# Patient Record
Sex: FEMALE | URBAN - METROPOLITAN AREA
[De-identification: names, ages, dates, MRNs, and addresses within clinical notes are randomized per-mention and may not be internally consistent; named-entity substitution may affect disease eponyms.]

---

## 2018-11-01 ENCOUNTER — HOSPITAL ENCOUNTER (OUTPATIENT)
Age: 58
Setting detail: SPECIMEN
Discharge: HOME | End: 2018-11-01
Payer: COMMERCIAL

## 2018-11-06 LAB — SURGICAL PATHOLOGY REPORT: NORMAL

## 2020-01-16 ENCOUNTER — HOSPITAL ENCOUNTER (OUTPATIENT)
Age: 60
Setting detail: SPECIMEN
Discharge: HOME | End: 2020-01-16
Payer: COMMERCIAL

## 2020-01-22 LAB — SURGICAL PATHOLOGY REPORT: NORMAL

## 2023-10-16 ENCOUNTER — APPOINTMENT (OUTPATIENT)
Dept: GENERAL RADIOLOGY | Age: 63
End: 2023-10-16

## 2023-10-16 ENCOUNTER — HOSPITAL ENCOUNTER (INPATIENT)
Age: 63
LOS: 2 days | Discharge: HOME | End: 2023-10-18
Attending: EMERGENCY MEDICINE | Admitting: PSYCHIATRY & NEUROLOGY

## 2023-10-16 DIAGNOSIS — R26.2 INABILITY TO WALK: Primary | ICD-10-CM

## 2023-10-16 DIAGNOSIS — R26.81 GAIT INSTABILITY: ICD-10-CM

## 2023-10-16 LAB
ALBUMIN SERPL-MCNC: 3.1 G/DL (ref 3.5–5.2)
ALBUMIN/GLOB SERPL: 0.8 {RATIO} (ref 1–2.5)
ALP SERPL-CCNC: 330 U/L (ref 35–104)
ALT SERPL-CCNC: 32 U/L (ref 5–33)
ANION GAP SERPL CALCULATED.3IONS-SCNC: 19 MMOL/L (ref 9–17)
AST SERPL-CCNC: 52 U/L
BASOPHILS # BLD: 0.07 K/UL (ref 0–0.2)
BASOPHILS NFR BLD: 1 % (ref 0–2)
BILIRUB SERPL-MCNC: 0.6 MG/DL (ref 0.3–1.2)
BUN SERPL-MCNC: 9 MG/DL (ref 8–23)
CALCIUM SERPL-MCNC: 9 MG/DL (ref 8.6–10.4)
CHLORIDE SERPL-SCNC: 95 MMOL/L (ref 98–107)
CO2 SERPL-SCNC: 17 MMOL/L (ref 20–31)
CREAT SERPL-MCNC: 0.5 MG/DL (ref 0.5–0.9)
EOSINOPHIL # BLD: 0.06 K/UL (ref 0–0.44)
EOSINOPHILS RELATIVE PERCENT: 1 % (ref 1–4)
ERYTHROCYTE [DISTWIDTH] IN BLOOD BY AUTOMATED COUNT: 12.3 % (ref 11.8–14.4)
GFR SERPL CREATININE-BSD FRML MDRD: >60 ML/MIN/1.73M2
GLUCOSE SERPL-MCNC: 79 MG/DL (ref 70–99)
HCT VFR BLD AUTO: 38.8 % (ref 36.3–47.1)
HGB BLD-MCNC: 13.1 G/DL (ref 11.9–15.1)
IMM GRANULOCYTES # BLD AUTO: 0.03 K/UL (ref 0–0.3)
IMM GRANULOCYTES NFR BLD: 0 %
LYMPHOCYTES NFR BLD: 1.28 K/UL (ref 1.1–3.7)
LYMPHOCYTES RELATIVE PERCENT: 13 % (ref 24–43)
MAGNESIUM SERPL-MCNC: 2.2 MG/DL (ref 1.6–2.6)
MCH RBC QN AUTO: 32.4 PG (ref 25.2–33.5)
MCHC RBC AUTO-ENTMCNC: 33.8 G/DL (ref 28.4–34.8)
MCV RBC AUTO: 96 FL (ref 82.6–102.9)
MONOCYTES NFR BLD: 0.65 K/UL (ref 0.1–1.2)
MONOCYTES NFR BLD: 7 % (ref 3–12)
NEUTROPHILS NFR BLD: 78 % (ref 36–65)
NEUTS SEG NFR BLD: 7.56 K/UL (ref 1.5–8.1)
NRBC BLD-RTO: 0 PER 100 WBC
PLATELET # BLD AUTO: 368 K/UL (ref 138–453)
PMV BLD AUTO: 9.7 FL (ref 8.1–13.5)
POTASSIUM SERPL-SCNC: 3.6 MMOL/L (ref 3.7–5.3)
PROT SERPL-MCNC: 6.8 G/DL (ref 6.4–8.3)
RBC # BLD AUTO: 4.04 M/UL (ref 3.95–5.11)
SODIUM SERPL-SCNC: 131 MMOL/L (ref 135–144)
TROPONIN I SERPL HS-MCNC: 11 NG/L (ref 0–14)
TROPONIN I SERPL HS-MCNC: 11 NG/L (ref 0–14)
TSH SERPL DL<=0.05 MIU/L-ACNC: 4.8 UIU/ML (ref 0.3–5)
WBC OTHER # BLD: 9.7 K/UL (ref 3.5–11.3)

## 2023-10-16 PROCEDURE — 83735 ASSAY OF MAGNESIUM: CPT

## 2023-10-16 PROCEDURE — 2060000000 HC ICU INTERMEDIATE R&B

## 2023-10-16 PROCEDURE — 84443 ASSAY THYROID STIM HORMONE: CPT

## 2023-10-16 PROCEDURE — 84484 ASSAY OF TROPONIN QUANT: CPT

## 2023-10-16 PROCEDURE — 93005 ELECTROCARDIOGRAM TRACING: CPT | Performed by: STUDENT IN AN ORGANIZED HEALTH CARE EDUCATION/TRAINING PROGRAM

## 2023-10-16 PROCEDURE — 83036 HEMOGLOBIN GLYCOSYLATED A1C: CPT

## 2023-10-16 PROCEDURE — 71046 X-RAY EXAM CHEST 2 VIEWS: CPT

## 2023-10-16 PROCEDURE — 80053 COMPREHEN METABOLIC PANEL: CPT

## 2023-10-16 PROCEDURE — 99285 EMERGENCY DEPT VISIT HI MDM: CPT

## 2023-10-16 PROCEDURE — 85025 COMPLETE CBC W/AUTO DIFF WBC: CPT

## 2023-10-16 PROCEDURE — 6370000000 HC RX 637 (ALT 250 FOR IP)

## 2023-10-16 PROCEDURE — 80061 LIPID PANEL: CPT

## 2023-10-16 RX ORDER — LORAZEPAM 2 MG/ML
1 INJECTION INTRAMUSCULAR EVERY 5 MIN PRN
Status: DISCONTINUED | OUTPATIENT
Start: 2023-10-16 | End: 2023-10-18 | Stop reason: HOSPADM

## 2023-10-16 RX ORDER — LEVOTHYROXINE SODIUM 137 UG/1
TABLET ORAL DAILY
COMMUNITY
Start: 2023-01-13

## 2023-10-16 RX ORDER — ONDANSETRON 2 MG/ML
4 INJECTION INTRAMUSCULAR; INTRAVENOUS EVERY 6 HOURS PRN
Status: DISCONTINUED | OUTPATIENT
Start: 2023-10-16 | End: 2023-10-18 | Stop reason: HOSPADM

## 2023-10-16 RX ORDER — ONDANSETRON 4 MG/1
4 TABLET, ORALLY DISINTEGRATING ORAL EVERY 8 HOURS PRN
Status: DISCONTINUED | OUTPATIENT
Start: 2023-10-16 | End: 2023-10-18 | Stop reason: HOSPADM

## 2023-10-16 RX ORDER — ENOXAPARIN SODIUM 100 MG/ML
40 INJECTION SUBCUTANEOUS DAILY
Status: DISCONTINUED | OUTPATIENT
Start: 2023-10-17 | End: 2023-10-18 | Stop reason: HOSPADM

## 2023-10-16 RX ORDER — SODIUM CHLORIDE 0.9 % (FLUSH) 0.9 %
5-40 SYRINGE (ML) INJECTION PRN
Status: DISCONTINUED | OUTPATIENT
Start: 2023-10-16 | End: 2023-10-18 | Stop reason: HOSPADM

## 2023-10-16 RX ORDER — POLYETHYLENE GLYCOL 3350 17 G/17G
17 POWDER, FOR SOLUTION ORAL DAILY PRN
Status: DISCONTINUED | OUTPATIENT
Start: 2023-10-16 | End: 2023-10-18 | Stop reason: HOSPADM

## 2023-10-16 RX ORDER — ACETAMINOPHEN 325 MG/1
650 TABLET ORAL EVERY 6 HOURS PRN
Status: DISCONTINUED | OUTPATIENT
Start: 2023-10-16 | End: 2023-10-18 | Stop reason: HOSPADM

## 2023-10-16 RX ORDER — SODIUM CHLORIDE 9 MG/ML
INJECTION, SOLUTION INTRAVENOUS PRN
Status: DISCONTINUED | OUTPATIENT
Start: 2023-10-16 | End: 2023-10-18 | Stop reason: HOSPADM

## 2023-10-16 RX ORDER — ACETAMINOPHEN 650 MG/1
650 SUPPOSITORY RECTAL EVERY 6 HOURS PRN
Status: DISCONTINUED | OUTPATIENT
Start: 2023-10-16 | End: 2023-10-18 | Stop reason: HOSPADM

## 2023-10-16 RX ORDER — LEVOTHYROXINE SODIUM 137 UG/1
137 TABLET ORAL DAILY
Status: DISCONTINUED | OUTPATIENT
Start: 2023-10-17 | End: 2023-10-18 | Stop reason: HOSPADM

## 2023-10-16 RX ORDER — SODIUM CHLORIDE 0.9 % (FLUSH) 0.9 %
5-40 SYRINGE (ML) INJECTION EVERY 12 HOURS SCHEDULED
Status: DISCONTINUED | OUTPATIENT
Start: 2023-10-16 | End: 2023-10-18 | Stop reason: HOSPADM

## 2023-10-16 RX ADMIN — ACETAMINOPHEN 650 MG: 325 TABLET ORAL at 23:22

## 2023-10-16 ASSESSMENT — ENCOUNTER SYMPTOMS
RHINORRHEA: 0
SHORTNESS OF BREATH: 0
BACK PAIN: 0
NAUSEA: 0
SORE THROAT: 0
ABDOMINAL PAIN: 0
COUGH: 0
VOMITING: 0

## 2023-10-16 ASSESSMENT — PAIN SCALES - GENERAL
PAINLEVEL_OUTOF10: 8
PAINLEVEL_OUTOF10: 6

## 2023-10-16 ASSESSMENT — PAIN - FUNCTIONAL ASSESSMENT: PAIN_FUNCTIONAL_ASSESSMENT: 0-10

## 2023-10-16 NOTE — ED PROVIDER NOTES
[address] ED  Emergency Department Encounter  Emergency Medicine Resident     Pt Travon Mock  MRN: [id number]  [facility] 1960  Date of evaluation: 10/16/23  PCP:  Gudelia Myers MD  Note Started: 4:03 PM EDT      CHIEF COMPLAINT       Chief Complaint   Patient presents with    Leg Pain    Arm Pain    Numbness     Pt states neuropathy     Extremity Weakness       HISTORY OF PRESENT ILLNESS  (Location/Symptom, Timing/Onset, Context/Setting, Quality, Duration, Modifying Factors, Severity.)      Aiden Colvin is a 59 y.o. female who presents with inability to ambulate. Patient states beginning in July she began having issues with neuropathy in her hands and feet. Patient states that she had MRIs of her spine done, showed some mild stenosis, followed up with neurosurgery and they determined that problem was not operative and recommended outpatient follow-up with neurology. Patient states she has an appointment scheduled with neurology however still is a month away, has not yet been able to see them. Patient states starting a few days ago she had significant progression in her neuropathy in her bilateral lower extremities and has having difficulty feeling her legs. States she feels off balance, feels weak. Having significant difficulty walking due to this. Denies any dizziness. No preceding viral-like symptoms. No fevers or chills. Patient states she is never an issue like this in the past.    PAST MEDICAL / SURGICAL / SOCIAL / FAMILY HISTORY      has no past medical history on file. has no past surgical history on file.       Social History     Socioeconomic History    Marital status:      Spouse name: Not on file    Number of children: Not on file    Years of education: Not on file    Highest education level: Not on file   Occupational History    Not on file   Tobacco Use    Smoking status: Not on file    Smokeless tobacco: Not on file   Substance and Sexual Activity

## 2023-10-16 NOTE — ED NOTES
Pt came to ed via triage w complaint of bilateral arm/leg numbness + tingling. Pt states that she has neuropathy, however has been struggling w unsteady gait since Thursday and wanted to be seen here in the ED. States she tried to make an appt w neuro however their earliest appt is in November. Denies previous stroke hx, cardiac hx, or resp issues. Denies pertinent medical conditions besides neuropathy. - blood thinner use. Not a stroke per Dr Adrien Bob. NIH 0 VSS, NAD, AOx4. Patient resting in bed, respirations even and unlabored. Call light in reach.        Milton Marvin RN  10/16/23 6558

## 2023-10-16 NOTE — ED TRIAGE NOTES
Pt to ED for arm/leg numbness tingling and pain and weakness. Pt states shes had it since July. Pt states she is suppose to see neuro end of November but cannot wait.

## 2023-10-17 ENCOUNTER — APPOINTMENT (OUTPATIENT)
Dept: CT IMAGING | Age: 63
End: 2023-10-17

## 2023-10-17 PROBLEM — R26.2 INABILITY TO WALK: Status: ACTIVE | Noted: 2023-10-17

## 2023-10-17 LAB
ANION GAP SERPL CALCULATED.3IONS-SCNC: 9 MMOL/L (ref 9–17)
BASOPHILS # BLD: 0.05 K/UL (ref 0–0.2)
BASOPHILS NFR BLD: 1 % (ref 0–2)
BUN SERPL-MCNC: 11 MG/DL (ref 8–23)
CALCIUM SERPL-MCNC: 8.8 MG/DL (ref 8.6–10.4)
CHLORIDE SERPL-SCNC: 98 MMOL/L (ref 98–107)
CHOLEST SERPL-MCNC: 188 MG/DL
CHOLESTEROL/HDL RATIO: 2
CO2 SERPL-SCNC: 25 MMOL/L (ref 20–31)
CREAT SERPL-MCNC: 0.6 MG/DL (ref 0.5–0.9)
EKG ATRIAL RATE: 83 BPM
EKG P AXIS: 59 DEGREES
EKG P-R INTERVAL: 144 MS
EKG Q-T INTERVAL: 428 MS
EKG QRS DURATION: 74 MS
EKG QTC CALCULATION (BAZETT): 502 MS
EKG R AXIS: -8 DEGREES
EKG T AXIS: -4 DEGREES
EKG VENTRICULAR RATE: 83 BPM
EOSINOPHIL # BLD: 0.06 K/UL (ref 0–0.44)
EOSINOPHILS RELATIVE PERCENT: 1 % (ref 1–4)
ERYTHROCYTE [DISTWIDTH] IN BLOOD BY AUTOMATED COUNT: 12.4 % (ref 11.8–14.4)
EST. AVERAGE GLUCOSE BLD GHB EST-MCNC: 88 MG/DL
FOLATE SERPL-MCNC: 8.5 NG/ML
GFR SERPL CREATININE-BSD FRML MDRD: >60 ML/MIN/1.73M2
GLUCOSE SERPL-MCNC: 88 MG/DL (ref 70–99)
HBA1C MFR BLD: 4.7 % (ref 4–6)
HCT VFR BLD AUTO: 35.7 % (ref 36.3–47.1)
HDLC SERPL-MCNC: 96 MG/DL
HGB BLD-MCNC: 12.1 G/DL (ref 11.9–15.1)
IMM GRANULOCYTES # BLD AUTO: 0.03 K/UL (ref 0–0.3)
IMM GRANULOCYTES NFR BLD: 0 %
LDLC SERPL CALC-MCNC: 72 MG/DL (ref 0–130)
LYMPHOCYTES NFR BLD: 1.55 K/UL (ref 1.1–3.7)
LYMPHOCYTES RELATIVE PERCENT: 21 % (ref 24–43)
MCH RBC QN AUTO: 32.2 PG (ref 25.2–33.5)
MCHC RBC AUTO-ENTMCNC: 33.9 G/DL (ref 28.4–34.8)
MCV RBC AUTO: 94.9 FL (ref 82.6–102.9)
MONOCYTES NFR BLD: 0.54 K/UL (ref 0.1–1.2)
MONOCYTES NFR BLD: 8 % (ref 3–12)
NEUTROPHILS NFR BLD: 69 % (ref 36–65)
NEUTS SEG NFR BLD: 5.01 K/UL (ref 1.5–8.1)
NRBC BLD-RTO: 0 PER 100 WBC
PLATELET # BLD AUTO: 302 K/UL (ref 138–453)
PMV BLD AUTO: 9.9 FL (ref 8.1–13.5)
POTASSIUM SERPL-SCNC: 4.1 MMOL/L (ref 3.7–5.3)
RBC # BLD AUTO: 3.76 M/UL (ref 3.95–5.11)
SODIUM SERPL-SCNC: 132 MMOL/L (ref 135–144)
TRIGL SERPL-MCNC: 102 MG/DL
VIT B12 SERPL-MCNC: 799 PG/ML (ref 232–1245)
WBC OTHER # BLD: 7.2 K/UL (ref 3.5–11.3)

## 2023-10-17 PROCEDURE — 82607 VITAMIN B-12: CPT

## 2023-10-17 PROCEDURE — 93010 ELECTROCARDIOGRAM REPORT: CPT | Performed by: INTERNAL MEDICINE

## 2023-10-17 PROCEDURE — 6370000000 HC RX 637 (ALT 250 FOR IP)

## 2023-10-17 PROCEDURE — 82746 ASSAY OF FOLIC ACID SERUM: CPT

## 2023-10-17 PROCEDURE — 6360000002 HC RX W HCPCS

## 2023-10-17 PROCEDURE — 99222 1ST HOSP IP/OBS MODERATE 55: CPT | Performed by: PSYCHIATRY & NEUROLOGY

## 2023-10-17 PROCEDURE — 97162 PT EVAL MOD COMPLEX 30 MIN: CPT

## 2023-10-17 PROCEDURE — 2580000003 HC RX 258

## 2023-10-17 PROCEDURE — 2060000000 HC ICU INTERMEDIATE R&B

## 2023-10-17 PROCEDURE — 80048 BASIC METABOLIC PNL TOTAL CA: CPT

## 2023-10-17 PROCEDURE — 97530 THERAPEUTIC ACTIVITIES: CPT

## 2023-10-17 PROCEDURE — 72131 CT LUMBAR SPINE W/O DYE: CPT

## 2023-10-17 PROCEDURE — 6370000000 HC RX 637 (ALT 250 FOR IP): Performed by: STUDENT IN AN ORGANIZED HEALTH CARE EDUCATION/TRAINING PROGRAM

## 2023-10-17 PROCEDURE — 85025 COMPLETE CBC W/AUTO DIFF WBC: CPT

## 2023-10-17 PROCEDURE — 36415 COLL VENOUS BLD VENIPUNCTURE: CPT

## 2023-10-17 RX ORDER — KETOROLAC TROMETHAMINE 15 MG/ML
15 INJECTION, SOLUTION INTRAMUSCULAR; INTRAVENOUS EVERY 6 HOURS PRN
Status: DISCONTINUED | OUTPATIENT
Start: 2023-10-17 | End: 2023-10-18 | Stop reason: HOSPADM

## 2023-10-17 RX ORDER — VENLAFAXINE 75 MG/1
75 TABLET ORAL 2 TIMES DAILY
COMMUNITY

## 2023-10-17 RX ORDER — VENLAFAXINE 75 MG/1
75 TABLET ORAL 2 TIMES DAILY
Status: DISCONTINUED | OUTPATIENT
Start: 2023-10-17 | End: 2023-10-18 | Stop reason: HOSPADM

## 2023-10-17 RX ORDER — PANTOPRAZOLE SODIUM 40 MG/1
40 TABLET, DELAYED RELEASE ORAL
Status: DISCONTINUED | OUTPATIENT
Start: 2023-10-18 | End: 2023-10-18 | Stop reason: HOSPADM

## 2023-10-17 RX ADMIN — SODIUM CHLORIDE, PRESERVATIVE FREE 10 ML: 5 INJECTION INTRAVENOUS at 22:37

## 2023-10-17 RX ADMIN — ACETAMINOPHEN 650 MG: 325 TABLET ORAL at 06:55

## 2023-10-17 RX ADMIN — VENLAFAXINE 75 MG: 75 TABLET ORAL at 21:27

## 2023-10-17 RX ADMIN — KETOROLAC TROMETHAMINE 15 MG: 15 INJECTION, SOLUTION INTRAMUSCULAR; INTRAVENOUS at 13:15

## 2023-10-17 RX ADMIN — KETOROLAC TROMETHAMINE 15 MG: 15 INJECTION, SOLUTION INTRAMUSCULAR; INTRAVENOUS at 21:27

## 2023-10-17 RX ADMIN — SODIUM CHLORIDE, PRESERVATIVE FREE 10 ML: 5 INJECTION INTRAVENOUS at 10:53

## 2023-10-17 RX ADMIN — VENLAFAXINE 75 MG: 75 TABLET ORAL at 12:39

## 2023-10-17 RX ADMIN — LEVOTHYROXINE SODIUM 137 MCG: 137 TABLET ORAL at 10:53

## 2023-10-17 ASSESSMENT — PAIN DESCRIPTION - ORIENTATION
ORIENTATION: LOWER

## 2023-10-17 ASSESSMENT — PAIN DESCRIPTION - LOCATION
LOCATION: BACK

## 2023-10-17 ASSESSMENT — PAIN DESCRIPTION - DESCRIPTORS
DESCRIPTORS: THROBBING;STABBING;ACHING
DESCRIPTORS: DISCOMFORT

## 2023-10-17 ASSESSMENT — PAIN SCALES - GENERAL
PAINLEVEL_OUTOF10: 5
PAINLEVEL_OUTOF10: 10
PAINLEVEL_OUTOF10: 2
PAINLEVEL_OUTOF10: 0
PAINLEVEL_OUTOF10: 8
PAINLEVEL_OUTOF10: 5
PAINLEVEL_OUTOF10: 10

## 2023-10-17 ASSESSMENT — PAIN - FUNCTIONAL ASSESSMENT: PAIN_FUNCTIONAL_ASSESSMENT: PREVENTS OR INTERFERES SOME ACTIVE ACTIVITIES AND ADLS

## 2023-10-17 NOTE — PROGRESS NOTES
Physical Therapy  Facility/Department: [facility] OBSERVATION UNIT  Physical Therapy Initial Assessment    Name: Cayetano Gallegos  : 1960  MRN: [id number]  Date of Service: 10/17/2023  Chief Complaint   Patient presents with    Leg Pain    Arm Pain    Numbness     Pt states neuropathy     Extremity Weakness       Discharge Recommendations:    Further therapy recommended at discharge. PT Equipment Recommendations  Equipment Needed: Yes  Mobility Devices: Kirt Zoe; ADL Assistive Devices  Walker: Rolling  ADL Assistive Devices: Shower Chair with back; Toileting - 3-in-1 Commode      Patient Diagnosis(es): The encounter diagnosis was Inability to walk. Past Medical History:  has no past medical history on file. Past Surgical History:  has a past surgical history that includes Hysterectomy. Assessment   Body Structures, Functions, Activity Limitations Requiring Skilled Therapeutic Intervention: Decreased functional mobility ; Decreased endurance;Decreased ROM; Decreased balance; Increased pain;Decreased posture;Decreased strength;Decreased sensation  Assessment: Pt ambulated 6ft x 2 with RW Eric, pt demonstrates generalized strength and sensory deficits and significant endurance deficits limiting tolerance to functional mobility. Pt is a high fall risk. Recommending continued skilled physical therapy to address continued strength deficits, decrease fall risk and return pt to prior level of independence.   Therapy Prognosis: Fair  Decision Making: Medium Complexity  Requires PT Follow-Up: Yes  Activity Tolerance  Activity Tolerance: Patient limited by fatigue;Patient limited by endurance     Plan   Physcial Therapy Plan  General Plan:  (5-6x/week)  Current Treatment Recommendations: Strengthening, ROM, Balance training, Endurance training, Equipment evaluation, education, & procurement, Functional mobility training, Home exercise program, Safety education & training, Patient/Caregiver education & training, Gait training, Stair

## 2023-10-17 NOTE — ED NOTES
Pt refusing BP cuff at this time. Writer to reattempt to place BP cuff at next BP check.       Nahomy Francis RN  10/17/23 5787

## 2023-10-17 NOTE — PLAN OF CARE
Problem: Safety - Adult  Goal: Free from fall injury  Flowsheets (Taken 10/17/2023 8102)  Free From Fall Injury:   Instruct family/caregiver on patient safety   Based on caregiver fall risk screen, instruct family/caregiver to ask for assistance with transferring infant if caregiver noted to have fall risk factors

## 2023-10-17 NOTE — ED NOTES
Dr. Sanjay Hope notifed that patient is experiencing pain that is unrelieved by Jacqueline Nation RN  10/17/23 0739

## 2023-10-17 NOTE — PLAN OF CARE
Problem: Discharge Planning  Goal: Discharge to home or other facility with appropriate resources  Flowsheets (Taken 10/17/2023 2204)  Discharge to home or other facility with appropriate resources:   Identify barriers to discharge with patient and caregiver   Arrange for needed discharge resources and transportation as appropriate   Identify discharge learning needs (meds, wound care, etc)     Problem: Pain  Goal: Verbalizes/displays adequate comfort level or baseline comfort level  Flowsheets (Taken 10/17/2023 0886)  Verbalizes/displays adequate comfort level or baseline comfort level:   Encourage patient to monitor pain and request assistance   Assess pain using appropriate pain scale   Administer analgesics based on type and severity of pain and evaluate response   Implement non-pharmacological measures as appropriate and evaluate response   Consider cultural and social influences on pain and pain management   Notify Licensed Independent Practitioner if interventions unsuccessful or patient reports new pain

## 2023-10-17 NOTE — H&P
[facility]   [address] [facility]    HISTORY AND PHYSICAL EXAMINATION            Date:   10/16/2023  Patient name:  Yeni Lugo  Date of admission:  10/16/2023  4:03 PM  MRN:   [id number]  Account:  [de-identified]  YOB: 1960  PCP:    Chuck Wood MD  Room:   21/21  Code Status:    No Order    Chief Complaint:     Chief Complaint   Patient presents with    Leg Pain    Arm Pain    Numbness     Pt states neuropathy     Extremity Weakness       History Obtained From:     patient    History of Present Illness:     71-year-old female history of bilateral lower limb neuropathy, anxiety depression, hypothyroidism on Synthroid presented after experiencing worsening gait instability. Patient does not appear to have any focal weakness in her lower limbs but does get very shaky upon initiation of gait movement. Patient mentions she cannot feel the location of her limbs and space, which supports a diagnosis of severe lower limb neuropathy causing dysautonomia. Patient will be admitted for work-up regarding her gait issues, and will get a CT lumbar without contrast to rule out any acute pathologies as it has been worsening over the last couple of days. Past Medical History:     History reviewed. No pertinent past medical history. Past Surgical History:     History reviewed. No pertinent surgical history. Medications Prior to Admission:     Prior to Admission medications    Medication Sig Start Date End Date Taking? Authorizing Provider   levothyroxine (SYNTHROID) 137 MCG tablet Take by mouth daily 1/13/23  Yes Provider, MD Stef        Allergies:     Patient has no known allergies. Social History:     Tobacco:    has no history on file for tobacco use. Alcohol:      has no history on file for alcohol use. Drug Use:  has no history on file for drug use. Family History:     History reviewed. No pertinent family history.     Review of Systems: FUNCTION:  Intact fine motor control over upper limbs and lower limbs   REFLEX FUNCTION:  Symmetric in upper and lower extremities, no Babinski sign   STATION and GAIT Unstable gait, patient is unable to ambulate due to tremulousness in the lower limbs         Investigations:      Laboratory Testing:  Recent Results (from the past 24 hour(s))   CBC with Auto Differential    Collection Time: 10/16/23  4:24 PM   Result Value Ref Range    WBC 9.7 3.5 - 11.3 k/uL    RBC 4.04 3.95 - 5.11 m/uL    Hemoglobin 13.1 11.9 - 15.1 g/dL    Hematocrit 38.8 36.3 - 47.1 %    MCV 96.0 82.6 - 102.9 fL    MCH 32.4 25.2 - 33.5 pg    MCHC 33.8 28.4 - 34.8 g/dL    RDW 12.3 11.8 - 14.4 %    Platelets 723 405 - 637 k/uL    MPV 9.7 8.1 - 13.5 fL    NRBC Automated 0.0 0.0 per 100 WBC    Neutrophils % 78 (H) 36 - 65 %    Lymphocytes % 13 (L) 24 - 43 %    Monocytes % 7 3 - 12 %    Eosinophils % 1 1 - 4 %    Basophils % 1 0 - 2 %    Immature Granulocytes 0 0 %    Neutrophils Absolute 7.56 1.50 - 8.10 k/uL    Lymphocytes Absolute 1.28 1.10 - 3.70 k/uL    Monocytes Absolute 0.65 0.10 - 1.20 k/uL    Eosinophils Absolute 0.06 0.00 - 0.44 k/uL    Basophils Absolute 0.07 0.00 - 0.20 k/uL    Absolute Immature Granulocyte 0.03 0.00 - 0.30 k/uL   CMP    Collection Time: 10/16/23  4:24 PM   Result Value Ref Range    Sodium 131 (L) 135 - 144 mmol/L    Potassium 3.6 (L) 3.7 - 5.3 mmol/L    Chloride 95 (L) 98 - 107 mmol/L    CO2 17 (L) 20 - 31 mmol/L    Anion Gap 19 (H) 9 - 17 mmol/L    Glucose 79 70 - 99 mg/dL    BUN 9 8 - 23 mg/dL    Creatinine 0.5 0.5 - 0.9 mg/dL    Est, Glom Filt Rate >60 >60 mL/min/1.73m2    Calcium 9.0 8.6 - 10.4 mg/dL    Total Protein 6.8 6.4 - 8.3 g/dL    Albumin 3.1 (L) 3.5 - 5.2 g/dL    Albumin/Globulin Ratio 0.8 (L) 1.0 - 2.5    Total Bilirubin 0.6 0.3 - 1.2 mg/dL    Alkaline Phosphatase 330 (H) 35 - 104 U/L    ALT 32 5 - 33 U/L    AST 52 (H) <32 U/L   Troponin    Collection Time: 10/16/23  4:24 PM   Result Value Ref Range

## 2023-10-17 NOTE — CARE COORDINATION
Case Management Assessment  Initial Evaluation    Date/Time of Evaluation: 10/17/2023 11:41 AM  Assessment Completed by: Montez Cockayne, RN    If patient is discharged prior to next notation, then this note serves as note for discharge by case management. Patient Name: Luigi Loera                   YOB: 1960  Diagnosis: Gait instability [R26.81]  Inability to walk [R26.2]                   Date / Time: 10/16/2023  4:03 PM    Patient Admission Status: Inpatient   Readmission Risk (Low < 19, Mod (19-27), High > 27): Readmission Risk Score: 6    Current PCP: Catherine Mike MD  PCP verified by CM? Yes (levar downs md)    Chart Reviewed: Yes      History Provided by: Patient  Patient Orientation: Alert and Oriented    Patient Cognition: Alert    Hospitalization in the last 30 days (Readmission):  No    If yes, Readmission Assessment in  Navigator will be completed. Advance Directives:      Code Status: Full Code   Patient's Primary Decision Maker is: Legal Next of Kin      Discharge Planning:    Patient lives with: Spouse/Significant Other Type of Home: House  Primary Care Giver: Self  Patient Support Systems include: Spouse/Significant Other   Current Financial resources: HELP  Current community resources: None  Current services prior to admission: Durable Medical Equipment            Current DME: Walker            Type of Home Care services:  OT, PT    ADLS  Prior functional level: Independent in ADLs/IADLs  Current functional level: Independent in ADLs/IADLs    PT AM-PAC:   /24  OT AM-PAC:   /24    Family can provide assistance at DC: Yes  Would you like Case Management to discuss the discharge plan with any other family members/significant others, and if so, who?     Plans to Return to Present Housing: Yes  Other Identified Issues/Barriers to RETURNING to current housing: none  Potential Assistance needed at discharge: N/A            Patient expects to discharge to: [address] [place] [address] for transportation at

## 2023-10-17 NOTE — ED NOTES
ED to inpatient nurses report    Chief Complaint   Patient presents with    Leg Pain    Arm Pain    Numbness     Pt states neuropathy     Extremity Weakness      Present to ED from home for bilateral leg weakness with neuropathy, no hx of DM, new onset. Pt walks with a walker but is having weakness. LOC: alert and orientated to name, place, date  Vital signs   Vitals:    10/17/23 0434 10/17/23 0500 10/17/23 0542 10/17/23 0608   BP:  (!) 145/98     Pulse:       Resp:       Temp:       TempSrc:       SpO2: 95% 92% 93% 96%   Weight:          Oxygen Baseline RA    Current needs required RA   LDAs:   Peripheral IV 10/16/23 Right Antecubital (Active)     Mobility: Requires assistance * 1  Pending ED orders: NA  Present condition: A&Ox4,   Code Status: [unfilled] FULL  Consults:  []  Hospitalist  Completed  [] yes [] no  []  Medicine  Completed  [] yes [] No  []  Cardiology  Completed  [] yes [] No  []  GI   Completed  [] yes [] No  []  Neurology  Completed  [] yes [] No  []  Nephrology Completed  [] yes [] No  []  Vascular  Completed  [] yes [] No   []  Surgery  Completed  [] yes [] No   []  Urology  Completed  [] yes [] No   []  Plastics  Completed  [] yes [] No   []  ENT  Completed  [] yes [] No   []  Other   Completed  [] yes [] No  Pertinent event(s)  Pt is unsteady when getting up even with walker. Using bedside commode for her.      Electronically signed by Jersey Lacey RN on 10/17/2023 at 6:22 AM      Jersey Lacey RN  10/17/23 036

## 2023-10-17 NOTE — ED NOTES
Pt attempted to ambulate to RR w walker as she used walker at home. Pt lightly slipped and caught herself on the ground w RN at bedside while writer was positioning herself to help place her back in the bed. Pt caught herself , no apparent injuries as seen by RN or verbalized by pt. Pt did not hit head. Pt assisted back to bed by writer and another RN. Pt given pillow and repositioned in bed, NAD, no verbalized needs at this time. Orthostatics completed however pt unable to tolerate standing d/t bilateral leg weakness so standing portion not completed.       Ioana , RN  10/17/23 2239

## 2023-10-18 VITALS
BODY MASS INDEX: 27.11 KG/M2 | SYSTOLIC BLOOD PRESSURE: 148 MMHG | OXYGEN SATURATION: 97 % | WEIGHT: 153 LBS | HEART RATE: 72 BPM | RESPIRATION RATE: 16 BRPM | TEMPERATURE: 98.5 F | DIASTOLIC BLOOD PRESSURE: 79 MMHG | HEIGHT: 63 IN

## 2023-10-18 LAB
ANION GAP SERPL CALCULATED.3IONS-SCNC: 11 MMOL/L (ref 9–17)
BASOPHILS # BLD: 0.03 K/UL (ref 0–0.2)
BASOPHILS NFR BLD: 1 % (ref 0–2)
BUN SERPL-MCNC: 11 MG/DL (ref 8–23)
CALCIUM SERPL-MCNC: 8.5 MG/DL (ref 8.6–10.4)
CHLORIDE SERPL-SCNC: 98 MMOL/L (ref 98–107)
CO2 SERPL-SCNC: 23 MMOL/L (ref 20–31)
CREAT SERPL-MCNC: 0.5 MG/DL (ref 0.5–0.9)
EOSINOPHIL # BLD: 0.06 K/UL (ref 0–0.44)
EOSINOPHILS RELATIVE PERCENT: 1 % (ref 1–4)
ERYTHROCYTE [DISTWIDTH] IN BLOOD BY AUTOMATED COUNT: 12.3 % (ref 11.8–14.4)
GFR SERPL CREATININE-BSD FRML MDRD: >60 ML/MIN/1.73M2
GLUCOSE SERPL-MCNC: 81 MG/DL (ref 70–99)
HCT VFR BLD AUTO: 33 % (ref 36.3–47.1)
HGB BLD-MCNC: 11.1 G/DL (ref 11.9–15.1)
IMM GRANULOCYTES # BLD AUTO: <0.03 K/UL (ref 0–0.3)
IMM GRANULOCYTES NFR BLD: 0 %
LYMPHOCYTES NFR BLD: 1.1 K/UL (ref 1.1–3.7)
LYMPHOCYTES RELATIVE PERCENT: 18 % (ref 24–43)
MAGNESIUM SERPL-MCNC: 2.2 MG/DL (ref 1.6–2.6)
MCH RBC QN AUTO: 32.8 PG (ref 25.2–33.5)
MCHC RBC AUTO-ENTMCNC: 33.6 G/DL (ref 28.4–34.8)
MCV RBC AUTO: 97.6 FL (ref 82.6–102.9)
MONOCYTES NFR BLD: 0.49 K/UL (ref 0.1–1.2)
MONOCYTES NFR BLD: 8 % (ref 3–12)
NEUTROPHILS NFR BLD: 72 % (ref 36–65)
NEUTS SEG NFR BLD: 4.33 K/UL (ref 1.5–8.1)
NRBC BLD-RTO: 0 PER 100 WBC
PLATELET # BLD AUTO: 262 K/UL (ref 138–453)
PMV BLD AUTO: 9.7 FL (ref 8.1–13.5)
POTASSIUM SERPL-SCNC: 3.2 MMOL/L (ref 3.7–5.3)
RBC # BLD AUTO: 3.38 M/UL (ref 3.95–5.11)
SODIUM SERPL-SCNC: 132 MMOL/L (ref 135–144)
WBC OTHER # BLD: 6 K/UL (ref 3.5–11.3)

## 2023-10-18 PROCEDURE — 6370000000 HC RX 637 (ALT 250 FOR IP): Performed by: STUDENT IN AN ORGANIZED HEALTH CARE EDUCATION/TRAINING PROGRAM

## 2023-10-18 PROCEDURE — 6370000000 HC RX 637 (ALT 250 FOR IP)

## 2023-10-18 PROCEDURE — 36415 COLL VENOUS BLD VENIPUNCTURE: CPT

## 2023-10-18 PROCEDURE — 85025 COMPLETE CBC W/AUTO DIFF WBC: CPT

## 2023-10-18 PROCEDURE — 80048 BASIC METABOLIC PNL TOTAL CA: CPT

## 2023-10-18 PROCEDURE — 99232 SBSQ HOSP IP/OBS MODERATE 35: CPT | Performed by: PSYCHIATRY & NEUROLOGY

## 2023-10-18 PROCEDURE — 83735 ASSAY OF MAGNESIUM: CPT

## 2023-10-18 PROCEDURE — 2580000003 HC RX 258

## 2023-10-18 RX ADMIN — LEVOTHYROXINE SODIUM 137 MCG: 137 TABLET ORAL at 07:58

## 2023-10-18 RX ADMIN — PANTOPRAZOLE SODIUM 40 MG: 40 TABLET, DELAYED RELEASE ORAL at 06:18

## 2023-10-18 RX ADMIN — SODIUM CHLORIDE, PRESERVATIVE FREE 10 ML: 5 INJECTION INTRAVENOUS at 07:57

## 2023-10-18 RX ADMIN — VENLAFAXINE 75 MG: 75 TABLET ORAL at 07:58

## 2023-10-18 NOTE — PROGRESS NOTES
[facility]   [address] [facility]     Progress note      Date:                            10/16/2023  Patient name:              Magalis Treadwell  Date of admission:      10/16/2023  4:03 PM  MRN:                           [id number]  Account:                      [id number]  YOB: 1960  PCP:                            Tiffany Anderson MD  Room:                          21/21  Code Status:               No Order     Chief Complaint:           Chief Complaint   Patient presents with    Leg Pain    Arm Pain    Numbness       Pt states neuropathy     Extremity Weakness         History Obtained From:      Patient      Interval hx:     CT lumbar spine without contrast showed spondylolysis of the lumbar spine most prominent at the level of L4-L5 and mild to moderate central stenosis without fractures    vitamin B12 799, folate 8.5-both within normal limits    Physical therapy evaluated patient, made a strengthening physical therapy plan for next 5 to 6 home exercise program     History of Present Illness:      59-year-old female history of bilateral lower limb neuropathy, anxiety depression, hypothyroidism on Synthroid presented after experiencing worsening gait instability. Patient does not appear to have any focal weakness in her lower limbs but does get very shaky upon initiation of gait movement. Patient mentions she cannot feel the location of her limbs and space, which supports a diagnosis of severe lower limb neuropathy causing dysautonomia. Patient will be admitted for work-up regarding her gait issues, and will get a CT lumbar without contrast to rule out any acute pathologies as it has been worsening over the last couple of days. Past Medical History:      Past Medical History   History reviewed. No pertinent past medical history. Past Surgical History:      Past Surgical History   History reviewed. No pertinent surgical history.

## 2023-10-18 NOTE — PLAN OF CARE
Macie Loza was evaluated today and a DME order was entered for a wheeled walker because she requires this to successfully complete daily living tasks of eating, bathing, toileting, personal cares, ambulating, grooming, hygiene, dressing upper body, dressing lower body, meal preparation, and taking own medications. A wheeled walker is necessary due to the patient's unsteady gait, upper body weakness, and inability to  an ambulation device; and she can ambulate only by pushing a walker instead of a lesser assistive device such as a cane, crutch, or standard walker. The need for this equipment was discussed with the patient and she understands and is in agreement.      Electronically signed by Ramana Wu MD on 10/18/2023 at 11:18 AM

## 2023-10-26 ENCOUNTER — TELEPHONE (OUTPATIENT)
Dept: NEUROLOGY | Age: 63
End: 2023-10-26

## 2023-10-26 NOTE — TELEPHONE ENCOUNTER
Bernice Mills called in to schedule a  appt. Discussed the option of being seen at [facility] since she had seen residents. She asked to schedule here. Pt. offered appt for 12/14/23 and to be put on wait list. She refused that appt for now.

## 2023-11-21 ENCOUNTER — TELEPHONE (OUTPATIENT)
Dept: NEUROLOGY | Age: 63
End: 2023-11-21

## 2023-11-21 NOTE — TELEPHONE ENCOUNTER
11 21 2023 I called the patient times 2 (11 14 2023 and 11 21 2023 at  [id number]) to schedule new patient appointment with one of our providers, EVER both times, no response. I mailed the patient a letter asking them to call the office back to schedule this appointment.   KS

## 2024-02-12 ENCOUNTER — HOSPITAL ENCOUNTER (INPATIENT)
Age: 64
LOS: 11 days | Discharge: OTHER INSTITUTION | DRG: 052 | End: 2024-02-23
Attending: STUDENT IN AN ORGANIZED HEALTH CARE EDUCATION/TRAINING PROGRAM | Admitting: FAMILY MEDICINE
Payer: MEDICAID

## 2024-02-12 ENCOUNTER — APPOINTMENT (OUTPATIENT)
Dept: ULTRASOUND IMAGING | Age: 64
DRG: 052 | End: 2024-02-12
Attending: STUDENT IN AN ORGANIZED HEALTH CARE EDUCATION/TRAINING PROGRAM
Payer: MEDICAID

## 2024-02-12 DIAGNOSIS — R56.9 SEIZURE-LIKE ACTIVITY (HCC): ICD-10-CM

## 2024-02-12 DIAGNOSIS — G81.94 LEFT HEMIPARESIS (HCC): Primary | ICD-10-CM

## 2024-02-12 DIAGNOSIS — D64.9 ANEMIA, UNSPECIFIED TYPE: ICD-10-CM

## 2024-02-12 DIAGNOSIS — R41.89 COGNITIVE IMPAIRMENT: ICD-10-CM

## 2024-02-12 PROBLEM — G93.40 ACUTE ENCEPHALOPATHY: Status: ACTIVE | Noted: 2024-02-12

## 2024-02-12 PROBLEM — K74.60 CIRRHOSIS OF LIVER WITH ASCITES (HCC): Status: ACTIVE | Noted: 2024-02-12

## 2024-02-12 PROBLEM — R79.89 ELEVATED LFTS: Status: ACTIVE | Noted: 2024-02-12

## 2024-02-12 PROBLEM — F10.10 ALCOHOL ABUSE: Status: ACTIVE | Noted: 2024-02-12

## 2024-02-12 PROBLEM — R18.8 CIRRHOSIS OF LIVER WITH ASCITES (HCC): Status: ACTIVE | Noted: 2024-02-12

## 2024-02-12 LAB
A1AT SERPL-MCNC: 188 MG/DL (ref 90–200)
AMMONIA PLAS-SCNC: 25 UMOL/L (ref 11–51)
ANION GAP SERPL CALCULATED.3IONS-SCNC: 7 MMOL/L (ref 9–17)
BUN SERPL-MCNC: 26 MG/DL (ref 8–23)
CALCIUM SERPL-MCNC: 8 MG/DL (ref 8.6–10.4)
CERULOPLASMIN SERPL-MCNC: 11 MG/DL (ref 16–45)
CHLORIDE SERPL-SCNC: 111 MMOL/L (ref 98–107)
CO2 SERPL-SCNC: 33 MMOL/L (ref 20–31)
CREAT SERPL-MCNC: 0.8 MG/DL (ref 0.5–0.9)
FERRITIN SERPL-MCNC: 1205 NG/ML (ref 13–150)
FOLATE SERPL-MCNC: 7.8 NG/ML
GFR SERPL CREATININE-BSD FRML MDRD: >60 ML/MIN/1.73M2
GLUCOSE SERPL-MCNC: 80 MG/DL (ref 70–99)
HCT VFR BLD AUTO: 23.7 % (ref 36–46)
HCT VFR BLD AUTO: 24.2 % (ref 36–46)
HCT VFR BLD AUTO: 24.3 % (ref 36–46)
HGB BLD-MCNC: 8 G/DL (ref 12–16)
HGB BLD-MCNC: 8 G/DL (ref 12–16)
HGB BLD-MCNC: 8.1 G/DL (ref 12–16)
IRON SATN MFR SERPL: 30 % (ref 20–55)
IRON SERPL-MCNC: 28 UG/DL (ref 37–145)
POTASSIUM SERPL-SCNC: 3.7 MMOL/L (ref 3.7–5.3)
SODIUM SERPL-SCNC: 151 MMOL/L (ref 135–144)
TIBC SERPL-MCNC: 94 UG/DL (ref 250–450)
UNSATURATED IRON BINDING CAPACITY: 66 UG/DL (ref 112–347)
VIT B12 SERPL-MCNC: 689 PG/ML (ref 232–1245)

## 2024-02-12 PROCEDURE — 86645 CMV ANTIBODY IGM: CPT

## 2024-02-12 PROCEDURE — 6360000002 HC RX W HCPCS: Performed by: NURSE PRACTITIONER

## 2024-02-12 PROCEDURE — 82607 VITAMIN B-12: CPT

## 2024-02-12 PROCEDURE — 6370000000 HC RX 637 (ALT 250 FOR IP): Performed by: NURSE PRACTITIONER

## 2024-02-12 PROCEDURE — 36415 COLL VENOUS BLD VENIPUNCTURE: CPT

## 2024-02-12 PROCEDURE — 82746 ASSAY OF FOLIC ACID SERUM: CPT

## 2024-02-12 PROCEDURE — 83540 ASSAY OF IRON: CPT

## 2024-02-12 PROCEDURE — 83516 IMMUNOASSAY NONANTIBODY: CPT

## 2024-02-12 PROCEDURE — 76705 ECHO EXAM OF ABDOMEN: CPT

## 2024-02-12 PROCEDURE — 2580000003 HC RX 258: Performed by: STUDENT IN AN ORGANIZED HEALTH CARE EDUCATION/TRAINING PROGRAM

## 2024-02-12 PROCEDURE — 1210000000 HC MED SURG R&B

## 2024-02-12 PROCEDURE — 2580000003 HC RX 258: Performed by: NURSE PRACTITIONER

## 2024-02-12 PROCEDURE — 80048 BASIC METABOLIC PNL TOTAL CA: CPT

## 2024-02-12 PROCEDURE — C9113 INJ PANTOPRAZOLE SODIUM, VIA: HCPCS | Performed by: NURSE PRACTITIONER

## 2024-02-12 PROCEDURE — 86663 EPSTEIN-BARR ANTIBODY: CPT

## 2024-02-12 PROCEDURE — A4216 STERILE WATER/SALINE, 10 ML: HCPCS | Performed by: NURSE PRACTITIONER

## 2024-02-12 PROCEDURE — 85014 HEMATOCRIT: CPT

## 2024-02-12 PROCEDURE — 82390 ASSAY OF CERULOPLASMIN: CPT

## 2024-02-12 PROCEDURE — 85018 HEMOGLOBIN: CPT

## 2024-02-12 PROCEDURE — 82728 ASSAY OF FERRITIN: CPT

## 2024-02-12 PROCEDURE — 6370000000 HC RX 637 (ALT 250 FOR IP): Performed by: STUDENT IN AN ORGANIZED HEALTH CARE EDUCATION/TRAINING PROGRAM

## 2024-02-12 PROCEDURE — 82103 ALPHA-1-ANTITRYPSIN TOTAL: CPT

## 2024-02-12 PROCEDURE — 82140 ASSAY OF AMMONIA: CPT

## 2024-02-12 PROCEDURE — 87040 BLOOD CULTURE FOR BACTERIA: CPT

## 2024-02-12 PROCEDURE — 80074 ACUTE HEPATITIS PANEL: CPT

## 2024-02-12 PROCEDURE — 89051 BODY FLUID CELL COUNT: CPT

## 2024-02-12 PROCEDURE — 86376 MICROSOMAL ANTIBODY EACH: CPT

## 2024-02-12 PROCEDURE — 82105 ALPHA-FETOPROTEIN SERUM: CPT

## 2024-02-12 PROCEDURE — 99222 1ST HOSP IP/OBS MODERATE 55: CPT | Performed by: STUDENT IN AN ORGANIZED HEALTH CARE EDUCATION/TRAINING PROGRAM

## 2024-02-12 PROCEDURE — 87070 CULTURE OTHR SPECIMN AEROBIC: CPT

## 2024-02-12 PROCEDURE — 82042 OTHER SOURCE ALBUMIN QUAN EA: CPT

## 2024-02-12 PROCEDURE — 6360000002 HC RX W HCPCS: Performed by: STUDENT IN AN ORGANIZED HEALTH CARE EDUCATION/TRAINING PROGRAM

## 2024-02-12 PROCEDURE — 87205 SMEAR GRAM STAIN: CPT

## 2024-02-12 PROCEDURE — 86664 EPSTEIN-BARR NUCLEAR ANTIGEN: CPT

## 2024-02-12 PROCEDURE — 83550 IRON BINDING TEST: CPT

## 2024-02-12 PROCEDURE — 99255 IP/OBS CONSLTJ NEW/EST HI 80: CPT | Performed by: NURSE PRACTITIONER

## 2024-02-12 PROCEDURE — 86644 CMV ANTIBODY: CPT

## 2024-02-12 PROCEDURE — 86038 ANTINUCLEAR ANTIBODIES: CPT

## 2024-02-12 PROCEDURE — 87075 CULTR BACTERIA EXCEPT BLOOD: CPT

## 2024-02-12 PROCEDURE — 84157 ASSAY OF PROTEIN OTHER: CPT

## 2024-02-12 PROCEDURE — 86225 DNA ANTIBODY NATIVE: CPT

## 2024-02-12 PROCEDURE — 86665 EPSTEIN-BARR CAPSID VCA: CPT

## 2024-02-12 RX ORDER — DEXTROSE AND SODIUM CHLORIDE 5; .45 G/100ML; G/100ML
INJECTION, SOLUTION INTRAVENOUS CONTINUOUS
Status: DISCONTINUED | OUTPATIENT
Start: 2024-02-12 | End: 2024-02-13

## 2024-02-12 RX ORDER — LACTULOSE 10 G/15ML
30 SOLUTION ORAL 2 TIMES DAILY
Status: ON HOLD | COMMUNITY
End: 2024-02-23 | Stop reason: HOSPADM

## 2024-02-12 RX ORDER — LEVOTHYROXINE SODIUM 0.12 MG/1
125 TABLET ORAL DAILY
Status: ON HOLD | COMMUNITY
End: 2024-02-23 | Stop reason: HOSPADM

## 2024-02-12 RX ORDER — MULTIVIT-MIN/FERROUS GLUCONATE 9 MG/15 ML
15 LIQUID (ML) ORAL DAILY
COMMUNITY

## 2024-02-12 RX ORDER — DULOXETIN HYDROCHLORIDE 60 MG/1
60 CAPSULE, DELAYED RELEASE ORAL DAILY
Status: ON HOLD | COMMUNITY
End: 2024-02-23 | Stop reason: HOSPADM

## 2024-02-12 RX ORDER — ONDANSETRON 2 MG/ML
4 INJECTION INTRAMUSCULAR; INTRAVENOUS EVERY 6 HOURS PRN
Status: DISCONTINUED | OUTPATIENT
Start: 2024-02-12 | End: 2024-02-23 | Stop reason: HOSPADM

## 2024-02-12 RX ORDER — GABAPENTIN 100 MG/1
100 CAPSULE ORAL NIGHTLY
Status: ON HOLD | COMMUNITY
End: 2024-02-23 | Stop reason: HOSPADM

## 2024-02-12 RX ORDER — LACTULOSE 10 G/15ML
20 SOLUTION ORAL 3 TIMES DAILY
Status: DISCONTINUED | OUTPATIENT
Start: 2024-02-12 | End: 2024-02-23 | Stop reason: HOSPADM

## 2024-02-12 RX ORDER — ONDANSETRON 4 MG/1
4 TABLET, ORALLY DISINTEGRATING ORAL EVERY 8 HOURS PRN
Status: DISCONTINUED | OUTPATIENT
Start: 2024-02-12 | End: 2024-02-23 | Stop reason: HOSPADM

## 2024-02-12 RX ORDER — BUMETANIDE 1 MG/1
2 TABLET ORAL DAILY
Status: ON HOLD | COMMUNITY
End: 2024-02-23

## 2024-02-12 RX ORDER — FENTANYL 12.5 UG/1
1 PATCH TRANSDERMAL
COMMUNITY

## 2024-02-12 RX ORDER — SODIUM CHLORIDE 0.9 % (FLUSH) 0.9 %
5-40 SYRINGE (ML) INJECTION PRN
Status: DISCONTINUED | OUTPATIENT
Start: 2024-02-12 | End: 2024-02-23 | Stop reason: HOSPADM

## 2024-02-12 RX ORDER — ACETAMINOPHEN 325 MG/1
650 TABLET ORAL EVERY 6 HOURS PRN
Status: DISCONTINUED | OUTPATIENT
Start: 2024-02-12 | End: 2024-02-23

## 2024-02-12 RX ORDER — BISACODYL 10 MG
10 SUPPOSITORY, RECTAL RECTAL DAILY PRN
COMMUNITY

## 2024-02-12 RX ORDER — ACETAMINOPHEN 325 MG/1
650 TABLET ORAL EVERY 6 HOURS PRN
COMMUNITY

## 2024-02-12 RX ORDER — NORTRIPTYLINE HYDROCHLORIDE 10 MG/1
10 CAPSULE ORAL NIGHTLY
COMMUNITY

## 2024-02-12 RX ORDER — ACETAMINOPHEN 650 MG/1
650 SUPPOSITORY RECTAL EVERY 6 HOURS PRN
Status: DISCONTINUED | OUTPATIENT
Start: 2024-02-12 | End: 2024-02-23

## 2024-02-12 RX ORDER — SODIUM CHLORIDE 9 MG/ML
INJECTION, SOLUTION INTRAVENOUS PRN
Status: DISCONTINUED | OUTPATIENT
Start: 2024-02-12 | End: 2024-02-23 | Stop reason: HOSPADM

## 2024-02-12 RX ORDER — FENTANYL 12.5 UG/1
1 PATCH TRANSDERMAL
Status: DISCONTINUED | OUTPATIENT
Start: 2024-02-12 | End: 2024-02-23 | Stop reason: HOSPADM

## 2024-02-12 RX ORDER — MORPHINE SULFATE 10 MG/5ML
10 SOLUTION ORAL EVERY 4 HOURS PRN
COMMUNITY

## 2024-02-12 RX ORDER — BISACODYL 5 MG/1
10 TABLET, DELAYED RELEASE ORAL DAILY PRN
COMMUNITY

## 2024-02-12 RX ORDER — SODIUM CHLORIDE 0.9 % (FLUSH) 0.9 %
5-40 SYRINGE (ML) INJECTION EVERY 12 HOURS SCHEDULED
Status: DISCONTINUED | OUTPATIENT
Start: 2024-02-12 | End: 2024-02-23 | Stop reason: HOSPADM

## 2024-02-12 RX ADMIN — LACTULOSE 20 G: 10 SOLUTION ORAL at 20:54

## 2024-02-12 RX ADMIN — SODIUM CHLORIDE, PRESERVATIVE FREE 10 ML: 5 INJECTION INTRAVENOUS at 20:54

## 2024-02-12 RX ADMIN — DEXTROSE AND SODIUM CHLORIDE: 5; 450 INJECTION, SOLUTION INTRAVENOUS at 08:40

## 2024-02-12 RX ADMIN — RIFAXIMIN 550 MG: 550 TABLET ORAL at 20:54

## 2024-02-12 RX ADMIN — CEFTRIAXONE SODIUM 1000 MG: 1 INJECTION, POWDER, FOR SOLUTION INTRAMUSCULAR; INTRAVENOUS at 21:01

## 2024-02-12 RX ADMIN — SODIUM CHLORIDE, PRESERVATIVE FREE 10 ML: 5 INJECTION INTRAVENOUS at 08:48

## 2024-02-12 RX ADMIN — VANCOMYCIN HYDROCHLORIDE 1000 MG: 1 INJECTION, POWDER, LYOPHILIZED, FOR SOLUTION INTRAVENOUS at 15:11

## 2024-02-12 RX ADMIN — SODIUM CHLORIDE, PRESERVATIVE FREE 40 MG: 5 INJECTION INTRAVENOUS at 08:43

## 2024-02-12 RX ADMIN — SODIUM CHLORIDE, PRESERVATIVE FREE 10 ML: 5 INJECTION INTRAVENOUS at 08:49

## 2024-02-12 ASSESSMENT — PAIN SCALES - WONG BAKER
WONGBAKER_NUMERICALRESPONSE: 2
WONGBAKER_NUMERICALRESPONSE: HURTS A LITTLE BIT
WONGBAKER_NUMERICALRESPONSE: 2
WONGBAKER_NUMERICALRESPONSE: 2
WONGBAKER_NUMERICALRESPONSE: HURTS A LITTLE BIT
WONGBAKER_NUMERICALRESPONSE: HURTS A LITTLE BIT
WONGBAKER_NUMERICALRESPONSE: 2
WONGBAKER_NUMERICALRESPONSE: HURTS A LITTLE BIT
WONGBAKER_NUMERICALRESPONSE: 2
WONGBAKER_NUMERICALRESPONSE: HURTS A LITTLE BIT
WONGBAKER_NUMERICALRESPONSE: HURTS A LITTLE BIT
WONGBAKER_NUMERICALRESPONSE: 2
WONGBAKER_NUMERICALRESPONSE: HURTS A LITTLE BIT
WONGBAKER_NUMERICALRESPONSE: 2
WONGBAKER_NUMERICALRESPONSE: HURTS A LITTLE BIT
WONGBAKER_NUMERICALRESPONSE: HURTS A LITTLE BIT
WONGBAKER_NUMERICALRESPONSE: 2
WONGBAKER_NUMERICALRESPONSE: HURTS A LITTLE BIT
WONGBAKER_NUMERICALRESPONSE: 2
WONGBAKER_NUMERICALRESPONSE: HURTS A LITTLE BIT
WONGBAKER_NUMERICALRESPONSE: HURTS A LITTLE BIT
WONGBAKER_NUMERICALRESPONSE: 2
WONGBAKER_NUMERICALRESPONSE: HURTS A LITTLE BIT
WONGBAKER_NUMERICALRESPONSE: 2
WONGBAKER_NUMERICALRESPONSE: HURTS A LITTLE BIT
WONGBAKER_NUMERICALRESPONSE: HURTS A LITTLE BIT

## 2024-02-12 NOTE — H&P
[facility]  Office: [phone]  Fer Villegas DO, Francis Braswell DO, Prabhakar Gillette DO, Jose Veliz DO, Jessica Petty MD, Karli Lu MD, Loida Velazquez MD, Nicole Resendiz MD,  Norberto Mondragon MD, Anat Farias MD, Charles Nieves MD,  Enrike Beth DO, Regis Shell MD, Kenny Munson MD, Misael Villegas DO, Cari Liang MD,  Eliud Jara DO, Ayana Watson MD, Mikayla Durham MD, Jenna Lucero MD, Austin Kelly MD,  Joe Brannon MD, Karthik Hernández MD, Jeffrey Pineda MD, Gisella Dickerson MD, Dnay Guaman MD, Ruddy Martins MD, Warner Lkue DO, Raymundo Penn DO, Donna Guajardo MD,  Zaheer Gray MD, Shirley Waterhouse, CNP,  Lina Morrow CNP, Maged Baptiste, CNP,  Zainab Blackwood, DNP, Merna Martinez, CNP, Radha Johnson, CNP, Ginger Obando CNP, Taty Antonio, CNP, Darlene Weiner, CNP, Gaby Garcia, PA-C, Ebony Murray PA-C, Catherine Benito, CNP, Chaparrita Amaya, CNP, Sandhya Cerna, CNS, Maranda Ambrosio, CNP, Tram Almonte CNP, Tracy Schwab, CNP         [facility]   IN-PATIENT SERVICE   [facility]    HISTORY AND PHYSICAL EXAMINATION            Date:   2/12/2024  Patient name:  Clemente Garcia  Date of admission:  2/12/2024  1:23 AM  MRN:   [id number]  Account:  [id number]  YOB: 1960  PCP:    Milana Whalen MD  Room:   [address] [place] [address]  Code Status:    Full Code    Chief Complaint:     No chief complaint on file.      History Obtained From:     patient, electronic medical record    History of Present Illness:     Clemente Garcia is a 63 y.o. Non- / non  female who presents with No chief complaint on file.   and is admitted to the hospital for the management of Anemia.    63-year-old female with past medical history of depression, fibromyalgia, hypertension, dyslipidemia, THURSTON nonalcoholic steatohepatitis, nephrolithiasis, neuropathy and osteoarthritis, history of anemia, degenerative disc disease initially was brought in to [place]  catheter we will continue with IV antibiotic and IV fluid  Acute on chronic anemia likely multifactorial due to chronic disease, hemoglobin improved after 1 unit of blood transfusion, will check B12, folic acid, iron, will monitor H&H every 8 hour, will keep n.p.o. will continue with IV pantoprazole, avoid NSAID,  hyper natremia could be due to dehydration and poor oral intake, will continue with IV fluid now and will monitor will repeat BMP  Liver cirrhosis with mild ascites, patient with history of alcohol use disorder, continue lactulose and rifaximin mean, GI team on board patient may need paracentesis and further workup for liver cirrhosis  Depression will continue home medication  Hypertension will resume home medication  Hypothyroidism we will continue levothyroxine  Neuropathic pain will continue gabapentin  DVT prophylaxis SCD S      Consultations:   IP CONSULT TO GI    Patient is admitted as inpatient status because of co-morbidities listed above, severity of signs and symptoms as outlined, requirement for current medical therapies and most importantly because of direct risk to patient if care not provided in a hospital setting.  Expected length of stay > 48 hours.    Gisella Dickerson MD  2/12/2024  9:18 AM    Copy sent to Milana Gonzalez MD

## 2024-02-12 NOTE — PROGRESS NOTES
[facility]   Pharmacy Pharmacokinetic Monitoring Service - Vancomycin     Clemente Garcia is a 63 y.o. female starting on vancomycin therapy for bloodstream infection. Pharmacy consulted by Gisella Dickerson for monitoring and adjustment.    Target Concentration: Goal AUC/-600 mg*hr/L    Additional Antimicrobials: ceftriaxone    Pertinent Laboratory Values:   Wt Readings from Last 1 Encounters:   02/12/24 96.6 kg (212 lb 15.4 oz)     Temp Readings from Last 1 Encounters:   02/12/24 98.6 °F (37 °C)     Estimated Creatinine Clearance: 81 mL/min (based on SCr of 0.8 mg/dL).  Recent Labs     02/12/24  0537   CREATININE 0.8   BUN 26*     Procalcitonin:     Pertinent Cultures:  Culture Date Source Results        MRSA Nasal Swab: N/A. Non-respiratory infection.    Plan:  Dosing recommendations based on Bayesian software  Start vancomycin 1000 mg every 12 hours  Anticipated AUC of 490 and trough concentration of 14.7 at steady state  Renal labs as indicated   Vancomycin concentration not yet ordered   Pharmacy will continue to monitor patient and adjust therapy as indicated    Thank you for the consult,  JADEN POOLE RPH  2/12/2024 2:29 PM

## 2024-02-12 NOTE — CONSULTS
[facility]    GASTROENTEROLOGY CONSULT    Patient:   Clemente Garcia   :    1960   Facility:   [facility]   Date:    2024  Admission Dx:  Anemia [D64.9]  Symptomatic anemia [D64.9]  Requesting physician: Gisella Dickerson*  Reason for consult:  Anemia and cirrhosis    SUBJECTIVE:  History of Present Illness:  This is a 63 y.o.   female who was admitted 2024 with Anemia [D64.9]  Symptomatic anemia [D64.9]. We have been asked to see the patient in consultation by Gisella Dickerson* for  anemia, cirrhosis. This is a 63 year old  pt with a pmh of alcohol abuse (last drink one month ago)  dysphagia neuropathy OA pernicious anemia nephrolithiasis fibromyalgia gastric band surgery in , peg placement one week ago depression htn hld who was transferred to this facility from [facility] for AMS. She is being treated for a UTI. The pt is oriented to person and family members only, HPI per electronic record family and records from [facility]. Per the family the pt is normally alert and oriented x3. Her confusion began over the weekend and she was sent to [facility]. UA revealed UTI and she was noted to have anemia. Hgb at [facility] was 6.7 now improved to 8 after transfusion. Per the family she has not had prior gi bleeding but was taking \"lots of motrin for a while\" for body pain. She is not on anticoagulation medications. Her last BM was at [facility] ED but the family does not know what color it was. She has not had any melena hematemesis or hematochezia since arriving here. She has not had abdominal pain fevers or chills.     She had a peg tube placed last week at [facility] due to dysphagia (the records of this are not available for review), she has not had any bleeding from this site and has not had any drainage around the tube. She was tolerating tube feeding prior to admission.. Per the family she has newly diagnosed liver  hh  Will discuss possible egd with md    2 UTI-abx per primary service    3.newly diagnosed cirrhosis etiology possibly from etoh abuse, pt  has ascites and AMS; some of the AMS could be due to UTI  Liver eval  and US ordered  Ammonia level  Lactulose and xifaxin   Paracentesis with labs to r/o infection and determine SAAG ordered  Recheck lft and inr in am  Avoid sedatives and narcotics    4.hypernatremia mgt per primary service    5.dysphagia s/p peg tube insertion last week at [facility]    MELD 3.0 is 10    This plan was formulated in collaboration with Dr. contreras .  Thank you for allowing us to participate in the care of your patient.    Electronically signed by: NAZIA Tubbs - CNP on 2/12/2024 at 11:25 AM

## 2024-02-12 NOTE — PLAN OF CARE
Problem: Confusion  Goal: Confusion, delirium, dementia, or psychosis is improved or at baseline  Description: INTERVENTIONS:  1. Assess for possible contributors to thought disturbance, including medications, impaired vision or hearing, underlying metabolic abnormalities, dehydration, psychiatric diagnoses, and notify attending LIP  2. [place] high risk fall precautions, as indicated  3. Provide frequent short contacts to provide reality reorientation, refocusing and direction  4. Decrease environmental stimuli, including noise as appropriate  5. Monitor and intervene to maintain adequate nutrition, hydration, elimination, sleep and activity  6. If unable to ensure safety without constant attention obtain sitter and review sitter guidelines with assigned personnel  7. Initiate Psychosocial CNS and Spiritual Care consult, as indicated  2/12/2024 1125 by Cher Christianson, RN  Outcome: Progressing  2/12/2024 0437 by Radha Penny, RN  Outcome: Not Progressing

## 2024-02-12 NOTE — PROGRESS NOTES
Received a call from [facility] that patient has a positive blood culture which showed gram-positive cocci, will check blood culture stat then after blood draw will start patient on IV vancomycin, if the blood culture came back negative will discontinue antibiotic but if the culture remain positive we will continue vancomycin pending final result and sensitivity

## 2024-02-12 NOTE — CARE COORDINATION
Patient planned for paracentesis tomorrow at [facility]. Transport scheduled for 0700. Nurse notified and will notify IR at [place].

## 2024-02-12 NOTE — PROGRESS NOTES
[facility] called the writer at [phone] about the result of Blood culture which is gram positive cocci in chain. Preliminary result. Faxed number was given and the faxed result was placed in patient's chart. Urine Culture prelim pending. Notified Dr Niru Salgado for the result. See new orders.

## 2024-02-12 NOTE — PLAN OF CARE
Problem: Confusion  Goal: Confusion, delirium, dementia, or psychosis is improved or at baseline  Description: INTERVENTIONS:  1. Assess for possible contributors to thought disturbance, including medications, impaired vision or hearing, underlying metabolic abnormalities, dehydration, psychiatric diagnoses, and notify attending LIP  2. [place] high risk fall precautions, as indicated  3. Provide frequent short contacts to provide reality reorientation, refocusing and direction  4. Decrease environmental stimuli, including noise as appropriate  5. Monitor and intervene to maintain adequate nutrition, hydration, elimination, sleep and activity  6. If unable to ensure safety without constant attention obtain sitter and review sitter guidelines with assigned personnel  7. Initiate Psychosocial CNS and Spiritual Care consult, as indicated  Outcome: Not Progressing     Problem: Discharge Planning  Goal: Discharge to home or other facility with appropriate resources  Outcome: Progressing     Problem: Pain  Goal: Verbalizes/displays adequate comfort level or baseline comfort level  Outcome: Progressing     Problem: Skin/Tissue Integrity  Goal: Absence of new skin breakdown  Description: 1.  Monitor for areas of redness and/or skin breakdown  2.  Assess vascular access sites hourly  3.  Every 4-6 hours minimum:  Change oxygen saturation probe site  4.  Every 4-6 hours:  If on nasal continuous positive airway pressure, respiratory therapy assess nares and determine need for appliance change or resting period.  Outcome: Progressing     Problem: Safety - Adult  Goal: Free from fall injury  Outcome: Progressing     Problem: ABCDS Injury Assessment  Goal: Absence of physical injury  Outcome: Progressing

## 2024-02-12 NOTE — PLAN OF CARE
Nutrition Problem #1: Increased nutrient needs (protein)  Intervention: Food and/or Nutrient Delivery: Continue NPO (recommend restart TF once medically indicated)  Nutritional Goal: Initiate TF by next RD assessment (2/15)

## 2024-02-12 NOTE — PROGRESS NOTES
Comprehensive Nutrition Assessment    Type and Reason for Visit:  Positive Nutrition Screen    Nutrition Recommendations/Plan:   Recommend restart TF once clinically indicated  Monitor weight, labs, skin, TF     Malnutrition Assessment:  Malnutrition Status:  At risk for malnutrition (Comment) (TF is currently held) (02/12/24 1206)    Context:  Acute Illness     Findings of the 6 clinical characteristics of malnutrition:  Energy Intake:  Mild decrease in energy intake (Comment) (TF currently held)  Weight Loss:  No significant weight loss     Body Fat Loss:  No significant body fat loss     Muscle Mass Loss:  No significant muscle mass loss    Fluid Accumulation:  Mild     Strength:  Not Performed    Nutrition Assessment:    Pt admitted with anemia. PNS received d/t TF, PEG in place. Writer spoke with Dr. Dickerson who states to hold off on TF at this time d/t possible GI bleed. No weight loss per EMR. Multiple wounds are noted. Writer recommends to initiate TF as soon as medically indicated to avoid malnutrition and promote wound healing. Noted Na 151. D5NaCl running @ 50 ml/hr, providing 1200 ml fluid and 204 kcal. Pt regular TF rx: Jevity 1.2 @ 60 ml/hr with 125 ml free water flush q 6 hours.    Nutrition Related Findings:    Na 151, Chl 111, Co2 33, BUN 26. All other labs/meds reviewed. Wound Type: Multiple       Current Nutrition Intake & Therapies:    Average Meal Intake: NPO  Average Supplements Intake: NPO  Diet NPO Exceptions are: Ice Chips    Anthropometric Measures:  Height: 160 cm (5' 2.99\")  Ideal Body Weight (IBW): 115 lbs (52 kg)    Current Body Weight: 96.6 kg (212 lb 15.4 oz), 185.2 % IBW.    Current BMI (kg/m2): 37.7  BMI Categories: Obese Class 2 (BMI 35.0 -39.9)    Estimated Daily Nutrient Needs:  Energy Requirements Based On: Kcal/kg  Weight Used for Energy Requirements: Current  Energy (kcal/day): 4973-2755 kcal/day (15-17 kcal/kg)  Weight Used for Protein Requirements: Ideal  Protein  (g/day):  g/day (1.5-2.0 g/kg IBW)  Method Used for Fluid Requirements: 1 ml/kcal  Fluid (ml/day): 6633-9629 ml    Nutrition Diagnosis:   Increased nutrient needs (protein) related to increase demand for energy/nutrients as evidenced by wounds  Inadequate oral intake related to swallowing difficulty as evidenced by nutrition support - enteral nutrition    Nutrition Interventions:   Food and/or Nutrient Delivery: Continue NPO (recommend restart TF once medically indicated)  Nutrition Education/Counseling: No recommendation at this time  Coordination of Nutrition Care: Continue to monitor while inpatient, Interdisciplinary Rounds    Goals:  Goals: Initiate nutrition support, by next RD assessment    Nutrition Monitoring and Evaluation:   Behavioral-Environmental Outcomes: None Identified  Food/Nutrient Intake Outcomes: Enteral Nutrition Intake/Tolerance  Physical Signs/Symptoms Outcomes: Biochemical Data, Nutrition Focused Physical Findings, Skin, Weight, Fluid Status or Edema, GI Status    Discharge Planning:    Too soon to determine     NGA Gregory, RDN, LD  Registered Dietitian  [facility]  [phone]

## 2024-02-13 ENCOUNTER — ANESTHESIA (OUTPATIENT)
Dept: OPERATING ROOM | Age: 64
End: 2024-02-13

## 2024-02-13 ENCOUNTER — ANESTHESIA EVENT (OUTPATIENT)
Dept: OPERATING ROOM | Age: 64
End: 2024-02-13

## 2024-02-13 ENCOUNTER — HOSPITAL ENCOUNTER (OUTPATIENT)
Dept: ULTRASOUND IMAGING | Age: 64
Discharge: HOME | End: 2024-02-15

## 2024-02-13 ENCOUNTER — APPOINTMENT (OUTPATIENT)
Dept: CT IMAGING | Age: 64
DRG: 052 | End: 2024-02-13
Attending: STUDENT IN AN ORGANIZED HEALTH CARE EDUCATION/TRAINING PROGRAM
Payer: MEDICAID

## 2024-02-13 VITALS
HEART RATE: 83 BPM | DIASTOLIC BLOOD PRESSURE: 66 MMHG | RESPIRATION RATE: 15 BRPM | SYSTOLIC BLOOD PRESSURE: 96 MMHG | OXYGEN SATURATION: 97 %

## 2024-02-13 PROBLEM — E43 SEVERE PROTEIN-CALORIE MALNUTRITION (HCC): Status: ACTIVE | Noted: 2024-02-13

## 2024-02-13 PROBLEM — E86.0 DEHYDRATION: Status: ACTIVE | Noted: 2024-02-13

## 2024-02-13 PROBLEM — K31.84 GASTROPARESIS: Status: ACTIVE | Noted: 2024-02-13

## 2024-02-13 PROBLEM — G62.1 ALCOHOLIC POLYNEUROPATHY (HCC): Status: ACTIVE | Noted: 2024-02-13

## 2024-02-13 PROBLEM — G93.41 ACUTE METABOLIC ENCEPHALOPATHY: Status: ACTIVE | Noted: 2024-02-13

## 2024-02-13 PROBLEM — D64.89 OTHER SPECIFIED ANEMIAS: Status: ACTIVE | Noted: 2024-02-12

## 2024-02-13 PROBLEM — N31.9 NEUROGENIC BLADDER: Status: ACTIVE | Noted: 2024-02-13

## 2024-02-13 PROBLEM — Z93.1 S/P PERCUTANEOUS ENDOSCOPIC GASTROSTOMY (PEG) TUBE PLACEMENT (HCC): Status: ACTIVE | Noted: 2024-02-13

## 2024-02-13 LAB
AFP SERPL-MCNC: 5.9 UG/L
ALBUMIN FLD-MCNC: 0.8 G/DL
ALBUMIN SERPL-MCNC: 2.2 G/DL (ref 3.5–5.2)
ALBUMIN/GLOB SERPL: 0.5 {RATIO} (ref 1–2.5)
ALP SERPL-CCNC: 174 U/L (ref 35–104)
ALT SERPL-CCNC: 12 U/L (ref 5–33)
ANION GAP SERPL CALCULATED.3IONS-SCNC: 7 MMOL/L (ref 9–17)
AST SERPL-CCNC: 28 U/L
BASOPHILS # BLD: 0 K/UL (ref 0–0.2)
BASOPHILS NFR BLD: 1 % (ref 0–2)
BILIRUB DIRECT SERPL-MCNC: 0.3 MG/DL
BILIRUB INDIRECT SERPL-MCNC: 0.4 MG/DL (ref 0–1)
BILIRUB SERPL-MCNC: 0.7 MG/DL (ref 0.3–1.2)
BUN SERPL-MCNC: 22 MG/DL (ref 8–23)
CALCIUM SERPL-MCNC: 8 MG/DL (ref 8.6–10.4)
CHLORIDE SERPL-SCNC: 111 MMOL/L (ref 98–107)
CMV IGG SERPL QL IA: 0.5
CMV IGM SERPL QL IA: 0.2
CO2 SERPL-SCNC: 32 MMOL/L (ref 20–31)
CREAT SERPL-MCNC: 0.8 MG/DL (ref 0.5–0.9)
EOSINOPHIL # BLD: 0.1 K/UL (ref 0–0.4)
EOSINOPHILS RELATIVE PERCENT: 2 % (ref 1–4)
ERYTHROCYTE [DISTWIDTH] IN BLOOD BY AUTOMATED COUNT: 16.2 % (ref 12.5–15.4)
GFR SERPL CREATININE-BSD FRML MDRD: >60 ML/MIN/1.73M2
GLUCOSE SERPL-MCNC: 82 MG/DL (ref 70–99)
HAV IGM SERPL QL IA: NONREACTIVE
HBV CORE IGM SERPL QL IA: NONREACTIVE
HBV SURFACE AG SERPL QL IA: NONREACTIVE
HCT VFR BLD AUTO: 24.1 % (ref 36–46)
HCT VFR BLD AUTO: 24.4 % (ref 36–46)
HCT VFR BLD AUTO: 24.5 % (ref 36–46)
HCV AB SERPL QL IA: NONREACTIVE
HGB BLD-MCNC: 7.9 G/DL (ref 12–16)
HGB BLD-MCNC: 8 G/DL (ref 12–16)
HGB BLD-MCNC: 8 G/DL (ref 12–16)
INR PPP: 1.1
LYMPHOCYTES NFR BLD: 0.7 K/UL (ref 1–4.8)
LYMPHOCYTES RELATIVE PERCENT: 15 % (ref 24–44)
MAGNESIUM SERPL-MCNC: 2.3 MG/DL (ref 1.6–2.6)
MCH RBC QN AUTO: 31.3 PG (ref 26–34)
MCHC RBC AUTO-ENTMCNC: 32.7 G/DL (ref 31–37)
MCV RBC AUTO: 95.8 FL (ref 80–100)
MONOCYTES NFR BLD: 0.6 K/UL (ref 0.1–1.2)
MONOCYTES NFR BLD: 13 % (ref 2–11)
NEUTROPHILS NFR BLD: 69 % (ref 36–66)
NEUTS SEG NFR BLD: 3.3 K/UL (ref 1.8–7.7)
PARTIAL THROMBOPLASTIN TIME: 29.5 SEC (ref 21.3–31.3)
PLATELET # BLD AUTO: 220 K/UL (ref 140–450)
PMV BLD AUTO: 8.7 FL (ref 6–12)
POTASSIUM SERPL-SCNC: 3.5 MMOL/L (ref 3.7–5.3)
PROT FLD-MCNC: 1.6 G/DL
PROT SERPL-MCNC: 6.3 G/DL (ref 6.4–8.3)
PROTHROMBIN TIME: 11.6 SEC (ref 9.4–12.6)
RBC # BLD AUTO: 2.54 M/UL (ref 4–5.2)
SODIUM SERPL-SCNC: 150 MMOL/L (ref 135–144)
SPECIMEN TYPE: NORMAL
SPECIMEN TYPE: NORMAL
WBC OTHER # BLD: 4.6 K/UL (ref 3.5–11)

## 2024-02-13 PROCEDURE — 88112 CYTOPATH CELL ENHANCE TECH: CPT

## 2024-02-13 PROCEDURE — 2580000003 HC RX 258: Performed by: NURSE PRACTITIONER

## 2024-02-13 PROCEDURE — 85730 THROMBOPLASTIN TIME PARTIAL: CPT

## 2024-02-13 PROCEDURE — 3700000000 HC ANESTHESIA ATTENDED CARE: Performed by: INTERNAL MEDICINE

## 2024-02-13 PROCEDURE — 3700000001 HC ADD 15 MINUTES (ANESTHESIA): Performed by: INTERNAL MEDICINE

## 2024-02-13 PROCEDURE — 88305 TISSUE EXAM BY PATHOLOGIST: CPT

## 2024-02-13 PROCEDURE — 85018 HEMOGLOBIN: CPT

## 2024-02-13 PROCEDURE — 6370000000 HC RX 637 (ALT 250 FOR IP): Performed by: INTERNAL MEDICINE

## 2024-02-13 PROCEDURE — 3609012400 HC EGD TRANSORAL BIOPSY SINGLE/MULTIPLE: Performed by: INTERNAL MEDICINE

## 2024-02-13 PROCEDURE — 6360000002 HC RX W HCPCS: Performed by: FAMILY MEDICINE

## 2024-02-13 PROCEDURE — 85014 HEMATOCRIT: CPT

## 2024-02-13 PROCEDURE — 84425 ASSAY OF VITAMIN B-1: CPT

## 2024-02-13 PROCEDURE — A4216 STERILE WATER/SALINE, 10 ML: HCPCS | Performed by: NURSE PRACTITIONER

## 2024-02-13 PROCEDURE — 0DB68ZX EXCISION OF STOMACH, VIA NATURAL OR ARTIFICIAL OPENING ENDOSCOPIC, DIAGNOSTIC: ICD-10-PCS | Performed by: INTERNAL MEDICINE

## 2024-02-13 PROCEDURE — 99233 SBSQ HOSP IP/OBS HIGH 50: CPT | Performed by: FAMILY MEDICINE

## 2024-02-13 PROCEDURE — 2580000003 HC RX 258: Performed by: FAMILY MEDICINE

## 2024-02-13 PROCEDURE — 6360000002 HC RX W HCPCS: Performed by: NURSE ANESTHETIST, CERTIFIED REGISTERED

## 2024-02-13 PROCEDURE — C9113 INJ PANTOPRAZOLE SODIUM, VIA: HCPCS | Performed by: NURSE PRACTITIONER

## 2024-02-13 PROCEDURE — 6360000002 HC RX W HCPCS: Performed by: INTERNAL MEDICINE

## 2024-02-13 PROCEDURE — 7100000000 HC PACU RECOVERY - FIRST 15 MIN: Performed by: INTERNAL MEDICINE

## 2024-02-13 PROCEDURE — 43239 EGD BIOPSY SINGLE/MULTIPLE: CPT | Performed by: INTERNAL MEDICINE

## 2024-02-13 PROCEDURE — 2500000003 HC RX 250 WO HCPCS: Performed by: FAMILY MEDICINE

## 2024-02-13 PROCEDURE — 83735 ASSAY OF MAGNESIUM: CPT

## 2024-02-13 PROCEDURE — 6360000002 HC RX W HCPCS: Performed by: NURSE PRACTITIONER

## 2024-02-13 PROCEDURE — 2709999900 HC NON-CHARGEABLE SUPPLY: Performed by: INTERNAL MEDICINE

## 2024-02-13 PROCEDURE — 49083 ABD PARACENTESIS W/IMAGING: CPT

## 2024-02-13 PROCEDURE — 0W9G3ZZ DRAINAGE OF PERITONEAL CAVITY, PERCUTANEOUS APPROACH: ICD-10-PCS | Performed by: RADIOLOGY

## 2024-02-13 PROCEDURE — 2580000003 HC RX 258: Performed by: INTERNAL MEDICINE

## 2024-02-13 PROCEDURE — 80076 HEPATIC FUNCTION PANEL: CPT

## 2024-02-13 PROCEDURE — 70450 CT HEAD/BRAIN W/O DYE: CPT

## 2024-02-13 PROCEDURE — 2060000000 HC ICU INTERMEDIATE R&B

## 2024-02-13 PROCEDURE — 80048 BASIC METABOLIC PNL TOTAL CA: CPT

## 2024-02-13 PROCEDURE — 7100000001 HC PACU RECOVERY - ADDTL 15 MIN: Performed by: INTERNAL MEDICINE

## 2024-02-13 PROCEDURE — 36415 COLL VENOUS BLD VENIPUNCTURE: CPT

## 2024-02-13 PROCEDURE — 85025 COMPLETE CBC W/AUTO DIFF WBC: CPT

## 2024-02-13 PROCEDURE — 2580000003 HC RX 258: Performed by: STUDENT IN AN ORGANIZED HEALTH CARE EDUCATION/TRAINING PROGRAM

## 2024-02-13 PROCEDURE — 6360000002 HC RX W HCPCS: Performed by: STUDENT IN AN ORGANIZED HEALTH CARE EDUCATION/TRAINING PROGRAM

## 2024-02-13 PROCEDURE — 85610 PROTHROMBIN TIME: CPT

## 2024-02-13 PROCEDURE — 2500000003 HC RX 250 WO HCPCS: Performed by: NURSE ANESTHETIST, CERTIFIED REGISTERED

## 2024-02-13 PROCEDURE — 30233N1 TRANSFUSION OF NONAUTOLOGOUS RED BLOOD CELLS INTO PERIPHERAL VEIN, PERCUTANEOUS APPROACH: ICD-10-PCS | Performed by: FAMILY MEDICINE

## 2024-02-13 RX ORDER — SODIUM CHLORIDE 9 MG/ML
INJECTION, SOLUTION INTRAVENOUS PRN
Status: DISCONTINUED | OUTPATIENT
Start: 2024-02-13 | End: 2024-02-13

## 2024-02-13 RX ORDER — DULOXETIN HYDROCHLORIDE 30 MG/1
30 CAPSULE, DELAYED RELEASE ORAL DAILY
Status: DISCONTINUED | OUTPATIENT
Start: 2024-02-14 | End: 2024-02-23 | Stop reason: HOSPADM

## 2024-02-13 RX ORDER — OXYCODONE HYDROCHLORIDE AND ACETAMINOPHEN 5; 325 MG/1; MG/1
2 TABLET ORAL
Status: DISCONTINUED | OUTPATIENT
Start: 2024-02-13 | End: 2024-02-13

## 2024-02-13 RX ORDER — PROMETHAZINE HYDROCHLORIDE 25 MG/ML
6.25 INJECTION, SOLUTION INTRAMUSCULAR; INTRAVENOUS EVERY 5 MIN PRN
Status: DISCONTINUED | OUTPATIENT
Start: 2024-02-13 | End: 2024-02-13

## 2024-02-13 RX ORDER — M-VIT,TX,IRON,MINS/CALC/FOLIC 27MG-0.4MG
1 TABLET ORAL DAILY
Status: DISCONTINUED | OUTPATIENT
Start: 2024-02-14 | End: 2024-02-13

## 2024-02-13 RX ORDER — DEXMEDETOMIDINE HYDROCHLORIDE 100 UG/ML
INJECTION, SOLUTION INTRAVENOUS PRN
Status: DISCONTINUED | OUTPATIENT
Start: 2024-02-13 | End: 2024-02-13 | Stop reason: SDUPTHER

## 2024-02-13 RX ORDER — MIDAZOLAM HYDROCHLORIDE 2 MG/2ML
2 INJECTION, SOLUTION INTRAMUSCULAR; INTRAVENOUS
Status: DISCONTINUED | OUTPATIENT
Start: 2024-02-13 | End: 2024-02-13

## 2024-02-13 RX ORDER — POTASSIUM CHLORIDE 20 MEQ/1
40 TABLET, EXTENDED RELEASE ORAL PRN
Status: DISCONTINUED | OUTPATIENT
Start: 2024-02-13 | End: 2024-02-23 | Stop reason: HOSPADM

## 2024-02-13 RX ORDER — LABETALOL HYDROCHLORIDE 5 MG/ML
10 INJECTION, SOLUTION INTRAVENOUS
Status: DISCONTINUED | OUTPATIENT
Start: 2024-02-13 | End: 2024-02-13

## 2024-02-13 RX ORDER — LIDOCAINE HYDROCHLORIDE 10 MG/ML
INJECTION, SOLUTION INFILTRATION; PERINEURAL PRN
Status: DISCONTINUED | OUTPATIENT
Start: 2024-02-13 | End: 2024-02-13 | Stop reason: SDUPTHER

## 2024-02-13 RX ORDER — SODIUM CHLORIDE 0.9 % (FLUSH) 0.9 %
5-40 SYRINGE (ML) INJECTION EVERY 12 HOURS SCHEDULED
Status: DISCONTINUED | OUTPATIENT
Start: 2024-02-13 | End: 2024-02-13

## 2024-02-13 RX ORDER — MEPERIDINE HYDROCHLORIDE 50 MG/ML
12.5 INJECTION INTRAMUSCULAR; INTRAVENOUS; SUBCUTANEOUS EVERY 5 MIN PRN
Status: DISCONTINUED | OUTPATIENT
Start: 2024-02-13 | End: 2024-02-13

## 2024-02-13 RX ORDER — MAGNESIUM SULFATE IN WATER 40 MG/ML
2000 INJECTION, SOLUTION INTRAVENOUS PRN
Status: DISCONTINUED | OUTPATIENT
Start: 2024-02-13 | End: 2024-02-23 | Stop reason: HOSPADM

## 2024-02-13 RX ORDER — MORPHINE SULFATE 2 MG/ML
2 INJECTION, SOLUTION INTRAMUSCULAR; INTRAVENOUS EVERY 5 MIN PRN
Status: DISCONTINUED | OUTPATIENT
Start: 2024-02-13 | End: 2024-02-13

## 2024-02-13 RX ORDER — GLYCOPYRROLATE 0.2 MG/ML
0.4 INJECTION INTRAMUSCULAR; INTRAVENOUS ONCE
Status: DISCONTINUED | OUTPATIENT
Start: 2024-02-13 | End: 2024-02-23 | Stop reason: HOSPADM

## 2024-02-13 RX ORDER — SODIUM CHLORIDE 0.9 % (FLUSH) 0.9 %
5-40 SYRINGE (ML) INJECTION PRN
Status: DISCONTINUED | OUTPATIENT
Start: 2024-02-13 | End: 2024-02-13

## 2024-02-13 RX ORDER — DULOXETIN HYDROCHLORIDE 30 MG/1
60 CAPSULE, DELAYED RELEASE ORAL DAILY
Status: DISCONTINUED | OUTPATIENT
Start: 2024-02-14 | End: 2024-02-13

## 2024-02-13 RX ORDER — GLYCOPYRROLATE 0.2 MG/ML
INJECTION INTRAMUSCULAR; INTRAVENOUS PRN
Status: DISCONTINUED | OUTPATIENT
Start: 2024-02-13 | End: 2024-02-13 | Stop reason: SDUPTHER

## 2024-02-13 RX ORDER — IPRATROPIUM BROMIDE AND ALBUTEROL SULFATE 2.5; .5 MG/3ML; MG/3ML
1 SOLUTION RESPIRATORY (INHALATION)
Status: DISCONTINUED | OUTPATIENT
Start: 2024-02-13 | End: 2024-02-13

## 2024-02-13 RX ORDER — SODIUM CHLORIDE, SODIUM LACTATE, POTASSIUM CHLORIDE, CALCIUM CHLORIDE 600; 310; 30; 20 MG/100ML; MG/100ML; MG/100ML; MG/100ML
INJECTION, SOLUTION INTRAVENOUS CONTINUOUS
Status: DISCONTINUED | OUTPATIENT
Start: 2024-02-13 | End: 2024-02-13

## 2024-02-13 RX ORDER — DIPHENHYDRAMINE HYDROCHLORIDE 50 MG/ML
12.5 INJECTION INTRAMUSCULAR; INTRAVENOUS
Status: DISCONTINUED | OUTPATIENT
Start: 2024-02-13 | End: 2024-02-13

## 2024-02-13 RX ORDER — POTASSIUM CHLORIDE 7.45 MG/ML
10 INJECTION INTRAVENOUS PRN
Status: DISCONTINUED | OUTPATIENT
Start: 2024-02-13 | End: 2024-02-23 | Stop reason: HOSPADM

## 2024-02-13 RX ORDER — LEVOTHYROXINE SODIUM 0.12 MG/1
125 TABLET ORAL DAILY
Status: DISCONTINUED | OUTPATIENT
Start: 2024-02-14 | End: 2024-02-19

## 2024-02-13 RX ORDER — PROPOFOL 10 MG/ML
INJECTION, EMULSION INTRAVENOUS PRN
Status: DISCONTINUED | OUTPATIENT
Start: 2024-02-13 | End: 2024-02-13 | Stop reason: SDUPTHER

## 2024-02-13 RX ORDER — NORTRIPTYLINE HYDROCHLORIDE 10 MG/1
10 CAPSULE ORAL NIGHTLY
Status: DISCONTINUED | OUTPATIENT
Start: 2024-02-13 | End: 2024-02-23 | Stop reason: HOSPADM

## 2024-02-13 RX ORDER — OXYCODONE HYDROCHLORIDE AND ACETAMINOPHEN 5; 325 MG/1; MG/1
1 TABLET ORAL
Status: DISCONTINUED | OUTPATIENT
Start: 2024-02-13 | End: 2024-02-13

## 2024-02-13 RX ORDER — HYDRALAZINE HYDROCHLORIDE 20 MG/ML
10 INJECTION INTRAMUSCULAR; INTRAVENOUS
Status: DISCONTINUED | OUTPATIENT
Start: 2024-02-13 | End: 2024-02-13

## 2024-02-13 RX ORDER — BUMETANIDE 1 MG/1
2 TABLET ORAL 2 TIMES DAILY
Status: DISCONTINUED | OUTPATIENT
Start: 2024-02-13 | End: 2024-02-14

## 2024-02-13 RX ORDER — ACETAMINOPHEN 325 MG/1
650 TABLET ORAL EVERY 6 HOURS PRN
Status: DISCONTINUED | OUTPATIENT
Start: 2024-02-13 | End: 2024-02-13 | Stop reason: SDUPTHER

## 2024-02-13 RX ORDER — ONDANSETRON 2 MG/ML
4 INJECTION INTRAMUSCULAR; INTRAVENOUS
Status: DISCONTINUED | OUTPATIENT
Start: 2024-02-13 | End: 2024-02-13

## 2024-02-13 RX ORDER — METOCLOPRAMIDE HYDROCHLORIDE 5 MG/ML
10 INJECTION INTRAMUSCULAR; INTRAVENOUS
Status: DISCONTINUED | OUTPATIENT
Start: 2024-02-13 | End: 2024-02-13

## 2024-02-13 RX ORDER — LIDOCAINE HYDROCHLORIDE 10 MG/ML
1 INJECTION, SOLUTION EPIDURAL; INFILTRATION; INTRACAUDAL; PERINEURAL
Status: DISCONTINUED | OUTPATIENT
Start: 2024-02-13 | End: 2024-02-13

## 2024-02-13 RX ADMIN — SODIUM CHLORIDE: 9 INJECTION, SOLUTION INTRAVENOUS at 14:39

## 2024-02-13 RX ADMIN — LIDOCAINE HYDROCHLORIDE 90 MG: 10 INJECTION, SOLUTION INFILTRATION; PERINEURAL at 14:39

## 2024-02-13 RX ADMIN — NORTRIPTYLINE HYDROCHLORIDE 10 MG: 10 CAPSULE ORAL at 21:22

## 2024-02-13 RX ADMIN — THIAMINE HYDROCHLORIDE: 100 INJECTION, SOLUTION INTRAMUSCULAR; INTRAVENOUS at 22:27

## 2024-02-13 RX ADMIN — VANCOMYCIN HYDROCHLORIDE 1000 MG: 1 INJECTION, POWDER, LYOPHILIZED, FOR SOLUTION INTRAVENOUS at 04:31

## 2024-02-13 RX ADMIN — POTASSIUM BICARBONATE 40 MEQ: 782 TABLET, EFFERVESCENT ORAL at 17:33

## 2024-02-13 RX ADMIN — DEXMEDETOMIDINE HCL 8 MCG: 100 INJECTION INTRAVENOUS at 14:44

## 2024-02-13 RX ADMIN — RIFAXIMIN 550 MG: 550 TABLET ORAL at 21:22

## 2024-02-13 RX ADMIN — CEFTRIAXONE SODIUM 1000 MG: 1 INJECTION, POWDER, FOR SOLUTION INTRAMUSCULAR; INTRAVENOUS at 21:20

## 2024-02-13 RX ADMIN — PROPOFOL 50 MG: 10 INJECTION, EMULSION INTRAVENOUS at 14:43

## 2024-02-13 RX ADMIN — SODIUM CHLORIDE, PRESERVATIVE FREE 10 ML: 5 INJECTION INTRAVENOUS at 21:23

## 2024-02-13 RX ADMIN — VANCOMYCIN HYDROCHLORIDE 1000 MG: 1 INJECTION, POWDER, LYOPHILIZED, FOR SOLUTION INTRAVENOUS at 16:36

## 2024-02-13 RX ADMIN — DEXMEDETOMIDINE HCL 8 MCG: 100 INJECTION INTRAVENOUS at 14:41

## 2024-02-13 RX ADMIN — LACTULOSE 20 G: 10 SOLUTION ORAL at 17:33

## 2024-02-13 RX ADMIN — DEXMEDETOMIDINE HCL 8 MCG: 100 INJECTION INTRAVENOUS at 14:37

## 2024-02-13 RX ADMIN — PROPOFOL 50 MG: 10 INJECTION, EMULSION INTRAVENOUS at 14:39

## 2024-02-13 RX ADMIN — SODIUM CHLORIDE, PRESERVATIVE FREE 40 MG: 5 INJECTION INTRAVENOUS at 11:34

## 2024-02-13 RX ADMIN — GLYCOPYRROLATE 0.2 MG: 0.2 INJECTION INTRAMUSCULAR; INTRAVENOUS at 14:37

## 2024-02-13 RX ADMIN — BUMETANIDE 2 MG: 1 TABLET ORAL at 21:22

## 2024-02-13 RX ADMIN — PROPOFOL 50 MG: 10 INJECTION, EMULSION INTRAVENOUS at 14:47

## 2024-02-13 RX ADMIN — LACTULOSE 20 G: 10 SOLUTION ORAL at 21:22

## 2024-02-13 ASSESSMENT — PAIN SCALES - PAIN ASSESSMENT IN ADVANCED DEMENTIA (PAINAD)
BODYLANGUAGE: TENSE, DISTRESSED PACING, FIDGETING
BREATHING: 0
BREATHING: NORMAL
CONSOLABILITY: NO NEED TO CONSOLE
NEGVOCALIZATION: 0
FACIALEXPRESSION: SMILING OR INEXPRESSIVE
FACIALEXPRESSION: 0
TOTALSCORE: 1
CONSOLABILITY: 0
BODYLANGUAGE: 1

## 2024-02-13 ASSESSMENT — PAIN SCALES - WONG BAKER
WONGBAKER_NUMERICALRESPONSE: 4
WONGBAKER_NUMERICALRESPONSE: HURTS LITTLE MORE

## 2024-02-13 ASSESSMENT — PAIN - FUNCTIONAL ASSESSMENT: PAIN_FUNCTIONAL_ASSESSMENT: NONE - DENIES PAIN

## 2024-02-13 NOTE — PLAN OF CARE
Problem: Discharge Planning  Goal: Discharge to home or other facility with appropriate resources  2/13/2024 1452 by Cher Christianson RN  Outcome: Progressing  2/13/2024 0411 by Radha Penny RN  Outcome: Progressing  2/13/2024 0411 by Radha Penny RN  Outcome: Progressing     Problem: Pain  Goal: Verbalizes/displays adequate comfort level or baseline comfort level  2/13/2024 1452 by Cher Christianson RN  Outcome: Progressing  2/13/2024 0411 by Radha Penny RN  Outcome: Progressing  2/13/2024 0411 by Radha Penny RN  Outcome: Progressing     Problem: Confusion  Goal: Confusion, delirium, dementia, or psychosis is improved or at baseline  Description: INTERVENTIONS:  1. Assess for possible contributors to thought disturbance, including medications, impaired vision or hearing, underlying metabolic abnormalities, dehydration, psychiatric diagnoses, and notify attending LIP  2. [place] high risk fall precautions, as indicated  3. Provide frequent short contacts to provide reality reorientation, refocusing and direction  4. Decrease environmental stimuli, including noise as appropriate  5. Monitor and intervene to maintain adequate nutrition, hydration, elimination, sleep and activity  6. If unable to ensure safety without constant attention obtain sitter and review sitter guidelines with assigned personnel  7. Initiate Psychosocial CNS and Spiritual Care consult, as indicated  2/13/2024 1452 by Cher Christianson RN  Outcome: Progressing  2/13/2024 0411 by Radha Penny RN  Outcome: Progressing  2/13/2024 0411 by Radha Penny RN  Outcome: Not Progressing     Problem: Skin/Tissue Integrity  Goal: Absence of new skin breakdown  Description: 1.  Monitor for areas of redness and/or skin breakdown  2.  Assess vascular access sites hourly  3.  Every 4-6 hours minimum:  Change oxygen saturation probe site  4.  Every 4-6 hours:  If on nasal continuous positive airway pressure, respiratory therapy assess nares and  determine need for appliance change or resting period.  2/13/2024 1452 by Cher Christianson RN  Outcome: Progressing  2/13/2024 0411 by Radha Penny RN  Outcome: Progressing  2/13/2024 0411 by Radha Penny RN  Outcome: Progressing     Problem: Safety - Adult  Goal: Free from fall injury  2/13/2024 1452 by Cher Christianson RN  Outcome: Progressing  2/13/2024 0411 by Radha Penny RN  Outcome: Progressing  2/13/2024 0411 by Radha Penny RN  Outcome: Progressing     Problem: ABCDS Injury Assessment  Goal: Absence of physical injury  2/13/2024 1452 by Cher Christianson RN  Outcome: Progressing  2/13/2024 0411 by Radha Penny RN  Outcome: Progressing  2/13/2024 0411 by Radha Penny RN  Outcome: Progressing     Problem: Nutrition Deficit:  Goal: Optimize nutritional status  2/13/2024 1452 by Cher Christianson RN  Outcome: Progressing  Flowsheets (Taken 2/13/2024 1020 by Kaela Porter, DB)  Nutrient intake appropriate for improving, restoring, or maintaining nutritional needs:   Monitor oral intake, labs, and treatment plans   Recommend appropriate diets, oral nutritional supplements, and vitamin/mineral supplements   Recommend, monitor, and adjust tube feedings and TPN/PPN based on assessed needs   Assess nutritional status and recommend course of action  2/13/2024 0411 by Radha Penny RN  Outcome: Progressing  2/13/2024 0411 by Radha Penny RN  Outcome: Progressing     Problem: Confusion  Goal: Confusion, delirium, dementia, or psychosis is improved or at baseline  Description: INTERVENTIONS:  1. Assess for possible contributors to thought disturbance, including medications, impaired vision or hearing, underlying metabolic abnormalities, dehydration, psychiatric diagnoses, and notify attending LIP  2. [place] high risk fall precautions, as indicated  3. Provide frequent short contacts to provide reality reorientation, refocusing and direction  4. Decrease environmental stimuli, including noise as  appropriate  5. Monitor and intervene to maintain adequate nutrition, hydration, elimination, sleep and activity  6. If unable to ensure safety without constant attention obtain sitter and review sitter guidelines with assigned personnel  7. Initiate Psychosocial CNS and Spiritual Care consult, as indicated  2/13/2024 1452 by Cher Christianson, RN  Outcome: Progressing  2/13/2024 0411 by Radha Penny, RN  Outcome: Progressing  2/13/2024 0411 by Radha Penny, RN  Outcome: Not Progressing

## 2024-02-13 NOTE — ANESTHESIA POSTPROCEDURE EVALUATION
Department of Anesthesiology  Postprocedure Note    Patient: Clemente Garcia  MRN: [id number]  YOB: 1960  Date of evaluation: 2/13/2024    Procedure Summary       Date: 02/13/24 Room / Location: [address]    Anesthesia Start: 1437 Anesthesia Stop: 1501    Procedure: EGD BIOPSY Diagnosis:       Anemia, unspecified type      (Anemia, unspecified type [D64.9])    Surgeons: Taqueria Mckoy MD Responsible Provider: Blade Edwards MD    Anesthesia Type: MAC ASA Status: 4            Anesthesia Type: No value filed.    Jolly Phase I: Jolly Score: 8    Jolly Phase II:      Anesthesia Post Evaluation    Patient location during evaluation: PACU  Patient participation: complete - patient participated  Level of consciousness: awake and alert  Airway patency: patent  Nausea & Vomiting: no nausea and no vomiting  Cardiovascular status: hemodynamically stable  Respiratory status: spontaneous ventilation and nasal cannula  Hydration status: euvolemic  Multimodal analgesia pain management approach  Pain management: adequate    No notable events documented.

## 2024-02-13 NOTE — PLAN OF CARE
Problem: Discharge Planning  Goal: Discharge to home or other facility with appropriate resources  2/13/2024 0411 by Radha Penny RN  Outcome: Progressing     Problem: Pain  Goal: Verbalizes/displays adequate comfort level or baseline comfort level  2/13/2024 0411 by Radha Penny RN  Outcome: Progressing     Problem: Confusion  Goal: Confusion, delirium, dementia, or psychosis is improved or at baseline  Description: INTERVENTIONS:  1. Assess for possible contributors to thought disturbance, including medications, impaired vision or hearing, underlying metabolic abnormalities, dehydration, psychiatric diagnoses, and notify attending LIP  2. [place] high risk fall precautions, as indicated  3. Provide frequent short contacts to provide reality reorientation, refocusing and direction  4. Decrease environmental stimuli, including noise as appropriate  5. Monitor and intervene to maintain adequate nutrition, hydration, elimination, sleep and activity  6. If unable to ensure safety without constant attention obtain sitter and review sitter guidelines with assigned personnel  7. Initiate Psychosocial CNS and Spiritual Care consult, as indicated  2/13/2024 0411 by Radha Penny RN  Outcome: Progressing     Problem: Skin/Tissue Integrity  Goal: Absence of new skin breakdown  Description: 1.  Monitor for areas of redness and/or skin breakdown  2.  Assess vascular access sites hourly  3.  Every 4-6 hours minimum:  Change oxygen saturation probe site  4.  Every 4-6 hours:  If on nasal continuous positive airway pressure, respiratory therapy assess nares and determine need for appliance change or resting period.  2/13/2024 0411 by Radha Penny RN  Outcome: Progressing     Problem: Safety - Adult  Goal: Free from fall injury  2/13/2024 0411 by Radha Penny RN  Outcome: Progressing     Problem: ABCDS Injury Assessment  Goal: Absence of physical injury  2/13/2024 0411 by Radha Penny RN  Outcome:  Progressing     Problem: Nutrition Deficit:  Goal: Optimize nutritional status  2/13/2024 0411 by Radha Penny, RN  Outcome: Progressing     Problem: Confusion  Goal: Confusion, delirium, dementia, or psychosis is improved or at baseline  Description: INTERVENTIONS:  1. Assess for possible contributors to thought disturbance, including medications, impaired vision or hearing, underlying metabolic abnormalities, dehydration, psychiatric diagnoses, and notify attending LIP  2. [place] high risk fall precautions, as indicated  3. Provide frequent short contacts to provide reality reorientation, refocusing and direction  4. Decrease environmental stimuli, including noise as appropriate  5. Monitor and intervene to maintain adequate nutrition, hydration, elimination, sleep and activity  6. If unable to ensure safety without constant attention obtain sitter and review sitter guidelines with assigned personnel  7. Initiate Psychosocial CNS and Spiritual Care consult, as indicated  2/13/2024 0411 by Radha Penny, RN  Outcome: Progressing  2/13/2024 0411 by Radha Penny, RN  Outcome: Not Progressing

## 2024-02-13 NOTE — PROGRESS NOTES
Physician Progress Note      PATIENT:               BILL DAVIDSON  [facility] #:                  [id number]  :                       1960  ADMIT DATE:       2024 1:23 AM  DISCH DATE:  RESPONDING  PROVIDER #:        Roscoe Marie MD          QUERY TEXT:    Pt admitted with UTI.  Pt noted to have chronic indwelling urinary catheter.   If possible, please document if you are evaluating and/or treating any of the   following:    The medical record reflects the following:  Risk Factors: Chronic indwelling urinary catheter    Clinical Indicators: Per H&P:  From [facility] for AMS was   found to have abnormal urinalysis. She has a chronic Loaiza catheter, will   change Loaiza catheter    Treatment: Change urinary catheter. IV Rocephin and Vanco.    Thank-you,  Maranda Haq RN, CDS  [email or web address]  Options provided:  -- UTI due to chronic indwelling urinary catheter  -- UTI not due to indwelling urinary catheter  -- Other - I will add my own diagnosis  -- Disagree - Not applicable / Not valid  -- Disagree - Clinically unable to determine / Unknown  -- Refer to Clinical Documentation Reviewer    PROVIDER RESPONSE TEXT:    Provider is clinically unable to determine a response to this query.  Dont know the sequence of events if she had th e UTI prior toplacement she has   a hisotry of neurogenic bladder since     Query created by: Maranda Haq on 2024 8:43 AM      QUERY TEXT:    Pt admitted with AMS and UTI, and has encephalopathy documented. If possible,   please document further specificity regarding the type of encephalopathy:    The medical record reflects the following:  Risk Factors: UTI, hypernatremia    Clinical Indicators: Initially was brought in to [facility]   due to concern about altered mental status and confusion, Urinalysis at [facility]   indicative of UTI. Serum Na 151 and 150, initial Hgb 6.7. Per H&P: Acute   encephalopathy likely due to UTI.    Treatment:

## 2024-02-13 NOTE — CARE COORDINATION
spoke to Betty from [facility] regarding patient discharge. Patient from [facility], [place] but son, Marek, is POA and wants another facility at discharge. Referral sent to [facility] after speaking to Marek. Waiting to hear from [facility] to determine if patient is accepted.   [facility] - Card[facility] Stepdown  Cardiology  Progress Note    Patient Name: Lonnie Taylor  MRN: [id number]  Admission Date: 3/28/2022  Hospital Length of Stay: 1 days  Code Status: Full Code   Attending Physician: Tremayne Stahl MD   Primary Care Physician: Isiah You MD  Expected Discharge Date: 3/31/2022  Principal Problem:NSTEMI (non-ST elevated myocardial infarction)    Subjective:     Hospital Course:   No notes on file    Interval History: NAEON. Continues on heparin drip. Chest pressure has resolved. Is coughing and wheezing some.     ROS  Objective:     Vital Signs (Most Recent):  Temp: 97.1 °F (36.2 °C) (03/30/22 0756)  Pulse: 69 (03/30/22 0756)  Resp: 18 (03/30/22 0756)  BP: (!) 146/67 (03/30/22 0756)  SpO2: 97 % (03/30/22 0756)   Vital Signs (24h Range):  Temp:  [96.1 °F (35.6 °C)-98 °F (36.7 °C)] 97.1 °F (36.2 °C)  Pulse:  [54-77] 69  Resp:  [16-20] 18  SpO2:  [91 %-100 %] 97 %  BP: (114-157)/(58-70) 146/67     Weight: 100 kg (220 lb 7.4 oz)  Body mass index is 32.56 kg/m².     SpO2: 97 %  O2 Device (Oxygen Therapy): room air      Intake/Output Summary (Last 24 hours) at 3/30/2022 0838  Last data filed at 3/30/2022 0500  Gross per 24 hour   Intake 622 ml   Output --   Net 622 ml       Lines/Drains/Airways       Peripheral Intravenous Line  Duration                  Peripheral IV - Single Lumen 03/28/22 2247 20 G Right Antecubital 1 day                    Physical Exam  Vitals and nursing note reviewed.   Constitutional:       General: He is not in acute distress.     Appearance: He is not ill-appearing, toxic-appearing or diaphoretic.   HENT:      Head: Normocephalic.   Cardiovascular:      Rate and Rhythm: Normal rate and regular rhythm.   Pulmonary:      Effort: Pulmonary effort is normal. No respiratory distress.      Breath sounds: Wheezing present.   Abdominal:      Palpations: Abdomen is soft.      Tenderness: There is no guarding.   Musculoskeletal:      Right lower leg: No edema.      Left  lower leg: No edema.   Skin:     General: Skin is warm and dry.   Neurological:      Mental Status: He is alert. Mental status is at baseline.      Cranial Nerves: No dysarthria.   Psychiatric:         Mood and Affect: Mood normal.         Behavior: Behavior normal.       Significant Labs: All pertinent lab results from the last 24 hours have been reviewed.    Significant Imaging:  na    Assessment and Plan:     * NSTEMI (non-ST elevated myocardial infarction)  Briefly this is Mr. Taylor who is a 75-year-old male with a past medical history of CAD with previous CABG and PCI (most recent 3/2020) and multiple cardiac risk factors presenting with chest pain, found to have NSTEMI on admission with trop of 31.  Most recent LHC 2/14/22 with Ost LM to mid % stenosed, Ost RCA to prox % stenosed, mild diffuse disease in grafts to 1st ck and distal LAD, yet remain patent. LVEDP 35mmHg. No interventions were done at the time. He continues to smoke daily and has poor dietary compliance. I would treat for ACS given significant elevation in troponin and chest pain with known significant CAD as well for ADHF. Currently hemodynamically stable and chest pain free.    Recommendations:   Continue DAPT (ASA, prasugrel)   recommend medical management (ACS protocol) with heparin gtt x 48 hours    EKG + NTG 0.4mg SL PRN q5m for chest pain (max 3 doses)   Continue high intensity statin + fenofibrate    Discontinued lisinopril + will start washout period before transitioning to Entresto 3/30 PM   Continue Coreg 6.25mg bid and uptitrate to target HR 60; recommend uptitration after initiation of Entresto with likely concurrent discontinuation of amlodipine   Consider increasing Imdur to 120 mg daily in future once transitioned to Entresto   Continue ranolazine 500mg BID   F/u official TTE this morning   continue tele monitoring         Acute decompensated heart failure  EF 40%. BNP 1500 on admission with CXR  suggestive of pulmonary edema.  Continue current GDMT: Coreg 6.25mg BID, spironolactone 25mg, changed to Entresto  · Continue diureses with Lasix 40IV BID; likely can transition to PO tomorrow  · Starting Entresto this evening  · Repeat troponin prior to discharge to ensure remains downtrending  · Strict I/Os  · Low Na diet    PAD (peripheral artery disease)  · cilostazol discontinued prior admission due to HF   · DAPT with aspirin + prasugrel + AC with low dose Xarelto 2.5mg daily once off heparin drip        VTE Risk Mitigation (From admission, onward)         Ordered     IP VTE HIGH RISK PATIENT  Once         03/29/22 0143     Place sequential compression device  Until discontinued         03/29/22 0143     heparin 25,000 units in dextrose 5% (100 units/ml) IV bolus from bag - ADDITIONAL PRN BOLUS - 60 units/kg (max bolus 4000 units)  As needed (PRN)        Question:  Heparin Infusion Adjustment (DO NOT MODIFY ANSWER)  Answer:  \\[email or web address]\epic\Images\Pharmacy\HeparinInfusions\heparin LOW INTENSITY nomogram for [facility] [id number].pdf    03/29/22 0039     heparin 25,000 units in dextrose 5% (100 units/ml) IV bolus from bag - ADDITIONAL PRN BOLUS - 30 units/kg (max bolus 4000 units)  As needed (PRN)        Question:  Heparin Infusion Adjustment (DO NOT MODIFY ANSWER)  Answer:  \\[email or web address]\epic\Images\Pharmacy\HeparinInfusions\heparin LOW INTENSITY nomogram for [facility] [id number].pdf    03/29/22 0039     Place sequential compression device  Until discontinued         03/29/22 0135     Place sequential compression device  Until discontinued         03/29/22 0134     heparin 25,000 units in dextrose 5% 250 mL (100 units/mL) infusion LOW INTENSITY nomogram - [facility]  Continuous        Question Answer Comment   Heparin Infusion Adjustment (DO NOT MODIFY ANSWER) \\[email or web address]\epic\Images\Pharmacy\HeparinInfusions\heparin LOW INTENSITY nomogram for [facility] [id number].pdf    Begin at (in units/kg/hr) 12        03/29/22 0039                Rosalba  JOSSE Zamora MD  Cardiology  [facility] - Cardiology Stepdown

## 2024-02-13 NOTE — ANESTHESIA PRE PROCEDURE
ALKPHOS 174 02/13/2024 05:08 AM    AST 28 02/13/2024 05:08 AM    ALT 12 02/13/2024 05:08 AM       POC Tests: No results for input(s): \"POCGLU\", \"POCNA\", \"POCK\", \"POCCL\", \"POCBUN\", \"POCHEMO\", \"POCHCT\" in the last 72 hours.    Coags:   Lab Results   Component Value Date/Time    PROTIME 11.6 02/13/2024 05:08 AM    INR 1.1 02/13/2024 05:08 AM    APTT 29.5 02/13/2024 05:08 AM       HCG (If Applicable): No results found for: \"PREGTESTUR\", \"PREGSERUM\", \"HCG\", \"HCGQUANT\"     ABGs: No results found for: \"PHART\", \"PO2ART\", \"GPB0VRM\", \"KDW7TVW\", \"BEART\", \"F5CWTNYT\"     Type & Screen (If Applicable):  No results found for: \"LABABO\", \"LABRH\"    Drug/Infectious Status (If Applicable):  Lab Results   Component Value Date/Time    HEPCAB NONREACTIVE 02/12/2024 01:27 PM       COVID-19 Screening (If Applicable): No results found for: \"COVID19\"        Anesthesia Evaluation  Patient summary reviewed and Nursing notes reviewed  Airway: Mallampati: III  TM distance: >3 FB   Neck ROM: full  Mouth opening: > = 3 FB   Dental: normal exam         Pulmonary:Negative Pulmonary ROS and normal exam                               Cardiovascular:Negative CV ROS                      Neuro/Psych:                ROS comment: -ENCEPHALOPATHY GI/Hepatic/Renal:   (+) liver disease:, morbid obesity         ROS comment: -CIRRHOSIS OF LIVER.   Endo/Other:    (+) hypothyroidism::..                  ROS comment: -ALCOHOL ABUSE - HAS NOT HAD A DRINK IN SEVERAL WEEKS  -NPO AFTER MIDNIGHT  -NKDA Abdominal: normal exam            Vascular:           ROS comment: -ANEMIA. Other Findings:       Anesthesia Plan      MAC     ASA 4       Induction: intravenous.    MIPS: Postoperative opioids intended and Prophylactic antiemetics administered.  Anesthetic plan and risks discussed with patient.      Plan discussed with CRNA.    Attending anesthesiologist reviewed and agrees with Preprocedure content            KATALINA HUSTON MD   2/13/2024

## 2024-02-13 NOTE — PROGRESS NOTES
Transport arrived now going to [facility] for IR US paracenteses.

## 2024-02-13 NOTE — CARE COORDINATION
Case Management Assessment  Initial Evaluation    Date/Time of Evaluation: 2/13/2024 8:56 AM  Assessment Completed by: ABRAHAM ROLDAN CARO    If patient is discharged prior to next notation, then this note serves as note for discharge by case management.    Patient Name: Clemente Garcia                   YOB: 1960  Diagnosis: Anemia [D64.9]  Symptomatic anemia [D64.9]                   Date / Time: 2/12/2024  1:23 AM    Patient Admission Status: Inpatient   Readmission Risk (Low < 19, Mod (19-27), High > 27): Readmission Risk Score: 12.8    Current PCP: Milana Whalen MD  PCP verified by CM? Yes    Chart Reviewed: Yes      History Provided by:    Patient Orientation: Unable to Assess    Patient Cognition: Severely Impaired    Hospitalization in the last 30 days (Readmission):  No    If yes, Readmission Assessment in  Navigator will be completed.    Advance Directives:      Code Status: Full Code   Patient's Primary Decision Maker is: Legal Next of Kin      Discharge Planning:    Patient lives with: Other (Comment) ([facility]) Type of Home: Long-Term Care  Primary Care Giver: Other (Comment) ([facility])  Patient Support Systems include: Spouse/Significant Other, Children   Current Financial resources: None  Current community resources: None  Current services prior to admission: Extended Care Facility            Current DME:              Type of Home Care services:       ADLS  Prior functional level: Assistance with the following:, Bathing, Dressing, Mobility  Current functional level: Assistance with the following:, Mobility    PT AM-PAC:   /24  OT AM-PAC:   /24    Family can provide assistance at DC: No  Would you like Case Management to discuss the discharge plan with any other family members/significant others, and if so, who?    Plans to Return to Present Housing: Yes  Other Identified Issues/Barriers to RETURNING to current housing: None  Potential Assistance

## 2024-02-13 NOTE — CONSULTS
Comprehensive Nutrition Assessment    Type and Reason for Visit:  Reassess    Nutrition Recommendations/Plan:   Recommend initiate TF after EGD, once clinically indicated: Osmolite 1.2 @ 30 ml/hr   1 protein modular daily  150 ml free water flush q 4 hours (advance to 215 ml once IVF has been discontinued)  Consider d/c D5   Continue NPO  Monitor weight, labs, skin, IVF delivery, TF tolerance     Malnutrition Assessment:  Malnutrition Status:  At risk for malnutrition (Comment) (TF to begin today) (02/13/24 1040)    Context:  Acute Illness     Findings of the 6 clinical characteristics of malnutrition:  Energy Intake:  Mild decrease in energy intake (Comment) (TF to begin today)  Weight Loss:  No significant weight loss     Body Fat Loss:  No significant body fat loss     Muscle Mass Loss:  No significant muscle mass loss    Fluid Accumulation:  Mild     Strength:  Not Performed    Nutrition Assessment:    Pt admitted with anemia. Per rounds, pt is at [facility] this morning for paracentesis. Pt to also have EGD today. Per Dr. Marie-begin TF following EGD. D5 continues to run @ 50 ml/hr providing 204 kcal. Noted Na 150. Recommend increase goal free water flush from 125 ml q 6 hours (per facility paper work) to 215 ml q 4 hours. Once TF is initiated, will increase rate and flushes to goal as tolerated. Goal to have stabilized elytes with TF and flushes, no IVF. Anticipate pt will tolerate TF well, plan to advance to goal 2/14/2024.    Nutrition Related Findings:    +1 BUE, +4 BLE. Na 150, K+ 3.5, Chl 111, Co2 32, Ca 8.0, Alb 2.2. All other labs/meds reviewed. Wound Type: Multiple       Current Nutrition Intake & Therapies:    Average Meal Intake: NPO  Average Supplements Intake: NPO  Diet NPO  Current Tube Feeding (TF) Orders:  Feeding Route: PEG  Formula: Standard without Fiber  Schedule: Continuous  Feeding Regimen: Osmolite 1.2 @ 30 ml/hr  Additives/Modulars: Protein (1 daily)  Water Flushes: 150 ml q 4

## 2024-02-13 NOTE — BRIEF OP NOTE
Brief Postoperative Note for Paracentesis    Clemente Garcia  YOB: 1960  [id number]    Pre-operative Diagnosis: Ascites      Post-operative Diagnosis: Same    Procedure: Ultrasound guided Paracentesis     Anesthesia: 1% Lidocaine     Surgeons/Assistants: ZACKERY KRAUS MD    Complications: none    Specimens: were obtained    Ultrasound guided paracentesis performed. 1000 ml clear yellow fluid obtained from RUQ.  Dressing applied.  Vital signs were reviewed and were stable after the procedure.        Electronically signed by ZACKERY KRAUS MD on 2/13/2024 at 9:58 AM

## 2024-02-13 NOTE — PROGRESS NOTES
PT HAD PARACENTESIS TODAY. 1 LITERS OF YELLOW ASCITIC FLUID COLLECTED. 2X2 GAUZE AND TEGADERM TO CATHETER SITE. DRY AND INTACT. SPECIMEN SENT. PT TOLERATED WELL. BP STABLE THROUGHOUT PROCEDURE. REPORT CALLED. DISCHARGED STABLE WITH TRANSPORT

## 2024-02-13 NOTE — PROGRESS NOTES
[facility]  Inpatient Service   Attending Physician Statement  I have discussed the care of Clemente Garcia  including pertinent history and exam findings,  with the resident. I have reviewed the key elements of all parts of the encounter with the resident.  I have had the opportunity at today's visit to personally discuss and repeat any exam deemed necessary.  I agree with the assessment, plan and orders as documented by the resident.    Patient examined and family interviewed in the room extensively.  Multiple family members with 1 primary spokesperson.  Multiple questions regarding course to date.  Difficult to determine exactly without extensive review of records.  Family member produced a document that showed that they were considering CIDP back in November.  Apparently patient was reluctant to disclose that she was a fairly heavy drinker continued drinking to about a month ago.    Patient currently is clearly confused and hallucinating talking to people who is not there.  She has chloroform motion of her hands above her head waving at something.  Trying to brush away something on her nose that again is not there.  Her chest is relatively clear to auscultation but poor tidal volume heart seems regular though distant abdomen soft and nontender.  Bilateral heels are wrapped with a heel guard protectors in place.    Will continue to adjust feedings and electrolytes.  Patient will need consultation to address the bilateral heel eschars.  Will pay attention for signs of  secondary infection.  Patient did have 1 positive culture of cocci in chains.  Uncertain etiology with a normal white count.  Long conversation with regards to CODE STATUS with family.  They will discuss after our discussion further.  It sounds like that they may determine her wishes to be DNR CCA with out intubation or CPR.    Roscoe Marie MD   2/13/2024    Progress Note  2/13/2024    11:49   Resp 20   Ht 1.6 m (5' 2.99\")   Wt 93.8 kg (206 lb 12.7 oz)   SpO2 98%   BMI 36.64 kg/m²   Temp (24hrs), Av.3 °F (36.8 °C), Min:97.8 °F (36.6 °C), Max:98.6 °F (37 °C)    No results for input(s): \"POCGLU\" in the last 72 hours.    I/O (24Hr):    Intake/Output Summary (Last 24 hours) at 2024 1150  Last data filed at 2024 0031  Gross per 24 hour   Intake 50 ml   Output 200 ml   Net -150 ml       Labs:  Hematology:  Recent Labs     24  1327 24  2114 24  0508   HGB 8.0* 8.1* 7.9*   HCT 23.7* 24.3* 24.1*   INR  --   --  1.1     Chemistry:  Recent Labs     24  0537 24  0508   * 150*   K 3.7 3.5*   * 111*   CO2 33* 32*   GLUCOSE 80 82   BUN 26* 22   CREATININE 0.8 0.8   MG  --  2.3   ANIONGAP 7* 7*   LABGLOM >60 >60   CALCIUM 8.0* 8.0*     Recent Labs     24  1327 24  0508   PROT  --  6.3*   LABALBU  --  2.2*   AST  --  28   ALT  --  12   ALKPHOS  --  174*   BILITOT  --  0.7   BILIDIR  --  0.3*   AMMONIA 25  --      ABG:No results found for: \"POCPH\", \"PHART\", \"PH\", \"POCPCO2\", \"ZIL8SMW\", \"PCO2\", \"POCPO2\", \"PO2ART\", \"PO2\", \"POCHCO3\", \"UGT1SJY\", \"HCO3\", \"NBEA\", \"PBEA\", \"BEART\", \"BE\", \"THGBART\", \"THB\", \"CQE8RHO\", \"VJXQ9SGU\", \"L0ZFAPXE\", \"O2SAT\", \"FIO2\"  Lab Results   Component Value Date/Time    SPECIAL 10ml left hand 2024 04:13 PM     Lab Results   Component Value Date/Time    CULTURE NO GROWTH 12 HOURS 2024 04:13 PM       Radiology:  US LIVER    Result Date: 2024  Morphologic findings consistent with cirrhosis. The major hepatic veins, main hepatic artery and imaged IVC are patent.       Physical Examination:   Physical Exam  Constitutional:       General: She is not in acute distress.     Appearance: She is obese.   HENT:      Head: Normocephalic.   Eyes:      Extraocular Movements: Extraocular movements intact.      Pupils: Pupils are equal, round, and reactive to light.   Cardiovascular:      Rate and Rhythm: Normal rate and regular  rhythm.      Pulses: Normal pulses.      Heart sounds: Normal heart sounds. No murmur heard.  Pulmonary:      Effort: Pulmonary effort is normal. No respiratory distress.      Breath sounds: No wheezing or rales.   Chest:      Chest wall: No tenderness.   Abdominal:      General: There is distension.      Tenderness: There is abdominal tenderness. There is no guarding.   Musculoskeletal:      Right lower leg: Edema (pitting) present.      Left lower leg: Edema (pitting) present.   Neurological:      Mental Status: She is alert.      Comments: Oriented to self only          Assessment:     Hospital Problems             Last Modified POA    * (Principal) Anemia 2/12/2024 Yes    Symptomatic anemia 2/12/2024 Yes    Acute encephalopathy 2/12/2024 Yes    Cirrhosis of liver with ascites (HCC) 2/12/2024 Yes    Elevated LFTs 2/12/2024 Yes    Alcohol abuse 2/12/2024 Yes    Acute metabolic encephalopathy 2/13/2024 Yes    Severe protein-calorie malnutrition (HCC) 2/13/2024 Yes    S/P percutaneous endoscopic gastrostomy (PEG) tube placement (HCC) 2/13/2024 Yes     61-year-old female with PMH of depression, fibromyalgia, hypertension, dyslipidemia, THURSTON, nephrolithiasis, neuropathy, osteoarthritis, anemia, degenerative disc disease who presented from [facility] due to concern about altered mental status and confusion.  History additionally notable for alcohol abuse, last drink within the past couple of months per chart review.  CT abdomen showed mild to moderate ascites with liver cirrhosis low.  And patient was given 1 unit of blood and started on IV antibiotics for UTI and transferred to our facility for further evaluation. GI was consulted and getting EGD and US guided paracentesis today. [facility] stated blood culture grew gram positive cocci in chains. New blood cultures were taken and NGTD.    Plan:     Acute metabolic encephalopathy  Altered mental status  Cirrhosis of liver w/ mild ascites  Elevated

## 2024-02-13 NOTE — OP NOTE
PROCEDURE NOTE    DATE OF PROCEDURE: 2/13/2024     SURGEON: Taqueria Mckoy MD  Facility: [facility]   ASSISTANT: None  Anesthesia: mac   PREOPERATIVE DIAGNOSIS:     Anemia  Liver disease    Diagnosis:  No esophageal varices  Retroflex of the GE junction showed swelling and edema with a donut shaped GE junction, rectal could be related to postsurgical but this patient did not have any history of surgery for which I went ahead and to biopsies      Significant gastritis with edema biopsies were taken      The oropharyngeal area was filled with food particles and mucus and crusted tissue I spent 5 to 10 minutes cleaning it until its all clean    PEG tube is in place and there is no abnormality with it  And no PEG related ulcer      POSTOPERATIVE DIAGNOSIS: As described below    OPERATION: Upper GI endoscopy with Biopsy    ANESTHESIA: Moderate Sedation     ESTIMATED BLOOD LOSS: Less than 50 ml    COMPLICATIONS: None.     SPECIMENS:  Was Obtained: As below    HISTORY: The patient is a 63 y.o. year old female with history of above preop diagnosis.  I recommended esophagogastroduodenoscopy with possible biopsy and I explained the risk, benefits, expected outcome, and alternatives to the procedure.  Risks included but are not limited to bleeding, infection, respiratory distress, hypotension, and perforation of the esophagus, stomach, or duodenum.  Patient understands and is in agreement.      The patient was counseled at length about the risks of anabell Covid-19 during their perioperative period and any recovery window from their procedure.  The patient was made aware that anabell Covid-19  may worsen their prognosis for recovering from their procedure  and lend to a higher morbidity and/or mortality risk.  All material risks, benefits, and reasonable alternatives including postponing the procedure were discussed. The patient does wish to proceed with the procedure at this time.         PROCEDURE: The patient

## 2024-02-14 LAB
ANA SER QL IA: NEGATIVE
ANION GAP SERPL CALCULATED.3IONS-SCNC: 6 MMOL/L (ref 9–17)
ANION GAP SERPL CALCULATED.3IONS-SCNC: 7 MMOL/L (ref 9–17)
BASOPHILS # BLD: 0 K/UL (ref 0–0.2)
BASOPHILS NFR BLD: 0 % (ref 0–2)
BODY FLD TYPE: NORMAL
BUN SERPL-MCNC: 18 MG/DL (ref 8–23)
BUN SERPL-MCNC: 20 MG/DL (ref 8–23)
CALCIUM SERPL-MCNC: 7.6 MG/DL (ref 8.6–10.4)
CALCIUM SERPL-MCNC: 7.8 MG/DL (ref 8.6–10.4)
CASE NUMBER:: NORMAL
CHLORIDE SERPL-SCNC: 113 MMOL/L (ref 98–107)
CHLORIDE SERPL-SCNC: 115 MMOL/L (ref 98–107)
CO2 SERPL-SCNC: 30 MMOL/L (ref 20–31)
CO2 SERPL-SCNC: 32 MMOL/L (ref 20–31)
CREAT SERPL-MCNC: 0.9 MG/DL (ref 0.5–0.9)
CREAT SERPL-MCNC: 0.9 MG/DL (ref 0.5–0.9)
DSDNA IGG SER QL IA: 2.9 IU/ML
EOSINOPHIL # BLD: 0.1 K/UL (ref 0–0.4)
EOSINOPHILS RELATIVE PERCENT: 2 % (ref 1–4)
ERYTHROCYTE [DISTWIDTH] IN BLOOD BY AUTOMATED COUNT: 16.3 % (ref 12.5–15.4)
GFR SERPL CREATININE-BSD FRML MDRD: >60 ML/MIN/1.73M2
GFR SERPL CREATININE-BSD FRML MDRD: >60 ML/MIN/1.73M2
GLUCOSE SERPL-MCNC: 118 MG/DL (ref 70–99)
GLUCOSE SERPL-MCNC: 125 MG/DL (ref 70–99)
HCT VFR BLD AUTO: 24 % (ref 36–46)
HCT VFR BLD AUTO: 24.6 % (ref 36–46)
HCT VFR BLD AUTO: 24.9 % (ref 36–46)
HGB BLD-MCNC: 7.8 G/DL (ref 12–16)
HGB BLD-MCNC: 8 G/DL (ref 12–16)
HGB BLD-MCNC: 8.1 G/DL (ref 12–16)
LKM AB TITR SER IF: NORMAL {TITER}
LYMPHOCYTES NFR BLD: 0.6 K/UL (ref 1–4.8)
LYMPHOCYTES NFR FLD: 65 %
LYMPHOCYTES RELATIVE PERCENT: 14 % (ref 24–44)
MAGNESIUM SERPL-MCNC: 2.2 MG/DL (ref 1.6–2.6)
MCH RBC QN AUTO: 31.3 PG (ref 26–34)
MCHC RBC AUTO-ENTMCNC: 32.5 G/DL (ref 31–37)
MCV RBC AUTO: 96.4 FL (ref 80–100)
MITOCHONDRIA M2 IGG SER-ACNC: 1.4 U/ML (ref 0–4)
MONOCYTES NFR BLD: 0.5 K/UL (ref 0.1–1.2)
MONOCYTES NFR BLD: 12 % (ref 2–11)
NEUTROPHILS NFR BLD: 72 % (ref 36–66)
NEUTROPHILS NFR FLD: 11 %
NEUTS SEG NFR BLD: 3.3 K/UL (ref 1.8–7.7)
NUCLEAR IGG SER IA-RTO: 0.3 U/ML
PHOSPHATE SERPL-MCNC: 3.9 MG/DL (ref 2.6–4.5)
PLATELET # BLD AUTO: 208 K/UL (ref 140–450)
PMV BLD AUTO: 9 FL (ref 6–12)
POTASSIUM SERPL-SCNC: 3.5 MMOL/L (ref 3.7–5.3)
POTASSIUM SERPL-SCNC: 3.7 MMOL/L (ref 3.7–5.3)
RBC # BLD AUTO: 2.55 M/UL (ref 4–5.2)
RBC # FLD: <3000 CELLS/UL
SMOOTH MUSCLE ANTIBODY: 6 UNITS (ref 0–19)
SODIUM SERPL-SCNC: 150 MMOL/L (ref 135–144)
SODIUM SERPL-SCNC: 153 MMOL/L (ref 135–144)
SODIUM UR-SCNC: 96 MMOL/L
TRIGL SERPL-MCNC: 86 MG/DL
UNIDENT CELLS NFR FLD: NORMAL %
VANCOMYCIN SERPL-MCNC: 26.3 UG/ML
WBC # FLD: 385 CELLS/UL
WBC OTHER # BLD: 4.6 K/UL (ref 3.5–11)

## 2024-02-14 PROCEDURE — 84300 ASSAY OF URINE SODIUM: CPT

## 2024-02-14 PROCEDURE — 6370000000 HC RX 637 (ALT 250 FOR IP): Performed by: INTERNAL MEDICINE

## 2024-02-14 PROCEDURE — 36415 COLL VENOUS BLD VENIPUNCTURE: CPT

## 2024-02-14 PROCEDURE — 80202 ASSAY OF VANCOMYCIN: CPT

## 2024-02-14 PROCEDURE — 85018 HEMOGLOBIN: CPT

## 2024-02-14 PROCEDURE — 2580000003 HC RX 258: Performed by: INTERNAL MEDICINE

## 2024-02-14 PROCEDURE — 2500000003 HC RX 250 WO HCPCS: Performed by: FAMILY MEDICINE

## 2024-02-14 PROCEDURE — 85014 HEMATOCRIT: CPT

## 2024-02-14 PROCEDURE — 99233 SBSQ HOSP IP/OBS HIGH 50: CPT | Performed by: FAMILY MEDICINE

## 2024-02-14 PROCEDURE — 84478 ASSAY OF TRIGLYCERIDES: CPT

## 2024-02-14 PROCEDURE — 6370000000 HC RX 637 (ALT 250 FOR IP)

## 2024-02-14 PROCEDURE — A4216 STERILE WATER/SALINE, 10 ML: HCPCS | Performed by: INTERNAL MEDICINE

## 2024-02-14 PROCEDURE — 83735 ASSAY OF MAGNESIUM: CPT

## 2024-02-14 PROCEDURE — 99231 SBSQ HOSP IP/OBS SF/LOW 25: CPT | Performed by: NURSE PRACTITIONER

## 2024-02-14 PROCEDURE — 6360000002 HC RX W HCPCS: Performed by: INTERNAL MEDICINE

## 2024-02-14 PROCEDURE — 83935 ASSAY OF URINE OSMOLALITY: CPT

## 2024-02-14 PROCEDURE — 84100 ASSAY OF PHOSPHORUS: CPT

## 2024-02-14 PROCEDURE — 2060000000 HC ICU INTERMEDIATE R&B

## 2024-02-14 PROCEDURE — 2580000003 HC RX 258: Performed by: FAMILY MEDICINE

## 2024-02-14 PROCEDURE — 6360000002 HC RX W HCPCS: Performed by: FAMILY MEDICINE

## 2024-02-14 PROCEDURE — 85025 COMPLETE CBC W/AUTO DIFF WBC: CPT

## 2024-02-14 PROCEDURE — 80048 BASIC METABOLIC PNL TOTAL CA: CPT

## 2024-02-14 PROCEDURE — C9113 INJ PANTOPRAZOLE SODIUM, VIA: HCPCS | Performed by: INTERNAL MEDICINE

## 2024-02-14 PROCEDURE — 99212 OFFICE O/P EST SF 10 MIN: CPT

## 2024-02-14 PROCEDURE — 99222 1ST HOSP IP/OBS MODERATE 55: CPT | Performed by: INTERNAL MEDICINE

## 2024-02-14 RX ORDER — MIDODRINE HYDROCHLORIDE 5 MG/1
5 TABLET ORAL 3 TIMES DAILY
Status: COMPLETED | OUTPATIENT
Start: 2024-02-14 | End: 2024-02-14

## 2024-02-14 RX ORDER — GABAPENTIN 100 MG/1
100 CAPSULE ORAL NIGHTLY
Status: DISCONTINUED | OUTPATIENT
Start: 2024-02-14 | End: 2024-02-14

## 2024-02-14 RX ORDER — GABAPENTIN 100 MG/1
100 CAPSULE ORAL EVERY 8 HOURS PRN
Status: DISCONTINUED | OUTPATIENT
Start: 2024-02-14 | End: 2024-02-23 | Stop reason: HOSPADM

## 2024-02-14 RX ORDER — SPIRONOLACTONE 25 MG/1
25 TABLET ORAL DAILY
Status: DISCONTINUED | OUTPATIENT
Start: 2024-02-14 | End: 2024-02-17

## 2024-02-14 RX ORDER — BUMETANIDE 1 MG/1
1 TABLET ORAL 2 TIMES DAILY
Status: DISCONTINUED | OUTPATIENT
Start: 2024-02-14 | End: 2024-02-14

## 2024-02-14 RX ORDER — BUMETANIDE 1 MG/1
1 TABLET ORAL 2 TIMES DAILY
Status: DISCONTINUED | OUTPATIENT
Start: 2024-02-14 | End: 2024-02-18

## 2024-02-14 RX ADMIN — RIFAXIMIN 550 MG: 550 TABLET ORAL at 10:00

## 2024-02-14 RX ADMIN — LEVOTHYROXINE SODIUM 125 MCG: 125 TABLET ORAL at 05:33

## 2024-02-14 RX ADMIN — SODIUM CHLORIDE, PRESERVATIVE FREE 40 MG: 5 INJECTION INTRAVENOUS at 10:01

## 2024-02-14 RX ADMIN — MICONAZOLE NITRATE: 20 CREAM TOPICAL at 15:46

## 2024-02-14 RX ADMIN — NORTRIPTYLINE HYDROCHLORIDE 10 MG: 10 CAPSULE ORAL at 22:59

## 2024-02-14 RX ADMIN — MIDODRINE HYDROCHLORIDE 5 MG: 5 TABLET ORAL at 15:01

## 2024-02-14 RX ADMIN — VANCOMYCIN HYDROCHLORIDE 1000 MG: 1 INJECTION, POWDER, LYOPHILIZED, FOR SOLUTION INTRAVENOUS at 05:32

## 2024-02-14 RX ADMIN — ACETAMINOPHEN 650 MG: 325 TABLET ORAL at 17:05

## 2024-02-14 RX ADMIN — LACTULOSE 20 G: 10 SOLUTION ORAL at 10:00

## 2024-02-14 RX ADMIN — SPIRONOLACTONE 25 MG: 25 TABLET, FILM COATED ORAL at 10:00

## 2024-02-14 RX ADMIN — BUMETANIDE 1 MG: 1 TABLET ORAL at 10:00

## 2024-02-14 RX ADMIN — CEFTRIAXONE SODIUM 1000 MG: 1 INJECTION, POWDER, FOR SOLUTION INTRAMUSCULAR; INTRAVENOUS at 22:58

## 2024-02-14 RX ADMIN — RIFAXIMIN 550 MG: 550 TABLET ORAL at 22:59

## 2024-02-14 RX ADMIN — BUMETANIDE 1 MG: 1 TABLET ORAL at 22:59

## 2024-02-14 RX ADMIN — THIAMINE HYDROCHLORIDE: 100 INJECTION, SOLUTION INTRAMUSCULAR; INTRAVENOUS at 10:19

## 2024-02-14 RX ADMIN — MICONAZOLE NITRATE: 20 CREAM TOPICAL at 23:01

## 2024-02-14 RX ADMIN — GABAPENTIN 100 MG: 100 CAPSULE ORAL at 17:05

## 2024-02-14 RX ADMIN — DULOXETINE HYDROCHLORIDE 30 MG: 30 CAPSULE, DELAYED RELEASE ORAL at 10:01

## 2024-02-14 RX ADMIN — MIDODRINE HYDROCHLORIDE 5 MG: 5 TABLET ORAL at 10:00

## 2024-02-14 RX ADMIN — VANCOMYCIN HYDROCHLORIDE 1000 MG: 1 INJECTION, POWDER, LYOPHILIZED, FOR SOLUTION INTRAVENOUS at 15:00

## 2024-02-14 RX ADMIN — MIDODRINE HYDROCHLORIDE 5 MG: 5 TABLET ORAL at 22:59

## 2024-02-14 ASSESSMENT — PAIN SCALES - PAIN ASSESSMENT IN ADVANCED DEMENTIA (PAINAD)
TOTALSCORE: 5
FACIALEXPRESSION: SMILING OR INEXPRESSIVE
FACIALEXPRESSION: 2
BODYLANGUAGE: 1
CONSOLABILITY: 0
FACIALEXPRESSION: 0
BREATHING: 0
FACIALEXPRESSION: FACIAL GRIMACING
BODYLANGUAGE: TENSE, DISTRESSED PACING, FIDGETING
BREATHING: NORMAL
BODYLANGUAGE: TENSE, DISTRESSED PACING, FIDGETING
CONSOLABILITY: NO NEED TO CONSOLE
BREATHING: 0
CONSOLABILITY: 1
NEGVOCALIZATION: OCCASIONAL MOAN/GROAN, LOW SPEECH, NEGATIVE/DISAPPROVING QUALITY
TOTALSCORE: 1
NEGVOCALIZATION: 1
NEGVOCALIZATION: 0
BREATHING: NORMAL
BODYLANGUAGE: 1
CONSOLABILITY: DISTRACTED OR REASSURED BY VOICE/TOUCH

## 2024-02-14 NOTE — PROGRESS NOTES
Nutrition Note    Writer is off campus today. Pt TF running at 40 ml/hr per flowsheet. Na 153, flushes at 215 ml q 4 hours. Recommend increase flush to 250 ml q 4 hours. Con't to advance TF as tolerated. Reassess 2/15/2024    NGA Gregory, RDN, LD  Registered Dietitian  [facility]  [phone]

## 2024-02-14 NOTE — PROGRESS NOTES
[facility] Residency  Inpatient Service       Attending Physician Statement  I have discussed the care of Clemente Garcia  including pertinent history and exam findings,  with the resident. I have reviewed the key elements of all parts of the encounter with the resident.  I have had the opportunity at today's visit to personally discuss and repeat any exam deemed necessary.  I agree with the assessment, plan and orders as documented by the resident.    Patient much more alert today.  Was able to recognize me as I walked in the room and old some semblance of a conversation.  Patient was examined and interviewed in the presence of her .  Chest was relatively clear to auscultation with only scattered rhonchi but poor tidal volume.  Abdomen was soft and nontender subtle fluid wave.  Not distended.  Positive heel protectors in place.    Discussed the case with gastroenterology.  Shared the additional information in regards to history of workup that was leading towards CIDP in November 2023.  Apparently patient went back to drinking for until most recently about a month ago.  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  Will continue to follow closely appreciate consultant input.  Further changes made in tube feeds as the patient needs to adjust to the concentrated formula and will add probiotic and increase free water.    Roscoe Marie MD   2/14/2024      Progress Note  2/14/2024    8:59 AM    Name:   Clemente Garcia  MRN:     [id number]     Acct:      [id number]   Room:   329/329-01  IP Day:  2  Admit Date:  2/12/2024  1:23 AM    PCP:   Milana Whalen MD  Code Status:  Full Code    Subjective:     Overnight events: none    Patient has worsening altered mental status this morning. Continues to have choreiform movements of her hands and waving them above her head. Was unable to  24.4* 24.5* 24.6*   MCV  --  95.8  --  96.4   MCH  --  31.3  --  31.3   MCHC  --  32.7  --  32.5   RDW  --  16.2*  --  16.3*   PLT  --  220  --  208   MPV  --  8.7  --  9.0   INR 1.1  --   --   --        Chemistry:  Recent Labs     02/12/24  0537 02/13/24  0508 02/14/24  0537   * 150* 153*   K 3.7 3.5* 3.7   * 111* 115*   CO2 33* 32* 32*   GLUCOSE 80 82 118*   BUN 26* 22 20   CREATININE 0.8 0.8 0.9   MG  --  2.3 2.2   ANIONGAP 7* 7* 6*   LABGLOM >60 >60 >60   CALCIUM 8.0* 8.0* 7.8*   PHOS  --   --  3.9       Recent Labs     02/12/24  1327 02/13/24  0508   PROT  --  6.3*   LABALBU  --  2.2*   AST  --  28   ALT  --  12   ALKPHOS  --  174*   BILITOT  --  0.7   BILIDIR  --  0.3*   AMMONIA 25  --        ABG:No results found for: \"POCPH\", \"PHART\", \"PH\", \"POCPCO2\", \"AWY6OLC\", \"PCO2\", \"POCPO2\", \"PO2ART\", \"PO2\", \"POCHCO3\", \"QVX4UKC\", \"HCO3\", \"NBEA\", \"PBEA\", \"BEART\", \"BE\", \"THGBART\", \"THB\", \"TGG6IUU\", \"YWBS0CGV\", \"E1VLZIJW\", \"O2SAT\", \"FIO2\"  Lab Results   Component Value Date/Time    SPECIAL 10ml left hand 02/12/2024 04:13 PM     Lab Results   Component Value Date/Time    CULTURE NO GROWTH 13 HOURS 02/12/2024 05:01 PM       Radiology:  US LIVER    Result Date: 2/12/2024  Morphologic findings consistent with cirrhosis. The major hepatic veins, main hepatic artery and imaged IVC are patent.       Physical Examination:   Physical Exam  Vitals reviewed.   Constitutional:       General: She is not in acute distress.     Appearance: She is obese.   HENT:      Head: Normocephalic.   Eyes:      Extraocular Movements: Extraocular movements intact.      Pupils: Pupils are equal, round, and reactive to light.   Cardiovascular:      Rate and Rhythm: Normal rate and regular rhythm.      Pulses: Normal pulses.      Heart sounds: Normal heart sounds. No murmur heard.  Pulmonary:      Effort: Pulmonary effort is normal. No respiratory distress.      Breath sounds: No wheezing or rales.   Chest:      Chest wall: No tenderness.

## 2024-02-14 NOTE — DISCHARGE INSTR - COC
Continuity of Care Form    Patient Name: Clemente Garcia   :  1960  MRN:  [id number]    Admit date:  2024  Discharge date:  2024    Code Status Order: Full Code   Advance Directives:     Admitting Physician:  Roscoe Marie MD  PCP: Milana Whalen MD    Discharging Nurse: Polly  [facility] Unit/Room#: 329/329-01  Discharging Unit Phone Number: [phone]    Emergency Contact:   Extended Emergency Contact Information  Primary Emergency Contact: Joni Garcia  Home Phone: [phone]  Relation: Spouse  Preferred language: English   needed? No  Secondary Emergency Contact: Marek Garcia  Mobile Phone: [phone]  Relation: Child    Past Surgical History:  Past Surgical History:   Procedure Laterality Date    HYSTERECTOMY (CERVIX STATUS UNKNOWN)      UPPER GASTROINTESTINAL ENDOSCOPY N/A 2024    EGD BIOPSY performed by Taqueria Mckoy MD at [facility] OR       Immunization History:   Immunization History   Administered Date(s) Administered    COVID-19, PFIZER PURPLE top, DILUTE for use, (age 12 y+), 30mcg/0.3mL 2021, 2021       Active Problems:  Patient Active Problem List   Diagnosis Code    Gait instability R26.81    Inability to walk R26.2    Other specified anemias D64.89    Anemia D64.9    Acute encephalopathy G93.40    Cirrhosis of liver with ascites (HCC) K74.60, R18.8    Elevated LFTs R79.89    Alcohol abuse F10.10    Acute metabolic encephalopathy G93.41    Severe protein-calorie malnutrition (HCC) E43    S/P percutaneous endoscopic gastrostomy (PEG) tube placement (HCC) Z93.1    Dehydration E86.0    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    Gastroparesis K31.84    Neurogenic bladder N31.9       Isolation/Infection:   Isolation            No Isolation          Patient Infection Status       None to display            Nurse Assessment:  Last Vital Signs: /64   Pulse 87   Temp 99.5 °F (37.5 °C) (Oral)   Resp 15   Ht 1.6 m (5' 2.99\")   Wt 93.4 kg (205 lb  02/14/24 0818   Dressing Status New dressing applied 02/14/24 0818   Wound Cleansed Betadine/povidone iodine 02/14/24 0818   Dressing/Treatment Betadine swabs/povidone iodine;ABD 02/14/24 0818   Offloading for Diabetic Foot Ulcers Offloading boot 02/14/24 0818   Wound Assessment Eschar dry 02/14/24 0818   Drainage Amount None (dry) 02/14/24 0818   Odor None 02/13/24 1610   Estelita-wound Assessment Dry/flaky;Edematous 02/14/24 0818   Number of days: 2     BILATERAL HEELS:  Ensure heels are offloaded using heel boots & ensure strap is not tight across the anterior ankle.  Keep stable eschar dry with daily application of Betadine; cover with ADB and loose roll gauze.     RIGHT MEDIAL LOWER LEG:  Silicone bordered foam.  Change every 72 hours     [x] Turn and reposition every 2 hours while in bed.     [x] Elevate legs with  pillows under calves.     [x] Heel protective boots (heel medix boots) at all times while in bed.      [x] Apply zinc oxide cream twice daily and as needed after incontinent episodes.     [x] Perform routine incontinence care with use of foam cleanser.     [x] Use single layer moisture wicking underpad.  Avoid use of briefs in bed     [x] Use comfort glide system and wedges to reposition patient.     Elimination:  Continence:   Bowel: No  Bladder: Loaiza catheter  Urinary Catheter: Insertion Date: 2/12/2024    Colostomy/Ileostomy/Ileal Conduit: No       Date of Last BM: 2/23/2024    Intake/Output Summary (Last 24 hours) at 2/14/2024 0847  Last data filed at 2/14/2024 0532  Gross per 24 hour   Intake 4243.5 ml   Output 975 ml   Net 3268.5 ml     I/O last 3 completed shifts:  In: 4293.5 [I.V.:2243.5; NG/GT:1205; IV Piggyback:845]  Out: 1175 [Urine:1175]    Safety Concerns:     Risk for Falls    Impairments/Disabilities:      Vision    Nutrition Therapy:  Current Nutrition Therapy:   - Tube Feedings:  Standard without fiber    Routes of Feeding:  PEG  Liquids: NPO  Daily Fluid Restriction: no  Last Modified

## 2024-02-14 NOTE — PROGRESS NOTES
[facility]    Gastroenterology Daily Progress Note      Patient:   Clemente Garcia   :    1960   [facility]  Date:     2024  Consultant:   NAZIA Tubbs - CNP, CNP      SUBJECTIVE  63 y.o. female admitted 2024 with Anemia [D64.9]  Symptomatic anemia [D64.9] and seen for anemia with cirrhosis, AMS. The pt was seen and examined. She is s/p egd yesterday that is discussed below. Underwent paracentesis that was negative for SBP, culture and cytology are pending. Remains alert and oriented to person and family members only. Tube feeding has been started, having non bloody loose stools. Ammonia level was normal. Blood culture so far here show no growth.        OBJECTIVE  Scheduled Meds:   midodrine  5 mg Oral TID    spironolactone  25 mg Oral Daily    bumetanide  1 mg PEG Tube BID    miconazole   Topical BID    levothyroxine  125 mcg PEG Tube Daily    nortriptyline  10 mg PEG Tube Nightly    glycopyrrolate  0.4 mg IntraVENous Once    sodium chloride 0.9 % 1,000 mL with folic acid 1 mg, adult multi-vitamin with vitamin k 10 mL, thiamine 100 mg   IntraVENous Daily    DULoxetine  30 mg Per G Tube Daily    sodium chloride flush  5-40 mL IntraVENous 2 times per day    cefTRIAXone (ROCEPHIN) IV  1,000 mg IntraVENous Q24H    pantoprazole (PROTONIX) 40 mg in sodium chloride (PF) 0.9 % 10 mL injection  40 mg IntraVENous Daily    [Held by provider] lactulose  20 g PEG Tube TID    rifAXIMin  550 mg PEG Tube BID    fentaNYL  1 patch TransDERmal Q72H    vancomycin  1,000 mg IntraVENous Q12H    vancomycin (VANCOCIN) intermittent dosing (placeholder)   Other RX Placeholder       Vital Signs:  /68   Pulse 88   Temp 98.6 °F (37 °C) (Temporal)   Resp 16   Ht 1.6 m (5' 2.99\")   Wt 93.4 kg (205 lb 14.6 oz)   SpO2 95%   BMI 36.48 kg/m²    ROS not completed due to AMS  Physical Exam:       General Appearance: ill appearing alert and oriented to person, and family members   well-developed and well-nourished, in no acute distress  Skin: warm and dry, no rash or erythema  Head: normocephalic and atraumatic  Eyes: pupils equal, round, and reactive to light, extraocular eye movements intact, conjunctivae normal  ENT: hearing grossly normal bilaterally  Neck: neck supple and non tender without mass, no thyromegaly or thyroid nodules, no cervical lymphadenopathy   Pulmonary/Chest: clear to auscultation bilaterally- no wheezes, rales or rhonchi, normal air movement, no respiratory distress  Cardiovascular: normal rate, regular rhythm, normal S1 and S2, no murmurs, rubs, clicks or gallops, distal pulses intact, no carotid bruits  Abdomen: soft, peg site with no bleeding or sign of infection non-tender, non-distended, normal bowel sounds, no masses or organomegaly  Extremities: no cyanosis, clubbing positive for extremity edema  Musculoskeletal: normal range of motion, no joint swelling, deformity or tenderness  Neurologic: alert to self and family members only, bilateral choreiform movements of her hands    Lab and Imaging Review     CBC  Recent Labs     02/13/24  1336 02/13/24  2117 02/14/24  0537   WBC 4.6  --  4.6   HGB 8.0* 8.0* 8.0*   HCT 24.4* 24.5* 24.6*   MCV 95.8  --  96.4     --  208       BMP  Recent Labs     02/12/24  0537 02/13/24  0508 02/14/24  0537   * 150* 153*   K 3.7 3.5* 3.7   * 111* 115*   CO2 33* 32* 32*   BUN 26* 22 20   CREATININE 0.8 0.8 0.9   GLUCOSE 80 82 118*   CALCIUM 8.0* 8.0* 7.8*   MG  --  2.3 2.2       LFTS  Recent Labs     02/13/24  0508   ALKPHOS 174*   ALT 12   AST 28   PROT 6.3*   BILITOT 0.7   BILIDIR 0.3*   LABALBU 2.2*       AMYLASE/LIPASE/AMMONIA  Recent Labs     02/12/24  1327   AMMONIA 25       PT/INR  Recent Labs     02/13/24  0508   PROTIME 11.6   INR 1.1      Latest Reference Range & Units 02/12/24 13:27   Ceruloplasmin 16 - 45 mg/dL 11 (L)   (L): Data is abnormally low   Latest Reference Range & Units 02/12/24 13:27   A-1  Antitrypsin 90 - 200 mg/dL 188      Latest Reference Range & Units 02/12/24 13:27   AFP (Alpha Fetoprotein) <8.4 ug/L 5.9      Latest Reference Range & Units 02/12/24 13:27   MANUEL NEGATIVE  NEGATIVE   Anti ds DNA <10.0 IU/mL 2.9   Anti-Mitochondrial Antibody 0.0 - 4.0 U/mL 1.4   SMOOTH MUSCLE AB 0 - 19 Units 6      Latest Reference Range & Units 02/12/24 13:27   Hep A IgM NONREACTIVE  NONREACTIVE   Hepatitis B Surface Ag NONREACTIVE  NONREACTIVE   Hepatitis C Ab NONREACTIVE  NONREACTIVE   Hep B Core Ab, IgM NONREACTIVE  NONREACTIVE      Latest Reference Range & Units 02/12/24 13:27   CMV IgG <0.5  0.5 (H)   CMV IgM <0.7  0.2   (H): Data is abnormally high      ANEMIA STUDIES  Recent Labs     02/12/24  0537 02/12/24  1327   TIBC  --  94*   FERRITIN 1,205*  --    [id number] 689  --    FOLATE 7.8  --      Paracentesis 2/12/24   Latest Reference Range & Units 02/12/24 17:01   RBC, Fluid cells/uL <3,000   Lymphocytes, Body Fluid % 65   Neutrophil Count, Fluid % 11   Total Protein, Body Fluid g/dL 1.6   WBC, Fluid cells/uL 385   Albumin, Fluid g/dL 0.8   Other Cells, Fluid % PENDING     FINDINGS:liver us 2/12/24  LIVER:  The liver demonstrates normal echogenicity and mildly nodular contour  without evidence of intrahepatic biliary ductal dilatation.  It measures 15  cm.     HEPATIC VASCULATURE:     Main portal vein is patent with hepatopetal flow.  The left and right portal  veins are patent with normal direction of flow. No appreciable echogenic  filling defect within the main portal vein to suggest thrombosis.     The right hepatic vein, middle hepatic vein and left hepatic vein are patent.     The imaged IVC is patent.     The splenic vein is patent.     The main hepatic artery is patent.     IMPRESSION:  Morphologic findings consistent with cirrhosis.     The major hepatic veins, main hepatic artery and imaged IVC are patent.      Findings:egd dr contreras 2/13/24      The oropharyngeal area was filled with food particles

## 2024-02-14 NOTE — CONSULTS
NEPHROLOGY     REASON FOR CONSULT:     HypERnatremia sodium 153      ASSESSMENT     Hyper natremia secondary to free water deficit from impaired thirst because of encephalopathy along with use of loop diuretics.  Evolving.  No evidence of primary sodium gain and I doubt - D or  - R  Status post PEG placement a week back for dysphagia and on tube feedings  Decompensated cirrhosis of liver likely THURSTON status post paracentesis with SAAG gradient more than 1.1  Acute metabolic encephalopathy multifactorial secondary to UTI/bacteremia/cirrhosis of liver  UTI/bacteremia with blood culture at [facility] positive for gram-positive cocci in chains exclude SBP  Anemia status post EGD  Bilateral pressure ulcers on the heel     PLAN     Agree to free water to 250 cc q. 4.  Recheck electrolytes in the evening along with urine osmolality   Reduce the dose of Bumex and added Aldactone.  Recommend to gradually increase Aldactone  AdD mIDODRINE      HISTORY OF PRESENTING ILLNESS                 This is a 63 y.o. female who with background history of THURSTON liver cirrhosis/hypertension/fibromyalgia/anemia suspected pernicious along with osteoarthropathy has been transferred [facility] where she presented with altered mental sensorium.  No reported history of fever/head trauma/any change in medications recently.  A week prior she was in the hospital at [facility] details unavailable for dysphagia for which a PEG placement was carried out.  She is getting tube feedings with PEG placement.    Initial assessment showed hemoglobin of 6.7 with sodium of 152.  CT head was negative for any acute pathology.  CT abdomen disclosed a liver cirrhosis with ascites.  She received 1 unit of blood and started on IV antibiotics for suspected UTI and transferred to [facility].  Here she underwent paracentesis of 1 L and SAG gradient is more than 1.1 suggestive of liver etiology.  She has been getting free water but     Not on file   Social History Narrative    Not on file     Social Determinants of Health     Financial Resource Strain: Not on file   Food Insecurity: Not on file   Transportation Needs: Not on file   Physical Activity: Not on file   Stress: Not on file   Social Connections: Not on file   Intimate Partner Violence: Not on file   Housing Stability: Not on file       FAMILY HISTORY     History reviewed. No pertinent family history.       REVIEW OF SYSTEM     Unable to obtain    PHYSICAL EXAM     Vitals:    02/13/24 2010 02/14/24 0000 02/14/24 0322 02/14/24 0832   BP: 102/60 101/71 102/68 107/64   Pulse: 85 83 80 87   Resp: 15 16 18 15   Temp: 97.7 °F (36.5 °C) 98.5 °F (36.9 °C) 97.4 °F (36.3 °C) 99.5 °F (37.5 °C)   TempSrc: Oral Oral Temporal Oral   SpO2: 94% 100% 98% 95%   Weight:   93.4 kg (205 lb 14.6 oz)    Height:         24HR INTAKE/OUTPUT:    Intake/Output Summary (Last 24 hours) at 2/14/2024 0949  Last data filed at 2/14/2024 0532  Gross per 24 hour   Intake 4243.5 ml   Output 975 ml   Net 3268.5 ml       General appearance:Awake, alert, in no acute distress  Skin: warm and dry, no rash or erythema  Eyes: conjunctivae pale and sclera anicteric  ENT: no thrush no pharyngeal congestion  orodental hygiene   Neck: No JVD, Lymphadenopathty or thyromegaly  Respiratory: vesicular breath sounds,no wheeze/crackles  Cardiovascular: S1 S2 normal,no gallop or organic murmur.No carotid bruit  Abdomen:Non tender/Bowel sounds present  Extremities: No Cyanosis or Clubbing, present lower extremity edema  Neurological: Confused    INVESTIGATIONS      CBC:   Recent Labs     02/13/24  1336 02/13/24  2117 02/14/24  0537   WBC 4.6  --  4.6   RBC 2.54*  --  2.55*   HGB 8.0* 8.0* 8.0*   HCT 24.4* 24.5* 24.6*   MCV 95.8  --  96.4   MCH 31.3  --  31.3   MCHC 32.7  --  32.5   RDW 16.2*  --  16.3*     --  208   MPV 8.7  --  9.0      BMP:   Recent Labs     02/12/24  0537 02/13/24  0508 02/14/24  0537   * 150* 153*   K 3.7  3.5* 3.7   * 111* 115*   CO2 33* 32* 32*   BUN 26* 22 20   CREATININE 0.8 0.8 0.9   GLUCOSE 80 82 118*   CALCIUM 8.0* 8.0* 7.8*        Phosphorus:    Recent Labs     02/14/24  0537   PHOS 3.9     Magnesium:   Recent Labs     02/13/24  0508 02/14/24  0537   MG 2.3 2.2     Albumin:   Recent Labs     02/13/24  0508   LABALBU 2.2*       Radiology:  Reviewed as available.  Thank you for the consultation.  Please do not hesitate to call with questions.    This note is created with the assistance of a speech-recognition program. While intending to generate a document that actually reflects the content of the visit, no guarantees can be provided that every mistake has been identified and corrected by editing.    Vincent Carter MD MD, MRCP ([place]), FACP   2/14/2024 9:49 AM    [facility]

## 2024-02-14 NOTE — PROGRESS NOTES
[facility] Wound Ostomy  Nurse  Consult Note       NAME:  Clemente Garcia  MEDICAL RECORD NUMBER:  [id number]  AGE: 63 y.o.   GENDER: female  : 1960  TODAY'S DATE:  2024    Subjective   Reason for [facility] Nurse Evaluation and Assessment: \"bilateral ulcer on heels\"      Clemente Garcia is a 63 y.o. female referred by:   [x] Physician  [] Nursing  [] Other:     Wound Identification:  Wound Type: pressure  Contributing Factors: edema, chronic pressure, decreased mobility, shear force, obesity, and incontinence of stool    Wound History:   From H&P:  63-year-old female with past medical history of depression, fibromyalgia, hypertension, dyslipidemia, THURSTON nonalcoholic steatohepatitis, nephrolithiasis, neuropathy and osteoarthritis, history of anemia, degenerative disc disease initially was brought in to [facility] due to concern about altered mental status and confusion   Following at [facility] with Dr. Eliud Salazar  for workup for rapid onset sensory impairment.  Current Wound Care Treatment:  Offloading heel boots in use.          Objective    /68   Pulse 80   Temp 97.4 °F (36.3 °C) (Temporal)   Resp 18   Ht 1.6 m (5' 2.99\")   Wt 93.4 kg (205 lb 14.6 oz)   SpO2 98%   BMI 36.48 kg/m²     LABS:  WBC:    Lab Results   Component Value Date/Time    WBC 4.6 2024 05:37 AM     H/H:    Lab Results   Component Value Date/Time    HGB 8.0 2024 05:37 AM    HCT 24.6 2024 05:37 AM     PTT:    Lab Results   Component Value Date/Time    APTT 29.5 2024 05:08 AM   [APTT}  PT/INR:    Lab Results   Component Value Date/Time    PROTIME 11.6 2024 05:08 AM    INR 1.1 2024 05:08 AM     HgBA1c:    Lab Results   Component Value Date/Time    LABA1C 4.7 10/16/2023 04:24 PM       Assessment   Babak Risk Score: Babak Scale Score: (P) 11    Patient Active Problem List   Diagnosis Code    Gait instability R26.81    Inability to walk R26.2    Other specified anemias D64.89    Anemia

## 2024-02-14 NOTE — CONSULTS
Consultation Note  Podiatric Medicine and Surgery     Subjective     Chief Complaint: Bilateral pressure ulcers to heel    HPI:  Clemente Garcia is a 63 y.o. female seen at [facility] for bilateral ischial ulcers to the heel.  Patient was admitted approximately a month ago due to anemia and since then she has undergone EGD, paracentesis was started on tube feedings.  Podiatry was consulted due to bilateral pressure ulcers.  Patient does not see a podiatrist at this time.  Per patient's , the patient was ambulatory and walking approximately a month ago and felt sick which made her bedbound.  Since then she does not communicate properly and is in pain.  No other pedal complaints at this time    PCP is Milana Whalen MD    ROS:   Review of Systems   Constitutional:  Positive for activity change. Negative for chills, fatigue and fever.   Respiratory:  Positive for shortness of breath. Negative for cough.    Cardiovascular:  Positive for leg swelling.   Musculoskeletal:  Positive for joint swelling.   Skin:  Positive for color change and wound.   Neurological:  Positive for weakness. Negative for numbness.     Past Medical History   has no past medical history on file.    Past Surgical History   has a past surgical history that includes Hysterectomy and Upper gastrointestinal endoscopy (N/A, 2/13/2024).    Medications  Prior to Admission medications    Medication Sig Start Date End Date Taking? Authorizing Provider   bumetanide (BUMEX) 1 MG tablet Take 2 tablets by mouth daily   Yes Stef Antunez MD   bisacodyl (DULCOLAX) 10 MG suppository Place 1 suppository rectally daily as needed for Constipation   Yes Stef Antunez MD   DULoxetine (CYMBALTA) 60 MG extended release capsule 1 capsule by Per G Tube route daily   Yes Stef Antunez MD   fentaNYL (DURAGESIC) 12 MCG/HR Place 1 patch onto the skin every 72 hours. Max Daily Amount: 1 patch   Yes Stef Antunez MD   gabapentin  (NEURONTIN) 100 MG capsule 1 capsule by PEG Tube route at bedtime.   Yes Stef Antunez MD   bisacodyl (DULCOLAX) 5 MG EC tablet Take 2 tablets by mouth daily as needed for Constipation Per peg TUbe   Yes Stef Antunez MD   lactulose (CHRONULAC) 10 GM/15ML solution 45 mLs by PEG Tube route 2 times daily   Yes Stef Antunez MD   levothyroxine (SYNTHROID) 125 MCG tablet 1 tablet by PEG Tube route Daily   Yes Stef Antunez MD   magnesium hydroxide (MILK OF MAGNESIA) 400 MG/5ML suspension 30 mLs by Per G Tube route daily as needed for Constipation   Yes Stef Antunez MD   morphine 10 MG/5ML solution 5 mLs by PEG Tube route every 4 hours as needed for Pain. Max Daily Amount: 60 mg   Yes Stef Antunez MD   Multiple Vitamins-Minerals (CENTRUM/CERTA-NIEVES WITH MINERALS ORAL) solution Take 15 mLs by mouth daily   Yes Stef Antunez MD   nortriptyline (PAMELOR) 10 MG capsule 1 capsule by PEG Tube route nightly   Yes Stef Antunez MD   acetaminophen (TYLENOL) 325 MG tablet 2 tablets by PEG Tube route every 6 hours as needed for Pain For pain/fever   Yes Stef Antunez MD   venlafaxine (EFFEXOR) 75 MG tablet Take 1 tablet by mouth 2 times daily    Stef Antunez MD    Scheduled Meds:   midodrine  5 mg Oral TID    spironolactone  25 mg Oral Daily    bumetanide  1 mg PEG Tube BID    miconazole   Topical BID    levothyroxine  125 mcg PEG Tube Daily    nortriptyline  10 mg PEG Tube Nightly    glycopyrrolate  0.4 mg IntraVENous Once    sodium chloride 0.9 % 1,000 mL with folic acid 1 mg, adult multi-vitamin with vitamin k 10 mL, thiamine 100 mg   IntraVENous Daily    DULoxetine  30 mg Per G Tube Daily    sodium chloride flush  5-40 mL IntraVENous 2 times per day    cefTRIAXone (ROCEPHIN) IV  1,000 mg IntraVENous Q24H    pantoprazole (PROTONIX) 40 mg in sodium chloride (PF) 0.9 % 10 mL injection  40 mg IntraVENous Daily    [Held by provider] lactulose  20 g  a document that actually reflects the content of the visit, the document can still have some errors including those of syntax and sound a like substitutions which may escape proof reading.  In such instances, actual meaning can be extrapolated by contextual diversion.      I performed a history and physical examination of the patient and discussed management with the resident. I reviewed the resident’s note and agree with the documented findings and plan of care. Any areas of disagreement are noted on the chart. I was personally present for the key portions of any procedures. I have documented in the chart those procedures where I was not present during the key portions. I have reviewed the Podiatry Resident progress note. I agree with the chief complaint, past medical history, past surgical history, allergies, medications, social and family history as documented unless otherwise noted below. Documentation of the HPI, Physical Exam and Medical Decision Making performed by medical students or scribes is based on my personal performance of the HPI, PE and MDM. I have personally evaluated this patient and have completed at least one if not all key elements of the E/M (history, physical exam, and MDM). Additional findings are as noted.     Nancy Bonilla DPM on 2/14/2024 at 3:34 PM  Board Certified, American Board of Podiatric Surgery  Fellow, [employer]

## 2024-02-15 PROBLEM — R56.9 SEIZURE-LIKE ACTIVITY (HCC): Status: ACTIVE | Noted: 2024-02-15

## 2024-02-15 PROBLEM — G40.209: Status: ACTIVE | Noted: 2024-02-15

## 2024-02-15 LAB
ALBUMIN SERPL-MCNC: 2 G/DL (ref 3.5–5.2)
ALBUMIN SERPL-MCNC: 2.2 G/DL (ref 3.5–5.2)
ALBUMIN/GLOB SERPL: 0.5 {RATIO} (ref 1–2.5)
ALP SERPL-CCNC: 172 U/L (ref 35–104)
ALT SERPL-CCNC: 12 U/L (ref 5–33)
ANION GAP SERPL CALCULATED.3IONS-SCNC: 7 MMOL/L (ref 9–17)
ANION GAP SERPL CALCULATED.3IONS-SCNC: 8 MMOL/L (ref 9–17)
AST SERPL-CCNC: 28 U/L
BASOPHILS # BLD: 0.01 K/UL (ref 0–0.2)
BASOPHILS NFR BLD: 0 % (ref 0–2)
BILIRUB DIRECT SERPL-MCNC: 0.2 MG/DL
BILIRUB INDIRECT SERPL-MCNC: 0.2 MG/DL (ref 0–1)
BILIRUB SERPL-MCNC: 0.4 MG/DL (ref 0.3–1.2)
BUN SERPL-MCNC: 17 MG/DL (ref 8–23)
BUN SERPL-MCNC: 17 MG/DL (ref 8–23)
CALCIUM SERPL-MCNC: 7.4 MG/DL (ref 8.6–10.4)
CALCIUM SERPL-MCNC: 7.7 MG/DL (ref 8.6–10.4)
CHLORIDE SERPL-SCNC: 112 MMOL/L (ref 98–107)
CHLORIDE SERPL-SCNC: 112 MMOL/L (ref 98–107)
CO2 SERPL-SCNC: 28 MMOL/L (ref 20–31)
CO2 SERPL-SCNC: 29 MMOL/L (ref 20–31)
CREAT SERPL-MCNC: 1 MG/DL (ref 0.5–0.9)
CREAT SERPL-MCNC: 1 MG/DL (ref 0.5–0.9)
EBV EA-D IGG SER-ACNC: 161 U/ML
EBV INTERPRETATION: ABNORMAL
EBV NA IGG SER IA-ACNC: 210 U/ML
EBV VCA IGG SER-ACNC: 251 U/ML
EBV VCA IGM SER-ACNC: 15 U/ML
EOSINOPHIL # BLD: 0.12 K/UL (ref 0–0.4)
EOSINOPHILS RELATIVE PERCENT: 2 % (ref 1–4)
ERYTHROCYTE [DISTWIDTH] IN BLOOD BY AUTOMATED COUNT: 15.8 % (ref 12.5–15.4)
GFR SERPL CREATININE-BSD FRML MDRD: >60 ML/MIN/1.73M2
GFR SERPL CREATININE-BSD FRML MDRD: >60 ML/MIN/1.73M2
GLUCOSE SERPL-MCNC: 120 MG/DL (ref 70–99)
GLUCOSE SERPL-MCNC: 124 MG/DL (ref 70–99)
HCT VFR BLD AUTO: 30.8 % (ref 36–46)
HGB BLD-MCNC: 9.4 G/DL (ref 12–16)
LYMPHOCYTES NFR BLD: 0.67 K/UL (ref 1–4.8)
LYMPHOCYTES RELATIVE PERCENT: 12 % (ref 24–44)
MAGNESIUM SERPL-MCNC: 2 MG/DL (ref 1.6–2.6)
MCH RBC QN AUTO: 32 PG (ref 26–34)
MCHC RBC AUTO-ENTMCNC: 30.5 G/DL (ref 31–37)
MCV RBC AUTO: 104.8 FL (ref 80–100)
MONOCYTES NFR BLD: 0.56 K/UL (ref 0.1–1.2)
MONOCYTES NFR BLD: 10 % (ref 2–11)
NEUTROPHILS NFR BLD: 76 % (ref 36–66)
NEUTS SEG NFR BLD: 4.49 K/UL (ref 1.8–7.7)
OSMOLALITY UR: 400 MOSM/KG (ref 80–1300)
PHOSPHATE SERPL-MCNC: 3.6 MG/DL (ref 2.6–4.5)
PLATELET # BLD AUTO: 173 K/UL (ref 140–450)
PMV BLD AUTO: 11.6 FL (ref 6–12)
POTASSIUM SERPL-SCNC: 3.8 MMOL/L (ref 3.7–5.3)
POTASSIUM SERPL-SCNC: 3.9 MMOL/L (ref 3.7–5.3)
PROT SERPL-MCNC: 6.4 G/DL (ref 6.4–8.3)
RBC # BLD AUTO: 2.94 M/UL (ref 4–5.2)
SODIUM SERPL-SCNC: 147 MMOL/L (ref 135–144)
SODIUM SERPL-SCNC: 149 MMOL/L (ref 135–144)
SURGICAL PATHOLOGY REPORT: NORMAL
WBC OTHER # BLD: 5.9 K/UL (ref 3.5–11)

## 2024-02-15 PROCEDURE — 83735 ASSAY OF MAGNESIUM: CPT

## 2024-02-15 PROCEDURE — 2580000003 HC RX 258: Performed by: INTERNAL MEDICINE

## 2024-02-15 PROCEDURE — 6360000002 HC RX W HCPCS: Performed by: INTERNAL MEDICINE

## 2024-02-15 PROCEDURE — 99232 SBSQ HOSP IP/OBS MODERATE 35: CPT | Performed by: INTERNAL MEDICINE

## 2024-02-15 PROCEDURE — 99232 SBSQ HOSP IP/OBS MODERATE 35: CPT | Performed by: FAMILY MEDICINE

## 2024-02-15 PROCEDURE — 99223 1ST HOSP IP/OBS HIGH 75: CPT | Performed by: PSYCHIATRY & NEUROLOGY

## 2024-02-15 PROCEDURE — 36415 COLL VENOUS BLD VENIPUNCTURE: CPT

## 2024-02-15 PROCEDURE — 6370000000 HC RX 637 (ALT 250 FOR IP): Performed by: INTERNAL MEDICINE

## 2024-02-15 PROCEDURE — 85025 COMPLETE CBC W/AUTO DIFF WBC: CPT

## 2024-02-15 PROCEDURE — 80076 HEPATIC FUNCTION PANEL: CPT

## 2024-02-15 PROCEDURE — C9254 INJECTION, LACOSAMIDE: HCPCS | Performed by: PSYCHIATRY & NEUROLOGY

## 2024-02-15 PROCEDURE — 2580000003 HC RX 258: Performed by: FAMILY MEDICINE

## 2024-02-15 PROCEDURE — 2580000003 HC RX 258: Performed by: PSYCHIATRY & NEUROLOGY

## 2024-02-15 PROCEDURE — 6360000002 HC RX W HCPCS: Performed by: FAMILY MEDICINE

## 2024-02-15 PROCEDURE — 6360000002 HC RX W HCPCS: Performed by: PSYCHIATRY & NEUROLOGY

## 2024-02-15 PROCEDURE — 80048 BASIC METABOLIC PNL TOTAL CA: CPT

## 2024-02-15 PROCEDURE — 93005 ELECTROCARDIOGRAM TRACING: CPT | Performed by: PSYCHIATRY & NEUROLOGY

## 2024-02-15 PROCEDURE — C9113 INJ PANTOPRAZOLE SODIUM, VIA: HCPCS | Performed by: INTERNAL MEDICINE

## 2024-02-15 PROCEDURE — 6370000000 HC RX 637 (ALT 250 FOR IP)

## 2024-02-15 PROCEDURE — 82040 ASSAY OF SERUM ALBUMIN: CPT

## 2024-02-15 PROCEDURE — 99231 SBSQ HOSP IP/OBS SF/LOW 25: CPT | Performed by: NURSE PRACTITIONER

## 2024-02-15 PROCEDURE — 2060000000 HC ICU INTERMEDIATE R&B

## 2024-02-15 PROCEDURE — 2500000003 HC RX 250 WO HCPCS: Performed by: FAMILY MEDICINE

## 2024-02-15 PROCEDURE — 84100 ASSAY OF PHOSPHORUS: CPT

## 2024-02-15 RX ORDER — CHLOROTHIAZIDE SODIUM 500 MG/1
500 INJECTION INTRAVENOUS ONCE
Status: COMPLETED | OUTPATIENT
Start: 2024-02-15 | End: 2024-02-15

## 2024-02-15 RX ADMIN — BUMETANIDE 1 MG: 1 TABLET ORAL at 21:54

## 2024-02-15 RX ADMIN — VANCOMYCIN HYDROCHLORIDE 1000 MG: 1 INJECTION, POWDER, LYOPHILIZED, FOR SOLUTION INTRAVENOUS at 03:15

## 2024-02-15 RX ADMIN — SODIUM CHLORIDE, PRESERVATIVE FREE 40 MG: 5 INJECTION INTRAVENOUS at 09:07

## 2024-02-15 RX ADMIN — SPIRONOLACTONE 25 MG: 25 TABLET, FILM COATED ORAL at 09:08

## 2024-02-15 RX ADMIN — SODIUM CHLORIDE, PRESERVATIVE FREE 10 ML: 5 INJECTION INTRAVENOUS at 21:55

## 2024-02-15 RX ADMIN — CHLOROTHIAZIDE SODIUM 500 MG: 500 INJECTION, POWDER, LYOPHILIZED, FOR SOLUTION INTRAVENOUS at 13:38

## 2024-02-15 RX ADMIN — GABAPENTIN 100 MG: 100 CAPSULE ORAL at 17:05

## 2024-02-15 RX ADMIN — THIAMINE HYDROCHLORIDE: 100 INJECTION, SOLUTION INTRAMUSCULAR; INTRAVENOUS at 09:07

## 2024-02-15 RX ADMIN — LACTULOSE 20 G: 10 SOLUTION ORAL at 09:08

## 2024-02-15 RX ADMIN — SODIUM CHLORIDE, PRESERVATIVE FREE 10 ML: 5 INJECTION INTRAVENOUS at 09:08

## 2024-02-15 RX ADMIN — LEVOTHYROXINE SODIUM 125 MCG: 125 TABLET ORAL at 05:57

## 2024-02-15 RX ADMIN — MICONAZOLE NITRATE: 20 CREAM TOPICAL at 09:08

## 2024-02-15 RX ADMIN — BUMETANIDE 1 MG: 1 TABLET ORAL at 09:08

## 2024-02-15 RX ADMIN — MICONAZOLE NITRATE: 20 CREAM TOPICAL at 21:57

## 2024-02-15 RX ADMIN — LACOSAMIDE 100 MG: 10 INJECTION INTRAVENOUS at 22:28

## 2024-02-15 RX ADMIN — DULOXETINE HYDROCHLORIDE 30 MG: 30 CAPSULE, DELAYED RELEASE ORAL at 09:08

## 2024-02-15 RX ADMIN — LACTULOSE 20 G: 10 SOLUTION ORAL at 21:54

## 2024-02-15 RX ADMIN — RIFAXIMIN 550 MG: 550 TABLET ORAL at 09:08

## 2024-02-15 RX ADMIN — NORTRIPTYLINE HYDROCHLORIDE 10 MG: 10 CAPSULE ORAL at 22:02

## 2024-02-15 RX ADMIN — CEFTRIAXONE SODIUM 1000 MG: 1 INJECTION, POWDER, FOR SOLUTION INTRAMUSCULAR; INTRAVENOUS at 21:56

## 2024-02-15 RX ADMIN — RIFAXIMIN 550 MG: 550 TABLET ORAL at 21:54

## 2024-02-15 RX ADMIN — LACTULOSE 20 G: 10 SOLUTION ORAL at 13:59

## 2024-02-15 ASSESSMENT — PAIN SCALES - PAIN ASSESSMENT IN ADVANCED DEMENTIA (PAINAD)
FACIALEXPRESSION: 0
BODYLANGUAGE: RELAXED
FACIALEXPRESSION: SMILING OR INEXPRESSIVE
BODYLANGUAGE: 0
BREATHING: NORMAL
NEGVOCALIZATION: 0
TOTALSCORE: 0
BREATHING: 0
CONSOLABILITY: 0
CONSOLABILITY: NO NEED TO CONSOLE

## 2024-02-15 NOTE — PROGRESS NOTES
[facility]    Gastroenterology Daily Progress Note      Patient:   Clemente Garcia   :    1960   Facility:   [facility]   Date:     2/15/2024  Consultant:   NAZIA Tubbs - CNP, CNP      SUBJECTIVE  63 y.o. female admitted 2024 with Anemia [D64.9]  Symptomatic anemia [D64.9] and seen for cirrhosis with anemia. The pt was seen and examined. The pt is alert and oriented to self and family members. She is slightly more conversant today. She states that she is in [facility]. Per the rn she had multiple  non bloody stools overnight. She is tolerating tube feeding with no emesis. Hgb 9.4 no growth so far on the blood and ascitic fluid cultures. Ascitic cytology results negative for malignancy        OBJECTIVE  Scheduled Meds:   [START ON 2024] dextrose 5 % 1,000 mL with folic acid 1 mg, adult multi-vitamin with vitamin k 10 mL, thiamine 100 mg   IntraVENous Daily    chlorothiazide  500 mg IntraVENous Once    spironolactone  25 mg Oral Daily    bumetanide  1 mg PEG Tube BID    miconazole   Topical BID    levothyroxine  125 mcg PEG Tube Daily    nortriptyline  10 mg PEG Tube Nightly    glycopyrrolate  0.4 mg IntraVENous Once    DULoxetine  30 mg Per G Tube Daily    sodium chloride flush  5-40 mL IntraVENous 2 times per day    cefTRIAXone (ROCEPHIN) IV  1,000 mg IntraVENous Q24H    pantoprazole (PROTONIX) 40 mg in sodium chloride (PF) 0.9 % 10 mL injection  40 mg IntraVENous Daily    lactulose  20 g PEG Tube TID    rifAXIMin  550 mg PEG Tube BID    fentaNYL  1 patch TransDERmal Q72H    vancomycin (VANCOCIN) intermittent dosing (placeholder)   Other RX Placeholder       Vital Signs:  /69   Pulse 90   Temp 99.1 °F (37.3 °C) (Oral)   Resp 18   Ht 1.6 m (5' 2.99\")   Wt 93.4 kg (205 lb 14.6 oz)   SpO2 95%   BMI 36.48 kg/m²    ROS not completed due to AMS  Physical Exam:     General Appearance: ill appearing alert and oriented to person, and family members   cirrhosis.Her liver autoimmune testing is negative except for her  low ceruloplasmin; will need outpt follow up once discharged, would consider outpt liver bx ,  Perfect serve message sent to dr benavidez (primary service) to consider neurology consult for AMS, pt does have hx of neuropathy per family   -Continue xifaxin and titrate lactulose for 2-3 BM per day  Avoid sedatives and narcotics  May need prn outpt paracentesis     4.hypernatremia, mgt per nephrology     5.dysphagia s/p peg tube insertion, tolerating tube feeding    6.AMS/acute metabolic encephalopathy     Gi signing off information given to family about gi clinic, multiple questions answered     This plan was formulated in collaboration with Dr. Mckoy  .    Electronically signed by: NAZIA Tubbs - CNP on 2/15/2024 at 1:14 PM

## 2024-02-15 NOTE — PLAN OF CARE
Problem: Discharge Planning  Goal: Discharge to home or other facility with appropriate resources  2/15/2024 1240 by Gabriela Grace RN  Outcome: Progressing  Flowsheets (Taken 2/15/2024 0800)  Discharge to home or other facility with appropriate resources: Identify barriers to discharge with patient and caregiver  2/15/2024 0415 by Raina Noel RN  Outcome: Progressing  Flowsheets (Taken 2/14/2024 0800 by CAMILLE HAMILTON)  Discharge to home or other facility with appropriate resources:   Identify barriers to discharge with patient and caregiver   Arrange for needed discharge resources and transportation as appropriate   Identify discharge learning needs (meds, wound care, etc)   Refer to discharge planning if patient needs post-hospital services based on physician order or complex needs related to functional status, cognitive ability or social support system     Problem: Pain  Goal: Verbalizes/displays adequate comfort level or baseline comfort level  2/15/2024 1240 by Gabriela Grace RN  Outcome: Progressing  2/15/2024 0415 by Raina Noel RN  Outcome: Progressing  Flowsheets (Taken 2/14/2024 1134 by CAMILLE HAMILTON)  Verbalizes/displays adequate comfort level or baseline comfort level:   Encourage patient to monitor pain and request assistance   Assess pain using appropriate pain scale   Administer analgesics based on type and severity of pain and evaluate response   Implement non-pharmacological measures as appropriate and evaluate response   Notify Licensed Independent Practitioner if interventions unsuccessful or patient reports new pain     Problem: Confusion  Goal: Confusion, delirium, dementia, or psychosis is improved or at baseline  Description: INTERVENTIONS:  1. Assess for possible contributors to thought disturbance, including medications, impaired vision or hearing, underlying metabolic abnormalities, dehydration, psychiatric diagnoses, and notify attending LIP  2. [place] high risk fall

## 2024-02-15 NOTE — CARE COORDINATION
Placed call to Betty at [facility] to f/u on referral, left message, call back requested.    1236 - Received message from Betty at [facility], they are able to accept. Writer placed call back to Betty, [phone], discussed that pt is not ready today, Betty will continue to follow.

## 2024-02-15 NOTE — PLAN OF CARE
Problem: Nutrition Deficit:  Goal: Optimize nutritional status  2/15/2024 1118 by Kaela Porter RD  Outcome: Progressing  Flowsheets (Taken 2/15/2024 1105)  Nutrient intake appropriate for improving, restoring, or maintaining nutritional needs:   Assess nutritional status and recommend course of action   Recommend, monitor, and adjust tube feedings and TPN/PPN based on assessed needs  2/15/2024 0415 by Raina Noel RN  Outcome: Progressing  Flowsheets (Taken 2/13/2024 1020 by Kaela Porter RD)  Nutrient intake appropriate for improving, restoring, or maintaining nutritional needs:   Monitor oral intake, labs, and treatment plans   Recommend appropriate diets, oral nutritional supplements, and vitamin/mineral supplements   Recommend, monitor, and adjust tube feedings and TPN/PPN based on assessed needs   Assess nutritional status and recommend course of action

## 2024-02-15 NOTE — PROGRESS NOTES
[facility]  Inpatient Service       Attending Physician Statement  I have discussed the care of Clemente Garcia  including pertinent history and exam findings,  with the resident. I have reviewed the key elements of all parts of the encounter with the resident.  I have had the opportunity at today's visit to personally discuss and repeat any exam deemed necessary.  I agree with the assessment, plan and orders as documented by the resident.    Exam of patient separate from resident staff.  Marked improvement in mental status able to answer questions today.  Still with chloroform type movements and picking.  Poor tidal volume, with scattered rhonchi.  Regular rate no obvious ectopy.  Abdomen soft and nontender with mild ascitic wave.  Nondistended.  Area around G-tube without inflammation.    Daily improvement with correction of hyponatremia as well as replacement of vitamin deficiency.  Believe this to be alcoholic encephalopathy.  Personally discussed with GI that they do not believe the ascitic fluid is not consistent with SBP, and believe outside hospital culture is a contaminant.  Repeat blood culture pending.  Patient is drinking stopped approximately 1 month ago with placement of feeding tube for gastroparesis during that hospitalization.  It was at that time that there was a change in mental status.  It worsened most recently with UTI.  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  The working diagnosis of CIDP and workup ended with discussion of obtaining biopsies for definitive diagnosis.  Whereupon series of hospitalizations led to progression of worsening of gastroparesis/feeding and neurogenic bladder leading to placement of G-tube for feeding as well as Loaiza for neurogenic bladder.  Discussion of patient's wishes occurred with  DO  Family Medicine Resident, PGY-1   2/15/2024  9:40 AM    Senior Resident Attestation:    I have independently seen Clemente Garcia and discussed the assessment and plan with the intern listed above and the attending Roscoe Marie MD. I have reviewed the note and have included any edits or additional information above.     60 yo F w/PMH of liver cirrhosis w/ascites s/p paracentesis 2/13, Hx of ETOH use, chronic anemia, HTN, dyslipidemia, fibromyalgia, neuropathy, and severe protein calorie malnutrition w/PEG tube in place who was admitted for acute metabolic encephalopathy with electrolyte derangements and UTI with one positive blood culture (repeat Bcx NGTD). Hypernatremia improving (Na 149 today) with increase in free water, reduced dosing of Bumex and Aldactone. Patient remains altered but slowly improving. Continuing rifaximin, lactulose and PPI for alcoholic cirrhosis. Per GI, suspect \"encephalopathy is acute and is likely not relates to her cirrhosis.\" GI recommending neurology consult for encephalopathy. Outpatient follow up w/GI for EGD biopsy results, ascitic fluid cultures and possible liver biopsy. Per nursing, patient does continue to be confused, have abnl arm movements and auditory hallucinations of a baby in the room. Will consult Neurology.    Adriana Mondragon MD  Family Medicine Resident, PGY-2   2/15/2024  3:42 PM

## 2024-02-15 NOTE — PROGRESS NOTES
SUBJECTIVE    Patient was seen and examined.  Patient was lying flat.  She was alert and awake.  She is receiving free water 250 mL every 4 hours at the same time she is receiving normal saline with multivitamin at 100 mL/h.  Her sodium which was up to 153 yesterday is down to 149.  Her intake and output charting shows significant positive fluid balance.  She put out 750 mL while the intake was 3.9 L.  Denies any shortness of breath.  OBJECTIVE      CURRENT TEMPERATURE:  Temp: 98 °F (36.7 °C)  MAXIMUM TEMPERATURE OVER 24HRS:  Temp (24hrs), Av.2 °F (36.8 °C), Min:98 °F (36.7 °C), Max:98.6 °F (37 °C)    CURRENT RESPIRATORY RATE:  Respirations: 18  CURRENT PULSE:  Pulse: 94  CURRENT BLOOD PRESSURE:  BP: 121/69  24HR BLOOD PRESSURE RANGE:  Systolic (24hrs), Av , Min:100 , Max:126   ; Diastolic (24hrs), Av, Min:58, Max:69    24HR INTAKE/OUTPUT:    Intake/Output Summary (Last 24 hours) at 2/15/2024 1131  Last data filed at 2/15/2024 0601  Gross per 24 hour   Intake 3922.56 ml   Output 750 ml   Net 3172.56 ml     WEIGHT :Patient Vitals for the past 96 hrs (Last 3 readings):   Weight   24 0322 93.4 kg (205 lb 14.6 oz)   24 0031 93.8 kg (206 lb 12.7 oz)   24 0115 96.6 kg (212 lb 15.4 oz)     PHYSICAL EXAM      GENERAL APPEARANCE: Awake and alert x 3   SKIN: Warm to touch  EYES: conjunctivae normal and sclera anicteric  NECK:   No JVD or lymphadenopathy.  PULMONARY: Bilateral air entry and clear  CADRDIOVASCULAR: S1 and S2 audible no S3   ABDOMEN: Soft and nontender bowel sounds are positive   EXTREMITIES: 2+ edema and anterior abdominal wall edema  CURRENT MEDICATIONS      spironolactone (ALDACTONE) tablet 25 mg, Daily  bumetanide (BUMEX) tablet 1 mg, BID  miconazole (MICOTIN) 2 % cream, BID  gabapentin (NEURONTIN) capsule 100 mg, Q8H PRN  levothyroxine (SYNTHROID) tablet 125 mcg, Daily  nortriptyline (PAMELOR) capsule 10 mg, Nightly  potassium chloride (KLOR-CON M) extended release  tablet 40 mEq, PRN   Or  potassium bicarb-citric acid (EFFER-K) effervescent tablet 40 mEq, PRN   Or  potassium chloride 10 mEq/100 mL IVPB (Peripheral Line), PRN  magnesium sulfate 2000 mg in 50 mL IVPB premix, PRN  Glycopyrrolate injection 0.4 mg, Once  sodium chloride 0.9 % 1,000 mL with folic acid 1 mg, adult multi-vitamin with vitamin k 10 mL, thiamine 100 mg, Daily  DULoxetine (CYMBALTA) extended release capsule 30 mg, Daily  sodium chloride flush 0.9 % injection 5-40 mL, 2 times per day  sodium chloride flush 0.9 % injection 5-40 mL, PRN  0.9 % sodium chloride infusion, PRN  ondansetron (ZOFRAN-ODT) disintegrating tablet 4 mg, Q8H PRN   Or  ondansetron (ZOFRAN) injection 4 mg, Q6H PRN  acetaminophen (TYLENOL) tablet 650 mg, Q6H PRN   Or  acetaminophen (TYLENOL) suppository 650 mg, Q6H PRN  cefTRIAXone (ROCEPHIN) 1,000 mg in sodium chloride 0.9 % 50 mL IVPB (mini-bag), Q24H  pantoprazole (PROTONIX) 40 mg in sodium chloride (PF) 0.9 % 10 mL injection, Daily  lactulose (CHRONULAC) 10 GM/15ML solution 20 g, TID  rifAXIMin (XIFAXAN) tablet 550 mg, BID  fentaNYL (DURAGESIC) 12 MCG/HR 1 patch, Q72H  vancomycin (VANCOCIN) intermittent dosing (placeholder), RX Placeholder          LABS      CBC:   Recent Labs     02/13/24  1336 02/13/24  2117 02/14/24  0537 02/14/24  1253 02/14/24 2113 02/15/24  0558   WBC 4.6  --  4.6  --   --  5.9   RBC 2.54*  --  2.55*  --   --  2.94*   HGB 8.0*   < > 8.0* 8.1* 7.8* 9.4*   HCT 24.4*   < > 24.6* 24.9* 24.0* 30.8*   MCV 95.8  --  96.4  --   --  104.8*   MCH 31.3  --  31.3  --   --  32.0   MCHC 32.7  --  32.5  --   --  30.5*   RDW 16.2*  --  16.3*  --   --  15.8*     --  208  --   --  173   MPV 8.7  --  9.0  --   --  11.6    < > = values in this interval not displayed.      BMP:   Recent Labs     02/14/24  0537 02/14/24  1829 02/15/24  0558   * 150* 149*   K 3.7 3.5* 3.9   * 113* 112*   CO2 32* 30 29   BUN 20 18 17   CREATININE 0.9 0.9 1.0*   GLUCOSE 118* 125* 120*    CALCIUM 7.8* 7.6* 7.7*    PHOSPHORUS:    Recent Labs     02/14/24  0537 02/15/24  0558   PHOS 3.9 3.6     MAGNESIUM:   Recent Labs     02/13/24  0508 02/14/24  0537 02/15/24  0558   MG 2.3 2.2 2.0     ALBUMIN:   Recent Labs     02/13/24  0508 02/15/24  0558   LABALBU 2.2* 2.2*     IRON:    Lab Results   Component Value Date/Time    IRON 28 02/12/2024 01:27 PM   TIBC:    Lab Results   Component Value Date/Time    TIBC 94 02/12/2024 01:27 PM     FERRITIN:    Lab Results   Component Value Date/Time    FERRITIN 1,205 02/12/2024 05:37 AM     MANUEL:   Lab Results   Component Value Date    MANUEL NEGATIVE 02/12/2024       SPEP:   Lab Results   Component Value Date/Time    PROT 6.4 02/15/2024 05:58 AM     UPEP: No results found for: \"TPU\"   HEPBSAG:  Lab Results   Component Value Date/Time    HEPBSAG NONREACTIVE 02/12/2024 01:27 PM     HEPCAB:  Lab Results   Component Value Date/Time    HEPCAB NONREACTIVE 02/12/2024 01:27 PM   URINE SODIUM:    Lab Results   Component Value Date/Time    FENG 96 02/14/2024 03:47 PM    URINE OSMOLARITY:    Lab Results   Component Value Date/Time    OSMOU 400 02/14/2024 03:47 PM     RADIOLOGY      Reviewed as available.      ASSESSMENT      ASSESSMENT      Hypernatremia due to free water deficit.  Improving with free water through NG tube.  Status post PEG placement a week back for dysphagia and on tube feedings.  Patient is tolerating tube feed fairly well  3. Decompensated cirrhosis of liver likely THURSTON status post paracentesis with SAAG gradient more than 1.1  4. Acute metabolic encephalopathy multifactorial secondary to UTI/bacteremia/cirrhosis of liver.  Clinically patient is much better and encephalopathy is resolving  5. UTI/bacteremia with blood culture at [facility] positive for gram-positive cocci in chains exclude SBP  6. Anemia status post EGD  7. Bilateral pressure ulcers on the heel  8.  Volume overload  PLAN      Continue to free water to 250 cc q. 4.  Recheck electrolytes in the  evening along with urine osmolality   Reduce the dose of Bumex and added Aldactone.  Recommend to gradually increase Aldactone  Multivitamin drip to multivitamin in D5  Will give a dose of Tylenol today  Please do not hesitate to call with questions.    Electronically signed by Caden Hernandez MD on 2/15/2024 at 11:31 AM

## 2024-02-15 NOTE — PLAN OF CARE
Problem: Discharge Planning  Goal: Discharge to home or other facility with appropriate resources  2/15/2024 0415 by Raina Noel RN  Outcome: Progressing  Flowsheets (Taken 2/14/2024 0800 by CAMILLE HAMILTON)  Discharge to home or other facility with appropriate resources:   Identify barriers to discharge with patient and caregiver   Arrange for needed discharge resources and transportation as appropriate   Identify discharge learning needs (meds, wound care, etc)   Refer to discharge planning if patient needs post-hospital services based on physician order or complex needs related to functional status, cognitive ability or social support system  2/14/2024 1434 by CAMILLE HAMILTON  Outcome: Progressing  Flowsheets (Taken 2/14/2024 0800)  Discharge to home or other facility with appropriate resources:   Identify barriers to discharge with patient and caregiver   Arrange for needed discharge resources and transportation as appropriate   Identify discharge learning needs (meds, wound care, etc)   Refer to discharge planning if patient needs post-hospital services based on physician order or complex needs related to functional status, cognitive ability or social support system     Problem: Pain  Goal: Verbalizes/displays adequate comfort level or baseline comfort level  2/15/2024 0415 by Raina Noel, RN  Outcome: Progressing  Flowsheets (Taken 2/14/2024 1134 by CAMILLE HAMILTON)  Verbalizes/displays adequate comfort level or baseline comfort level:   Encourage patient to monitor pain and request assistance   Assess pain using appropriate pain scale   Administer analgesics based on type and severity of pain and evaluate response   Implement non-pharmacological measures as appropriate and evaluate response   Notify Licensed Independent Practitioner if interventions unsuccessful or patient reports new pain  2/14/2024 1434 by CAMILLE HAMILTON  Outcome: Progressing  Flowsheets  Taken 2/14/2024 1134  Verbalizes/displays adequate  Nutrition Deficit:  Goal: Optimize nutritional status  2/15/2024 0415 by Raina Noel, RN  Outcome: Progressing  Flowsheets (Taken 2/13/2024 1020 by Kaela Porter RD)  Nutrient intake appropriate for improving, restoring, or maintaining nutritional needs:   Monitor oral intake, labs, and treatment plans   Recommend appropriate diets, oral nutritional supplements, and vitamin/mineral supplements   Recommend, monitor, and adjust tube feedings and TPN/PPN based on assessed needs   Assess nutritional status and recommend course of action  2/14/2024 1434 by CAMILLE HAMILTON  Outcome: Progressing

## 2024-02-15 NOTE — PROGRESS NOTES
Case d/w dr langford notified md of low ceruloplasm level, will get 24 hour copper level to help r/o Danny’s disease. Primary rn notified and message sent to dr benavidez via [employer]. Talked to pts son Marek via phone about plan of care and need to help r/o Danny’s disease even though rare condition it could possibly cause some of the neurological issues she is having in addition to the uti and nutritional issues she has He verbalized und erstanding Mellissa Wang CNP APRN

## 2024-02-15 NOTE — PROGRESS NOTES
Comprehensive Nutrition Assessment    Type and Reason for Visit:  Reassess    Nutrition Recommendations/Plan:   Recommend con't current TF: Osmolite 1.2 @ 60 ml/hr  250 ml free water flush q 4 hours  1 protein modular daily  Continue NPO  Monitor weight, labs, skin, TF tolerance      Malnutrition Assessment:  Malnutrition Status:  At risk for malnutrition (Comment) (TF at goal) (02/15/24 1113)    Context:  Acute Illness     Findings of the 6 clinical characteristics of malnutrition:  Energy Intake:  Mild decrease in energy intake (Comment) (TF at goal)  Weight Loss:  No significant weight loss     Body Fat Loss:  No significant body fat loss     Muscle Mass Loss:  No significant muscle mass loss    Fluid Accumulation:  Moderate to Severe (Not nutrition related)     Strength:  Not Performed    Nutrition Assessment:    Pt admitted with anemia, acute metabolic encephalopathy. Pt had PEG in place, TF is running at goal fo 60 ml/hr. Pt's Na has improved today, 148. Free water flush currently 250 ml q 4 hours providing 2680 ml fluid/day, will slightly increase flushes to 280 ml q 4 hours providing 2860 ml fluid. Noted pt's extremeties are still edematous.    Addendum- will keep free water flush to 250 ml/hr per nephro.    Nutrition Related Findings:    +2 BUE, +4 RLE, +3 LLE edema noted. Na 149, Alb 2.2, Chl 112, Cr 1.0, Glu 120. All other labs/meds reviewed. Wound Type: Multiple       Current Nutrition Intake & Therapies:    Average Meal Intake: NPO  Average Supplements Intake: NPO  Diet NPO  ADULT TUBE FEEDING; PEG; Standard without Fiber; Continuous; 30; Yes; 10; Q 8 hours; 60; 250; Q 4 hours; Protein; 1 Dose; Daily  Current Tube Feeding (TF) Orders:  Feeding Route: PEG  Formula: Standard with Fiber  Schedule: Continuous  Feeding Regimen: Osmolite 1.2 @ 60 ml/hr  Additives/Modulars: Protein (1 modular daily)  Water Flushes: 280 ml q 4 hours  Current TF & Flush Orders Provides: 1832 kcal, 65 g/protein, 2860 ml  Goal  TF & Flush Orders Provides: 1832 kcal, 105 g pro, 2470 ml fluid (Osmolite 1.2 @ 60 ml/hr with 1 protein modular daily, 215 ml free water flush Q 4 hours)    Anthropometric Measures:  Height: 160 cm (5' 2.99\")  Ideal Body Weight (IBW): 115 lbs (52 kg)    Current Body Weight: 96.6 kg (212 lb 15.4 oz), 185.2 % IBW.    Current BMI (kg/m2): 37.7  BMI Categories: Obese Class 2 (BMI 35.0 -39.9)    Estimated Daily Nutrient Needs:  Energy Requirements Based On: Kcal/kg  Weight Used for Energy Requirements: Current  Energy (kcal/day): 5647-5358 kcal/day (15-17 kcal/kg)  Weight Used for Protein Requirements: Ideal  Protein (g/day):  g/day (1.5-2.0 g/kg IBW)  Method Used for Fluid Requirements: 1 ml/kcal  Fluid (ml/day): 6772-1028 ml    Nutrition Diagnosis:   Increased nutrient needs (protein) related to increase demand for energy/nutrients as evidenced by wounds    Nutrition Interventions:   Food and/or Nutrient Delivery: Continue Current Tube Feeding  Nutrition Education/Counseling: No recommendation at this time  Coordination of Nutrition Care: Continue to monitor while inpatient, Interdisciplinary Rounds    Goals:  Previous Goal Met: Goal(s) Achieved  Goals: Tolerate nutrition support at goal rate, prior to discharge    Nutrition Monitoring and Evaluation:   Behavioral-Environmental Outcomes: None Identified  Food/Nutrient Intake Outcomes: Enteral Nutrition Intake/Tolerance  Physical Signs/Symptoms Outcomes: Biochemical Data, Nutrition Focused Physical Findings, Skin, Weight, Fluid Status or Edema    Discharge Planning:    Too soon to determine     NGA Gregory, RDN, LD  Registered Dietitian  [facility]  [phone]

## 2024-02-15 NOTE — PROGRESS NOTES
Vancomycin Day: 4  Current Regimen: 1000 mg IV every 12 hours    Patient's labs, cultures, vitals, and vancomycin regimen reviewed.   [facility] updated    Plan:   HOLDing vancomycin for ~24 hours. Will draw level with 2/16 AM labs to determine clearance with elevated vancomycin level    Ray Rendon,   PharmD  2/15/2024 9:13 AM

## 2024-02-15 NOTE — SIGNIFICANT EVENT
[facility]  Inpatient Service     Second Visit Note  For more detailed information please refer to the progress note of the day      2/15/2024    5:58 PM    Name:   Clemente Garcia  MRN:     [id number]     Acct:      [id number]   Room:   329/329-01   Day:  3  Admit Date:  2/12/2024  1:23 AM    PCP:   Milana Whalen MD  Code Status:  Full Code      Received perfect serve that patient became very sleepy all of a sudden and not responding to commands besides opening her eyes. Patient had a slight temperature of 99.1 degrees farenheit, HR 89, saturating 95% on 2L via NC, and bp 108/72.     Upon arrival patient responded to name, was answering questions appropriately, and able to follow simple commands. Family at bedside and interacting with them appropriately.     Physical Exam  Constitutional:       General: She is not in acute distress.     Appearance: She is diaphoretic (mild).   Eyes:      Extraocular Movements: Extraocular movements intact.      Pupils: Pupils are equal, round, and reactive to light.   Cardiovascular:      Rate and Rhythm: Normal rate and regular rhythm.      Pulses: Normal pulses.      Heart sounds: Normal heart sounds. No murmur heard.     No gallop.   Pulmonary:      Effort: No respiratory distress.      Breath sounds: Normal breath sounds. No wheezing or rales.   Chest:      Chest wall: No tenderness.   Abdominal:      General: There is distension (increased from AM).      Tenderness: There is no abdominal tenderness. There is no guarding.   Musculoskeletal:      Right lower leg: Edema (pitting +4) present.      Left lower leg: Edema (pitting +4) present.      Comments: Whole body edema similar to this morning   Neurological:      Mental Status: She is alert. She is disoriented.      Comments: Oriented to self, family, and situation. Disoriented to time and place          Updated Plan:     Neurology consulted  Continue monitoring

## 2024-02-15 NOTE — PROGRESS NOTES
SPIRITUAL CARE DEPARTMENT - [facility]  PROGRESS NOTE    Room # 329/329-01   Name: Clemente Garcia               Reason for visit: Routine    I visited the  patient and family .    Admit Date & Time: 2/12/2024  1:23 AM    Assessment:  Clemente Garcia is a 63 y.o. female in the hospital because \"encephalopathy\". Upon entering the room patient was lying in bed with eyes open. Pt said \"hello\" to writer. Pt also shared that she is \"doing better\" Pt's spouse, friend, and sister-in-law were also seated in room. Pt's family and friend shared that they have seen improvements in patient's health since yesterday. Pt and family welcomed prayer.       Intervention:  I introduced myself and my title as  I offered space for patient  to express feelings, needs, and concerns and provided a ministry presence. Writer actively listened to patient and family and offered words of affirmation. Writer also offered a prayer at bedside for pt.     Outcome:  Patient and family expressed gratitude for visit     Plan:  Chaplains will remain available to offer spiritual and emotional support as needed.    Electronically signed by Chaplain Hannah, on 2/15/2024 at 1:07 PM.  Spiritual Care Department  [facility] -        02/15/24 1303   Encounter Summary   Encounter Overview/Reason  Initial Encounter   Service Provided For: Patient and family together   Referral/Consult From: [facility] [place]   Support System Spouse;Friends/neighbors;Family members   Last Encounter  02/15/24   Complexity of Encounter Moderate   Begin Time 1040   End Time  1055   Total Time Calculated 15 min   Spiritual/Emotional needs   Type Spiritual Support   Assessment/Intervention/Outcome   Assessment Impaired resilience;Impaired social interaction;Calm;Coping   Intervention Active listening;Sustaining Presence/Ministry of presence;Prayer (assurance of)/[place];Explored/Affirmed feelings, thoughts, concerns   Outcome Engaged in conversation;Expressed  Gratitude;Coping;Receptive   Plan and Referrals   Plan/Referrals Continue Support (comment)

## 2024-02-16 ENCOUNTER — APPOINTMENT (OUTPATIENT)
Dept: GENERAL RADIOLOGY | Age: 64
DRG: 052 | End: 2024-02-16
Attending: STUDENT IN AN ORGANIZED HEALTH CARE EDUCATION/TRAINING PROGRAM
Payer: MEDICAID

## 2024-02-16 PROBLEM — K74.69 OTHER CIRRHOSIS OF LIVER (HCC): Status: ACTIVE | Noted: 2024-02-12

## 2024-02-16 PROBLEM — E87.0 HYPERNATREMIA: Status: ACTIVE | Noted: 2024-02-16

## 2024-02-16 PROBLEM — E88.09 HYPOALBUMINEMIA: Status: ACTIVE | Noted: 2024-02-16

## 2024-02-16 PROBLEM — E51.2 WERNICKE ENCEPHALOPATHY SYNDROME: Status: ACTIVE | Noted: 2024-02-12

## 2024-02-16 PROBLEM — R60.1 ANASARCA: Status: ACTIVE | Noted: 2024-02-16

## 2024-02-16 PROBLEM — E86.0 DEHYDRATION: Status: RESOLVED | Noted: 2024-02-13 | Resolved: 2024-02-16

## 2024-02-16 PROBLEM — Z78.9 NON-SMOKER: Status: ACTIVE | Noted: 2024-02-16

## 2024-02-16 LAB
ALBUMIN SERPL-MCNC: 2.1 G/DL (ref 3.5–5.2)
ALBUMIN/GLOB SERPL: 0.5 {RATIO} (ref 1–2.5)
ALP SERPL-CCNC: 168 U/L (ref 35–104)
ALT SERPL-CCNC: 10 U/L (ref 5–33)
AMMONIA PLAS-SCNC: 32 UMOL/L (ref 11–51)
ANION GAP SERPL CALCULATED.3IONS-SCNC: 6 MMOL/L (ref 9–17)
ANION GAP SERPL CALCULATED.3IONS-SCNC: 7 MMOL/L (ref 9–17)
AST SERPL-CCNC: 23 U/L
BASOPHILS # BLD: 0 K/UL (ref 0–0.2)
BASOPHILS NFR BLD: 0 % (ref 0–2)
BILIRUB DIRECT SERPL-MCNC: 0.1 MG/DL
BILIRUB INDIRECT SERPL-MCNC: 0.2 MG/DL (ref 0–1)
BILIRUB SERPL-MCNC: 0.3 MG/DL (ref 0.3–1.2)
BUN SERPL-MCNC: 17 MG/DL (ref 8–23)
BUN SERPL-MCNC: 18 MG/DL (ref 8–23)
CALCIUM SERPL-MCNC: 7.7 MG/DL (ref 8.6–10.4)
CALCIUM SERPL-MCNC: 8.1 MG/DL (ref 8.6–10.4)
CHLORIDE SERPL-SCNC: 111 MMOL/L (ref 98–107)
CHLORIDE SERPL-SCNC: 111 MMOL/L (ref 98–107)
CO2 SERPL-SCNC: 29 MMOL/L (ref 20–31)
CO2 SERPL-SCNC: 30 MMOL/L (ref 20–31)
CREAT SERPL-MCNC: 1.1 MG/DL (ref 0.5–0.9)
CREAT SERPL-MCNC: 1.1 MG/DL (ref 0.5–0.9)
EKG ATRIAL RATE: 87 BPM
EKG P AXIS: 33 DEGREES
EKG P-R INTERVAL: 118 MS
EKG Q-T INTERVAL: 416 MS
EKG QRS DURATION: 76 MS
EKG QTC CALCULATION (BAZETT): 500 MS
EKG R AXIS: 17 DEGREES
EKG T AXIS: 32 DEGREES
EKG VENTRICULAR RATE: 87 BPM
EOSINOPHIL # BLD: 0.1 K/UL (ref 0–0.4)
EOSINOPHILS RELATIVE PERCENT: 2 % (ref 1–4)
ERYTHROCYTE [DISTWIDTH] IN BLOOD BY AUTOMATED COUNT: 16.4 % (ref 12.5–15.4)
GFR SERPL CREATININE-BSD FRML MDRD: 57 ML/MIN/1.73M2
GFR SERPL CREATININE-BSD FRML MDRD: 57 ML/MIN/1.73M2
GLUCOSE SERPL-MCNC: 113 MG/DL (ref 70–99)
GLUCOSE SERPL-MCNC: 119 MG/DL (ref 70–99)
HCT VFR BLD AUTO: 26.6 % (ref 36–46)
HGB BLD-MCNC: 8.6 G/DL (ref 12–16)
LYMPHOCYTES NFR BLD: 0.5 K/UL (ref 1–4.8)
LYMPHOCYTES RELATIVE PERCENT: 8 % (ref 24–44)
MAGNESIUM SERPL-MCNC: 2.1 MG/DL (ref 1.6–2.6)
MAGNESIUM SERPL-MCNC: 2.1 MG/DL (ref 1.6–2.6)
MCH RBC QN AUTO: 31.3 PG (ref 26–34)
MCHC RBC AUTO-ENTMCNC: 32.1 G/DL (ref 31–37)
MCV RBC AUTO: 97.4 FL (ref 80–100)
MONOCYTES NFR BLD: 0.5 K/UL (ref 0.1–1.2)
MONOCYTES NFR BLD: 9 % (ref 2–11)
NEUTROPHILS NFR BLD: 81 % (ref 36–66)
NEUTS SEG NFR BLD: 4.4 K/UL (ref 1.8–7.7)
PHOSPHATE SERPL-MCNC: 3.5 MG/DL (ref 2.6–4.5)
PHOSPHATE SERPL-MCNC: 3.7 MG/DL (ref 2.6–4.5)
PLATELET # BLD AUTO: 192 K/UL (ref 140–450)
PMV BLD AUTO: 9.1 FL (ref 6–12)
POTASSIUM SERPL-SCNC: 3.8 MMOL/L (ref 3.7–5.3)
POTASSIUM SERPL-SCNC: 3.9 MMOL/L (ref 3.7–5.3)
PROT SERPL-MCNC: 6.6 G/DL (ref 6.4–8.3)
RBC # BLD AUTO: 2.73 M/UL (ref 4–5.2)
SODIUM SERPL-SCNC: 147 MMOL/L (ref 135–144)
SODIUM SERPL-SCNC: 147 MMOL/L (ref 135–144)
SURGICAL PATHOLOGY REPORT: NORMAL
VANCOMYCIN SERPL-MCNC: 26.7 UG/ML
WBC OTHER # BLD: 5.5 K/UL (ref 3.5–11)

## 2024-02-16 PROCEDURE — 99232 SBSQ HOSP IP/OBS MODERATE 35: CPT | Performed by: PSYCHIATRY & NEUROLOGY

## 2024-02-16 PROCEDURE — 6370000000 HC RX 637 (ALT 250 FOR IP): Performed by: INTERNAL MEDICINE

## 2024-02-16 PROCEDURE — 82525 ASSAY OF COPPER: CPT

## 2024-02-16 PROCEDURE — C9254 INJECTION, LACOSAMIDE: HCPCS | Performed by: PSYCHIATRY & NEUROLOGY

## 2024-02-16 PROCEDURE — 6360000002 HC RX W HCPCS: Performed by: INTERNAL MEDICINE

## 2024-02-16 PROCEDURE — 6370000000 HC RX 637 (ALT 250 FOR IP)

## 2024-02-16 PROCEDURE — 80076 HEPATIC FUNCTION PANEL: CPT

## 2024-02-16 PROCEDURE — C9113 INJ PANTOPRAZOLE SODIUM, VIA: HCPCS | Performed by: INTERNAL MEDICINE

## 2024-02-16 PROCEDURE — 82140 ASSAY OF AMMONIA: CPT

## 2024-02-16 PROCEDURE — 80048 BASIC METABOLIC PNL TOTAL CA: CPT

## 2024-02-16 PROCEDURE — 83735 ASSAY OF MAGNESIUM: CPT

## 2024-02-16 PROCEDURE — 2580000003 HC RX 258: Performed by: PSYCHIATRY & NEUROLOGY

## 2024-02-16 PROCEDURE — 2580000003 HC RX 258: Performed by: INTERNAL MEDICINE

## 2024-02-16 PROCEDURE — 2500000003 HC RX 250 WO HCPCS: Performed by: INTERNAL MEDICINE

## 2024-02-16 PROCEDURE — 84100 ASSAY OF PHOSPHORUS: CPT

## 2024-02-16 PROCEDURE — 99232 SBSQ HOSP IP/OBS MODERATE 35: CPT | Performed by: FAMILY MEDICINE

## 2024-02-16 PROCEDURE — 2060000000 HC ICU INTERMEDIATE R&B

## 2024-02-16 PROCEDURE — 81050 URINALYSIS VOLUME MEASURE: CPT

## 2024-02-16 PROCEDURE — 6360000002 HC RX W HCPCS: Performed by: PSYCHIATRY & NEUROLOGY

## 2024-02-16 PROCEDURE — 85025 COMPLETE CBC W/AUTO DIFF WBC: CPT

## 2024-02-16 PROCEDURE — 95816 EEG AWAKE AND DROWSY: CPT

## 2024-02-16 PROCEDURE — 74018 RADEX ABDOMEN 1 VIEW: CPT

## 2024-02-16 PROCEDURE — 99233 SBSQ HOSP IP/OBS HIGH 50: CPT | Performed by: INTERNAL MEDICINE

## 2024-02-16 PROCEDURE — 99231 SBSQ HOSP IP/OBS SF/LOW 25: CPT | Performed by: NURSE PRACTITIONER

## 2024-02-16 PROCEDURE — 36415 COLL VENOUS BLD VENIPUNCTURE: CPT

## 2024-02-16 PROCEDURE — 80202 ASSAY OF VANCOMYCIN: CPT

## 2024-02-16 RX ORDER — CHLOROTHIAZIDE SODIUM 500 MG/1
500 INJECTION INTRAVENOUS 2 TIMES DAILY
Status: DISCONTINUED | OUTPATIENT
Start: 2024-02-16 | End: 2024-02-19

## 2024-02-16 RX ORDER — GAUZE BANDAGE 2" X 2"
100 BANDAGE TOPICAL DAILY
Status: DISCONTINUED | OUTPATIENT
Start: 2024-02-16 | End: 2024-02-23 | Stop reason: HOSPADM

## 2024-02-16 RX ORDER — WATER 10 ML/10ML
20 INJECTION INTRAMUSCULAR; INTRAVENOUS; SUBCUTANEOUS ONCE
Status: COMPLETED | OUTPATIENT
Start: 2024-02-16 | End: 2024-02-16

## 2024-02-16 RX ORDER — M-VIT,TX,IRON,MINS/CALC/FOLIC 27MG-0.4MG
1 TABLET ORAL DAILY
Status: DISCONTINUED | OUTPATIENT
Start: 2024-02-16 | End: 2024-02-23 | Stop reason: HOSPADM

## 2024-02-16 RX ORDER — METOLAZONE 5 MG/1
5 TABLET ORAL DAILY
Status: DISCONTINUED | OUTPATIENT
Start: 2024-02-16 | End: 2024-02-21

## 2024-02-16 RX ORDER — FOLIC ACID 1 MG/1
1 TABLET ORAL DAILY
Status: DISCONTINUED | OUTPATIENT
Start: 2024-02-16 | End: 2024-02-23 | Stop reason: HOSPADM

## 2024-02-16 RX ADMIN — LACOSAMIDE 100 MG: 10 INJECTION INTRAVENOUS at 22:13

## 2024-02-16 RX ADMIN — LACOSAMIDE 100 MG: 10 INJECTION INTRAVENOUS at 10:19

## 2024-02-16 RX ADMIN — CHLOROTHIAZIDE SODIUM 500 MG: 500 INJECTION, POWDER, LYOPHILIZED, FOR SOLUTION INTRAVENOUS at 12:24

## 2024-02-16 RX ADMIN — SODIUM CHLORIDE, PRESERVATIVE FREE 40 MG: 5 INJECTION INTRAVENOUS at 09:58

## 2024-02-16 RX ADMIN — Medication 100 MG: at 12:23

## 2024-02-16 RX ADMIN — CEFTRIAXONE SODIUM 1000 MG: 1 INJECTION, POWDER, FOR SOLUTION INTRAMUSCULAR; INTRAVENOUS at 22:52

## 2024-02-16 RX ADMIN — LACTULOSE 20 G: 10 SOLUTION ORAL at 14:19

## 2024-02-16 RX ADMIN — BUMETANIDE 1 MG: 1 TABLET ORAL at 08:57

## 2024-02-16 RX ADMIN — WATER 20 ML: 1 INJECTION INTRAMUSCULAR; INTRAVENOUS; SUBCUTANEOUS at 21:53

## 2024-02-16 RX ADMIN — RIFAXIMIN 550 MG: 550 TABLET ORAL at 08:57

## 2024-02-16 RX ADMIN — SPIRONOLACTONE 25 MG: 25 TABLET, FILM COATED ORAL at 08:57

## 2024-02-16 RX ADMIN — SODIUM CHLORIDE, PRESERVATIVE FREE 10 ML: 5 INJECTION INTRAVENOUS at 21:51

## 2024-02-16 RX ADMIN — THIAMINE HYDROCHLORIDE: 100 INJECTION, SOLUTION INTRAMUSCULAR; INTRAVENOUS at 09:05

## 2024-02-16 RX ADMIN — MICONAZOLE NITRATE: 20 CREAM TOPICAL at 22:53

## 2024-02-16 RX ADMIN — LACTULOSE 20 G: 10 SOLUTION ORAL at 22:00

## 2024-02-16 RX ADMIN — Medication 1 TABLET: at 12:23

## 2024-02-16 RX ADMIN — RIFAXIMIN 550 MG: 550 TABLET ORAL at 22:00

## 2024-02-16 RX ADMIN — DULOXETINE HYDROCHLORIDE 30 MG: 30 CAPSULE, DELAYED RELEASE ORAL at 08:57

## 2024-02-16 RX ADMIN — CHLOROTHIAZIDE SODIUM 500 MG: 500 INJECTION, POWDER, LYOPHILIZED, FOR SOLUTION INTRAVENOUS at 21:51

## 2024-02-16 RX ADMIN — SODIUM CHLORIDE, PRESERVATIVE FREE 10 ML: 5 INJECTION INTRAVENOUS at 09:59

## 2024-02-16 RX ADMIN — FOLIC ACID 1 MG: 1 TABLET ORAL at 12:23

## 2024-02-16 RX ADMIN — METOLAZONE 5 MG: 5 TABLET ORAL at 12:24

## 2024-02-16 RX ADMIN — NORTRIPTYLINE HYDROCHLORIDE 10 MG: 10 CAPSULE ORAL at 22:00

## 2024-02-16 RX ADMIN — LEVOTHYROXINE SODIUM 125 MCG: 125 TABLET ORAL at 05:40

## 2024-02-16 RX ADMIN — LACTULOSE 20 G: 10 SOLUTION ORAL at 08:57

## 2024-02-16 RX ADMIN — MICONAZOLE NITRATE: 20 CREAM TOPICAL at 09:58

## 2024-02-16 ASSESSMENT — PAIN SCALES - WONG BAKER
WONGBAKER_NUMERICALRESPONSE: HURTS A LITTLE BIT
WONGBAKER_NUMERICALRESPONSE: HURTS LITTLE MORE
WONGBAKER_NUMERICALRESPONSE: 2
WONGBAKER_NUMERICALRESPONSE: 4
WONGBAKER_NUMERICALRESPONSE: HURTS LITTLE MORE
WONGBAKER_NUMERICALRESPONSE: HURTS LITTLE MORE
WONGBAKER_NUMERICALRESPONSE: HURTS A LITTLE BIT
WONGBAKER_NUMERICALRESPONSE: HURTS A LITTLE BIT
WONGBAKER_NUMERICALRESPONSE: HURTS LITTLE MORE
WONGBAKER_NUMERICALRESPONSE: HURTS LITTLE MORE
WONGBAKER_NUMERICALRESPONSE: 4
WONGBAKER_NUMERICALRESPONSE: 2
WONGBAKER_NUMERICALRESPONSE: HURTS A LITTLE BIT
WONGBAKER_NUMERICALRESPONSE: 2
WONGBAKER_NUMERICALRESPONSE: 4
WONGBAKER_NUMERICALRESPONSE: 2

## 2024-02-16 ASSESSMENT — PAIN SCALES - PAIN ASSESSMENT IN ADVANCED DEMENTIA (PAINAD)
NEGVOCALIZATION: 0
BREATHING: 0
NEGVOCALIZATION: 0
TOTALSCORE: 0
FACIALEXPRESSION: 0
BODYLANGUAGE: RELAXED
FACIALEXPRESSION: 0
BREATHING: 0
BREATHING: NORMAL
BREATHING: 0
NEGVOCALIZATION: 0
CONSOLABILITY: NO NEED TO CONSOLE
FACIALEXPRESSION: 0
NEGVOCALIZATION: 0
TOTALSCORE: 0
CONSOLABILITY: NO NEED TO CONSOLE
CONSOLABILITY: 0
TOTALSCORE: 0
CONSOLABILITY: NO NEED TO CONSOLE
BREATHING: 0
BREATHING: 0
TOTALSCORE: 0
BREATHING: NORMAL
CONSOLABILITY: 0
TOTALSCORE: 0
BODYLANGUAGE: RELAXED
BODYLANGUAGE: 0
FACIALEXPRESSION: SMILING OR INEXPRESSIVE
BODYLANGUAGE: 0
FACIALEXPRESSION: 0
BODYLANGUAGE: RELAXED
FACIALEXPRESSION: SMILING OR INEXPRESSIVE
FACIALEXPRESSION: SMILING OR INEXPRESSIVE
TOTALSCORE: 0
FACIALEXPRESSION: 0
BODYLANGUAGE: 0
BODYLANGUAGE: 0
FACIALEXPRESSION: 0
BREATHING: NORMAL
BREATHING: 0
CONSOLABILITY: 0
FACIALEXPRESSION: 0
TOTALSCORE: 0
CONSOLABILITY: 0
BODYLANGUAGE: RELAXED
CONSOLABILITY: NO NEED TO CONSOLE
NEGVOCALIZATION: 0
NEGVOCALIZATION: 0
FACIALEXPRESSION: SMILING OR INEXPRESSIVE
CONSOLABILITY: NO NEED TO CONSOLE
BREATHING: 0
FACIALEXPRESSION: SMILING OR INEXPRESSIVE
BREATHING: NORMAL
BODYLANGUAGE: RELAXED
BODYLANGUAGE: 0
CONSOLABILITY: NO NEED TO CONSOLE
BREATHING: NORMAL
BREATHING: NORMAL
CONSOLABILITY: NO NEED TO CONSOLE
BREATHING: 0
BODYLANGUAGE: 0
FACIALEXPRESSION: SMILING OR INEXPRESSIVE
BODYLANGUAGE: RELAXED
NEGVOCALIZATION: 0
CONSOLABILITY: 0
FACIALEXPRESSION: 0
BREATHING: NORMAL
CONSOLABILITY: NO NEED TO CONSOLE
CONSOLABILITY: 0
NEGVOCALIZATION: 0
CONSOLABILITY: NO NEED TO CONSOLE
FACIALEXPRESSION: SMILING OR INEXPRESSIVE
FACIALEXPRESSION: SMILING OR INEXPRESSIVE
BODYLANGUAGE: RELAXED
CONSOLABILITY: 0
BODYLANGUAGE: RELAXED
NEGVOCALIZATION: 0
CONSOLABILITY: 0
BODYLANGUAGE: 0
TOTALSCORE: 0
TOTALSCORE: 0
BREATHING: NORMAL
CONSOLABILITY: 0
BODYLANGUAGE: 0
BODYLANGUAGE: 0
BREATHING: NORMAL
BODYLANGUAGE: RELAXED
FACIALEXPRESSION: SMILING OR INEXPRESSIVE
FACIALEXPRESSION: 0
BREATHING: 0

## 2024-02-16 NOTE — PROGRESS NOTES
[facility]    Gastroenterology Daily Progress Note      Patient:   Clemente Garcia   :    1960   [facility]  Date:     20[facility]  Consultant:   NAZIA Tubbs CNP, CNP      SUBJECTIVE  63 y.o. female admitted 2024 with Anemia [D64.9]  Symptomatic anemia [D64.9] and seen for cirrhosis  with anemia and AMS. The pt was seen and examined. Pt will open eyes when name is called and does recognize son and sister. She is not conversing. She underwent EEG today and is being evaluated by neurology, mri brain ordered but pt has lap band that contains metal; unsure if mri can be done. . Recheck of ammonia level is 32. 24 hour urine to evaluate copper in progress. Per the RN she has had two loose stools that are orange in color. She is tolerating the tube feeding. Lft are normal except for elevated alk phos..        OBJECTIVE  Scheduled Meds:   chlorothiazide  500 mg IntraVENous BID    metOLazone  5 mg Per G Tube Daily    folic acid  1 mg PEG Tube Daily    thiamine mononitrate  100 mg Per G Tube Daily    therapeutic multivitamin-minerals  1 tablet Per G Tube Daily    lacosamide (VIMPAT) 100 mg in sodium chloride 0.9 % 60 mL IVPB  100 mg IntraVENous BID    spironolactone  25 mg Oral Daily    [Held by provider] bumetanide  1 mg PEG Tube BID    miconazole   Topical BID    levothyroxine  125 mcg PEG Tube Daily    nortriptyline  10 mg PEG Tube Nightly    glycopyrrolate  0.4 mg IntraVENous Once    DULoxetine  30 mg Per G Tube Daily    sodium chloride flush  5-40 mL IntraVENous 2 times per day    cefTRIAXone (ROCEPHIN) IV  1,000 mg IntraVENous Q24H    pantoprazole (PROTONIX) 40 mg in sodium chloride (PF) 0.9 % 10 mL injection  40 mg IntraVENous Daily    lactulose  20 g PEG Tube TID    rifAXIMin  550 mg PEG Tube BID    fentaNYL  1 patch TransDERmal Q72H    vancomycin (VANCOCIN) intermittent dosing (placeholder)   Other RX Placeholder       Vital Signs:  /73   Pulse 88    formulated in collaboration with Dr. Mckoy .    Electronically signed by: NAZIA Tubbs - CNP on 2/16/2024 at 3:09 PM

## 2024-02-16 NOTE — PROGRESS NOTES
NEUROLOGY INPATIENT PROGRESS NOTE     2/16/2024         Clemente Garcia is a  63 y.o. female admitted on 2/12/2024 with  Anemia [D64.9]  Symptomatic anemia [D64.9]    Reason for consult: Behavioral changes with visual hallucinations  History is obtained mostly from the patient's son and other family members at bedside. Also from the medical record and from the caregivers. Chart is reviewed and patient is examined.   Briefly, this is a  63 y.o. rt handed  female with hx of HTN, dyslipidemia, THURSTON nonalcoholic steatohepatitis, nephrolithiasis and osteoarthritis was admitted on 2/12/2024 as a transfer from [facility] with altered mentation..  As per family members; patient was found less responsive with blood sugar in 40s and also was found to be dehydrated and malnourished.  She was taken to [facility] ER where she was noted to be with urosepsis for which she was initiated on antibiotic therapy.  She had some improvement but it did not persist.  She was having fluctuating mentation for which she was brought to [facility] on 2/12/2024.  She was continued on vancomycin and ceftriaxone antibiotic therapy.  Because of acute on chronic encephalopathy; neurology consultation requested.  During this encounter; patient was alert and awake but was noted to be with abnormal movements of extremities as if she was trying to grab something out of air.    Family members at bedside stated that patient has chronic dysphagia and failed swallow studies earlier.  Patient has been on PEG tube for the past 10 days.    2/16/2024: Chart reviewed and discussed with caregivers.  They stated that patient is still having automatisms but \"not hallucinating\".  Patient has received IV Vimpat last night.  Vitals were stable.  Pulse had been 87-91 for the past 24 hours.  Patient's  is at bedside.  EEG and MRI brain are pending.      No current facility-administered medications on file prior to

## 2024-02-16 NOTE — CONSULTS
NEUROLOGY INPATIENT CONSULT NOTE    2/15/2024         Clemente Garcia is a  63 y.o. female admitted on 2/12/2024 with  Anemia [D64.9]  Symptomatic anemia [D64.9]    Reason for consult:  History is obtained mostly from the patient's son and other family members at bedside. Also from the medical record and from the caregivers. Chart is reviewed and patient is examined.   Briefly, this is a  63 y.o. rt handed  female with hx of HTN, dyslipidemia, THURSTON nonalcoholic steatohepatitis, nephrolithiasis and osteoarthritis was admitted on 2/12/2024 as a transfer from [facility] with altered mentation..  As per family members; patient was found less responsive with blood sugar in 40s and also was found to be dehydrated and malnourished.  She was taken to [facility] ER where she was noted to be with urosepsis for which she was initiated on antibiotic therapy.  She had some improvement but it did not persist.  She was having fluctuating mentation for which she was brought to [facility] on 2/12/2024.  She was continued on vancomycin and ceftriaxone antibiotic therapy.  Because of acute on chronic encephalopathy; neurology consultation requested.  During this encounter; patient was alert and awake but was noted to be with abnormal movements of extremities as if she was trying to grab something out of air.    Family members at bedside stated that patient has chronic dysphagia and failed swallow studies earlier.  Patient has been on PEG tube for the past 10 days.      No current facility-administered medications on file prior to encounter.     Current Outpatient Medications on File Prior to Encounter   Medication Sig Dispense Refill    bumetanide (BUMEX) 1 MG tablet Take 2 tablets by mouth daily      bisacodyl (DULCOLAX) 10 MG suppository Place 1 suppository rectally daily as needed for Constipation      DULoxetine (CYMBALTA) 60 MG extended release capsule 1 capsule by Per G Tube route  BID    levothyroxine  125 mcg PEG Tube Daily    nortriptyline  10 mg PEG Tube Nightly    glycopyrrolate  0.4 mg IntraVENous Once    DULoxetine  30 mg Per G Tube Daily    sodium chloride flush  5-40 mL IntraVENous 2 times per day    cefTRIAXone (ROCEPHIN) IV  1,000 mg IntraVENous Q24H    pantoprazole (PROTONIX) 40 mg in sodium chloride (PF) 0.9 % 10 mL injection  40 mg IntraVENous Daily    lactulose  20 g PEG Tube TID    rifAXIMin  550 mg PEG Tube BID    fentaNYL  1 patch TransDERmal Q72H    vancomycin (VANCOCIN) intermittent dosing (placeholder)   Other RX Placeholder     PRN Meds include: gabapentin, potassium chloride **OR** potassium alternative oral replacement **OR** potassium chloride, magnesium sulfate, sodium chloride flush, sodium chloride, ondansetron **OR** ondansetron, acetaminophen **OR** acetaminophen    ROS:   Constitutional Negative for fever and chills   HEENT Negative for ear discharge, ear pain, nosebleed   Eyes Negative for photophobia, pain and discharge   Respiratory Negative for hemoptysis and sputum   Cardiovascular Negative for orthopnea, claudication and PND   Gastrointestinal Negative for abdominal pain, diarrhea, blood in stool   Musculoskeletal Negative for joint pain, negative for myalgia   Skin Negative for rash or itching   Endo/heme/allergies Negative for polydipsia, environmental allergy   Psychiatric Negative for suicidal ideation.  Patient is not anxious           Objective:   /72   Pulse 88   Temp 99.1 °F (37.3 °C) (Axillary)   Resp 18   Ht 1.6 m (5' 2.99\")   Wt 93.4 kg (205 lb 14.6 oz)   SpO2 95%   BMI 36.48 kg/m²     Blood pressure range: Systolic (24hrs), Av , Min:108 , Max:126   ; Diastolic (24hrs), Av, Min:58, Max:72      Continuous infusions:    sodium chloride         ON EXAMINATION:  GENERAL  Appears comfortable and in no distress   HEENT  NC/ AT   NECK  Supple and no bruits heard   Cardiovascular  S1, S2 heard; radial pulse intact   MENTAL STATUS:   0.9 0.9 1.0* 1.0*   MG 2.3 2.2  --  2.0  --    CALCIUM 8.0* 7.8* 7.6* 7.7* 7.4*     Recent Labs     02/13/24  0508 02/14/24  0537 02/15/24  0558 02/15/24  1240   PROT 6.3*  --  6.4  --    LABALBU 2.2*  --  2.2* 2.0*   AST 28  --  28  --    ALT 12  --  12  --    TRIG  --  86  --   --        No results found for: \"PHENYTOIN\", \"PHENOBARB\", \"VALPROATE\", \"CBMZ\"        CT HEAD WO CONTRAST    Narrative  EXAMINATION:  CT OF THE HEAD WITHOUT CONTRAST  2/13/2024 7:44 pm    TECHNIQUE:  CT of the head was performed without the administration of intravenous  contrast. Automated exposure control, iterative reconstruction, and/or weight  based adjustment of the mA/kV was utilized to reduce the radiation dose to as  low as reasonably achievable.    COMPARISON:  None.    HISTORY:  ORDERING SYSTEM PROVIDED HISTORY: altered mental status  TECHNOLOGIST PROVIDED HISTORY:  altered mental status    Reason for Exam: altered mental status    FINDINGS:  Motion limited evaluation.    BRAIN/VENTRICLES: There is no acute intracranial hemorrhage, mass effect or  midline shift.  No abnormal extra-axial fluid collection.  The gray-white  differentiation is maintained without evidence of an acute infarct.  There is  no evidence of hydrocephalus.    ORBITS: The visualized portion of the orbits demonstrate no acute abnormality.    SINUSES: The visualized paranasal sinuses and mastoid air cells demonstrate  no acute abnormality.    SOFT TISSUES/SKULL:  No acute abnormality of the visualized skull or soft  tissues.    Impression  No acute intracranial abnormality.    Diagnostic data reviewed:  CT head 2/13/2024: No acute intracranial abnormality.    CTA head and neck:     MRI brain:     2D echo:                Impression and Plan: Ms. Clemente Garcia is a 63 y.o. female with   Acute on subacute encephalopathy with new onset hallucinations; patient was noted to be with automatisms raising concern for temporal lobe seizures; EKG now; will start her on IV

## 2024-02-16 NOTE — PLAN OF CARE
Problem: Discharge Planning  Goal: Discharge to home or other facility with appropriate resources  Outcome: Progressing     Problem: Pain  Goal: Verbalizes/displays adequate comfort level or baseline comfort level  Outcome: Progressing  Flowsheets (Taken 2/16/2024 0919)  Verbalizes/displays adequate comfort level or baseline comfort level: Assess pain using appropriate pain scale     Problem: Confusion  Goal: Confusion, delirium, dementia, or psychosis is improved or at baseline  Description: INTERVENTIONS:  1. Assess for possible contributors to thought disturbance, including medications, impaired vision or hearing, underlying metabolic abnormalities, dehydration, psychiatric diagnoses, and notify attending LIP  2. [place] high risk fall precautions, as indicated  3. Provide frequent short contacts to provide reality reorientation, refocusing and direction  4. Decrease environmental stimuli, including noise as appropriate  5. Monitor and intervene to maintain adequate nutrition, hydration, elimination, sleep and activity  6. If unable to ensure safety without constant attention obtain sitter and review sitter guidelines with assigned personnel  7. Initiate Psychosocial CNS and Spiritual Care consult, as indicated  Outcome: Progressing  Flowsheets (Taken 2/16/2024 2558)  Effect of thought disturbance (confusion, delirium, dementia, or psychosis) are managed with adequate functional status: [place] high risk fall precautions, as indicated     Problem: Skin/Tissue Integrity  Goal: Absence of new skin breakdown  Description: 1.  Monitor for areas of redness and/or skin breakdown  2.  Assess vascular access sites hourly  3.  Every 4-6 hours minimum:  Change oxygen saturation probe site  4.  Every 4-6 hours:  If on nasal continuous positive airway pressure, respiratory therapy assess nares and determine need for appliance change or resting period.  Outcome: Progressing     Problem: Safety - Adult  Goal: Free from  fall injury  Outcome: Progressing     Problem: ABCDS Injury Assessment  Goal: Absence of physical injury  Outcome: Progressing     Problem: Nutrition Deficit:  Goal: Optimize nutritional status  Outcome: Progressing     Problem: Neurosensory - Adult  Goal: Achieves stable or improved neurological status  Outcome: Progressing     Problem: Respiratory - Adult  Goal: Achieves optimal ventilation and oxygenation  Outcome: Progressing     Problem: Cardiovascular - Adult  Goal: Maintains optimal cardiac output and hemodynamic stability  Outcome: Progressing     Problem: Skin/Tissue Integrity - Adult  Goal: Skin integrity remains intact  Outcome: Progressing     Problem: Musculoskeletal - Adult  Goal: Return mobility to safest level of function  Outcome: Progressing     Problem: Gastrointestinal - Adult  Goal: Minimal or absence of nausea and vomiting  Outcome: Progressing     Problem: Genitourinary - Adult  Goal: Absence of urinary retention  Outcome: Progressing     Problem: Infection - Adult  Goal: Absence of infection at discharge  Outcome: Progressing     Problem: Metabolic/Fluid and Electrolytes - Adult  Goal: Electrolytes maintained within normal limits  Outcome: Progressing

## 2024-02-16 NOTE — PLAN OF CARE
Problem: Discharge Planning  Goal: Discharge to home or other facility with appropriate resources  2/15/2024 2313 by Lily Baez RN  Outcome: Progressing  2/15/2024 1240 by Gabriela Grace RN  Outcome: Progressing  Flowsheets (Taken 2/15/2024 0800)  Discharge to home or other facility with appropriate resources: Identify barriers to discharge with patient and caregiver     Problem: Pain  Goal: Verbalizes/displays adequate comfort level or baseline comfort level  2/15/2024 2313 by Lily Baez RN  Outcome: Progressing  2/15/2024 1240 by Gabriela Grace RN  Outcome: Progressing     Problem: Confusion  Goal: Confusion, delirium, dementia, or psychosis is improved or at baseline  Description: INTERVENTIONS:  1. Assess for possible contributors to thought disturbance, including medications, impaired vision or hearing, underlying metabolic abnormalities, dehydration, psychiatric diagnoses, and notify attending LIP  2. [place] high risk fall precautions, as indicated  3. Provide frequent short contacts to provide reality reorientation, refocusing and direction  4. Decrease environmental stimuli, including noise as appropriate  5. Monitor and intervene to maintain adequate nutrition, hydration, elimination, sleep and activity  6. If unable to ensure safety without constant attention obtain sitter and review sitter guidelines with assigned personnel  7. Initiate Psychosocial CNS and Spiritual Care consult, as indicated  2/15/2024 2313 by Lily Baez RN  Outcome: Progressing  2/15/2024 1240 by Gabriela Grace RN  Outcome: Progressing     Problem: Skin/Tissue Integrity  Goal: Absence of new skin breakdown  Description: 1.  Monitor for areas of redness and/or skin breakdown  2.  Assess vascular access sites hourly  3.  Every 4-6 hours minimum:  Change oxygen saturation probe site  4.  Every 4-6 hours:  If on nasal continuous positive airway pressure, respiratory therapy assess nares and determine need for  appliance change or resting period.  2/15/2024 2313 by Lily Baez RN  Outcome: Progressing  2/15/2024 1240 by Gabriela Grace RN  Outcome: Progressing     Problem: Safety - Adult  Goal: Free from fall injury  2/15/2024 1240 by Gabriela Grace RN  Outcome: Progressing     Problem: ABCDS Injury Assessment  Goal: Absence of physical injury  2/15/2024 1240 by Gabriela Grace RN  Outcome: Progressing     Problem: Nutrition Deficit:  Goal: Optimize nutritional status  2/15/2024 1240 by Gabriela Grace, RN  Outcome: Progressing  2/15/2024 1118 by Kaela Porter, DB  Outcome: Progressing  Flowsheets (Taken 2/15/2024 1105)  Nutrient intake appropriate for improving, restoring, or maintaining nutritional needs:   Assess nutritional status and recommend course of action   Recommend, monitor, and adjust tube feedings and TPN/PPN based on assessed needs

## 2024-02-16 NOTE — CONSULTS
Discussed with Dr.. Pineda.  His metabolic acidosis is corrected and patient has normal creatinine.  Dr.. Pineda mentioned that she will be canceling the consult.  Please call if you have any further questions

## 2024-02-16 NOTE — PROGRESS NOTES
Requested lab to come and take the 24 hour urine, stated they did not have anyone to take the urine.  Advised that it is on the counter in patient room on ice.

## 2024-02-16 NOTE — PROGRESS NOTES
Patient has a gastric band with a stimulator, family did not know the brand or any other information as they state patient was a private person.  Per MRI tech, radiology will not do MRI until further information is discovered.  Dr Mondragon and Dr Donovan with neuro has been updated.

## 2024-02-16 NOTE — PROGRESS NOTES
Vancomycin Day: 5  Current Regimen: 1000 mg IV every 12 hours;  currently on hold    Patient's labs, cultures, vitals, and vancomycin regimen reviewed.   [facility] updated    Plan:   HOLDing vancomycin for elevated level. Will draw level with 2/17 AM labs to determine clearance    Ray Rendon,   PharmD  2/16/2024 11:28 AM

## 2024-02-16 NOTE — PROGRESS NOTES
SUBJECTIVE    Patient was seen and examined.  Patient was hemodynamically stable.  Blood pressure was relatively better controlled.  Patient continues to have positive fluid balance.  Her sodium is better and down to 147 however she is 5.3 L positive since admission.  Patient is receiving multivitamin in the form of infusion.  She is also receiving free water through G-tube.  Her creatinine remained stable and 1.0 mg/dL    OBJECTIVE      CURRENT TEMPERATURE:  Temp: 97.7 °F (36.5 °C)  MAXIMUM TEMPERATURE OVER 24HRS:  Temp (24hrs), Av.9 °F (37.2 °C), Min:97.7 °F (36.5 °C), Max:99.7 °F (37.6 °C)    CURRENT RESPIRATORY RATE:  Respirations: 14  CURRENT PULSE:  Pulse: 88  CURRENT BLOOD PRESSURE:  BP: 123/67  24HR BLOOD PRESSURE RANGE:  Systolic (24hrs), Av , Min:94 , Max:140   ; Diastolic (24hrs), Av, Min:60, Max:75    24HR INTAKE/OUTPUT:    Intake/Output Summary (Last 24 hours) at 2024 1017  Last data filed at 2/15/2024 2207  Gross per 24 hour   Intake --   Output 685 ml   Net -685 ml     WEIGHT :Patient Vitals for the past 96 hrs (Last 3 readings):   Weight   24 0539 98.9 kg (218 lb 0.6 oz)   24 0322 93.4 kg (205 lb 14.6 oz)   24 0031 93.8 kg (206 lb 12.7 oz)     PHYSICAL EXAM      GENERAL APPEARANCE: Awake and alert  SKIN: Warm to touch  EYES: Conjunctiva was pale  NECK: Hard to assess JVD and no lymphadenopathy  PULMONARY: Bilateral air entry but decreased at the bases.  Patient has poor chest expansion  CADRDIOVASCULAR: S1 and S2 audible no S3   ABDOMEN: Soft nontender, anterior abdominal wall edema was present as well as shifting dullness.   EXTREMITIES: Anasarca now patient has 4+ edema  CURRENT MEDICATIONS      dextrose 5 % 1,000 mL with folic acid 1 mg, adult multi-vitamin with vitamin k 10 mL, thiamine 100 mg, Daily  lacosamide (VIMPAT) 100 mg in sodium chloride 0.9 % 60 mL IVPB, BID  spironolactone (ALDACTONE) tablet 25 mg, Daily  bumetanide (BUMEX) tablet 1 mg,  Volume overload with anasarca.  Patient received dose of Diuril yesterday.  9.  Hypoalbuminemia due to advanced liver disease further complicated by malnutrition  PLAN      Start Diuril 500 mg IV twice daily   Start Zaroxolyn 5 mg once a day.  Change multivitamin infusion to in a suspension form through her G-tube  Repeat BMP this evening   Hold Bumex  Will consider albumin infusion, if patient fails diuresis   please do not hesitate to call with questions.    Electronically signed by Caden Hernandez MD on 2/16/2024 at 10:17 AM

## 2024-02-16 NOTE — PROGRESS NOTES
[facility] Residency  Inpatient Service     Progress Note  2/16/2024    10:18 AM    Name:   Clemente Garcia  MRN:     [id number]     Acct:      [id number]   Room:   [facility] Day:  4  Admit Date:  2/12/2024  1:23 AM    PCP:   Milana Whalen MD  Code Status:  Full Code    Subjective:     Overnight events: nursing was concerned about possible change in mentation as patient appeared acutely somnolent and was only responding to verbal stimuli by opening her eyes. Upon assessment, patient was awake, back to baseline and responding appropriately.       Patients mental status has continued to improve daily and is more interactive today then yesterday. Still has issues with memory and unable to tell me her birthday etc. Able to recognize people, situation, and follow simple commands. Denies SOB, chest pain, abdominal pain, or tenderness to palpation on exam. Although answers appropriately for the most part unclear on reliability.     Medications:     Allergies:  No Known Allergies    Current Meds:   Scheduled Meds:    dextrose 5 % 1,000 mL with folic acid 1 mg, adult multi-vitamin with vitamin k 10 mL, thiamine 100 mg   IntraVENous Daily    lacosamide (VIMPAT) 100 mg in sodium chloride 0.9 % 60 mL IVPB  100 mg IntraVENous BID    spironolactone  25 mg Oral Daily    bumetanide  1 mg PEG Tube BID    miconazole   Topical BID    levothyroxine  125 mcg PEG Tube Daily    nortriptyline  10 mg PEG Tube Nightly    glycopyrrolate  0.4 mg IntraVENous Once    DULoxetine  30 mg Per G Tube Daily    sodium chloride flush  5-40 mL IntraVENous 2 times per day    cefTRIAXone (ROCEPHIN) IV  1,000 mg IntraVENous Q24H    pantoprazole (PROTONIX) 40 mg in sodium chloride (PF) 0.9 % 10 mL injection  40 mg IntraVENous Daily    lactulose  20 g PEG Tube TID    rifAXIMin  550 mg PEG Tube BID    fentaNYL  1 patch TransDERmal Q72H    vancomycin (VANCOCIN) intermittent dosing (placeholder)   Other RX    2/16/2024  5:54 PM

## 2024-02-17 PROBLEM — G24.01 TARDIVE DYSKINESIA: Status: ACTIVE | Noted: 2024-02-17

## 2024-02-17 PROBLEM — G93.40 ACUTE ENCEPHALOPATHY: Status: ACTIVE | Noted: 2024-02-17

## 2024-02-17 PROBLEM — E51.2 WERNICKE ENCEPHALOPATHY SYNDROME: Status: ACTIVE | Noted: 2024-02-17

## 2024-02-17 LAB
ANION GAP SERPL CALCULATED.3IONS-SCNC: 7 MMOL/L (ref 9–17)
ANION GAP SERPL CALCULATED.3IONS-SCNC: 8 MMOL/L (ref 9–17)
BASOPHILS # BLD: 0 K/UL (ref 0–0.2)
BASOPHILS NFR BLD: 0 % (ref 0–2)
BUN SERPL-MCNC: 18 MG/DL (ref 8–23)
BUN SERPL-MCNC: 19 MG/DL (ref 8–23)
CALCIUM SERPL-MCNC: 7.9 MG/DL (ref 8.6–10.4)
CALCIUM SERPL-MCNC: 7.9 MG/DL (ref 8.6–10.4)
CHLORIDE SERPL-SCNC: 107 MMOL/L (ref 98–107)
CHLORIDE SERPL-SCNC: 111 MMOL/L (ref 98–107)
CO2 SERPL-SCNC: 28 MMOL/L (ref 20–31)
CO2 SERPL-SCNC: 29 MMOL/L (ref 20–31)
CREAT SERPL-MCNC: 1.1 MG/DL (ref 0.5–0.9)
CREAT SERPL-MCNC: 1.1 MG/DL (ref 0.5–0.9)
DATE LAST DOSE: NORMAL
EOSINOPHIL # BLD: 0.1 K/UL (ref 0–0.4)
EOSINOPHILS RELATIVE PERCENT: 2 % (ref 1–4)
ERYTHROCYTE [DISTWIDTH] IN BLOOD BY AUTOMATED COUNT: 16.2 % (ref 12.5–15.4)
GFR SERPL CREATININE-BSD FRML MDRD: 57 ML/MIN/1.73M2
GFR SERPL CREATININE-BSD FRML MDRD: 57 ML/MIN/1.73M2
GLUCOSE SERPL-MCNC: 105 MG/DL (ref 70–99)
GLUCOSE SERPL-MCNC: 107 MG/DL (ref 70–99)
HCT VFR BLD AUTO: 24.6 % (ref 36–46)
HGB BLD-MCNC: 8 G/DL (ref 12–16)
LYMPHOCYTES NFR BLD: 0.6 K/UL (ref 1–4.8)
LYMPHOCYTES RELATIVE PERCENT: 11 % (ref 24–44)
MAGNESIUM SERPL-MCNC: 2.1 MG/DL (ref 1.6–2.6)
MCH RBC QN AUTO: 31.2 PG (ref 26–34)
MCHC RBC AUTO-ENTMCNC: 32.3 G/DL (ref 31–37)
MCV RBC AUTO: 96.8 FL (ref 80–100)
MICROORGANISM SPEC CULT: NORMAL
MONOCYTES NFR BLD: 0.4 K/UL (ref 0.1–1.2)
MONOCYTES NFR BLD: 7 % (ref 2–11)
NEUTROPHILS NFR BLD: 80 % (ref 36–66)
NEUTS SEG NFR BLD: 4 K/UL (ref 1.8–7.7)
PHOSPHATE SERPL-MCNC: 3.7 MG/DL (ref 2.6–4.5)
PLATELET # BLD AUTO: 179 K/UL (ref 140–450)
PMV BLD AUTO: 9 FL (ref 6–12)
POTASSIUM SERPL-SCNC: 3.9 MMOL/L (ref 3.7–5.3)
POTASSIUM SERPL-SCNC: 4 MMOL/L (ref 3.7–5.3)
RBC # BLD AUTO: 2.55 M/UL (ref 4–5.2)
SERVICE CMNT-IMP: NORMAL
SODIUM SERPL-SCNC: 143 MMOL/L (ref 135–144)
SODIUM SERPL-SCNC: 147 MMOL/L (ref 135–144)
SPECIMEN DESCRIPTION: NORMAL
VANCOMYCIN SERPL-MCNC: 19.6 UG/ML
VIT B1 PYROPHOSHATE BLD-SCNC: 115 NMOL/L (ref 70–180)
WBC OTHER # BLD: 5 K/UL (ref 3.5–11)

## 2024-02-17 PROCEDURE — 2580000003 HC RX 258: Performed by: PSYCHIATRY & NEUROLOGY

## 2024-02-17 PROCEDURE — 6370000000 HC RX 637 (ALT 250 FOR IP)

## 2024-02-17 PROCEDURE — 36415 COLL VENOUS BLD VENIPUNCTURE: CPT

## 2024-02-17 PROCEDURE — 6360000002 HC RX W HCPCS: Performed by: INTERNAL MEDICINE

## 2024-02-17 PROCEDURE — 6370000000 HC RX 637 (ALT 250 FOR IP): Performed by: INTERNAL MEDICINE

## 2024-02-17 PROCEDURE — 2580000003 HC RX 258: Performed by: INTERNAL MEDICINE

## 2024-02-17 PROCEDURE — 2060000000 HC ICU INTERMEDIATE R&B

## 2024-02-17 PROCEDURE — 99232 SBSQ HOSP IP/OBS MODERATE 35: CPT | Performed by: INTERNAL MEDICINE

## 2024-02-17 PROCEDURE — 99233 SBSQ HOSP IP/OBS HIGH 50: CPT | Performed by: FAMILY MEDICINE

## 2024-02-17 PROCEDURE — 6360000002 HC RX W HCPCS: Performed by: PSYCHIATRY & NEUROLOGY

## 2024-02-17 PROCEDURE — 85025 COMPLETE CBC W/AUTO DIFF WBC: CPT

## 2024-02-17 PROCEDURE — APPNB30 APP NON BILLABLE TIME 0-30 MINS: Performed by: NURSE PRACTITIONER

## 2024-02-17 PROCEDURE — C9113 INJ PANTOPRAZOLE SODIUM, VIA: HCPCS | Performed by: INTERNAL MEDICINE

## 2024-02-17 PROCEDURE — 83735 ASSAY OF MAGNESIUM: CPT

## 2024-02-17 PROCEDURE — C9254 INJECTION, LACOSAMIDE: HCPCS | Performed by: PSYCHIATRY & NEUROLOGY

## 2024-02-17 PROCEDURE — 80202 ASSAY OF VANCOMYCIN: CPT

## 2024-02-17 PROCEDURE — 80048 BASIC METABOLIC PNL TOTAL CA: CPT

## 2024-02-17 PROCEDURE — A4216 STERILE WATER/SALINE, 10 ML: HCPCS | Performed by: INTERNAL MEDICINE

## 2024-02-17 PROCEDURE — 99232 SBSQ HOSP IP/OBS MODERATE 35: CPT | Performed by: PSYCHIATRY & NEUROLOGY

## 2024-02-17 PROCEDURE — P9047 ALBUMIN (HUMAN), 25%, 50ML: HCPCS | Performed by: INTERNAL MEDICINE

## 2024-02-17 PROCEDURE — 84100 ASSAY OF PHOSPHORUS: CPT

## 2024-02-17 RX ORDER — ALBUMIN (HUMAN) 12.5 G/50ML
50 SOLUTION INTRAVENOUS ONCE
Status: COMPLETED | OUTPATIENT
Start: 2024-02-17 | End: 2024-02-17

## 2024-02-17 RX ORDER — WATER 10 ML/10ML
20 INJECTION INTRAMUSCULAR; INTRAVENOUS; SUBCUTANEOUS ONCE
Status: COMPLETED | OUTPATIENT
Start: 2024-02-17 | End: 2024-02-17

## 2024-02-17 RX ORDER — SPIRONOLACTONE 25 MG/1
25 TABLET ORAL 2 TIMES DAILY
Status: DISCONTINUED | OUTPATIENT
Start: 2024-02-17 | End: 2024-02-23 | Stop reason: HOSPADM

## 2024-02-17 RX ADMIN — Medication 1 TABLET: at 09:40

## 2024-02-17 RX ADMIN — MICONAZOLE NITRATE: 20 CREAM TOPICAL at 21:34

## 2024-02-17 RX ADMIN — SPIRONOLACTONE 25 MG: 25 TABLET, FILM COATED ORAL at 17:50

## 2024-02-17 RX ADMIN — SODIUM CHLORIDE, PRESERVATIVE FREE 10 ML: 5 INJECTION INTRAVENOUS at 21:22

## 2024-02-17 RX ADMIN — ALBUMIN (HUMAN) 50 G: 0.25 INJECTION, SOLUTION INTRAVENOUS at 10:26

## 2024-02-17 RX ADMIN — DULOXETINE HYDROCHLORIDE 30 MG: 30 CAPSULE, DELAYED RELEASE ORAL at 09:41

## 2024-02-17 RX ADMIN — CHLOROTHIAZIDE SODIUM 500 MG: 500 INJECTION, POWDER, LYOPHILIZED, FOR SOLUTION INTRAVENOUS at 21:24

## 2024-02-17 RX ADMIN — Medication 100 MG: at 09:41

## 2024-02-17 RX ADMIN — RIFAXIMIN 550 MG: 550 TABLET ORAL at 09:40

## 2024-02-17 RX ADMIN — LACTULOSE 20 G: 10 SOLUTION ORAL at 21:43

## 2024-02-17 RX ADMIN — NORTRIPTYLINE HYDROCHLORIDE 10 MG: 10 CAPSULE ORAL at 21:22

## 2024-02-17 RX ADMIN — WATER 20 ML: 1 INJECTION INTRAMUSCULAR; INTRAVENOUS; SUBCUTANEOUS at 21:24

## 2024-02-17 RX ADMIN — LACOSAMIDE 100 MG: 10 INJECTION INTRAVENOUS at 21:33

## 2024-02-17 RX ADMIN — SODIUM CHLORIDE, PRESERVATIVE FREE 40 MG: 5 INJECTION INTRAVENOUS at 09:41

## 2024-02-17 RX ADMIN — FOLIC ACID 1 MG: 1 TABLET ORAL at 09:41

## 2024-02-17 RX ADMIN — CHLOROTHIAZIDE SODIUM 500 MG: 500 INJECTION, POWDER, LYOPHILIZED, FOR SOLUTION INTRAVENOUS at 09:42

## 2024-02-17 RX ADMIN — ACETAMINOPHEN 650 MG: 325 TABLET ORAL at 18:56

## 2024-02-17 RX ADMIN — METOLAZONE 5 MG: 5 TABLET ORAL at 09:41

## 2024-02-17 RX ADMIN — VANCOMYCIN HYDROCHLORIDE 750 MG: 750 INJECTION, POWDER, LYOPHILIZED, FOR SOLUTION INTRAVENOUS at 17:55

## 2024-02-17 RX ADMIN — MICONAZOLE NITRATE: 20 CREAM TOPICAL at 10:32

## 2024-02-17 RX ADMIN — LEVOTHYROXINE SODIUM 125 MCG: 125 TABLET ORAL at 04:20

## 2024-02-17 RX ADMIN — RIFAXIMIN 550 MG: 550 TABLET ORAL at 21:22

## 2024-02-17 RX ADMIN — LACOSAMIDE 100 MG: 10 INJECTION INTRAVENOUS at 11:27

## 2024-02-17 RX ADMIN — SODIUM CHLORIDE, PRESERVATIVE FREE 10 ML: 5 INJECTION INTRAVENOUS at 09:41

## 2024-02-17 ASSESSMENT — PAIN SCALES - PAIN ASSESSMENT IN ADVANCED DEMENTIA (PAINAD)
BODYLANGUAGE: 0
NEGVOCALIZATION: 0
NEGVOCALIZATION: 0
CONSOLABILITY: NO NEED TO CONSOLE
FACIALEXPRESSION: SMILING OR INEXPRESSIVE
TOTALSCORE: 0
CONSOLABILITY: NO NEED TO CONSOLE
TOTALSCORE: 0
BODYLANGUAGE: 0
CONSOLABILITY: NO NEED TO CONSOLE
NEGVOCALIZATION: 0
BREATHING: 0
BODYLANGUAGE: 0
CONSOLABILITY: 0
FACIALEXPRESSION: SMILING OR INEXPRESSIVE
CONSOLABILITY: NO NEED TO CONSOLE
FACIALEXPRESSION: SMILING OR INEXPRESSIVE
BODYLANGUAGE: RELAXED
FACIALEXPRESSION: SMILING OR INEXPRESSIVE
BREATHING: NORMAL
FACIALEXPRESSION: SMILING OR INEXPRESSIVE
CONSOLABILITY: 0
CONSOLABILITY: NO NEED TO CONSOLE
BREATHING: 0
BREATHING: 0
CONSOLABILITY: 0
FACIALEXPRESSION: 0
BREATHING: 0
BODYLANGUAGE: RELAXED
CONSOLABILITY: 0
FACIALEXPRESSION: SMILING OR INEXPRESSIVE
FACIALEXPRESSION: 0
FACIALEXPRESSION: 0
TOTALSCORE: 0
BREATHING: NORMAL
BODYLANGUAGE: 0
TOTALSCORE: 0
BODYLANGUAGE: 0
TOTALSCORE: 0
CONSOLABILITY: NO NEED TO CONSOLE
NEGVOCALIZATION: 0
FACIALEXPRESSION: 0
FACIALEXPRESSION: SMILING OR INEXPRESSIVE
CONSOLABILITY: NO NEED TO CONSOLE
FACIALEXPRESSION: SMILING OR INEXPRESSIVE
BREATHING: 0
BODYLANGUAGE: 0
BODYLANGUAGE: RELAXED
TOTALSCORE: 0
FACIALEXPRESSION: SMILING OR INEXPRESSIVE
BREATHING: 0
CONSOLABILITY: 0
FACIALEXPRESSION: SMILING OR INEXPRESSIVE
NEGVOCALIZATION: 0
FACIALEXPRESSION: SMILING OR INEXPRESSIVE
BODYLANGUAGE: 0
BODYLANGUAGE: RELAXED
BREATHING: NORMAL
FACIALEXPRESSION: SMILING OR INEXPRESSIVE
CONSOLABILITY: NO NEED TO CONSOLE
FACIALEXPRESSION: 0
NEGVOCALIZATION: 0
NEGVOCALIZATION: 0
BREATHING: 0
FACIALEXPRESSION: 0
NEGVOCALIZATION: 0
FACIALEXPRESSION: 0
CONSOLABILITY: 0
FACIALEXPRESSION: SMILING OR INEXPRESSIVE
FACIALEXPRESSION: SMILING OR INEXPRESSIVE
BREATHING: 0
CONSOLABILITY: 0
CONSOLABILITY: NO NEED TO CONSOLE
CONSOLABILITY: 0
BREATHING: NORMAL
BREATHING: 0
BODYLANGUAGE: 0
CONSOLABILITY: 0
CONSOLABILITY: 0
BODYLANGUAGE: RELAXED
FACIALEXPRESSION: 0
FACIALEXPRESSION: 0
CONSOLABILITY: NO NEED TO CONSOLE
BREATHING: NORMAL
TOTALSCORE: 0
BREATHING: 0
BREATHING: 0
FACIALEXPRESSION: SMILING OR INEXPRESSIVE
BREATHING: 0
BREATHING: NORMAL
BREATHING: NORMAL
FACIALEXPRESSION: SMILING OR INEXPRESSIVE
TOTALSCORE: 0
BODYLANGUAGE: 0
NEGVOCALIZATION: 0
BODYLANGUAGE: 0
BODYLANGUAGE: RELAXED
BODYLANGUAGE: RELAXED
NEGVOCALIZATION: 0
FACIALEXPRESSION: 0
BREATHING: NORMAL
BODYLANGUAGE: 0
BODYLANGUAGE: 0
BREATHING: 0
BODYLANGUAGE: RELAXED
CONSOLABILITY: 0
CONSOLABILITY: NO NEED TO CONSOLE
NEGVOCALIZATION: 0
CONSOLABILITY: NO NEED TO CONSOLE
BODYLANGUAGE: RELAXED
NEGVOCALIZATION: 0
BREATHING: NORMAL
BODYLANGUAGE: RELAXED
BODYLANGUAGE: RELAXED
TOTALSCORE: 0
CONSOLABILITY: NO NEED TO CONSOLE
FACIALEXPRESSION: SMILING OR INEXPRESSIVE
BREATHING: NORMAL
TOTALSCORE: 0
BREATHING: 0
BREATHING: NORMAL
BREATHING: 0
FACIALEXPRESSION: 0
BREATHING: 0
NEGVOCALIZATION: 0
FACIALEXPRESSION: 0
BODYLANGUAGE: RELAXED
TOTALSCORE: 0
CONSOLABILITY: NO NEED TO CONSOLE
FACIALEXPRESSION: SMILING OR INEXPRESSIVE
BODYLANGUAGE: 0
BODYLANGUAGE: RELAXED
CONSOLABILITY: NO NEED TO CONSOLE
CONSOLABILITY: 0
BREATHING: NORMAL
NEGVOCALIZATION: 0
FACIALEXPRESSION: 0
BREATHING: NORMAL
BODYLANGUAGE: RELAXED
TOTALSCORE: 0
BODYLANGUAGE: RELAXED
CONSOLABILITY: NO NEED TO CONSOLE
FACIALEXPRESSION: 0
BODYLANGUAGE: 0
BREATHING: 0
TOTALSCORE: 0
FACIALEXPRESSION: 0
BODYLANGUAGE: RELAXED
BODYLANGUAGE: 0
BODYLANGUAGE: 0
FACIALEXPRESSION: 0
TOTALSCORE: 0
BREATHING: NORMAL
BREATHING: NORMAL
CONSOLABILITY: 0
CONSOLABILITY: NO NEED TO CONSOLE
BODYLANGUAGE: 0
NEGVOCALIZATION: 0
BREATHING: NORMAL
CONSOLABILITY: 0
FACIALEXPRESSION: SMILING OR INEXPRESSIVE
BREATHING: NORMAL
TOTALSCORE: 0
NEGVOCALIZATION: 0
BREATHING: 0
CONSOLABILITY: 0
TOTALSCORE: 0
FACIALEXPRESSION: SMILING OR INEXPRESSIVE
BREATHING: NORMAL
BODYLANGUAGE: RELAXED
TOTALSCORE: 0
FACIALEXPRESSION: 0
BODYLANGUAGE: 0
FACIALEXPRESSION: SMILING OR INEXPRESSIVE
BODYLANGUAGE: RELAXED
TOTALSCORE: 0
BREATHING: NORMAL
NEGVOCALIZATION: 0
NEGVOCALIZATION: 0
CONSOLABILITY: 0
FACIALEXPRESSION: 0
NEGVOCALIZATION: 0
CONSOLABILITY: 0
BREATHING: 0
CONSOLABILITY: 0
BREATHING: 0
CONSOLABILITY: NO NEED TO CONSOLE
FACIALEXPRESSION: 0
CONSOLABILITY: 0
FACIALEXPRESSION: SMILING OR INEXPRESSIVE
BODYLANGUAGE: 0
TOTALSCORE: 0
CONSOLABILITY: 0
TOTALSCORE: 0
BODYLANGUAGE: 0
CONSOLABILITY: 0
CONSOLABILITY: NO NEED TO CONSOLE
TOTALSCORE: 0
BREATHING: NORMAL
BODYLANGUAGE: RELAXED
TOTALSCORE: 0
BREATHING: NORMAL
FACIALEXPRESSION: 0
TOTALSCORE: 0
NEGVOCALIZATION: 0
BREATHING: 0
BODYLANGUAGE: 0
BREATHING: NORMAL
BREATHING: NORMAL
NEGVOCALIZATION: 0
CONSOLABILITY: NO NEED TO CONSOLE
FACIALEXPRESSION: 0
BODYLANGUAGE: 0
NEGVOCALIZATION: 0
BODYLANGUAGE: 0
CONSOLABILITY: NO NEED TO CONSOLE
NEGVOCALIZATION: 0
FACIALEXPRESSION: SMILING OR INEXPRESSIVE

## 2024-02-17 ASSESSMENT — PAIN SCALES - WONG BAKER
WONGBAKER_NUMERICALRESPONSE: 2
WONGBAKER_NUMERICALRESPONSE: HURTS A LITTLE BIT
WONGBAKER_NUMERICALRESPONSE: 2
WONGBAKER_NUMERICALRESPONSE: HURTS A LITTLE BIT
WONGBAKER_NUMERICALRESPONSE: 2
WONGBAKER_NUMERICALRESPONSE: 2
WONGBAKER_NUMERICALRESPONSE: HURTS A LITTLE BIT
WONGBAKER_NUMERICALRESPONSE: 2
WONGBAKER_NUMERICALRESPONSE: HURTS A LITTLE BIT
WONGBAKER_NUMERICALRESPONSE: 2
WONGBAKER_NUMERICALRESPONSE: 2
WONGBAKER_NUMERICALRESPONSE: HURTS A LITTLE BIT
WONGBAKER_NUMERICALRESPONSE: 2
WONGBAKER_NUMERICALRESPONSE: 2
WONGBAKER_NUMERICALRESPONSE: HURTS A LITTLE BIT
WONGBAKER_NUMERICALRESPONSE: 2
WONGBAKER_NUMERICALRESPONSE: HURTS A LITTLE BIT
WONGBAKER_NUMERICALRESPONSE: HURTS A LITTLE BIT
WONGBAKER_NUMERICALRESPONSE: 2
WONGBAKER_NUMERICALRESPONSE: HURTS A LITTLE BIT
WONGBAKER_NUMERICALRESPONSE: 2
WONGBAKER_NUMERICALRESPONSE: HURTS A LITTLE BIT
WONGBAKER_NUMERICALRESPONSE: 2
WONGBAKER_NUMERICALRESPONSE: HURTS A LITTLE BIT
WONGBAKER_NUMERICALRESPONSE: 2
WONGBAKER_NUMERICALRESPONSE: HURTS A LITTLE BIT
WONGBAKER_NUMERICALRESPONSE: 2
WONGBAKER_NUMERICALRESPONSE: HURTS A LITTLE BIT
WONGBAKER_NUMERICALRESPONSE: HURTS A LITTLE BIT
WONGBAKER_NUMERICALRESPONSE: 2
WONGBAKER_NUMERICALRESPONSE: HURTS A LITTLE BIT

## 2024-02-17 ASSESSMENT — PAIN DESCRIPTION - LOCATION: LOCATION: BACK

## 2024-02-17 ASSESSMENT — PAIN SCALES - GENERAL: PAINLEVEL_OUTOF10: 5

## 2024-02-17 ASSESSMENT — PAIN DESCRIPTION - DESCRIPTORS: DESCRIPTORS: ACHING

## 2024-02-17 NOTE — PLAN OF CARE
Problem: Discharge Planning  Goal: Discharge to home or other facility with appropriate resources  2/17/2024 0435 by Alvin Herzog RN  Outcome: Progressing   Problem: Pain  Goal: Verbalizes/displays adequate comfort level or baseline comfort level  2/17/2024 0435 by Alvin Herzog RN  Outcome: Progressing   No new signs/symptoms of pain noted, pain rating < 3 on scale of 0-10, pain controlled with medication/ repositioning   Problem: Confusion  Goal: Confusion, delirium, dementia, or psychosis is improved or at baseline  Description: INTERVENTIONS:  1. Assess for possible contributors to thought disturbance, including medications, impaired vision or hearing, underlying metabolic abnormalities, dehydration, psychiatric diagnoses, and notify attending LIP  2. [place] high risk fall precautions, as indicated  3. Provide frequent short contacts to provide reality reorientation, refocusing and direction  4. Decrease environmental stimuli, including noise as appropriate  5. Monitor and intervene to maintain adequate nutrition, hydration, elimination, sleep and activity  6. If unable to ensure safety without constant attention obtain sitter and review sitter guidelines with assigned personnel  7. Initiate Psychosocial CNS and Spiritual Care consult, as indicated  2/17/2024 0435 by Alvin Herzog RN  Outcome: Progressing   Problem: Skin/Tissue Integrity  Goal: Absence of new skin breakdown  Description: 1.  Monitor for areas of redness and/or skin breakdown  2.  Assess vascular access sites hourly  3.  Every 4-6 hours minimum:  Change oxygen saturation probe site  4.  Every 4-6 hours:  If on nasal continuous positive airway pressure, respiratory therapy assess nares and determine need for appliance change or resting period.  2/17/2024 0435 by Alvin Herzog RN  Outcome: Progressing   No new skin breakdown noted, no new signs/symptoms of infection, continue to monitor lab work including WBC, medications administered per

## 2024-02-17 NOTE — PROGRESS NOTES
[facility] Residency  Inpatient Service     Progress Note  2/17/2024    2:02 PM    Name:   Clemente Garcia  MRN:     [id number]     Acct:      [id number]   Room:   [address] [place] [address] Day:  5  Admit Date:  2/12/2024  1:23 AM    PCP:   Milana Whalen MD  Code Status:  Full Code    Subjective:     Overnight events: none    Patient was examined this morning at bedside where she is lying comfortably in no acute distress and her  is present.  Though she is interactive and can answer some questions including knowing about herself she is still clearly confused. Her eyes are open and she can move her arms and head around. Her  says that she is more confused than her baseline.    She denies: shortness of breath, chest pain or abdominal pain.     Medications:     Allergies:  No Known Allergies    Current Meds:   Scheduled Meds:    spironolactone  25 mg Oral BID    chlorothiazide  500 mg IntraVENous BID    metOLazone  5 mg Per G Tube Daily    folic acid  1 mg PEG Tube Daily    thiamine mononitrate  100 mg Per G Tube Daily    therapeutic multivitamin-minerals  1 tablet Per G Tube Daily    lacosamide (VIMPAT) 100 mg in sodium chloride 0.9 % 60 mL IVPB  100 mg IntraVENous BID    [Held by provider] bumetanide  1 mg PEG Tube BID    miconazole   Topical BID    levothyroxine  125 mcg PEG Tube Daily    nortriptyline  10 mg PEG Tube Nightly    glycopyrrolate  0.4 mg IntraVENous Once    DULoxetine  30 mg Per G Tube Daily    sodium chloride flush  5-40 mL IntraVENous 2 times per day    pantoprazole (PROTONIX) 40 mg in sodium chloride (PF) 0.9 % 10 mL injection  40 mg IntraVENous Daily    lactulose  20 g PEG Tube TID    rifAXIMin  550 mg PEG Tube BID    fentaNYL  1 patch TransDERmal Q72H    vancomycin (VANCOCIN) intermittent dosing (placeholder)   Other RX Placeholder     Continuous Infusions:    sodium chloride       PRN Meds: gabapentin, potassium chloride **OR** potassium alternative  iron replacement                                -24 urine collected to r/o [place] d/t low ceruloplasmin                                 -s/p paracentesis fluid (-) for malignancy and (-) SBP, SAAG >1.1, no growth on ascitic culture.                                  -may need prn outpatient paracentesis                                -c/w xifaxin (rifaximin 550mg BID)) and titrate lactulose 20g for 2-3 BM/day                           -Patient unable to have MRI abdomen due to lap band with metal.                                 -s/p PEG tube insertion, tolerating tube feeding.                                 -repeat ammonia 32 (25)                                -avoid sedatives, narcotics.   EGD 2/13, Biopsy taken NGTD  Neurology consulted out of concern for possible seizures  Vimpat 100mg IV BID, tolerating it well  MRI brain: Could not be done as patient has lap band with metal. Family members weren't aware of it.   EEG: Moderate diffuse encephalopathy   -no epileptiform discharges were identified.   -abnormal involuntary movements may be tardive dyskinesia: patient was on antidepressants for several years  -subacute encephalopathy with UTI: has been on broad-spectrum antibiotic therapy with fluctuating mentation.                         -Baseline dementing illness with chronic alcoholism; cirrhosis.                         -Chronic neuropathy likely a/w alcoholism, on gabapentin at home.       Bacteremia/UTI  + blood cx at [facility] gram positive cocci in chains  Follow BC taken here - NG x 4D  stopped Rocephin 1g day 5/5   stopped vancomycin day 5/5    Anemia, stable  Hemoglobin (g/dL)   Date Value   02/17/2024 8.0 (L)   02/16/2024 8.6 (L)   02/15/2024 9.4 (L)   02/14/2024 7.8 (L)     S/P 1 U PRBC  Likely acute on chronic secondary to chronic disease  continue daily CBC    Severe protein calorie malnutrition  S/P percutaneous endoscopic gastrostomy (PEG) tube placement  (HCC)  Dehydration  Hypoalbuminemia  Hypernatremia, improving  Hypophosphatemia, resolved  Sodium (mmol/L)   Date Value   02/17/2024 147 (H)   02/16/2024 147 (H)   02/16/2024 147 (H)   02/15/2024 147 (H)     Albumin (g/dL)   Date Value   02/16/2024 2.1 (L)   02/15/2024 2.0 (L)   02/15/2024 2.2 (L)     Nephrology following:                -c/w free water 250ml q4hrs through the PEG tube                -Holding bumex                -Chlorothiazide 500mg IV BID, metalozone 5mg daily, and aldactone 25mg daily per nephro                -advance tube feeds                            -hold loop diuretics (hold Bumex)                            -S/p Albumin 25 g x 1 dose given on 2/17 at 1026       -Another dose of Albumin pending  Dietary following: on 2/15: NPO with tube feeds: Osmolite 1.2 @ 60 ml/hr, 250 ml free water flush q 4 hours, 1 protein modular daily  Monitor weight, labs, skin, TF tolerance   Electrolyte replacement protocol  Folic acid 1mg daily  Thiamine mononitrate 100mg daily  Multivitamin 1mg tablet daily    Bilateral pressure ulcer of heels, stable dry eschar  Continue dressings to be applied to Bilateral foot every other day: betadine paint and mepilex pad b/l  apply light figure 8 compressive dressings with ACE wrap to b/l LE daily  Maintain heels off of bed    Depression  continue home nortriptyline 10mg nightly  Continue Cymbalta 30mg daily    Hypertension  Holding bumex  Chlorothiazide 500mg IV BID, metalozone 5mg daily, and aldactone 25mg daily per nephro    Hypothyroidism  continue home levothyroxine 137mcg    Neuropathic pain  gabapentin TID PRN    Telemetry: NSR ~80bpm w/ scattered PVCs  Anticoagulation: intermittent compression devices  Dispo: SNF  Diet: Diet NPO  ADULT TUBE FEEDING; PEG; Standard without Fiber; Continuous; 30; Yes; 10; Q 8 hours; 60; 250; Q 4 hours; Protein; 1 Dose; Daily    Patient was seen, evaluated and discussed with MD Zain Jung MD  Family Medicine

## 2024-02-17 NOTE — PLAN OF CARE
Problem: Discharge Planning  Goal: Discharge to home or other facility with appropriate resources  2/17/2024 1421 by Venice Bosch RN  Outcome: Progressing  2/17/2024 0435 by Alvin Herzog RN  Outcome: Progressing     Problem: Pain  Goal: Verbalizes/displays adequate comfort level or baseline comfort level  2/17/2024 1421 by Venice Bosch RN  Outcome: Progressing  2/17/2024 0435 by Alvin Herzog RN  Outcome: Progressing     Problem: Confusion  Goal: Confusion, delirium, dementia, or psychosis is improved or at baseline  Description: INTERVENTIONS:  1. Assess for possible contributors to thought disturbance, including medications, impaired vision or hearing, underlying metabolic abnormalities, dehydration, psychiatric diagnoses, and notify attending LIP  2. [place] high risk fall precautions, as indicated  3. Provide frequent short contacts to provide reality reorientation, refocusing and direction  4. Decrease environmental stimuli, including noise as appropriate  5. Monitor and intervene to maintain adequate nutrition, hydration, elimination, sleep and activity  6. If unable to ensure safety without constant attention obtain sitter and review sitter guidelines with assigned personnel  7. Initiate Psychosocial CNS and Spiritual Care consult, as indicated  2/17/2024 1421 by Venice Bosch RN  Outcome: Progressing  2/17/2024 0435 by Alvin Herzog RN  Outcome: Progressing     Problem: Skin/Tissue Integrity  Goal: Absence of new skin breakdown  Description: 1.  Monitor for areas of redness and/or skin breakdown  2.  Assess vascular access sites hourly  3.  Every 4-6 hours minimum:  Change oxygen saturation probe site  4.  Every 4-6 hours:  If on nasal continuous positive airway pressure, respiratory therapy assess nares and determine need for appliance change or resting period.  2/17/2024 1421 by Venice Bosch RN  Outcome: Progressing  2/17/2024 0435 by Alvin Herzog RN  Outcome: Progressing     Problem: Safety -

## 2024-02-17 NOTE — PROGRESS NOTES
SUBJECTIVE    Patient was seen and examined.  Patient was hemodynamically stable.  Blood pressure was relatively better controlled.  Urine output 30-40 mill an hour  Continues to have diarrhea from lactulose.  Tube feeds continue at goal rate.  Free water is being replaced intermittently.  Nursing staff was advised that free water has to be given 250 mL every 4 hours.  Hemodynamically stable.  Sodium 147.  Diuril and Zaroxolyn continue.  Labs today show sodium 147 potassium 4 chloride 111 bicarb 29 BUN 18 creatinine 1.1 calcium 7.9 hemoglobin 8    History reviewed  Known history of liver cirrhosis with portal hypertension.  She presented to [facility] with complaints of mental status changes.  Her labs showed that she had a hemoglobin of 6.7 and a sodium 152.  CT head was negative.  Nephrology was consulted for hypernatremia.  Her baseline creatinine is 0.9 which had gone up to 1.1.  Urine output has been in the 30-40 mill an hour range.    OBJECTIVE      CURRENT TEMPERATURE:  Temp: 98.5 °F (36.9 °C)  MAXIMUM TEMPERATURE OVER 24HRS:  Temp (24hrs), Av.2 °F (36.8 °C), Min:97.7 °F (36.5 °C), Max:98.5 °F (36.9 °C)    CURRENT RESPIRATORY RATE:  Respirations: 20  CURRENT PULSE:  Pulse: 86  CURRENT BLOOD PRESSURE:  BP: (!) 115/57  24HR BLOOD PRESSURE RANGE:  Systolic (24hrs), Av , Min:109 , Max:130   ; Diastolic (24hrs), Av, Min:57, Max:75    24HR INTAKE/OUTPUT:    Intake/Output Summary (Last 24 hours) at 2024 0910  Last data filed at 2024 0646  Gross per 24 hour   Intake 1202 ml   Output 625 ml   Net 577 ml       WEIGHT :Patient Vitals for the past 96 hrs (Last 3 readings):   Weight   24 0436 99 kg (218 lb 4.1 oz)   24 0539 98.9 kg (218 lb 0.6 oz)   24 0322 93.4 kg (205 lb 14.6 oz)       PHYSICAL EXAM      GENERAL APPEARANCE: Awake and alert  SKIN: Warm to touch  EYES: Conjunctiva was pale  NECK: Hard to assess JVD and no lymphadenopathy  PULMONARY: Bilateral air  of liver likely [place] status post paracentesis with SAAG gradient more than 1.1  4. Acute metabolic encephalopathy multifactorial secondary to UTI/bacteremia/cirrhosis of liver.  Clinically patient is much better and encephalopathy is resolving  5. UTI/bacteremia with blood culture at [facility] positive for gram-positive cocci in chains exclude SBP  6. Anemia status post EGD  7. Bilateral pressure ulcers on the heel  8.  Volume overload with anasarca.  Patient received dose of Diuril yesterday.  9.  Hypoalbuminemia due to advanced liver disease further complicated by malnutrition  PLAN      Continue free water  250 mL every 4 hours through the PEG tube  Start Zaroxolyn 5 mg once a day, continue Diuril.  Advance tube feeds  Repeat BMP this evening   Hold loop diuretics for now  Albumin 25 g x 1 dose  please do not hesitate to call with questions.    Electronically signed by Amaris Alas MD on 2/17/2024 at 9:10 AM

## 2024-02-17 NOTE — PROGRESS NOTES
NEUROLOGY INPATIENT PROGRESS NOTE     2/17/2024         Clemente Garcia is a  63 y.o. female admitted on 2/12/2024 with  Anemia [D64.9]  Symptomatic anemia [D64.9]    Reason for consult: Behavioral changes with visual hallucinations  History is obtained mostly from the patient's son and other family members at bedside. Also from the medical record and from the caregivers. Chart is reviewed and patient is examined.   Briefly, this is a  63 y.o. rt handed  female with hx of HTN, dyslipidemia, THURSTON nonalcoholic steatohepatitis, nephrolithiasis and osteoarthritis was admitted on 2/12/2024 as a transfer from [facility] with altered mentation..  As per family members; patient was found less responsive with blood sugar in 40s and also was found to be dehydrated and malnourished.  She was taken to [facility] ER where she was noted to be with urosepsis for which she was initiated on antibiotic therapy.  She had some improvement but it did not persist.  She was having fluctuating mentation for which she was brought to [facility] on 2/12/2024.  She was continued on vancomycin and ceftriaxone antibiotic therapy.  Because of acute on chronic encephalopathy; neurology consultation requested.  During this encounter; patient was alert and awake but was noted to be with abnormal movements of extremities as if she was trying to grab something out of air.    Family members at bedside stated that patient has chronic dysphagia and failed swallow studies earlier.  Patient has been on PEG tube for the past 10 days.    2/16/2024: Chart reviewed and discussed with caregivers.  They stated that patient is still having automatisms but \"not hallucinating\".  Patient has received IV Vimpat last night.  Vitals were stable.  Pulse had been 87-91 for the past 24 hours.  Patient's  is at bedside.  EEG and MRI brain are pending.    2/17/2024: Chart reviewed and discussed with caregivers.  Patient's  In such instances, original meaning may be extrapolated by contextual derivation.  Gerardo Donovan MD 2/17/2024 11:11 AM

## 2024-02-17 NOTE — PROGRESS NOTES
GI Progress notes    2/17/2024   6:34 AM    Name:  Clemente Garcia  MRN:    [id number]     Acct:     [id number]   Room:  [address] [place] [address] Day: 5     Admit Date: 2/12/2024  1:23 AM  PCP: Milana Whalen MD    Subjective:     C/C: No chief complaint on file.      Interval History: Status: not changed.     Patient seen and examined.  No acute events overnight.  24 hour urine collection complete- await results  Patient awake, alert but unable to answer questions  Continues to exhibit abnormal movement arms/head.  Volume overload, anasarca  Tolerating TF  Per flowsheet has had 4 BM last 24 hours    ROS:  Unable to assess:  AMS, encephalopathy    Medications:     Allergies: No Known Allergies    Current Meds: chlorothiazide (DIURIL) injection 500 mg, BID  metOLazone (ZAROXOLYN) tablet 5 mg, Daily  folic acid (FOLVITE) tablet 1 mg, Daily  thiamine mononitrate tablet 100 mg, Daily  therapeutic multivitamin-minerals 1 tablet, Daily  lacosamide (VIMPAT) 100 mg in sodium chloride 0.9 % 60 mL IVPB, BID  spironolactone (ALDACTONE) tablet 25 mg, Daily  [Held by provider] bumetanide (BUMEX) tablet 1 mg, BID  miconazole (MICOTIN) 2 % cream, BID  gabapentin (NEURONTIN) capsule 100 mg, Q8H PRN  levothyroxine (SYNTHROID) tablet 125 mcg, Daily  nortriptyline (PAMELOR) capsule 10 mg, Nightly  potassium chloride (KLOR-CON M) extended release tablet 40 mEq, PRN   Or  potassium bicarb-citric acid (EFFER-K) effervescent tablet 40 mEq, PRN   Or  potassium chloride 10 mEq/100 mL IVPB (Peripheral Line), PRN  magnesium sulfate 2000 mg in 50 mL IVPB premix, PRN  Glycopyrrolate injection 0.4 mg, Once  DULoxetine (CYMBALTA) extended release capsule 30 mg, Daily  sodium chloride flush 0.9 % injection 5-40 mL, 2 times per day  sodium chloride flush 0.9 % injection 5-40 mL, PRN  0.9 % sodium chloride infusion, PRN  ondansetron (ZOFRAN-ODT) disintegrating tablet 4 mg, Q8H PRN   Or  ondansetron (ZOFRAN) injection 4 mg, Q6H PRN  acetaminophen (TYLENOL) tablet  PARACENTESIS WITHOUT IMAGE GUIDANCE US ABDOMEN LIMITED 2/13/2024 HISTORY: ORDERING SYSTEM PROVIDED HISTORY: ascites TECHNOLOGIST PROVIDED HISTORY: ascites Does fluid need testing?  If yes, please place LAB Orders.->No Is the procedure for Diagnostic or Therapeutic reasons?->Therapeutic TECHNIQUE: Informed consent was obtained after a detailed explanation of the procedure including risks, benefits, and alternatives.  Universal protocol was followed.  A limited ultrasound of the abdomen was performed. The right abdomen was prepped and draped in sterile fashion and local anesthesia was achieved with lidocaine.  A 5 Irish needle sheath was advanced into ascites and paracentesis was performed.  The patient tolerated the procedure well. FINDINGS: Limited ultrasound of the abdomen demonstrates ascites. A total of 1 L was removed.     Successful therapeutic paracentesis.     US LIVER    Result Date: 2/12/2024  EXAMINATION: RIGHT UPPER QUADRANT ULTRASOUND 2/12/2024 1:16 pm COMPARISON: None. HISTORY: ORDERING SYSTEM PROVIDED HISTORY: assess for pvt new dx of cirrhosis ascites TECHNOLOGIST PROVIDED HISTORY: Assess for Portal vein thrombosis. Assess portal vein, IVC, etc assess for pvt new dx of cirrhosis ascites TECHNIQUE: Grayscale, color Doppler and spectral Doppler images of the liver and major hepatic arteries and veins were obtained. FINDINGS: LIVER:  The liver demonstrates normal echogenicity and mildly nodular contour without evidence of intrahepatic biliary ductal dilatation.  It measures 15 cm. HEPATIC VASCULATURE: Main portal vein is patent with hepatopetal flow.  The left and right portal veins are patent with normal direction of flow. No appreciable echogenic filling defect within the main portal vein to suggest thrombosis. The right hepatic vein, middle hepatic vein and left hepatic vein are patent. The imaged IVC is patent. The splenic vein is patent. The main hepatic artery is patent.     Morphologic findings

## 2024-02-18 ENCOUNTER — APPOINTMENT (OUTPATIENT)
Dept: GENERAL RADIOLOGY | Age: 64
DRG: 052 | End: 2024-02-18
Attending: STUDENT IN AN ORGANIZED HEALTH CARE EDUCATION/TRAINING PROGRAM
Payer: MEDICAID

## 2024-02-18 LAB
ANION GAP SERPL CALCULATED.3IONS-SCNC: 7 MMOL/L (ref 9–17)
BASOPHILS # BLD: 0 K/UL (ref 0–0.2)
BASOPHILS NFR BLD: 0 % (ref 0–2)
BUN SERPL-MCNC: 19 MG/DL (ref 8–23)
CALCIUM SERPL-MCNC: 8.1 MG/DL (ref 8.6–10.4)
CHLORIDE SERPL-SCNC: 106 MMOL/L (ref 98–107)
CO2 SERPL-SCNC: 29 MMOL/L (ref 20–31)
CREAT SERPL-MCNC: 1.1 MG/DL (ref 0.5–0.9)
EOSINOPHIL # BLD: 0.1 K/UL (ref 0–0.4)
EOSINOPHILS RELATIVE PERCENT: 2 % (ref 1–4)
ERYTHROCYTE [DISTWIDTH] IN BLOOD BY AUTOMATED COUNT: 16.2 % (ref 12.5–15.4)
GFR SERPL CREATININE-BSD FRML MDRD: 57 ML/MIN/1.73M2
GLUCOSE SERPL-MCNC: 110 MG/DL (ref 70–99)
HCT VFR BLD AUTO: 22.4 % (ref 36–46)
HGB BLD-MCNC: 7.2 G/DL (ref 12–16)
LYMPHOCYTES NFR BLD: 0.5 K/UL (ref 1–4.8)
LYMPHOCYTES RELATIVE PERCENT: 11 % (ref 24–44)
MAGNESIUM SERPL-MCNC: 2.1 MG/DL (ref 1.6–2.6)
MCH RBC QN AUTO: 31.5 PG (ref 26–34)
MCHC RBC AUTO-ENTMCNC: 32.4 G/DL (ref 31–37)
MCV RBC AUTO: 97.3 FL (ref 80–100)
MONOCYTES NFR BLD: 0.3 K/UL (ref 0.1–1.2)
MONOCYTES NFR BLD: 7 % (ref 2–11)
NEUTROPHILS NFR BLD: 80 % (ref 36–66)
NEUTS SEG NFR BLD: 3.6 K/UL (ref 1.8–7.7)
PHOSPHATE SERPL-MCNC: 3.7 MG/DL (ref 2.6–4.5)
PLATELET # BLD AUTO: 170 K/UL (ref 140–450)
PMV BLD AUTO: 9.3 FL (ref 6–12)
POTASSIUM SERPL-SCNC: 4.1 MMOL/L (ref 3.7–5.3)
RBC # BLD AUTO: 2.3 M/UL (ref 4–5.2)
SODIUM SERPL-SCNC: 142 MMOL/L (ref 135–144)
VANCOMYCIN SERPL-MCNC: 21.7 UG/ML
WBC OTHER # BLD: 4.5 K/UL (ref 3.5–11)

## 2024-02-18 PROCEDURE — 6360000002 HC RX W HCPCS: Performed by: INTERNAL MEDICINE

## 2024-02-18 PROCEDURE — APPNB30 APP NON BILLABLE TIME 0-30 MINS: Performed by: NURSE PRACTITIONER

## 2024-02-18 PROCEDURE — 85025 COMPLETE CBC W/AUTO DIFF WBC: CPT

## 2024-02-18 PROCEDURE — 99232 SBSQ HOSP IP/OBS MODERATE 35: CPT | Performed by: INTERNAL MEDICINE

## 2024-02-18 PROCEDURE — C9113 INJ PANTOPRAZOLE SODIUM, VIA: HCPCS | Performed by: INTERNAL MEDICINE

## 2024-02-18 PROCEDURE — 6370000000 HC RX 637 (ALT 250 FOR IP): Performed by: INTERNAL MEDICINE

## 2024-02-18 PROCEDURE — 2060000000 HC ICU INTERMEDIATE R&B

## 2024-02-18 PROCEDURE — 6370000000 HC RX 637 (ALT 250 FOR IP)

## 2024-02-18 PROCEDURE — 80202 ASSAY OF VANCOMYCIN: CPT

## 2024-02-18 PROCEDURE — 2580000003 HC RX 258: Performed by: INTERNAL MEDICINE

## 2024-02-18 PROCEDURE — 80048 BASIC METABOLIC PNL TOTAL CA: CPT

## 2024-02-18 PROCEDURE — 2580000003 HC RX 258: Performed by: PSYCHIATRY & NEUROLOGY

## 2024-02-18 PROCEDURE — 83735 ASSAY OF MAGNESIUM: CPT

## 2024-02-18 PROCEDURE — A4216 STERILE WATER/SALINE, 10 ML: HCPCS | Performed by: INTERNAL MEDICINE

## 2024-02-18 PROCEDURE — 80235 DRUG ASSAY LACOSAMIDE: CPT

## 2024-02-18 PROCEDURE — C9254 INJECTION, LACOSAMIDE: HCPCS | Performed by: PSYCHIATRY & NEUROLOGY

## 2024-02-18 PROCEDURE — P9047 ALBUMIN (HUMAN), 25%, 50ML: HCPCS | Performed by: INTERNAL MEDICINE

## 2024-02-18 PROCEDURE — 36415 COLL VENOUS BLD VENIPUNCTURE: CPT

## 2024-02-18 PROCEDURE — 99232 SBSQ HOSP IP/OBS MODERATE 35: CPT | Performed by: FAMILY MEDICINE

## 2024-02-18 PROCEDURE — 99232 SBSQ HOSP IP/OBS MODERATE 35: CPT | Performed by: PSYCHIATRY & NEUROLOGY

## 2024-02-18 PROCEDURE — 84100 ASSAY OF PHOSPHORUS: CPT

## 2024-02-18 PROCEDURE — 6360000002 HC RX W HCPCS: Performed by: PSYCHIATRY & NEUROLOGY

## 2024-02-18 PROCEDURE — 71045 X-RAY EXAM CHEST 1 VIEW: CPT

## 2024-02-18 RX ORDER — ALBUMIN (HUMAN) 12.5 G/50ML
25 SOLUTION INTRAVENOUS DAILY
Status: DISCONTINUED | OUTPATIENT
Start: 2024-02-18 | End: 2024-02-18

## 2024-02-18 RX ORDER — ALBUMIN (HUMAN) 12.5 G/50ML
25 SOLUTION INTRAVENOUS DAILY
Status: COMPLETED | OUTPATIENT
Start: 2024-02-18 | End: 2024-02-20

## 2024-02-18 RX ORDER — BUMETANIDE 0.25 MG/ML
2 INJECTION INTRAMUSCULAR; INTRAVENOUS EVERY 12 HOURS
Status: DISCONTINUED | OUTPATIENT
Start: 2024-02-18 | End: 2024-02-22

## 2024-02-18 RX ORDER — WATER 10 ML/10ML
18 INJECTION INTRAMUSCULAR; INTRAVENOUS; SUBCUTANEOUS 2 TIMES DAILY
Status: DISCONTINUED | OUTPATIENT
Start: 2024-02-18 | End: 2024-02-19

## 2024-02-18 RX ADMIN — RIFAXIMIN 550 MG: 550 TABLET ORAL at 21:53

## 2024-02-18 RX ADMIN — LEVOTHYROXINE SODIUM 125 MCG: 125 TABLET ORAL at 04:44

## 2024-02-18 RX ADMIN — BUMETANIDE 2 MG: 0.25 INJECTION INTRAMUSCULAR; INTRAVENOUS at 11:22

## 2024-02-18 RX ADMIN — Medication 1 TABLET: at 11:21

## 2024-02-18 RX ADMIN — SODIUM CHLORIDE, PRESERVATIVE FREE 10 ML: 5 INJECTION INTRAVENOUS at 11:56

## 2024-02-18 RX ADMIN — NORTRIPTYLINE HYDROCHLORIDE 10 MG: 10 CAPSULE ORAL at 21:53

## 2024-02-18 RX ADMIN — LACOSAMIDE 100 MG: 10 INJECTION INTRAVENOUS at 21:58

## 2024-02-18 RX ADMIN — LACOSAMIDE 100 MG: 10 INJECTION INTRAVENOUS at 12:01

## 2024-02-18 RX ADMIN — METOLAZONE 5 MG: 5 TABLET ORAL at 11:22

## 2024-02-18 RX ADMIN — ACETAMINOPHEN 650 MG: 325 TABLET ORAL at 16:55

## 2024-02-18 RX ADMIN — SODIUM CHLORIDE, PRESERVATIVE FREE 40 MG: 5 INJECTION INTRAVENOUS at 11:49

## 2024-02-18 RX ADMIN — ALBUMIN (HUMAN) 25 G: 0.25 INJECTION, SOLUTION INTRAVENOUS at 14:14

## 2024-02-18 RX ADMIN — RIFAXIMIN 550 MG: 550 TABLET ORAL at 11:21

## 2024-02-18 RX ADMIN — CHLOROTHIAZIDE SODIUM 500 MG: 500 INJECTION, POWDER, LYOPHILIZED, FOR SOLUTION INTRAVENOUS at 11:51

## 2024-02-18 RX ADMIN — MICONAZOLE NITRATE: 20 CREAM TOPICAL at 21:58

## 2024-02-18 RX ADMIN — Medication 100 MG: at 11:21

## 2024-02-18 RX ADMIN — SODIUM CHLORIDE, PRESERVATIVE FREE 10 ML: 5 INJECTION INTRAVENOUS at 21:52

## 2024-02-18 RX ADMIN — SPIRONOLACTONE 25 MG: 25 TABLET, FILM COATED ORAL at 11:21

## 2024-02-18 RX ADMIN — FOLIC ACID 1 MG: 1 TABLET ORAL at 11:21

## 2024-02-18 ASSESSMENT — PAIN SCALES - PAIN ASSESSMENT IN ADVANCED DEMENTIA (PAINAD)
FACIALEXPRESSION: 0
FACIALEXPRESSION: 0
CONSOLABILITY: NO NEED TO CONSOLE
BODYLANGUAGE: 0
CONSOLABILITY: NO NEED TO CONSOLE
NEGVOCALIZATION: 0
BODYLANGUAGE: RELAXED
CONSOLABILITY: 0
CONSOLABILITY: NO NEED TO CONSOLE
BODYLANGUAGE: 0
BODYLANGUAGE: RELAXED
BODYLANGUAGE: RELAXED
NEGVOCALIZATION: 0
TOTALSCORE: 0
FACIALEXPRESSION: 0
TOTALSCORE: 0
CONSOLABILITY: 0
BODYLANGUAGE: RELAXED
CONSOLABILITY: 0
FACIALEXPRESSION: 0
CONSOLABILITY: 0
BODYLANGUAGE: 0
NEGVOCALIZATION: 0
BREATHING: NORMAL
TOTALSCORE: 0
CONSOLABILITY: 0
CONSOLABILITY: NO NEED TO CONSOLE
BODYLANGUAGE: RELAXED
NEGVOCALIZATION: 0
FACIALEXPRESSION: SMILING OR INEXPRESSIVE
TOTALSCORE: 0
CONSOLABILITY: NO NEED TO CONSOLE
TOTALSCORE: 0
BREATHING: NORMAL
BREATHING: 0
BREATHING: NORMAL
TOTALSCORE: 0
FACIALEXPRESSION: SMILING OR INEXPRESSIVE
CONSOLABILITY: 0
NEGVOCALIZATION: 0
FACIALEXPRESSION: 0
BODYLANGUAGE: 0
BREATHING: 0
FACIALEXPRESSION: 0
TOTALSCORE: 0
FACIALEXPRESSION: SMILING OR INEXPRESSIVE
CONSOLABILITY: NO NEED TO CONSOLE
BREATHING: 0
BREATHING: 0
CONSOLABILITY: NO NEED TO CONSOLE
FACIALEXPRESSION: SMILING OR INEXPRESSIVE
BODYLANGUAGE: RELAXED
FACIALEXPRESSION: SMILING OR INEXPRESSIVE
BREATHING: NORMAL
BREATHING: NORMAL
NEGVOCALIZATION: 0
CONSOLABILITY: NO NEED TO CONSOLE
FACIALEXPRESSION: 0
TOTALSCORE: 0
CONSOLABILITY: 0
FACIALEXPRESSION: SMILING OR INEXPRESSIVE
BREATHING: 0
BODYLANGUAGE: 0
BREATHING: 0
BREATHING: NORMAL
NEGVOCALIZATION: 0
BODYLANGUAGE: 0
TOTALSCORE: 0
BREATHING: NORMAL
BODYLANGUAGE: RELAXED
CONSOLABILITY: NO NEED TO CONSOLE
FACIALEXPRESSION: 0
BREATHING: 0
BODYLANGUAGE: 0
FACIALEXPRESSION: SMILING OR INEXPRESSIVE
TOTALSCORE: 0
NEGVOCALIZATION: 0
TOTALSCORE: 0
FACIALEXPRESSION: 0
NEGVOCALIZATION: 0
BODYLANGUAGE: 0
BREATHING: 0
CONSOLABILITY: 0
FACIALEXPRESSION: 0
CONSOLABILITY: 0
NEGVOCALIZATION: 0
NEGVOCALIZATION: 0
BODYLANGUAGE: RELAXED
BODYLANGUAGE: RELAXED
CONSOLABILITY: NO NEED TO CONSOLE
BREATHING: NORMAL
BREATHING: 0
BREATHING: NORMAL
BODYLANGUAGE: RELAXED
CONSOLABILITY: 0
CONSOLABILITY: NO NEED TO CONSOLE
BREATHING: 0
FACIALEXPRESSION: 0
BREATHING: 0
CONSOLABILITY: 0
NEGVOCALIZATION: 0
BREATHING: NORMAL
BODYLANGUAGE: RELAXED
BODYLANGUAGE: 0
TOTALSCORE: 0
BREATHING: 0
BREATHING: NORMAL
FACIALEXPRESSION: SMILING OR INEXPRESSIVE
BODYLANGUAGE: RELAXED
BREATHING: NORMAL
CONSOLABILITY: NO NEED TO CONSOLE
CONSOLABILITY: 0
FACIALEXPRESSION: SMILING OR INEXPRESSIVE
FACIALEXPRESSION: 0
FACIALEXPRESSION: SMILING OR INEXPRESSIVE
BODYLANGUAGE: 0

## 2024-02-18 ASSESSMENT — PAIN SCALES - WONG BAKER
WONGBAKER_NUMERICALRESPONSE: 2
WONGBAKER_NUMERICALRESPONSE: HURTS A LITTLE BIT
WONGBAKER_NUMERICALRESPONSE: 2
WONGBAKER_NUMERICALRESPONSE: HURTS A LITTLE BIT
WONGBAKER_NUMERICALRESPONSE: HURTS A LITTLE BIT
WONGBAKER_NUMERICALRESPONSE: 2
WONGBAKER_NUMERICALRESPONSE: HURTS A LITTLE BIT
WONGBAKER_NUMERICALRESPONSE: 2
WONGBAKER_NUMERICALRESPONSE: HURTS A LITTLE BIT
WONGBAKER_NUMERICALRESPONSE: 2
WONGBAKER_NUMERICALRESPONSE: 2
WONGBAKER_NUMERICALRESPONSE: HURTS A LITTLE BIT
WONGBAKER_NUMERICALRESPONSE: 2
WONGBAKER_NUMERICALRESPONSE: 2
WONGBAKER_NUMERICALRESPONSE: HURTS A LITTLE BIT
WONGBAKER_NUMERICALRESPONSE: 2
WONGBAKER_NUMERICALRESPONSE: HURTS A LITTLE BIT
WONGBAKER_NUMERICALRESPONSE: 2
WONGBAKER_NUMERICALRESPONSE: 2
WONGBAKER_NUMERICALRESPONSE: HURTS A LITTLE BIT
WONGBAKER_NUMERICALRESPONSE: HURTS A LITTLE BIT
WONGBAKER_NUMERICALRESPONSE: 2
WONGBAKER_NUMERICALRESPONSE: HURTS A LITTLE BIT
WONGBAKER_NUMERICALRESPONSE: 2
WONGBAKER_NUMERICALRESPONSE: 2
WONGBAKER_NUMERICALRESPONSE: HURTS A LITTLE BIT
WONGBAKER_NUMERICALRESPONSE: 2
WONGBAKER_NUMERICALRESPONSE: HURTS A LITTLE BIT
WONGBAKER_NUMERICALRESPONSE: 2
WONGBAKER_NUMERICALRESPONSE: 2
WONGBAKER_NUMERICALRESPONSE: HURTS A LITTLE BIT
WONGBAKER_NUMERICALRESPONSE: 2
WONGBAKER_NUMERICALRESPONSE: HURTS A LITTLE BIT
WONGBAKER_NUMERICALRESPONSE: 2
WONGBAKER_NUMERICALRESPONSE: HURTS A LITTLE BIT
WONGBAKER_NUMERICALRESPONSE: 2
WONGBAKER_NUMERICALRESPONSE: HURTS A LITTLE BIT

## 2024-02-18 ASSESSMENT — PAIN DESCRIPTION - LOCATION: LOCATION: HEAD

## 2024-02-18 ASSESSMENT — PAIN SCALES - GENERAL
PAINLEVEL_OUTOF10: 8
PAINLEVEL_OUTOF10: 2

## 2024-02-18 NOTE — PROGRESS NOTES
NEUROLOGY INPATIENT PROGRESS NOTE     2/18/2024         Clemente Garcia is a  63 y.o. female admitted on 2/12/2024 with  Anemia [D64.9]  Symptomatic anemia [D64.9]    Reason for consult: Behavioral changes with visual hallucinations  History is obtained mostly from the patient's son and other family members at bedside. Also from the medical record and from the caregivers. Chart is reviewed and patient is examined.   Briefly, this is a  63 y.o. rt handed  female with hx of HTN, dyslipidemia, THURSTON nonalcoholic steatohepatitis, nephrolithiasis and osteoarthritis was admitted on 2/12/2024 as a transfer from [facility] with altered mentation..  As per family members; patient was found less responsive with blood sugar in 40s and also was found to be dehydrated and malnourished.  She was taken to [facility] ER where she was noted to be with urosepsis for which she was initiated on antibiotic therapy.  She had some improvement but it did not persist.  She was having fluctuating mentation for which she was brought to [facility] on 2/12/2024.  She was continued on vancomycin and ceftriaxone antibiotic therapy.  Because of acute on chronic encephalopathy; neurology consultation requested.  During this encounter; patient was alert and awake but was noted to be with abnormal movements of extremities as if she was trying to grab something out of air.    Family members at bedside stated that patient has chronic dysphagia and failed swallow studies earlier.  Patient has been on PEG tube for the past 10 days.    2/16/2024: Chart reviewed and discussed with caregivers.  They stated that patient is still having automatisms but \"not hallucinating\".  Patient has received IV Vimpat last night.  Vitals were stable.  Pulse had been 87-91 for the past 24 hours.  Patient's  is at bedside.  EEG and MRI brain are pending.    2/17/2024: Chart reviewed and discussed with caregivers.  Patient's  status    FINDINGS:  Motion limited evaluation.    BRAIN/VENTRICLES: There is no acute intracranial hemorrhage, mass effect or  midline shift.  No abnormal extra-axial fluid collection.  The gray-white  differentiation is maintained without evidence of an acute infarct.  There is  no evidence of hydrocephalus.    ORBITS: The visualized portion of the orbits demonstrate no acute abnormality.    SINUSES: The visualized paranasal sinuses and mastoid air cells demonstrate  no acute abnormality.    SOFT TISSUES/SKULL:  No acute abnormality of the visualized skull or soft  tissues.    Impression  No acute intracranial abnormality.    Diagnostic data reviewed:  CT head 2/13/2024: No acute intracranial abnormality.    MRI brain: Could not be done as patient has lap band with metal.    EEG: Moderate diffuse encephalopathy               Impression and Plan: Ms. Clemente Garcia is a 63 y.o. female with   Acute on subacute encephalopathy with new onset hallucinations; patient was noted to be with automatisms raising concern for temporal lobe seizures; EKG with TX interval 118 ms. Was started on IV Vimpat 100 bid ; she is tolerating it well.  EEG with encephalopathy; will get Vimpat level. MRI brain couldn't be done as patient has Lap Band with metal and unsure about compatibility. Family members weren't aware of it.     Abnl Involuntary movts, ? Tardive dyskinesia: Additional history obtained from patient's  at bedside; patient was on antidepressants for several years and her abnormal involuntary movements could be tardive dyskinesia associated with prior several antidepressants.   Subacute encephalopathy with UTI; completed abx therapy.  Baseline mild dementing illness with chronic alcoholism; cirrhosis.  Chronic neuropathy likely associated with alcoholism; on gabapentin at home.  Comorbid conditions include hypertension, hypothyroidism, depression    Care plan is discussed with patient and her  at bedside.  Will

## 2024-02-18 NOTE — PROGRESS NOTES
SUBJECTIVE    Patient was seen and examined.    Patient was hemodynamically stable, not on any ProAmatine.  Blood pressure was relatively better controlled, systolic pressures in the 100-1 20 range.  Urine output 30-40 mill an hour, and positive fluid balance by at least 6 to 8 L.  Loaiza catheter in place  Continues to have diarrhea from lactulose.  Tube feeds continue at goal rate.  Free water is being replaced intermittently.  Hypernatremia better, sodium down to 143.   Diuril and Zaroxolyn continue.  Loop diuretics have placed on hold.  Also receiving vancomycin, Vanco level today was 21.7  Labs today show sodium sodium of 143 potassium 3.9 chloride 107 bicarb 28 BUN 19 creatinine 1.1 calcium 7.9    History reviewed  Known history of liver cirrhosis with portal hypertension.  She presented to [facility] with complaints of mental status changes.  Her labs showed that she had a hemoglobin of 6.7 and a sodium 152.  CT head was negative.  Nephrology was consulted for hypernatremia.  Her baseline creatinine is 0.9 which had gone up to 1.1.  Urine output has been in the 30-40 mill an hour range.    OBJECTIVE      CURRENT TEMPERATURE:  Temp: 97.7 °F (36.5 °C)  MAXIMUM TEMPERATURE OVER 24HRS:  Temp (24hrs), Av.7 °F (36.5 °C), Min:97.5 °F (36.4 °C), Max:97.9 °F (36.6 °C)    CURRENT RESPIRATORY RATE:  Respirations: 20  CURRENT PULSE:  Pulse: 81  CURRENT BLOOD PRESSURE:  BP: 125/65  24HR BLOOD PRESSURE RANGE:  Systolic (24hrs), Av , Min:103 , Max:125   ; Diastolic (24hrs), Av, Min:57, Max:68    24HR INTAKE/OUTPUT:    Intake/Output Summary (Last 24 hours) at 2024 0740  Last data filed at 2024 0643  Gross per 24 hour   Intake 2824 ml   Output 725 ml   Net 2099 ml       WEIGHT :Patient Vitals for the past 96 hrs (Last 3 readings):   Weight   24 0412 99.6 kg (219 lb 9.3 oz)   24 0436 99 kg (218 lb 4.1 oz)   24 0539 98.9 kg (218 lb 0.6 oz)       PHYSICAL EXAM       GENERAL APPEARANCE: Awake and alert  SKIN: Warm to touch  EYES: Conjunctiva was pale  NECK: Hard to assess JVD and no lymphadenopathy  PULMONARY: Bilateral air entry but decreased at the bases.  Patient has poor chest expansion  CADRDIOVASCULAR: S1 and S2 audible no S3   ABDOMEN: Soft nontender, anterior abdominal wall edema was present as well as shifting dullness.  Significant ascites noted   EXTREMITIES: Anasarca now patient has 4+ edema  CURRENT MEDICATIONS      albumin human 25% IV solution 25 g, Daily  bumetanide (BUMEX) injection 2 mg, Q12H  spironolactone (ALDACTONE) tablet 25 mg, BID  chlorothiazide (DIURIL) injection 500 mg, BID  metOLazone (ZAROXOLYN) tablet 5 mg, Daily  folic acid (FOLVITE) tablet 1 mg, Daily  thiamine mononitrate tablet 100 mg, Daily  therapeutic multivitamin-minerals 1 tablet, Daily  lacosamide (VIMPAT) 100 mg in sodium chloride 0.9 % 60 mL IVPB, BID  miconazole (MICOTIN) 2 % cream, BID  gabapentin (NEURONTIN) capsule 100 mg, Q8H PRN  levothyroxine (SYNTHROID) tablet 125 mcg, Daily  nortriptyline (PAMELOR) capsule 10 mg, Nightly  potassium chloride (KLOR-CON M) extended release tablet 40 mEq, PRN   Or  potassium bicarb-citric acid (EFFER-K) effervescent tablet 40 mEq, PRN   Or  potassium chloride 10 mEq/100 mL IVPB (Peripheral Line), PRN  magnesium sulfate 2000 mg in 50 mL IVPB premix, PRN  Glycopyrrolate injection 0.4 mg, Once  DULoxetine (CYMBALTA) extended release capsule 30 mg, Daily  sodium chloride flush 0.9 % injection 5-40 mL, 2 times per day  sodium chloride flush 0.9 % injection 5-40 mL, PRN  0.9 % sodium chloride infusion, PRN  ondansetron (ZOFRAN-ODT) disintegrating tablet 4 mg, Q8H PRN   Or  ondansetron (ZOFRAN) injection 4 mg, Q6H PRN  acetaminophen (TYLENOL) tablet 650 mg, Q6H PRN   Or  acetaminophen (TYLENOL) suppository 650 mg, Q6H PRN  pantoprazole (PROTONIX) 40 mg in sodium chloride (PF) 0.9 % 10 mL injection, Daily  lactulose (CHRONULAC) 10 GM/15ML solution 20  circulating volume related to third spacing in setting of liver cirrhosis.  Element of Vanco toxicity being considered as well.  Baseline 0.9 which is up to 1.1 urine output has decreased  2.  Hypernatremia secondary to free water losses both from loop diuretic use, lactulose use sodium was 153 on presentation now down to 143  3.  Generalized anasarca secondary to liver cirrhosis and acute kidney injury patient is about 9 L positive and fluid balance  4.  Decompensated liver cirrhosis likely THURSTON with portal hypertension recurrent ascites SAAG was more than 1.1  5.  Encephalopathy likely hepatic currently on lactulose  6.  UTI/bacteremia treated with antibiotics    PLAN      Change Free water to 250 mL Q6  Restart Bumex 2 mg IV twice a day  Albumin 25 g daily for 3 doses   Continue Zaroxolyn and HydroDIURIL IV   Check chest x-ray  Discontinue vancomycin if possible  Check BMP daily  Prognosis guarded  Will follow  Electronically signed by Amaris Alas MD on 2/18/2024 at 7:40 AM

## 2024-02-18 NOTE — PROGRESS NOTES
GI Progress notes    2/18/2024   8:20 AM    Name:  Clemente Garcia  MRN:    [id number]     Acct:     [id number]   Room:  [address] [place] [address] Day: 6     Admit Date: 2/12/2024  1:23 AM  PCP: Milana Whalen MD    Subjective:     C/C: No chief complaint on file.      Interval History: Status: not changed.     Patient seen and examined  More awake this am  Exchanges pleasantries but unable to answer questions.  Continues to exhibit abnormal arms/head movements  Volume overload, anasarca  Tolerating TF at goal    Urine copper pending    ROS:  Constitutional: negative for chills, fevers and sweats  Gastrointestinal: negative for abdominal pain, constipation, diarrhea, nausea and vomiting  Neurological: negative for dizziness and headaches    Medications:     Allergies: No Known Allergies    Current Meds: albumin human 25% IV solution 25 g, Daily  bumetanide (BUMEX) injection 2 mg, Q12H  spironolactone (ALDACTONE) tablet 25 mg, BID  chlorothiazide (DIURIL) injection 500 mg, BID  metOLazone (ZAROXOLYN) tablet 5 mg, Daily  folic acid (FOLVITE) tablet 1 mg, Daily  thiamine mononitrate tablet 100 mg, Daily  therapeutic multivitamin-minerals 1 tablet, Daily  lacosamide (VIMPAT) 100 mg in sodium chloride 0.9 % 60 mL IVPB, BID  miconazole (MICOTIN) 2 % cream, BID  gabapentin (NEURONTIN) capsule 100 mg, Q8H PRN  levothyroxine (SYNTHROID) tablet 125 mcg, Daily  nortriptyline (PAMELOR) capsule 10 mg, Nightly  potassium chloride (KLOR-CON M) extended release tablet 40 mEq, PRN   Or  potassium bicarb-citric acid (EFFER-K) effervescent tablet 40 mEq, PRN   Or  potassium chloride 10 mEq/100 mL IVPB (Peripheral Line), PRN  magnesium sulfate 2000 mg in 50 mL IVPB premix, PRN  Glycopyrrolate injection 0.4 mg, Once  DULoxetine (CYMBALTA) extended release capsule 30 mg, Daily  sodium chloride flush 0.9 % injection 5-40 mL, 2 times per day  sodium chloride flush 0.9 % injection 5-40 mL, PRN  0.9 % sodium chloride infusion, PRN  ondansetron (ZOFRAN-ODT)  necessary changes as deemed appropriate directly in the note.     More than 50% of the time was spent taking care of this patient in addition to the nurse practitioner time.  That also included history taking follow-up physical examination and review of system.    Electronically signed by Gene Chisholm MD

## 2024-02-18 NOTE — PROGRESS NOTES
[facility]  Inpatient Service     Progress Note  2/18/2024    11:39 AM    Name:   Clemente Garcia  MRN:     [id number]     Acct:      [id number]   Room:   [address] [place] [address] Day:  6  Admit Date:  2/12/2024  1:23 AM    PCP:   Milana Whalen MD  Code Status:  Full Code    Subjective:     Overnight events: none    Patient is alert with pleasant attitude during evaluation at bedside today.  Patient is oriented to self and continues to respond appropriately to questioning though she is unable to always answer correctly.  On evaluation of mental status patient unable to give birthdate and when asked the day of the week answered \"22.\"  Denies shortness of breath, chest pain, abdominal pain, heart palpitations.  Unclear on reliability of ROS.    Medications:     Allergies:  No Known Allergies    Current Meds:   Scheduled Meds:    albumin human 25%  25 g IntraVENous Daily    bumetanide  2 mg IntraVENous Q12H    sterile water  18 mL Injection BID    spironolactone  25 mg Oral BID    chlorothiazide  500 mg IntraVENous BID    metOLazone  5 mg Per G Tube Daily    folic acid  1 mg PEG Tube Daily    thiamine mononitrate  100 mg Per G Tube Daily    therapeutic multivitamin-minerals  1 tablet Per G Tube Daily    lacosamide (VIMPAT) 100 mg in sodium chloride 0.9 % 60 mL IVPB  100 mg IntraVENous BID    miconazole   Topical BID    levothyroxine  125 mcg PEG Tube Daily    nortriptyline  10 mg PEG Tube Nightly    glycopyrrolate  0.4 mg IntraVENous Once    DULoxetine  30 mg Per G Tube Daily    sodium chloride flush  5-40 mL IntraVENous 2 times per day    pantoprazole (PROTONIX) 40 mg in sodium chloride (PF) 0.9 % 10 mL injection  40 mg IntraVENous Daily    lactulose  20 g PEG Tube TID    rifAXIMin  550 mg PEG Tube BID    fentaNYL  1 patch TransDERmal Q72H     Continuous Infusions:    sodium chloride       PRN Meds: gabapentin, potassium chloride **OR** potassium alternative oral  replacement **OR** potassium chloride, magnesium sulfate, sodium chloride flush, sodium chloride, ondansetron **OR** ondansetron, acetaminophen **OR** acetaminophen    ROS:   ROS is negative except for points noted above.    Data:     Vitals:  /72   Pulse 87   Temp 97.5 °F (36.4 °C) (Axillary)   Resp 20   Ht 1.6 m (5' 2.99\")   Wt 99.6 kg (219 lb 9.3 oz)   SpO2 95%   BMI 38.91 kg/m²   Temp (24hrs), Av.7 °F (36.5 °C), Min:97.5 °F (36.4 °C), Max:97.9 °F (36.6 °C)    No results for input(s): \"POCGLU\" in the last 72 hours.    I/O (24Hr):    Intake/Output Summary (Last 24 hours) at 2024 1139  Last data filed at 2024 0643  Gross per 24 hour   Intake 2574 ml   Output 725 ml   Net 1849 ml       Labs:  Hematology:  Recent Labs     24  0650 24  0618 24  0617   WBC 5.5 5.0 4.5   RBC 2.73* 2.55* 2.30*   HGB 8.6* 8.0* 7.2*   HCT 26.6* 24.6* 22.4*   MCV 97.4 96.8 97.3   MCH 31.3 31.2 31.5   MCHC 32.1 32.3 32.4   RDW 16.4* 16.2* 16.2*    179 170   MPV 9.1 9.0 9.3     Chemistry:  Recent Labs     24  1624 24  0618 24  1947 24  0617   * 147* 143 142   K 3.8 4.0 3.9 4.1   * 111* 107 106   CO2 30 29 28 29   GLUCOSE 113* 105* 107* 110*   BUN 18 18 19 19   CREATININE 1.1* 1.1* 1.1* 1.1*   MG 2.1 2.1  --  2.1   ANIONGAP 6* 7* 8* 7*   LABGLOM 57* 57* 57* 57*   CALCIUM 7.7* 7.9* 7.9* 8.1*   PHOS 3.7 3.7  --  3.7     Recent Labs     02/15/24  1240 24  0650 24  0906   PROT  --  6.6  --    LABALBU 2.0* 2.1*  --    AST  --  23  --    ALT  --  10  --    ALKPHOS  --  168*  --    BILITOT  --  0.3  --    BILIDIR  --  0.1  --    AMMONIA  --   --  32     ABG:No results found for: \"POCPH\", \"PHART\", \"PH\", \"POCPCO2\", \"ELF9SKV\", \"PCO2\", \"POCPO2\", \"PO2ART\", \"PO2\", \"POCHCO3\", \"UJC5MCY\", \"HCO3\", \"NBEA\", \"PBEA\", \"BEART\", \"BE\", \"THGBART\", \"THB\", \"LEL0VXS\", \"NDDX6TMS\", \"Y5GCNXPM\", \"O2SAT\", \"FIO2\"  Lab Results   Component Value Date/Time    SPECIAL 10ml left hand  Hypernatremia 2/16/2024 Yes    Anasarca 2/16/2024 Yes    Hypoalbuminemia 2/16/2024 Yes    Non-smoker 2/16/2024 Yes    Tardive dyskinesia 2/17/2024 Yes    Acute encephalopathy 2/17/2024 Yes    Wernicke encephalopathy syndrome 2/17/2024 Yes   61-year-old female with PMH of depression, fibromyalgia, hypertension, dyslipidemia, THURSTON, nephrolithiasis, neuropathy, osteoarthritis, anemia, degenerative disc disease who presented from [facility] due to concern about altered mental status and confusion.  History additionally notable for alcohol abuse, last drink within the past couple of months per chart review.  CT abdomen showed mild to moderate ascites with liver cirrhosis low.  And patient was given 1 unit of blood and started on IV antibiotics for UTI and transferred to our facility for further evaluation. GI was consulted and got EGD and US guided paracentesis 2/13. New blood cultures were taken and NGTD. Patient continued to have worsening hypernatremia which may be contributing to mental status which has improved with the decreasing sodium levels. GI at this time does not believe that there is a GI etiology and neurology was consulted who believes there is a possibility of temporal lobe seizures.     Plan:     Acute metabolic encephalopathy, improving  Altered mental status  Cirrhosis of liver w/ mild ascites  Elevated liver enzymes  S/P ultrasound guided paracentesis 2/13/24 - 1 L clear yellow fluid, NGTD less likely SBP  May need continued paracentesis outpatient  24 urine collected to r/o Wilsons d/t low ceruloplasmin, still pending  c/w xifaxin (rifaximin 550mg BID) and titrate lactulose 20g for 2-3 BM/day   Neurology consulted out of concern for possible seizures  Vimpat 100mg IV BID  Patient unable to have MRI abdomen due to lap band with metal.  EEG: Moderate diffuse encephalopathy, no epileptiform discharges were identified.   Continues to have abnormal involuntary movements may be tardive  dyskinesia  Baseline dementing illness with chronic alcoholism; cirrhosis.      Anemia, hgb decreasing   Hemoglobin (g/dL)   Date Value   02/18/2024 7.2 (L)   02/17/2024 8.0 (L)   02/16/2024 8.6 (L)   02/15/2024 9.4 (L)     S/P 1 U PRBC  Trending HH, transfuse if hgb <7  Monitor for signs of bleeding  continue daily CBC    Severe protein calorie malnutrition  S/P percutaneous endoscopic gastrostomy (PEG) tube placement (HCC)  Dehydration  Hypoalbuminemia  Hypernatremia, resolved  Hypophosphatemia, resolved  Sodium (mmol/L)   Date Value   02/18/2024 142   02/17/2024 143   02/17/2024 147 (H)   02/16/2024 147 (H)     Albumin (g/dL)   Date Value   02/16/2024 2.1 (L)   02/15/2024 2.0 (L)   02/15/2024 2.2 (L)     c/w free water 250ml q4hrs through the PEG tube  Dietary consulted  Nephrology consulted w/ recommendations appreciated  restarted bumex 2mg IV BID  Continue Chlorothiazide 500mg IV BID and metalozone 5mg daily  Increased aldactone 25mg BID  Albumin 25mg daily for three doses  Second dose pending  Monitor weight, labs, skin, TF tolerance   Electrolyte replacement protocol  Folic acid 1mg daily  Thiamine mononitrate 100mg daily  Multivitamin 1mg tablet daily    Bilateral pressure ulcer of heels, stable dry eschar  Continue dressings to be applied to Bilateral foot every other day: betadine paint and mepilex pad b/l  apply light figure 8 compressive dressings with ACE wrap to b/l LE daily  Maintain heels off of bed    Bacteremia/UTI resolved s/p abx w/ rocephin and vanco    Depression  continue home nortriptyline 10mg nightly  Continue Cymbalta 30mg daily    Hypertension  Bumex 2mg IV BID  Chlorothiazide 500mg IV BID, metalozone 5mg daily, and aldactone 25mg BID    Hypothyroidism  continue home levothyroxine 137mcg    Neuropathic pain  gabapentin TID PRN    Telemetry: NSR 80s w/ PVCs  Anticoagulation: intermittent compression devices  Dispo: SNF  Diet: Diet NPO  ADULT TUBE FEEDING; PEG; Standard without Fiber;

## 2024-02-18 NOTE — PLAN OF CARE
Problem: Discharge Planning  Goal: Discharge to home or other facility with appropriate resources  2/18/2024 0406 by Alvin Herzog RN  Outcome: Progressing   Problem: Pain  Goal: Verbalizes/displays adequate comfort level or baseline comfort level  2/18/2024 0406 by Alvin Herzog RN  Outcome: Progressing   Problem: Confusion  Goal: Confusion, delirium, dementia, or psychosis is improved or at baseline  Description: INTERVENTIONS:  1. Assess for possible contributors to thought disturbance, including medications, impaired vision or hearing, underlying metabolic abnormalities, dehydration, psychiatric diagnoses, and notify attending LIP  2. Bryce high risk fall precautions, as indicated  3. Provide frequent short contacts to provide reality reorientation, refocusing and direction  4. Decrease environmental stimuli, including noise as appropriate  5. Monitor and intervene to maintain adequate nutrition, hydration, elimination, sleep and activity  6. If unable to ensure safety without constant attention obtain sitter and review sitter guidelines with assigned personnel  7. Initiate Psychosocial CNS and Spiritual Care consult, as indicated  2/18/2024 0406 by Alvin Herzog RN  Outcome: Progressing   Problem: Skin/Tissue Integrity  Goal: Absence of new skin breakdown  Description: 1.  Monitor for areas of redness and/or skin breakdown  2.  Assess vascular access sites hourly  3.  Every 4-6 hours minimum:  Change oxygen saturation probe site  4.  Every 4-6 hours:  If on nasal continuous positive airway pressure, respiratory therapy assess nares and determine need for appliance change or resting period.  2/18/2024 0406 by Alvin Herzog RN  Outcome: Progressing   Problem: Safety - Adult  Goal: Free from fall injury  2/18/2024 0406 by Alvin Herzog RN  Outcome: Progressing

## 2024-02-19 PROBLEM — R41.89 COGNITIVE IMPAIRMENT: Status: ACTIVE | Noted: 2024-02-19

## 2024-02-19 PROBLEM — R29.898 LEFT ARM WEAKNESS: Status: ACTIVE | Noted: 2024-02-19

## 2024-02-19 PROBLEM — E03.9 ACQUIRED HYPOTHYROIDISM: Status: ACTIVE | Noted: 2024-02-19

## 2024-02-19 LAB
ALBUMIN SERPL-MCNC: 2.6 G/DL (ref 3.5–5.2)
ALBUMIN/GLOB SERPL: 0.7 {RATIO} (ref 1–2.5)
ALP SERPL-CCNC: 148 U/L (ref 35–104)
ALT SERPL-CCNC: 10 U/L (ref 5–33)
ANION GAP SERPL CALCULATED.3IONS-SCNC: 7 MMOL/L (ref 9–17)
AST SERPL-CCNC: 23 U/L
BASOPHILS # BLD: 0 K/UL (ref 0–0.2)
BASOPHILS NFR BLD: 0 % (ref 0–2)
BILIRUB DIRECT SERPL-MCNC: 0.1 MG/DL
BILIRUB SERPL-MCNC: 0.3 MG/DL (ref 0.3–1.2)
BUN SERPL-MCNC: 20 MG/DL (ref 8–23)
CALCIUM SERPL-MCNC: 8.3 MG/DL (ref 8.6–10.4)
CHLORIDE SERPL-SCNC: 107 MMOL/L (ref 98–107)
CO2 SERPL-SCNC: 29 MMOL/L (ref 20–31)
CREAT SERPL-MCNC: 1.1 MG/DL (ref 0.5–0.9)
EOSINOPHIL # BLD: 0.1 K/UL (ref 0–0.4)
EOSINOPHILS RELATIVE PERCENT: 2 % (ref 1–4)
ERYTHROCYTE [DISTWIDTH] IN BLOOD BY AUTOMATED COUNT: 16 % (ref 12.5–15.4)
GFR SERPL CREATININE-BSD FRML MDRD: 57 ML/MIN/1.73M2
GLUCOSE SERPL-MCNC: 102 MG/DL (ref 70–99)
HCT VFR BLD AUTO: 22.6 % (ref 36–46)
HGB BLD-MCNC: 7.4 G/DL (ref 12–16)
LYMPHOCYTES NFR BLD: 0.5 K/UL (ref 1–4.8)
LYMPHOCYTES RELATIVE PERCENT: 11 % (ref 24–44)
MAGNESIUM SERPL-MCNC: 2.2 MG/DL (ref 1.6–2.6)
MCH RBC QN AUTO: 31.3 PG (ref 26–34)
MCHC RBC AUTO-ENTMCNC: 32.5 G/DL (ref 31–37)
MCV RBC AUTO: 96.1 FL (ref 80–100)
MICROORGANISM SPEC CULT: NORMAL
MICROORGANISM/AGENT SPEC: NORMAL
MONOCYTES NFR BLD: 0.3 K/UL (ref 0.1–1.2)
MONOCYTES NFR BLD: 8 % (ref 2–11)
NEUTROPHILS NFR BLD: 79 % (ref 36–66)
NEUTS SEG NFR BLD: 3.2 K/UL (ref 1.8–7.7)
PHOSPHATE SERPL-MCNC: 4 MG/DL (ref 2.6–4.5)
PLATELET # BLD AUTO: 192 K/UL (ref 140–450)
PMV BLD AUTO: 9.5 FL (ref 6–12)
POTASSIUM SERPL-SCNC: 4.4 MMOL/L (ref 3.7–5.3)
PROT SERPL-MCNC: 6.4 G/DL (ref 6.4–8.3)
RBC # BLD AUTO: 2.35 M/UL (ref 4–5.2)
SODIUM SERPL-SCNC: 143 MMOL/L (ref 135–144)
SPECIMEN DESCRIPTION: NORMAL
T4 FREE SERPL-MCNC: 0.8 NG/DL (ref 0.9–1.7)
TSH SERPL DL<=0.05 MIU/L-ACNC: 28.67 UIU/ML (ref 0.3–5)
WBC OTHER # BLD: 4.1 K/UL (ref 3.5–11)

## 2024-02-19 PROCEDURE — 6360000002 HC RX W HCPCS: Performed by: INTERNAL MEDICINE

## 2024-02-19 PROCEDURE — C9254 INJECTION, LACOSAMIDE: HCPCS | Performed by: PSYCHIATRY & NEUROLOGY

## 2024-02-19 PROCEDURE — 6370000000 HC RX 637 (ALT 250 FOR IP)

## 2024-02-19 PROCEDURE — 83735 ASSAY OF MAGNESIUM: CPT

## 2024-02-19 PROCEDURE — 6360000002 HC RX W HCPCS: Performed by: PSYCHIATRY & NEUROLOGY

## 2024-02-19 PROCEDURE — 2580000003 HC RX 258: Performed by: INTERNAL MEDICINE

## 2024-02-19 PROCEDURE — 99233 SBSQ HOSP IP/OBS HIGH 50: CPT | Performed by: PSYCHIATRY & NEUROLOGY

## 2024-02-19 PROCEDURE — 82248 BILIRUBIN DIRECT: CPT

## 2024-02-19 PROCEDURE — 6370000000 HC RX 637 (ALT 250 FOR IP): Performed by: INTERNAL MEDICINE

## 2024-02-19 PROCEDURE — 99232 SBSQ HOSP IP/OBS MODERATE 35: CPT | Performed by: FAMILY MEDICINE

## 2024-02-19 PROCEDURE — APPNB30 APP NON BILLABLE TIME 0-30 MINS: Performed by: NURSE PRACTITIONER

## 2024-02-19 PROCEDURE — 84443 ASSAY THYROID STIM HORMONE: CPT

## 2024-02-19 PROCEDURE — 36415 COLL VENOUS BLD VENIPUNCTURE: CPT

## 2024-02-19 PROCEDURE — P9047 ALBUMIN (HUMAN), 25%, 50ML: HCPCS | Performed by: INTERNAL MEDICINE

## 2024-02-19 PROCEDURE — 84100 ASSAY OF PHOSPHORUS: CPT

## 2024-02-19 PROCEDURE — 84439 ASSAY OF FREE THYROXINE: CPT

## 2024-02-19 PROCEDURE — 2580000003 HC RX 258: Performed by: PSYCHIATRY & NEUROLOGY

## 2024-02-19 PROCEDURE — 80053 COMPREHEN METABOLIC PANEL: CPT

## 2024-02-19 PROCEDURE — 85025 COMPLETE CBC W/AUTO DIFF WBC: CPT

## 2024-02-19 PROCEDURE — 99232 SBSQ HOSP IP/OBS MODERATE 35: CPT | Performed by: INTERNAL MEDICINE

## 2024-02-19 PROCEDURE — 2060000000 HC ICU INTERMEDIATE R&B

## 2024-02-19 PROCEDURE — C9113 INJ PANTOPRAZOLE SODIUM, VIA: HCPCS | Performed by: INTERNAL MEDICINE

## 2024-02-19 RX ORDER — MIDODRINE HYDROCHLORIDE 5 MG/1
5 TABLET ORAL 3 TIMES DAILY
Status: DISCONTINUED | OUTPATIENT
Start: 2024-02-19 | End: 2024-02-23 | Stop reason: HOSPADM

## 2024-02-19 RX ORDER — LEVOTHYROXINE SODIUM 0.07 MG/1
150 TABLET ORAL DAILY
Status: DISCONTINUED | OUTPATIENT
Start: 2024-02-20 | End: 2024-02-23 | Stop reason: HOSPADM

## 2024-02-19 RX ADMIN — LEVOTHYROXINE SODIUM 125 MCG: 125 TABLET ORAL at 04:33

## 2024-02-19 RX ADMIN — Medication 100 MG: at 09:06

## 2024-02-19 RX ADMIN — SODIUM CHLORIDE, PRESERVATIVE FREE 10 ML: 5 INJECTION INTRAVENOUS at 21:48

## 2024-02-19 RX ADMIN — ALBUMIN (HUMAN) 25 G: 0.25 INJECTION, SOLUTION INTRAVENOUS at 10:13

## 2024-02-19 RX ADMIN — SODIUM CHLORIDE, PRESERVATIVE FREE 40 MG: 5 INJECTION INTRAVENOUS at 09:06

## 2024-02-19 RX ADMIN — SPIRONOLACTONE 25 MG: 25 TABLET, FILM COATED ORAL at 16:18

## 2024-02-19 RX ADMIN — MICONAZOLE NITRATE: 20 CREAM TOPICAL at 09:07

## 2024-02-19 RX ADMIN — LACOSAMIDE 100 MG: 10 INJECTION INTRAVENOUS at 09:19

## 2024-02-19 RX ADMIN — SODIUM CHLORIDE, PRESERVATIVE FREE 10 ML: 5 INJECTION INTRAVENOUS at 09:07

## 2024-02-19 RX ADMIN — BUMETANIDE 2 MG: 0.25 INJECTION INTRAMUSCULAR; INTRAVENOUS at 21:44

## 2024-02-19 RX ADMIN — NORTRIPTYLINE HYDROCHLORIDE 10 MG: 10 CAPSULE ORAL at 21:44

## 2024-02-19 RX ADMIN — DULOXETINE HYDROCHLORIDE 30 MG: 30 CAPSULE, DELAYED RELEASE ORAL at 09:06

## 2024-02-19 RX ADMIN — GABAPENTIN 100 MG: 100 CAPSULE ORAL at 15:42

## 2024-02-19 RX ADMIN — FOLIC ACID 1 MG: 1 TABLET ORAL at 09:06

## 2024-02-19 RX ADMIN — LACTULOSE 20 G: 10 SOLUTION ORAL at 21:45

## 2024-02-19 RX ADMIN — METOLAZONE 5 MG: 5 TABLET ORAL at 09:06

## 2024-02-19 RX ADMIN — SPIRONOLACTONE 25 MG: 25 TABLET, FILM COATED ORAL at 09:06

## 2024-02-19 RX ADMIN — Medication 1 TABLET: at 09:06

## 2024-02-19 RX ADMIN — ACETAMINOPHEN 650 MG: 325 TABLET ORAL at 15:42

## 2024-02-19 RX ADMIN — LACTULOSE 20 G: 10 SOLUTION ORAL at 09:05

## 2024-02-19 RX ADMIN — MIDODRINE HYDROCHLORIDE 5 MG: 5 TABLET ORAL at 21:44

## 2024-02-19 RX ADMIN — LACOSAMIDE 100 MG: 10 INJECTION INTRAVENOUS at 21:48

## 2024-02-19 RX ADMIN — BUMETANIDE 2 MG: 0.25 INJECTION INTRAMUSCULAR; INTRAVENOUS at 09:06

## 2024-02-19 RX ADMIN — MICONAZOLE NITRATE: 20 CREAM TOPICAL at 21:45

## 2024-02-19 RX ADMIN — RIFAXIMIN 550 MG: 550 TABLET ORAL at 09:05

## 2024-02-19 RX ADMIN — RIFAXIMIN 550 MG: 550 TABLET ORAL at 21:44

## 2024-02-19 ASSESSMENT — PAIN SCALES - PAIN ASSESSMENT IN ADVANCED DEMENTIA (PAINAD)
BREATHING: NORMAL
NEGVOCALIZATION: 0
FACIALEXPRESSION: SMILING OR INEXPRESSIVE
TOTALSCORE: 0
FACIALEXPRESSION: 0
BREATHING: NORMAL
CONSOLABILITY: NO NEED TO CONSOLE
TOTALSCORE: 0
TOTALSCORE: 0
BODYLANGUAGE: RELAXED
NEGVOCALIZATION: 0
BODYLANGUAGE: 0
CONSOLABILITY: 0
BODYLANGUAGE: RELAXED
BREATHING: 0
BREATHING: 0
CONSOLABILITY: 0
BODYLANGUAGE: RELAXED
NEGVOCALIZATION: 0
CONSOLABILITY: NO NEED TO CONSOLE
FACIALEXPRESSION: 0
BODYLANGUAGE: RELAXED
CONSOLABILITY: 0
BODYLANGUAGE: 0
BREATHING: 0
BODYLANGUAGE: 0
NEGVOCALIZATION: 0
BREATHING: NORMAL
BODYLANGUAGE: RELAXED
FACIALEXPRESSION: SMILING OR INEXPRESSIVE
BREATHING: 0
FACIALEXPRESSION: SMILING OR INEXPRESSIVE
BODYLANGUAGE: 0
BODYLANGUAGE: RELAXED
CONSOLABILITY: 0
NEGVOCALIZATION: 0
BREATHING: NORMAL
NEGVOCALIZATION: 0
BODYLANGUAGE: RELAXED
TOTALSCORE: 0
BREATHING: 0
CONSOLABILITY: NO NEED TO CONSOLE
CONSOLABILITY: NO NEED TO CONSOLE
BREATHING: NORMAL
BODYLANGUAGE: 0
TOTALSCORE: 0
BODYLANGUAGE: 0
FACIALEXPRESSION: SMILING OR INEXPRESSIVE
BODYLANGUAGE: 0
FACIALEXPRESSION: 0
CONSOLABILITY: 0
FACIALEXPRESSION: 0
TOTALSCORE: 0
NEGVOCALIZATION: 0
CONSOLABILITY: NO NEED TO CONSOLE
FACIALEXPRESSION: SMILING OR INEXPRESSIVE
CONSOLABILITY: NO NEED TO CONSOLE
BREATHING: 0
CONSOLABILITY: NO NEED TO CONSOLE
FACIALEXPRESSION: SMILING OR INEXPRESSIVE
FACIALEXPRESSION: 0
BREATHING: 0
TOTALSCORE: 0
CONSOLABILITY: 0
FACIALEXPRESSION: SMILING OR INEXPRESSIVE
BREATHING: NORMAL
BREATHING: NORMAL
FACIALEXPRESSION: 0
FACIALEXPRESSION: 0
CONSOLABILITY: 0

## 2024-02-19 ASSESSMENT — PAIN SCALES - WONG BAKER
WONGBAKER_NUMERICALRESPONSE: 2
WONGBAKER_NUMERICALRESPONSE: 2
WONGBAKER_NUMERICALRESPONSE: HURTS A LITTLE BIT
WONGBAKER_NUMERICALRESPONSE: HURTS A LITTLE BIT
WONGBAKER_NUMERICALRESPONSE: 2
WONGBAKER_NUMERICALRESPONSE: HURTS A LITTLE BIT
WONGBAKER_NUMERICALRESPONSE: HURTS A LITTLE BIT
WONGBAKER_NUMERICALRESPONSE: 2
WONGBAKER_NUMERICALRESPONSE: HURTS A LITTLE BIT
WONGBAKER_NUMERICALRESPONSE: 2
WONGBAKER_NUMERICALRESPONSE: HURTS A LITTLE BIT
WONGBAKER_NUMERICALRESPONSE: HURTS A LITTLE BIT
WONGBAKER_NUMERICALRESPONSE: 2
WONGBAKER_NUMERICALRESPONSE: 2

## 2024-02-19 NOTE — PROGRESS NOTES
SUBJECTIVE    Patient was seen and examined.   Bumex was started yesterday along with albumin.  Urine output has picked up since then.  Putting out quite close to 0.7 mL/kg/h.  Has also been receiving thiazides to help with diuresis.  Also on Aldactone.  Blood pressures running somewhat soft.  Encephalopathy persists and has been no improvement overall.  Ammonia levels are within normal range. Loaiza catheter in place  Continues to have diarrhea from lactulose.  Tube feeds continue at goal rate.  Free water is being replaced via the PEG tube v  Labs today showed a sodium of 143 potassium 4.4, BUN 20 creatinine 1.1 bicarbonate 29 calcium 8.3, hemoglobin 7.4 white count 4.1 platelets 192, albumin 2.6 alk phos 148    History reviewed  Known history of liver cirrhosis with portal hypertension.  She presented to [facility] with complaints of mental status changes.  Her labs showed that she had a hemoglobin of 6.7 and a sodium 152.  CT head was negative.  Nephrology was consulted for hypernatremia.  Her baseline creatinine is 0.9 which had gone up to 1.1.  Urine output has been in the 30-40 mill an hour range.    OBJECTIVE      CURRENT TEMPERATURE:  Temp: 98.5 °F (36.9 °C)  MAXIMUM TEMPERATURE OVER 24HRS:  Temp (24hrs), Av.8 °F (36.6 °C), Min:97.3 °F (36.3 °C), Max:98.5 °F (36.9 °C)    CURRENT RESPIRATORY RATE:  Respirations: 16  CURRENT PULSE:  Pulse: 86  CURRENT BLOOD PRESSURE:  BP: (!) 111/55  24HR BLOOD PRESSURE RANGE:  Systolic (24hrs), Av , Min:92 , Max:126   ; Diastolic (24hrs), Av, Min:55, Max:78    24HR INTAKE/OUTPUT:    Intake/Output Summary (Last 24 hours) at 2024 1036  Last data filed at 2024 0358  Gross per 24 hour   Intake 1280 ml   Output 1700 ml   Net -420 ml       WEIGHT :Patient Vitals for the past 96 hrs (Last 3 readings):   Weight   24 0412 100.7 kg (222 lb 0.1 oz)   24 0412 99.6 kg (219 lb 9.3 oz)   24 0436 99 kg (218 lb 4.1 oz)       PHYSICAL  (CHRONULAC) 10 GM/15ML solution 20 g, TID  rifAXIMin (XIFAXAN) tablet 550 mg, BID  fentaNYL (DURAGESIC) 12 MCG/HR 1 patch, Q72H          LABS      CBC:   Recent Labs     02/17/24 0618 02/18/24  0617 02/19/24  0638   WBC 5.0 4.5 4.1   RBC 2.55* 2.30* 2.35*   HGB 8.0* 7.2* 7.4*   HCT 24.6* 22.4* 22.6*   MCV 96.8 97.3 96.1   MCH 31.2 31.5 31.3   MCHC 32.3 32.4 32.5   RDW 16.2* 16.2* 16.0*    170 192   MPV 9.0 9.3 9.5        BMP:   Recent Labs     02/17/24  1947 02/18/24  0617 02/19/24  0638    142 143   K 3.9 4.1 4.4    106 107   CO2 28 29 29   BUN 19 19 20   CREATININE 1.1* 1.1* 1.1*   GLUCOSE 107* 110* 102*   CALCIUM 7.9* 8.1* 8.3*      PHOSPHORUS:    Recent Labs     02/17/24  0618 02/18/24  0617 02/19/24  0638   PHOS 3.7 3.7 4.0       MAGNESIUM:   Recent Labs     02/17/24  0618 02/18/24  0617 02/19/24  0638   MG 2.1 2.1 2.2       ALBUMIN:   Recent Labs     02/19/24  0638   LABALBU 2.6*       IRON:    Lab Results   Component Value Date/Time    IRON 28 02/12/2024 01:27 PM   TIBC:    Lab Results   Component Value Date/Time    TIBC 94 02/12/2024 01:27 PM     FERRITIN:    Lab Results   Component Value Date/Time    FERRITIN 1,205 02/12/2024 05:37 AM     MANUEL:   Lab Results   Component Value Date    MANUEL NEGATIVE 02/12/2024       SPEP:   Lab Results   Component Value Date/Time    PROT 6.4 02/19/2024 06:38 AM     UPEP: No results found for: \"TPU\"   HEPBSAG:  Lab Results   Component Value Date/Time    HEPBSAG NONREACTIVE 02/12/2024 01:27 PM     HEPCAB:  Lab Results   Component Value Date/Time    HEPCAB NONREACTIVE 02/12/2024 01:27 PM   URINE SODIUM:    Lab Results   Component Value Date/Time    FENG 96 02/14/2024 03:47 PM    URINE OSMOLARITY:    Lab Results   Component Value Date/Time    OSMOU 400 02/14/2024 03:47 PM     RADIOLOGY      Reviewed as available.      ASSESSMENT      ASSESSMENT    1.  Acute kidney injury likely prerenal from reduced circulating volume related to third spacing in setting of liver

## 2024-02-19 NOTE — PROGRESS NOTES
[facility]  Inpatient Service     Progress Note  2/19/2024    10:07 AM    Name:   Clemente Garcia  MRN:     [id number]     Acct:      [id number]   Room:   [address] [place] [address] Day:  7  Admit Date:  2/12/2024  1:23 AM    PCP:   Milana Whalen MD  Code Status:  Full Code    Subjective:     Overnight events: none    Patient is somnolent but arousable for exam stating that she is a little tired today.  With prompting patient responds and verbalizes her name and that she is in the hospital, but is still unable to remember certain things like daily of the week, birthdate, and which hospital. Denies shortness of breath, chest pain, abdominal pain, heart palpitations. Unclear on reliability of ROS.     Medications:     Allergies:  No Known Allergies    Current Meds:   Scheduled Meds:    bumetanide  2 mg IntraVENous Q12H    sterile water  18 mL Injection BID    albumin human 25%  25 g IntraVENous Daily    spironolactone  25 mg Oral BID    chlorothiazide  500 mg IntraVENous BID    metOLazone  5 mg Per G Tube Daily    folic acid  1 mg PEG Tube Daily    thiamine mononitrate  100 mg Per G Tube Daily    therapeutic multivitamin-minerals  1 tablet Per G Tube Daily    lacosamide (VIMPAT) 100 mg in sodium chloride 0.9 % 60 mL IVPB  100 mg IntraVENous BID    miconazole   Topical BID    levothyroxine  125 mcg PEG Tube Daily    nortriptyline  10 mg PEG Tube Nightly    glycopyrrolate  0.4 mg IntraVENous Once    DULoxetine  30 mg Per G Tube Daily    sodium chloride flush  5-40 mL IntraVENous 2 times per day    pantoprazole (PROTONIX) 40 mg in sodium chloride (PF) 0.9 % 10 mL injection  40 mg IntraVENous Daily    lactulose  20 g PEG Tube TID    rifAXIMin  550 mg PEG Tube BID    fentaNYL  1 patch TransDERmal Q72H     Continuous Infusions:    sodium chloride       PRN Meds: gabapentin, potassium chloride **OR** potassium alternative oral replacement **OR** potassium chloride, magnesium  250; Q 6 hours; Protein; 1 Dose; Daily    Patient was seen, evaluated and discussed with MD Warner Jung DO  Family Medicine Resident, PGY-1   2/16/2024  10:18 AM    Patient seen in conjunction with intern resident.  Patient's encephalopathic altered mentation remains unchanged.  Discussed with radiology that the pre-MRI KUB suggested the presence of a gastric stimulator in addition to a Lap-Band.  Although Lap-Band's are frequently MRI compatible, the same cannot be assumed for a gastric stimulator.  Radiology stated that they would need to review the operative note from the placement of the gastric stimulator in order to approve the safety of an MRI.  Will attempt to obtain operative report.  If report is unable to be obtained and MRI is unable to be performed, may progress to lumbar puncture per neurology recommendations.    Chris Jacobs DO  Family Medicine Resident PGY-3  2/19/2024  3:14 PM

## 2024-02-19 NOTE — PROGRESS NOTES
[facility] Neurology   IN-PATIENT SERVICE      NEUROLOGY PROGRESS  NOTE            Date:   2/19/2024  Patient name:  Clemente Garcia  Date of admission:  2/12/2024  YOB: 1960      Interval History:     No acute events.  Continues to have waxing and waning mentation, was not able to sleep last night.  Appears her movements are less pronounced today.  No new neurological deficits. Attempted to call  for further history, possible incorrect number listed on chart, unable to leave .    History of Present Illness:     Per my partner:    Briefly, this is a  63 y.o. rt handed  female with hx of HTN, dyslipidemia, THURSTON nonalcoholic steatohepatitis, nephrolithiasis and osteoarthritis was admitted on 2/12/2024 as a transfer from [facility] with altered mentation..  As per family members; patient was found less responsive with blood sugar in 40s and also was found to be dehydrated and malnourished.  She was taken to [facility] ER where she was noted to be with urosepsis for which she was initiated on antibiotic therapy.  She had some improvement but it did not persist.  She was having fluctuating mentation for which she was brought to [facility] facility on 2/12/2024.  She was continued on vancomycin and ceftriaxone antibiotic therapy.  Because of acute on chronic encephalopathy; neurology consultation requested.  During this encounter; patient was alert and awake but was noted to be with abnormal movements of extremities as if she was trying to grab something out of air.     Family members at bedside stated that patient has chronic dysphagia and failed swallow studies earlier.  Patient has been on PEG tube for the past 10 days.    Past Medical History:     History reviewed. No pertinent past medical history.     Past Surgical History:     Past Surgical History:   Procedure Laterality Date    ABDOMEN SURGERY      Gastric bypass with stimulator    HYSTERECTOMY (CERVIX  visualized portion of the orbits demonstrate no acute abnormality.    SINUSES: The visualized paranasal sinuses and mastoid air cells demonstrate  no acute abnormality.    SOFT TISSUES/SKULL:  No acute abnormality of the visualized skull or soft  tissues.    Impression  No acute intracranial abnormality.          I personally reviewed all of the above medications, clinical laboratory, imaging and other diagnostic tests.         Impression:      Clinical presentation warrants further evaluation.  There was initial concern for possible seizures/automatisms, she was started on Vimpat.  EEG with slowing, no epileptiform discharges.  Her mentation has not significantly improved.  On exam, there is asymmetric left-sided weakness, particularly left upper extremity.  She is also leaning to the right side possibly neglecting the left.  I do not see any prior documentation of stroke.  No family at bedside, attempted to call for further history, unable to reach.  MRI has been ordered but appears she has a history of lap band with metal and it is possibly not compatible?  Per chart review, she has had MRIs at OSH last November although we do not have the images or results.  Nonetheless, MRI brain will be helpful in the setting to evaluate for any structural/ischemic etiologies.  Encephalopathy may be due to toxic-metabolic confounders but given no significant improvement despite treatment of underlying infections and metabolic confounders, would further evaluate.    Acute encephalopathy, differential as above  Abnormal involuntary movements, ?  Tardive dyskinesia  Baseline cognitive impairment with history of chronic alcoholism, cirrhosis    Plan:     MRI brain ordered.  Initially held due to possible metal Lap-Band.  Primary team to follow-up with radiology as had MRIs in November last year.  Started on Vimpat 100 mg twice daily, continue for now. Level pending.  Vitamin B12, folate, ammonia, vitamin B1 wnl. Obtain

## 2024-02-19 NOTE — PROGRESS NOTES
GI Progress notes    2/19/2024   5:22 PM    Name:  Clemente Garcia  MRN:    [id number]     Acct:     [id number]   Room:  [address] [place] [address] Day: 7     Admit Date: 2/12/2024  1:23 AM  PCP: Milana Whalen MD    Subjective:     C/C: No chief complaint on file.      Interval History: Status: not changed.     Patient seen and examined  No acute events overnight  Urine copper pending    ROS:  Constitutional: negative for chills, fevers and sweats  Respiratory: negative for cough, dyspnea on exertion, hemoptysis, shortness of breath and wheezing  Cardiovascular: negative for chest pain, chest pressure/discomfort, dyspnea, lower extremity edema and palpitations  Gastrointestinal: negative for abdominal pain, constipation, diarrhea, nausea and vomiting  Neurological: negative for dizziness and headaches    Medications:     Allergies: No Known Allergies    Current Meds: midodrine (PROAMATINE) tablet 5 mg, TID  [START ON 2/20/2024] levothyroxine (SYNTHROID) tablet 150 mcg, Daily  bumetanide (BUMEX) injection 2 mg, Q12H  sterile water injection 18 mL, BID  albumin human 25% IV solution 25 g, Daily  spironolactone (ALDACTONE) tablet 25 mg, BID  metOLazone (ZAROXOLYN) tablet 5 mg, Daily  folic acid (FOLVITE) tablet 1 mg, Daily  thiamine mononitrate tablet 100 mg, Daily  therapeutic multivitamin-minerals 1 tablet, Daily  lacosamide (VIMPAT) 100 mg in sodium chloride 0.9 % 60 mL IVPB, BID  miconazole (MICOTIN) 2 % cream, BID  gabapentin (NEURONTIN) capsule 100 mg, Q8H PRN  nortriptyline (PAMELOR) capsule 10 mg, Nightly  potassium chloride (KLOR-CON M) extended release tablet 40 mEq, PRN   Or  potassium bicarb-citric acid (EFFER-K) effervescent tablet 40 mEq, PRN   Or  potassium chloride 10 mEq/100 mL IVPB (Peripheral Line), PRN  magnesium sulfate 2000 mg in 50 mL IVPB premix, PRN  Glycopyrrolate injection 0.4 mg, Once  DULoxetine (CYMBALTA) extended release capsule 30 mg, Daily  sodium chloride flush 0.9 % injection 5-40 mL, 2 times per  day  sodium chloride flush 0.9 % injection 5-40 mL, PRN  0.9 % sodium chloride infusion, PRN  ondansetron (ZOFRAN-ODT) disintegrating tablet 4 mg, Q8H PRN   Or  ondansetron (ZOFRAN) injection 4 mg, Q6H PRN  acetaminophen (TYLENOL) tablet 650 mg, Q6H PRN   Or  acetaminophen (TYLENOL) suppository 650 mg, Q6H PRN  pantoprazole (PROTONIX) 40 mg in sodium chloride (PF) 0.9 % 10 mL injection, Daily  lactulose (CHRONULAC) 10 GM/15ML solution 20 g, TID  rifAXIMin (XIFAXAN) tablet 550 mg, BID  fentaNYL (DURAGESIC) 12 MCG/HR 1 patch, Q72H        Data:     Code Status:  Full Code    History reviewed. No pertinent family history.    Social History     Socioeconomic History    Marital status:      Spouse name: Not on file    Number of children: Not on file    Years of education: Not on file    Highest education level: Not on file   Occupational History    Not on file   Tobacco Use    Smoking status: Never    Smokeless tobacco: Not on file   Vaping Use    Vaping Use: Never used   Substance and Sexual Activity    Alcohol use: Not Currently    Drug use: Never    Sexual activity: Defer   Other Topics Concern    Not on file   Social History Narrative    Not on file     Social Determinants of Health     Financial Resource Strain: Not on file   Food Insecurity: Not on file   Transportation Needs: Not on file   Physical Activity: Not on file   Stress: Not on file   Social Connections: Not on file   Intimate Partner Violence: Not on file   Housing Stability: Not on file       Vitals:  /67   Pulse 87   Temp 97.7 °F (36.5 °C) (Oral)   Resp 17   Ht 1.6 m (5' 2.99\")   Wt 100.7 kg (222 lb 0.1 oz)   SpO2 96%   BMI 39.34 kg/m²   Temp (24hrs), Av.8 °F (36.6 °C), Min:97.3 °F (36.3 °C), Max:98.5 °F (36.9 °C)    No results for input(s): \"POCGLU\" in the last 72 hours.    I/O (24Hr):    Intake/Output Summary (Last 24 hours) at 2024 1722  Last data filed at 2024 1524  Gross per 24 hour   Intake 1040 ml   Output 6970  ml   Net -1710 ml       Labs:      CBC:   Lab Results   Component Value Date/Time    WBC 4.1 02/19/2024 06:38 AM    RBC 2.35 02/19/2024 06:38 AM    HGB 7.4 02/19/2024 06:38 AM    HCT 22.6 02/19/2024 06:38 AM    MCV 96.1 02/19/2024 06:38 AM    MCH 31.3 02/19/2024 06:38 AM    MCHC 32.5 02/19/2024 06:38 AM    RDW 16.0 02/19/2024 06:38 AM     02/19/2024 06:38 AM    MPV 9.5 02/19/2024 06:38 AM     CBC with Differential:    Lab Results   Component Value Date/Time    WBC 4.1 02/19/2024 06:38 AM    RBC 2.35 02/19/2024 06:38 AM    HGB 7.4 02/19/2024 06:38 AM    HCT 22.6 02/19/2024 06:38 AM     02/19/2024 06:38 AM    MCV 96.1 02/19/2024 06:38 AM    MCH 31.3 02/19/2024 06:38 AM    MCHC 32.5 02/19/2024 06:38 AM    RDW 16.0 02/19/2024 06:38 AM    LYMPHOPCT 11 02/19/2024 06:38 AM    MONOPCT 8 02/19/2024 06:38 AM    BASOPCT 0 02/19/2024 06:38 AM    MONOSABS 0.30 02/19/2024 06:38 AM    LYMPHSABS 0.50 02/19/2024 06:38 AM    EOSABS 0.10 02/19/2024 06:38 AM    BASOSABS 0.00 02/19/2024 06:38 AM     Hemoglobin/Hematocrit:    Lab Results   Component Value Date/Time    HGB 7.4 02/19/2024 06:38 AM    HCT 22.6 02/19/2024 06:38 AM     CMP:    Lab Results   Component Value Date/Time     02/19/2024 06:38 AM    K 4.4 02/19/2024 06:38 AM     02/19/2024 06:38 AM    CO2 29 02/19/2024 06:38 AM    BUN 20 02/19/2024 06:38 AM    CREATININE 1.1 02/19/2024 06:38 AM    AGRATIO 0.7 02/19/2024 06:38 AM    LABGLOM 57 02/19/2024 06:38 AM    GLUCOSE 102 02/19/2024 06:38 AM    PROT 6.4 02/19/2024 06:38 AM    LABALBU 2.6 02/19/2024 06:38 AM    CALCIUM 8.3 02/19/2024 06:38 AM    BILITOT 0.3 02/19/2024 06:38 AM    ALKPHOS 148 02/19/2024 06:38 AM    AST 23 02/19/2024 06:38 AM    ALT 10 02/19/2024 06:38 AM     BMP:    Lab Results   Component Value Date/Time     02/19/2024 06:38 AM    K 4.4 02/19/2024 06:38 AM     02/19/2024 06:38 AM    CO2 29 02/19/2024 06:38 AM    BUN 20 02/19/2024 06:38 AM    LABALBU 2.6 02/19/2024 06:38 AM

## 2024-02-19 NOTE — PLAN OF CARE
Problem: Discharge Planning  Goal: Discharge to home or other facility with appropriate resources  2/19/2024 1229 by Gabriela Grace RN  Outcome: Progressing  Flowsheets (Taken 2/19/2024 0800)  Discharge to home or other facility with appropriate resources: Identify barriers to discharge with patient and caregiver  2/19/2024 0404 by Alvin Herzog RN  Outcome: Progressing     Problem: Pain  Goal: Verbalizes/displays adequate comfort level or baseline comfort level  2/19/2024 1229 by Gabriela Grace RN  Outcome: Progressing  2/19/2024 0404 by Alvin Herzog RN  Outcome: Progressing     Problem: Confusion  Goal: Confusion, delirium, dementia, or psychosis is improved or at baseline  Description: INTERVENTIONS:  1. Assess for possible contributors to thought disturbance, including medications, impaired vision or hearing, underlying metabolic abnormalities, dehydration, psychiatric diagnoses, and notify attending LIP  2. [place] high risk fall precautions, as indicated  3. Provide frequent short contacts to provide reality reorientation, refocusing and direction  4. Decrease environmental stimuli, including noise as appropriate  5. Monitor and intervene to maintain adequate nutrition, hydration, elimination, sleep and activity  6. If unable to ensure safety without constant attention obtain sitter and review sitter guidelines with assigned personnel  7. Initiate Psychosocial CNS and Spiritual Care consult, as indicated  2/19/2024 1229 by Gabriela Grace RN  Outcome: Progressing  2/19/2024 0404 by Alvin Herzog RN  Outcome: Progressing     Problem: Skin/Tissue Integrity  Goal: Absence of new skin breakdown  Description: 1.  Monitor for areas of redness and/or skin breakdown  2.  Assess vascular access sites hourly  3.  Every 4-6 hours minimum:  Change oxygen saturation probe site  4.  Every 4-6 hours:  If on nasal continuous positive airway pressure, respiratory therapy assess nares and determine need for appliance

## 2024-02-19 NOTE — PROGRESS NOTES
Comprehensive Nutrition Assessment    Type and Reason for Visit:  Reassess    Nutrition Recommendations/Plan:   Recommend con't current TF: Osmolite 1.2 @ 60 ml/hr  1 protein modular daily  250 ml free water flush q 6 hours  Continue NPO  Monitor weight, labs, skin, TF tolerance     Malnutrition Assessment:  Malnutrition Status:  No malnutrition (02/19/24 0917)    Context:  Acute Illness     Findings of the 6 clinical characteristics of malnutrition:  Energy Intake:  No significant decrease in energy intake (TF at goal)  Weight Loss:  No significant weight loss     Body Fat Loss:  No significant body fat loss     Muscle Mass Loss:  No significant muscle mass loss    Fluid Accumulation:  Mild     Strength:  Not Performed    Nutrition Assessment:    Pt tolerating TF well. Hypernatremia is resolved, Na 143 today. Noted pt is having increased edema in her extremeties, free water flushes have been modified to 250 ml q 6 hours. Pt's weight has also slightly increased with admission, will verify accurate bedscale weight was taken (also anticipate some weight gain d/t fluid shifts).    Nutrition Related Findings:    +4 BUE, +4 BLE. Cr 1.1, GFR 57, Glu 102, Alb 2.6. All other labs/meds reviewed. Wound Type: Multiple       Current Nutrition Intake & Therapies:    Average Meal Intake: NPO  Average Supplements Intake: NPO  Diet NPO  ADULT TUBE FEEDING; PEG; Standard without Fiber; Continuous; 30; Yes; 10; Q 8 hours; 60; 250; Q 6 hours; Protein; 1 Dose; Daily  Current Tube Feeding (TF) Orders:  Feeding Route: PEG  Formula: Standard without Fiber  Schedule: Continuous  Feeding Regimen: Osmolite 1.2 @ 60 ml/hr  Additives/Modulars: Protein (x1 per day)  Water Flushes: 250 ml q 6 hours  Current TF & Flush Orders Provides: 1832 kcal, 105 g protein, 2180 ml fluid  Goal TF & Flush Orders Provides: 1832 kcal, 105 g pro, 2470 ml fluid (Osmolite 1.2 @ 60 ml/hr with 1 protein modular daily, 215 ml free water flush Q 4

## 2024-02-19 NOTE — PLAN OF CARE
Problem: Discharge Planning  Goal: Discharge to home or other facility with appropriate resources  Outcome: Progressing     Problem: Pain  Goal: Verbalizes/displays adequate comfort level or baseline comfort level  Outcome: Progressing     Problem: Confusion  Goal: Confusion, delirium, dementia, or psychosis is improved or at baseline  Description: INTERVENTIONS:  1. Assess for possible contributors to thought disturbance, including medications, impaired vision or hearing, underlying metabolic abnormalities, dehydration, psychiatric diagnoses, and notify attending LIP  2. [place] high risk fall precautions, as indicated  3. Provide frequent short contacts to provide reality reorientation, refocusing and direction  4. Decrease environmental stimuli, including noise as appropriate  5. Monitor and intervene to maintain adequate nutrition, hydration, elimination, sleep and activity  6. If unable to ensure safety without constant attention obtain sitter and review sitter guidelines with assigned personnel  7. Initiate Psychosocial CNS and Spiritual Care consult, as indicated  Outcome: Progressing     Problem: Skin/Tissue Integrity  Goal: Absence of new skin breakdown  Description: 1.  Monitor for areas of redness and/or skin breakdown  2.  Assess vascular access sites hourly  3.  Every 4-6 hours minimum:  Change oxygen saturation probe site  4.  Every 4-6 hours:  If on nasal continuous positive airway pressure, respiratory therapy assess nares and determine need for appliance change or resting period.  Outcome: Progressing     Problem: Safety - Adult  Goal: Free from fall injury  Outcome: Progressing     Problem: ABCDS Injury Assessment  Goal: Absence of physical injury  Outcome: Progressing     Problem: Nutrition Deficit:  Goal: Optimize nutritional status  Outcome: Progressing     Problem: Neurosensory - Adult  Goal: Achieves stable or improved neurological status  Outcome: Progressing     Problem: Respiratory -

## 2024-02-20 ENCOUNTER — APPOINTMENT (OUTPATIENT)
Dept: CT IMAGING | Age: 64
DRG: 052 | End: 2024-02-20
Attending: STUDENT IN AN ORGANIZED HEALTH CARE EDUCATION/TRAINING PROGRAM
Payer: MEDICAID

## 2024-02-20 PROBLEM — Z71.89 GOALS OF CARE, COUNSELING/DISCUSSION: Status: ACTIVE | Noted: 2024-02-20

## 2024-02-20 PROBLEM — K70.30 ALCOHOLIC CIRRHOSIS (HCC): Status: ACTIVE | Noted: 2024-02-20

## 2024-02-20 PROBLEM — G81.94 LEFT HEMIPARESIS (HCC): Status: ACTIVE | Noted: 2024-02-20

## 2024-02-20 PROBLEM — N17.9 AKI (ACUTE KIDNEY INJURY) (HCC): Status: ACTIVE | Noted: 2024-02-20

## 2024-02-20 PROBLEM — Z51.5 ENCOUNTER FOR PALLIATIVE CARE: Status: ACTIVE | Noted: 2024-02-20

## 2024-02-20 LAB
ANION GAP SERPL CALCULATED.3IONS-SCNC: 7 MMOL/L (ref 9–17)
BASOPHILS # BLD: 0 K/UL (ref 0–0.2)
BASOPHILS NFR BLD: 1 % (ref 0–2)
BUN SERPL-MCNC: 21 MG/DL (ref 8–23)
CALCIUM SERPL-MCNC: 8.6 MG/DL (ref 8.6–10.4)
CHLORIDE SERPL-SCNC: 103 MMOL/L (ref 98–107)
CO2 SERPL-SCNC: 31 MMOL/L (ref 20–31)
CREAT SERPL-MCNC: 1.1 MG/DL (ref 0.5–0.9)
EOSINOPHIL # BLD: 0.1 K/UL (ref 0–0.4)
EOSINOPHILS RELATIVE PERCENT: 2 % (ref 1–4)
ERYTHROCYTE [DISTWIDTH] IN BLOOD BY AUTOMATED COUNT: 15.8 % (ref 12.5–15.4)
EST. AVERAGE GLUCOSE BLD GHB EST-MCNC: 94 MG/DL
GFR SERPL CREATININE-BSD FRML MDRD: 57 ML/MIN/1.73M2
GLUCOSE BLD-MCNC: 80 MG/DL (ref 65–105)
GLUCOSE BLD-MCNC: 95 MG/DL (ref 65–105)
GLUCOSE SERPL-MCNC: 115 MG/DL (ref 70–99)
HBA1C MFR BLD: 4.9 % (ref 4–6)
HCT VFR BLD AUTO: 21.6 % (ref 36–46)
HGB BLD-MCNC: 7.2 G/DL (ref 12–16)
LYMPHOCYTES NFR BLD: 0.5 K/UL (ref 1–4.8)
LYMPHOCYTES RELATIVE PERCENT: 11 % (ref 24–44)
MAGNESIUM SERPL-MCNC: 2.1 MG/DL (ref 1.6–2.6)
MCH RBC QN AUTO: 31.8 PG (ref 26–34)
MCHC RBC AUTO-ENTMCNC: 33.1 G/DL (ref 31–37)
MCV RBC AUTO: 96.4 FL (ref 80–100)
MONOCYTES NFR BLD: 0.3 K/UL (ref 0.1–1.2)
MONOCYTES NFR BLD: 8 % (ref 2–11)
NEUTROPHILS NFR BLD: 78 % (ref 36–66)
NEUTS SEG NFR BLD: 3.2 K/UL (ref 1.8–7.7)
PHOSPHATE SERPL-MCNC: 4.3 MG/DL (ref 2.6–4.5)
PLATELET # BLD AUTO: 195 K/UL (ref 140–450)
PMV BLD AUTO: 9 FL (ref 6–12)
POTASSIUM SERPL-SCNC: 4.3 MMOL/L (ref 3.7–5.3)
RBC # BLD AUTO: 2.25 M/UL (ref 4–5.2)
SODIUM SERPL-SCNC: 141 MMOL/L (ref 135–144)
WBC OTHER # BLD: 4.1 K/UL (ref 3.5–11)

## 2024-02-20 PROCEDURE — A4216 STERILE WATER/SALINE, 10 ML: HCPCS | Performed by: INTERNAL MEDICINE

## 2024-02-20 PROCEDURE — 2580000003 HC RX 258: Performed by: PSYCHIATRY & NEUROLOGY

## 2024-02-20 PROCEDURE — C9113 INJ PANTOPRAZOLE SODIUM, VIA: HCPCS | Performed by: INTERNAL MEDICINE

## 2024-02-20 PROCEDURE — 6370000000 HC RX 637 (ALT 250 FOR IP): Performed by: INTERNAL MEDICINE

## 2024-02-20 PROCEDURE — 2580000003 HC RX 258: Performed by: INTERNAL MEDICINE

## 2024-02-20 PROCEDURE — 2060000000 HC ICU INTERMEDIATE R&B

## 2024-02-20 PROCEDURE — 80048 BASIC METABOLIC PNL TOTAL CA: CPT

## 2024-02-20 PROCEDURE — 36415 COLL VENOUS BLD VENIPUNCTURE: CPT

## 2024-02-20 PROCEDURE — 6360000002 HC RX W HCPCS: Performed by: INTERNAL MEDICINE

## 2024-02-20 PROCEDURE — 6370000000 HC RX 637 (ALT 250 FOR IP)

## 2024-02-20 PROCEDURE — 83036 HEMOGLOBIN GLYCOSYLATED A1C: CPT

## 2024-02-20 PROCEDURE — 99232 SBSQ HOSP IP/OBS MODERATE 35: CPT | Performed by: PSYCHIATRY & NEUROLOGY

## 2024-02-20 PROCEDURE — 85025 COMPLETE CBC W/AUTO DIFF WBC: CPT

## 2024-02-20 PROCEDURE — C9254 INJECTION, LACOSAMIDE: HCPCS | Performed by: PSYCHIATRY & NEUROLOGY

## 2024-02-20 PROCEDURE — 70498 CT ANGIOGRAPHY NECK: CPT

## 2024-02-20 PROCEDURE — 99223 1ST HOSP IP/OBS HIGH 75: CPT

## 2024-02-20 PROCEDURE — 84100 ASSAY OF PHOSPHORUS: CPT

## 2024-02-20 PROCEDURE — 99232 SBSQ HOSP IP/OBS MODERATE 35: CPT | Performed by: FAMILY MEDICINE

## 2024-02-20 PROCEDURE — 6360000002 HC RX W HCPCS: Performed by: PSYCHIATRY & NEUROLOGY

## 2024-02-20 PROCEDURE — P9047 ALBUMIN (HUMAN), 25%, 50ML: HCPCS | Performed by: INTERNAL MEDICINE

## 2024-02-20 PROCEDURE — 82947 ASSAY GLUCOSE BLOOD QUANT: CPT

## 2024-02-20 PROCEDURE — 83735 ASSAY OF MAGNESIUM: CPT

## 2024-02-20 PROCEDURE — 99232 SBSQ HOSP IP/OBS MODERATE 35: CPT | Performed by: INTERNAL MEDICINE

## 2024-02-20 RX ORDER — SODIUM CHLORIDE 0.9 % (FLUSH) 0.9 %
10 SYRINGE (ML) INJECTION PRN
Status: COMPLETED | OUTPATIENT
Start: 2024-02-20 | End: 2024-02-21

## 2024-02-20 RX ORDER — 0.9 % SODIUM CHLORIDE 0.9 %
80 INTRAVENOUS SOLUTION INTRAVENOUS ONCE
Status: COMPLETED | OUTPATIENT
Start: 2024-02-20 | End: 2024-02-21

## 2024-02-20 RX ADMIN — SODIUM CHLORIDE, PRESERVATIVE FREE 10 ML: 5 INJECTION INTRAVENOUS at 09:01

## 2024-02-20 RX ADMIN — MICONAZOLE NITRATE: 20 CREAM TOPICAL at 21:00

## 2024-02-20 RX ADMIN — MIDODRINE HYDROCHLORIDE 5 MG: 5 TABLET ORAL at 20:39

## 2024-02-20 RX ADMIN — LACOSAMIDE 100 MG: 10 INJECTION INTRAVENOUS at 09:09

## 2024-02-20 RX ADMIN — Medication 1 TABLET: at 09:00

## 2024-02-20 RX ADMIN — NORTRIPTYLINE HYDROCHLORIDE 10 MG: 10 CAPSULE ORAL at 20:38

## 2024-02-20 RX ADMIN — SPIRONOLACTONE 25 MG: 25 TABLET, FILM COATED ORAL at 17:14

## 2024-02-20 RX ADMIN — DULOXETINE HYDROCHLORIDE 30 MG: 30 CAPSULE, DELAYED RELEASE ORAL at 09:00

## 2024-02-20 RX ADMIN — BUMETANIDE 2 MG: 0.25 INJECTION INTRAMUSCULAR; INTRAVENOUS at 09:02

## 2024-02-20 RX ADMIN — LACTULOSE 20 G: 10 SOLUTION ORAL at 20:38

## 2024-02-20 RX ADMIN — SODIUM CHLORIDE, PRESERVATIVE FREE 10 ML: 5 INJECTION INTRAVENOUS at 20:39

## 2024-02-20 RX ADMIN — MIDODRINE HYDROCHLORIDE 5 MG: 5 TABLET ORAL at 09:00

## 2024-02-20 RX ADMIN — SODIUM CHLORIDE, PRESERVATIVE FREE 40 MG: 5 INJECTION INTRAVENOUS at 09:02

## 2024-02-20 RX ADMIN — LACOSAMIDE 100 MG: 10 INJECTION INTRAVENOUS at 20:53

## 2024-02-20 RX ADMIN — LEVOTHYROXINE SODIUM 150 MCG: 75 TABLET ORAL at 05:53

## 2024-02-20 RX ADMIN — METOLAZONE 5 MG: 5 TABLET ORAL at 09:00

## 2024-02-20 RX ADMIN — LACTULOSE 20 G: 10 SOLUTION ORAL at 09:01

## 2024-02-20 RX ADMIN — Medication 100 MG: at 09:00

## 2024-02-20 RX ADMIN — RIFAXIMIN 550 MG: 550 TABLET ORAL at 20:38

## 2024-02-20 RX ADMIN — RIFAXIMIN 550 MG: 550 TABLET ORAL at 09:01

## 2024-02-20 RX ADMIN — SPIRONOLACTONE 25 MG: 25 TABLET, FILM COATED ORAL at 09:00

## 2024-02-20 RX ADMIN — MICONAZOLE NITRATE: 20 CREAM TOPICAL at 09:06

## 2024-02-20 RX ADMIN — BUMETANIDE 2 MG: 0.25 INJECTION INTRAMUSCULAR; INTRAVENOUS at 20:37

## 2024-02-20 RX ADMIN — FOLIC ACID 1 MG: 1 TABLET ORAL at 09:00

## 2024-02-20 RX ADMIN — ALBUMIN (HUMAN) 25 G: 0.25 INJECTION, SOLUTION INTRAVENOUS at 09:51

## 2024-02-20 ASSESSMENT — PAIN SCALES - GENERAL: PAINLEVEL_OUTOF10: 5

## 2024-02-20 ASSESSMENT — PAIN DESCRIPTION - LOCATION: LOCATION: OTHER (COMMENT)

## 2024-02-20 NOTE — CONSULTS
Palliative Care Inpatient Consult    NAME:  Clemente Garcia  MEDICAL RECORD NUMBER:  [id number]  AGE: 63 y.o.   GENDER: female  : 1960  TODAY'S DATE:  2024    Reasons for Consultation:    Symptom and/or pain management  Provision of information regarding PC and/or hospice philosophies  Complex, time-intensive communication and interdisciplinary psychosocial support  Clarification of goals of care and/or assistance with difficult decision-making  Guidance in regards to resources and transition(s)    Members of PC team contributing to this consultation are : Kadi Fonseca, Palliative Care APRN-CNP    Plan      Palliative Interaction: Patient seen and examined on rounds. Patient resting in bed, alert, however remains disoriented at this time. Patients  Joni present at bedside.     Attempt to gather history from Joni and the events leading up to hospitalization. He states prior to admission to [facility] the patient was not having confusion, he notes the confusion began during hospitalization. He states she originally presented due to decreased oral intake.     He states prior to admission patient was not able to ambulate at home, he relates this to neuropathy. He states the team is still working to figure out what is causing the patients confusion and expresses some frustration stating he doesn't feel all the teams are on the same page.    Inquire regarding POA, as it is reported patients son Marek is patients decision maker. Joni expresses he did not know this prior to this admission and is unsure how/when this was decided.      Attempted to call Marek to further discuss this as well as goals for the patient, unable to reach him at this time.     Upon review, only the first 3 pages of a POA document can be found in the patients chart. This does not include witness signatures or a notary stamp to indicate the documents as valid. Will need Marek to bring in a new copy to confirm legal POA    Pulmonary/Chest: Effort normal and breath sounds normal. No rales or wheezes.  Abdomen: Soft. No tenderness, not distended, no ascites, no organomegaly   Musculoskeletal: Normal range of motion. No edema lower ext.   Neurological: CN II-XII grossly intact, no focal neurological deficits   Skin: Normal turgor, no bleeding, no bruising     Palliative Performance Scale:  ___60%  Ambulation reduced; Significant disease; Can't do hobbies/housework; intake normal or reduced; occasional assist; LOC full/confusion  _x__50%  Mainly sit/lie; Extensive disease; Can't do any work; Considerable assist; intake normal or reduced; LOC full/confusion  ___40%  Mainly in bed; Extensive disease; Mainly assist; intake normal or reduced; LOC full/confusion   ___30%  Bed Bound; Extensive disease; Total care; intake reduced; LOCfull/confusion  ___20%  Bed Bound; Extensive disease; Total care; intake minimal; Drowsy/coma  ___10%  Bed Bound; Extensive disease; Total care; Mouth care only; Drowsy/coma  ___0       Death        Thank you for allowing Palliative Care to participate in the care of Ms. Garcia .    This note has been dictated by dragon, typing errors may be a possibility.    The total time I spent in seeing the patient, discussing goals of care, advanced directives, code status and other major issues was more than 76 minutes      Electronically signed by   NAZIA Victor CNP  Palliative Care Team  on 2/20/2024 at 8:52 AM    Palliative Care can be reached via perfect serve.

## 2024-02-20 NOTE — PROGRESS NOTES
[facility]  Inpatient Service     Progress Note  2/20/2024    8:17 AM    Name:   Clemente Garcia  MRN:     [id number]     Acct:      [id number]   Room:   [facility] Day:  8  Admit Date:  2/12/2024  1:23 AM    PCP:   Milana Whalen MD  Code Status:  Full Code    Subjective:     Overnight events: none    Patient was alert this morning and still able to state name. When asked about birthday she was close, and the nurse stated that she was able to state her birthday earlier in the morning. She knows she is in a hospital, but was unsure of the location. Patient denies SOB, chest pain, nausea, vomiting, or abdominal pain. Still unsure of the reliability of ROS.     Medications:     Allergies:  No Known Allergies    Current Meds:   Scheduled Meds:    midodrine  5 mg Oral TID    levothyroxine  150 mcg PEG Tube Daily    bumetanide  2 mg IntraVENous Q12H    albumin human 25%  25 g IntraVENous Daily    spironolactone  25 mg Oral BID    metOLazone  5 mg Per G Tube Daily    folic acid  1 mg PEG Tube Daily    thiamine mononitrate  100 mg Per G Tube Daily    therapeutic multivitamin-minerals  1 tablet Per G Tube Daily    lacosamide (VIMPAT) 100 mg in sodium chloride 0.9 % 60 mL IVPB  100 mg IntraVENous BID    miconazole   Topical BID    nortriptyline  10 mg PEG Tube Nightly    glycopyrrolate  0.4 mg IntraVENous Once    DULoxetine  30 mg Per G Tube Daily    sodium chloride flush  5-40 mL IntraVENous 2 times per day    pantoprazole (PROTONIX) 40 mg in sodium chloride (PF) 0.9 % 10 mL injection  40 mg IntraVENous Daily    lactulose  20 g PEG Tube TID    rifAXIMin  550 mg PEG Tube BID    fentaNYL  1 patch TransDERmal Q72H     Continuous Infusions:    sodium chloride       PRN Meds: gabapentin, potassium chloride **OR** potassium alternative oral replacement **OR** potassium chloride, magnesium sulfate, sodium chloride flush, sodium chloride, ondansetron **OR** ondansetron,    02/17/2024 8.0 (L)     S/P 1 U PRBC  Trending HH, transfuse if hgb <7  Monitor for signs of bleeding  continue daily CBC    Severe protein calorie malnutrition  S/P percutaneous endoscopic gastrostomy (PEG) tube placement (HCC)  Dehydration  Hypoalbuminemia, increasing  Hypernatremia, resolved  Hypophosphatemia, resolved  Sodium (mmol/L)   Date Value   02/20/2024 141   02/19/2024 143   02/18/2024 142   02/17/2024 143     Albumin (g/dL)   Date Value   02/19/2024 2.6 (L)   02/16/2024 2.1 (L)   02/15/2024 2.0 (L)     c/w free water 250ml q4hrs through the PEG tube  Dietary consulted  Nephrology consulted w/ recommendations appreciated  continue bumex 2mg IV BID  Continue metalozone 5mg daily  continue aldactone 25mg BID  Start proamatine 5mg TID, hold SBP >120  Albumin 25mg daily for three doses, day 3  Monitor weight, labs, skin, TF tolerance   Electrolyte replacement protocol  Folic acid 1mg daily  Thiamine mononitrate 100mg daily  Multivitamin 1mg tablet daily    Bilateral pressure ulcer of heels, stable dry eschar  Continue dressings to be applied to Bilateral foot every other day: betadine paint and mepilex pad b/l  apply light figure 8 compressive dressings with ACE wrap to b/l LE daily  Maintain heels off of bed    Depression  continue home nortriptyline 10mg nightly  Continue Cymbalta 30mg daily    Hypertension  Bumex 2mg IV BID  metalozone 5mg daily, and aldactone 25mg BID  Start proamatine 5mg TID, hold SBP >120    Hypothyroidism  TSH 28.67 and T4 0.8  Increased synthroid to 150mcg  Was on 137mcg until 2/4 when started on 125mcg at [facility]    Neuropathic pain  gabapentin TID PRN    Telemetry: NSR 84bpm  Anticoagulation: intermittent compression devices  Dispo: [facility]  Diet: Diet NPO  ADULT TUBE FEEDING; PEG; Standard without Fiber; Continuous; 30; Yes; 10; Q 8 hours; 60; 250; Q 6 hours; Protein; 1 Dose; Daily    Patient was seen, evaluated and discussed with MD Warner Jung,   Family

## 2024-02-20 NOTE — PLAN OF CARE
Problem: Discharge Planning  Goal: Discharge to home or other facility with appropriate resources  2/20/2024 0152 by Lily Baez RN  Outcome: Progressing  2/19/2024 1229 by Gabriela Grace RN  Outcome: Progressing  Flowsheets (Taken 2/19/2024 0800)  Discharge to home or other facility with appropriate resources: Identify barriers to discharge with patient and caregiver     Problem: Pain  Goal: Verbalizes/displays adequate comfort level or baseline comfort level  2/20/2024 0152 by Lily Baez RN  Outcome: Progressing  2/19/2024 1229 by Gabriela Grace RN  Outcome: Progressing     Problem: Confusion  Goal: Confusion, delirium, dementia, or psychosis is improved or at baseline  Description: INTERVENTIONS:  1. Assess for possible contributors to thought disturbance, including medications, impaired vision or hearing, underlying metabolic abnormalities, dehydration, psychiatric diagnoses, and notify attending LIP  2. [place] high risk fall precautions, as indicated  3. Provide frequent short contacts to provide reality reorientation, refocusing and direction  4. Decrease environmental stimuli, including noise as appropriate  5. Monitor and intervene to maintain adequate nutrition, hydration, elimination, sleep and activity  6. If unable to ensure safety without constant attention obtain sitter and review sitter guidelines with assigned personnel  7. Initiate Psychosocial CNS and Spiritual Care consult, as indicated  2/20/2024 0152 by Lily Baez RN  Outcome: Progressing  2/19/2024 1229 by Gabriela Grace RN  Outcome: Progressing     Problem: Skin/Tissue Integrity  Goal: Absence of new skin breakdown  Description: 1.  Monitor for areas of redness and/or skin breakdown  2.  Assess vascular access sites hourly  3.  Every 4-6 hours minimum:  Change oxygen saturation probe site  4.  Every 4-6 hours:  If on nasal continuous positive airway pressure, respiratory therapy assess nares and determine need for  appliance change or resting period.  2/20/2024 0152 by Lily Baez RN  Outcome: Progressing  2/19/2024 1229 by Gabriela Grace RN  Outcome: Progressing     Problem: Safety - Adult  Goal: Free from fall injury  2/20/2024 0152 by Lily Baez RN  Outcome: Progressing  2/19/2024 1229 by Gabriela Grace RN  Outcome: Progressing     Problem: ABCDS Injury Assessment  Goal: Absence of physical injury  2/20/2024 0152 by Lily Baez RN  Outcome: Progressing  2/19/2024 1229 by Gabriela Grace RN  Outcome: Progressing     Problem: Nutrition Deficit:  Goal: Optimize nutritional status  2/19/2024 1229 by Gabriela Grace RN  Outcome: Progressing  Flowsheets (Taken 2/19/2024 0904 by Kaela Porter, DB)  Nutrient intake appropriate for improving, restoring, or maintaining nutritional needs:   Assess nutritional status and recommend course of action   Recommend, monitor, and adjust tube feedings and TPN/PPN based on assessed needs     Problem: Respiratory - Adult  Goal: Achieves optimal ventilation and oxygenation  2/19/2024 1229 by Gabriela Grace RN  Outcome: Progressing     Problem: Cardiovascular - Adult  Goal: Maintains optimal cardiac output and hemodynamic stability  2/19/2024 1229 by Gabriela Grace RN  Outcome: Progressing     Problem: Skin/Tissue Integrity - Adult  Goal: Skin integrity remains intact  Recent Flowsheet Documentation  Taken 2/19/2024 1230 by Gabriela Grace RN  Skin Integrity Remains Intact: Monitor for areas of redness and/or skin breakdown  2/19/2024 1229 by Gabriela Grace RN  Outcome: Progressing  Flowsheets (Taken 2/19/2024 0800)  Skin Integrity Remains Intact: Monitor for areas of redness and/or skin breakdown     Problem: Musculoskeletal - Adult  Goal: Return mobility to safest level of function  2/19/2024 1229 by Gabriela Grace RN  Outcome: Progressing     Problem: Gastrointestinal - Adult  Goal: Minimal or absence of nausea and vomiting  2/19/2024 1229 by Gabriela Grace

## 2024-02-20 NOTE — PROGRESS NOTES
Renal Progress Note    Patient :  Clemente Garcia; 63 y.o. MRN# [id number]  Location:  329/329-01  Attending:  Johanne Philip MD  Admit Date:  2/12/2024   Hospital Day: 8    Subjective:     Patient was seen and examined.  No new issues reported overnight.  Current patient seems to be alert and awake, oriented to self and place but knows that she is in a hospital in [place] but not able to give the name.  BMP result from today reviewed sodium 141, potassium 4.3, chloride 103, bicarb 31, calcium 8.6, BUN 21, creatinine stable at 1.1 mg/dl.  Hemoglobin 7.2.  Urine output documented as about 3.1 L in the last 24 hours.  Patient is currently receiving IV albumin.  She is on Bumex 2 mg IV twice daily along with metolazone 5 mg daily and Aldactone 25 mg twice a day.  She is also on midodrine 5 mg 3 times a day.  On tube feeds and receiving free water replacement 250 cc every 6 hours.    No overt GI bleeding s/p EGD which showed swelling and edema in GE junction, gastritis as per gastroenterology notes.    Outpatient Medications:     Medications Prior to Admission: bumetanide (BUMEX) 1 MG tablet, Take 2 tablets by mouth daily  bisacodyl (DULCOLAX) 10 MG suppository, Place 1 suppository rectally daily as needed for Constipation  DULoxetine (CYMBALTA) 60 MG extended release capsule, 1 capsule by Per G Tube route daily  fentaNYL (DURAGESIC) 12 MCG/HR, Place 1 patch onto the skin every 72 hours. Max Daily Amount: 1 patch  gabapentin (NEURONTIN) 100 MG capsule, 1 capsule by PEG Tube route at bedtime.  bisacodyl (DULCOLAX) 5 MG EC tablet, Take 2 tablets by mouth daily as needed for Constipation Per peg TUbe  lactulose (CHRONULAC) 10 GM/15ML solution, 45 mLs by PEG Tube route 2 times daily  levothyroxine (SYNTHROID) 125 MCG tablet, 1 tablet by PEG Tube route Daily  magnesium hydroxide (MILK OF MAGNESIA) 400 MG/5ML suspension, 30 mLs by Per G Tube route daily as needed for Constipation  morphine 10 MG/5ML solution, 5 mLs by PEG Tube route  every 4 hours as needed for Pain. Max Daily Amount: 60 mg  Multiple Vitamins-Minerals (CENTRUM/CERTA-NIEVES WITH MINERALS ORAL) solution, Take 15 mLs by mouth daily  nortriptyline (PAMELOR) 10 MG capsule, 1 capsule by PEG Tube route nightly  acetaminophen (TYLENOL) 325 MG tablet, 2 tablets by PEG Tube route every 6 hours as needed for Pain For pain/fever  venlafaxine (EFFEXOR) 75 MG tablet, Take 1 tablet by mouth 2 times daily  [DISCONTINUED] levothyroxine (SYNTHROID) 137 MCG tablet, Take by mouth daily    Current Medications:     Scheduled Meds:    midodrine  5 mg Oral TID    levothyroxine  150 mcg PEG Tube Daily    bumetanide  2 mg IntraVENous Q12H    spironolactone  25 mg Oral BID    metOLazone  5 mg Per G Tube Daily    folic acid  1 mg PEG Tube Daily    thiamine mononitrate  100 mg Per G Tube Daily    therapeutic multivitamin-minerals  1 tablet Per G Tube Daily    lacosamide (VIMPAT) 100 mg in sodium chloride 0.9 % 60 mL IVPB  100 mg IntraVENous BID    miconazole   Topical BID    nortriptyline  10 mg PEG Tube Nightly    glycopyrrolate  0.4 mg IntraVENous Once    DULoxetine  30 mg Per G Tube Daily    sodium chloride flush  5-40 mL IntraVENous 2 times per day    pantoprazole (PROTONIX) 40 mg in sodium chloride (PF) 0.9 % 10 mL injection  40 mg IntraVENous Daily    lactulose  20 g PEG Tube TID    rifAXIMin  550 mg PEG Tube BID    fentaNYL  1 patch TransDERmal Q72H     Continuous Infusions:    sodium chloride       PRN Meds:  gabapentin, potassium chloride **OR** potassium alternative oral replacement **OR** potassium chloride, magnesium sulfate, sodium chloride flush, sodium chloride, ondansetron **OR** ondansetron, acetaminophen **OR** acetaminophen    Input/Output:       I/O last 3 completed shifts:  In: 800 [NG/GT:800]  Out: 3900 [Urine:3900].    Patient Vitals for the past 96 hrs (Last 3 readings):   Weight   02/20/24 0145 103 kg (227 lb 1.2 oz)   02/19/24 0412 100.7 kg (222 lb 0.1 oz)   02/18/24 0412 99.6 kg

## 2024-02-20 NOTE — PROGRESS NOTES
[facility] Neurology   IN-PATIENT SERVICE      NEUROLOGY PROGRESS  NOTE                Interval History:     Not much significant change.   Awake, confused, occasionally hallucinating.   Left side moving less than right.   Officially cannot have MRI per our MRI dept.   Sister, and daughter in law at bedside.     History of Present Illness:     Obtained from my partner:    History is obtained mostly from the patient's son and other family members at bedside. Also from the medical record and from the caregivers. Chart is reviewed and patient is examined.   Briefly, this is a  63 y.o. rt handed  female with hx of HTN, dyslipidemia, [place] nonalcoholic steatohepatitis, nephrolithiasis and osteoarthritis was admitted on 2024 as a transfer from [facility] with altered mentation..  As per family members; patient was found less responsive with blood sugar in 40s and also was found to be dehydrated and malnourished.  She was taken to [facility] ER where she was noted to be with urosepsis for which she was initiated on antibiotic therapy.  She had some improvement but it did not persist.  She was having fluctuating mentation for which she was brought to [facility] facility on 2024.  She was continued on vancomycin and ceftriaxone antibiotic therapy.  Because of acute on chronic encephalopathy; neurology consultation requested.  During this encounter; patient was alert and awake but was noted to be with abnormal movements of extremities as if she was trying to grab something out of air.     Family members at bedside stated that patient has chronic dysphagia and failed swallow studies earlier.  Patient has been on PEG tube for the past 10 days.    Physical Exam:   /64   Pulse 85   Temp 98.6 °F (37 °C) (Axillary)   Resp 18   Ht 1.6 m (5' 2.99\")   Wt 103 kg (227 lb 1.2 oz)   SpO2 95%   BMI 40.23 kg/m²   Temp (24hrs), Av.1 °F (36.7 °C), Min:97.8 °F (36.6 °C),

## 2024-02-20 NOTE — CARE COORDINATION
[facility] ICU Quality Flow/Interdisciplinary Rounds Progress Note    Quality Flow Rounds held on February 20, 2024 at 0930    Disciplines Attending:  Bedside Nurse, , , Nursing Unit Leadership, Dietary, Respiratory Therapy, Physical Therapy, Spiritual Care/, and Physician    Anticipated Discharge Date:       Anticipated Discharge Disposition: [facility]    Readmission Risk              Risk of Unplanned Readmission:  20           Discussed patient goal for the day, patient clinical progression, and barriers to discharge.  The following Goal(s) of the Day/Commitment(s) have been identified:   awaiting plan re: neuro      TOM TYLER RN  February 20, 2024

## 2024-02-20 NOTE — PLAN OF CARE
Problem: Discharge Planning  Goal: Discharge to home or other facility with appropriate resources  2/20/2024 1334 by Gabriela Grace RN  Outcome: Progressing  Flowsheets (Taken 2/20/2024 0800)  Discharge to home or other facility with appropriate resources: Identify barriers to discharge with patient and caregiver  2/20/2024 0152 by Lily Baez RN  Outcome: Progressing     Problem: Skin/Tissue Integrity  Goal: Absence of new skin breakdown  Description: 1.  Monitor for areas of redness and/or skin breakdown  2.  Assess vascular access sites hourly  3.  Every 4-6 hours minimum:  Change oxygen saturation probe site  4.  Every 4-6 hours:  If on nasal continuous positive airway pressure, respiratory therapy assess nares and determine need for appliance change or resting period.  2/20/2024 1334 by Gabriela Grace RN  Outcome: Progressing  2/20/2024 0152 by Lily Baez RN  Outcome: Progressing     Problem: Safety - Adult  Goal: Free from fall injury  2/20/2024 1334 by Gabriela Grace RN  Outcome: Progressing  2/20/2024 0152 by Lily Baez RN  Outcome: Progressing     Problem: ABCDS Injury Assessment  Goal: Absence of physical injury  2/20/2024 1334 by Gabriela Grace RN  Outcome: Progressing  2/20/2024 0152 by Lily Baez RN  Outcome: Progressing     Problem: Nutrition Deficit:  Goal: Optimize nutritional status  Outcome: Progressing     Problem: Respiratory - Adult  Goal: Achieves optimal ventilation and oxygenation  Outcome: Progressing     Problem: Cardiovascular - Adult  Goal: Maintains optimal cardiac output and hemodynamic stability  Outcome: Progressing     Problem: Skin/Tissue Integrity - Adult  Goal: Skin integrity remains intact  Outcome: Progressing  Flowsheets (Taken 2/20/2024 0800)  Skin Integrity Remains Intact: Monitor for areas of redness and/or skin breakdown     Problem: Musculoskeletal - Adult  Goal: Return mobility to safest level of function  Outcome: Progressing     Problem:  Pain  Goal: Verbalizes/displays adequate comfort level or baseline comfort level  2/20/2024 1334 by Gabriela Grace, RN  Outcome: Not Progressing  2/20/2024 0152 by Lily Baez, RN  Outcome: Progressing     Problem: Confusion  Goal: Confusion, delirium, dementia, or psychosis is improved or at baseline  Description: INTERVENTIONS:  1. Assess for possible contributors to thought disturbance, including medications, impaired vision or hearing, underlying metabolic abnormalities, dehydration, psychiatric diagnoses, and notify attending LIP  2. [place] high risk fall precautions, as indicated  3. Provide frequent short contacts to provide reality reorientation, refocusing and direction  4. Decrease environmental stimuli, including noise as appropriate  5. Monitor and intervene to maintain adequate nutrition, hydration, elimination, sleep and activity  6. If unable to ensure safety without constant attention obtain sitter and review sitter guidelines with assigned personnel  7. Initiate Psychosocial CNS and Spiritual Care consult, as indicated  2/20/2024 1334 by Gabriela Grace, RN  Outcome: Not Progressing  2/20/2024 0152 by Lily Baez, RN  Outcome: Progressing

## 2024-02-20 NOTE — PROGRESS NOTES
SPIRITUAL CARE DEPARTMENT - [facility]  PROGRESS NOTE    Room # 329/329-01   Name: Clemente Garcia              Reason for visit: Routine    I visited the patient.    Admit Date & Time: 2/12/2024  1:23 AM    Assessment:  Clemente Garcia is a 63 y.o. female in the hospital because \"acute metabolic encephalopathy\". Upon entering the room patient was lying in bed. Patient's spouse and pt's sister were seated bedside. Patient acknowledged writer and remembered writer from previous visit. Patient shared that she is doing \"ok\" today. Patient and family did not express any needs or concerns to writer at this time.       Intervention:  I introduced myself and my title as  I offered space for patient  to express feelings, needs, and concerns and provided a ministry presence. Writer actively listened to patient and family and offered words of affirmation.     Outcome:  Pt's family expressed gratitude for this  visit     Plan:  Chaplains will remain available to offer spiritual and emotional support as needed.    Electronically signed by Chaplain Hannah, on 2/20/2024 at 2:17 PM.  Spiritual Care Department  [facility]       02/20/24 1416   Encounter Summary   Encounter Overview/Reason  Follow-up   Service Provided For: Patient and family together   Referral/Consult From: [facility]   Support System Spouse;Family members   Last Encounter  02/20/24   Complexity of Encounter Moderate   Begin Time 1400   End Time  1407   Total Time Calculated 7 min   Spiritual/Emotional needs   Type Spiritual Support   Assessment/Intervention/Outcome   Assessment Impaired social interaction;Calm   Intervention Active listening;Sustaining Presence/Ministry of presence   Outcome Engaged in conversation;Expressed Gratitude   Plan and Referrals   Plan/Referrals Continue Support (comment)

## 2024-02-21 ENCOUNTER — APPOINTMENT (OUTPATIENT)
Age: 64
DRG: 052 | End: 2024-02-21
Attending: PSYCHIATRY & NEUROLOGY
Payer: MEDICAID

## 2024-02-21 PROBLEM — Z97.8 CHRONIC INDWELLING FOLEY CATHETER: Status: ACTIVE | Noted: 2024-02-21

## 2024-02-21 PROBLEM — R33.9 URINARY RETENTION: Status: ACTIVE | Noted: 2024-02-21

## 2024-02-21 LAB
ALBUMIN SERPL-MCNC: 2.8 G/DL (ref 3.5–5.2)
ALBUMIN/GLOB SERPL: 0.8 {RATIO} (ref 1–2.5)
ALP SERPL-CCNC: 144 U/L (ref 35–104)
ALT SERPL-CCNC: 9 U/L (ref 5–33)
ANION GAP SERPL CALCULATED.3IONS-SCNC: 7 MMOL/L (ref 9–17)
AST SERPL-CCNC: 21 U/L
BASOPHILS # BLD: 0 K/UL (ref 0–0.2)
BASOPHILS NFR BLD: 0 % (ref 0–2)
BILIRUB DIRECT SERPL-MCNC: 0.1 MG/DL
BILIRUB INDIRECT SERPL-MCNC: 0.2 MG/DL (ref 0–1)
BILIRUB SERPL-MCNC: 0.3 MG/DL (ref 0.3–1.2)
BUN SERPL-MCNC: 21 MG/DL (ref 8–23)
CALCIUM SERPL-MCNC: 8.5 MG/DL (ref 8.6–10.4)
CHLORIDE SERPL-SCNC: 99 MMOL/L (ref 98–107)
CHOLEST SERPL-MCNC: 68 MG/DL (ref 0–199)
CHOLESTEROL/HDL RATIO: 3
CO2 SERPL-SCNC: 32 MMOL/L (ref 20–31)
COLLECT DURATION TIME SPEC: 24 H
COPPER, URINE /24 HR: 14.7 UG/D (ref 3–45)
COPPER, URINE /VOLUME: 1.4 UG/DL
COPPER, URINE RATIO CREATININE: 35.9 UG/G CRT (ref 10–45)
CREAT 24H UR-MCNC: 39 MG/DL
CREAT SERPL-MCNC: 1.1 MG/DL (ref 0.5–0.9)
CREATININE URINE /24 HR: 410 MG/D (ref 500–1400)
ECHO AO ROOT DIAM: 2.9 CM
ECHO AO ROOT INDEX: 1.42 CM/M2
ECHO AV MEAN GRADIENT: 8 MMHG
ECHO AV MEAN VELOCITY: 1.4 M/S
ECHO AV PEAK GRADIENT: 13 MMHG
ECHO AV PEAK VELOCITY: 1.8 M/S
ECHO AV VELOCITY RATIO: 0.72
ECHO AV VTI: 38.1 CM
ECHO BSA: 2.14 M2
ECHO EST RA PRESSURE: 3 MMHG
ECHO IVC EXP: 1.7 CM
ECHO IVC INSP: 0.8 CM
ECHO LA AREA 2C: 23.1 CM2
ECHO LA AREA 4C: 21 CM2
ECHO LA DIAMETER INDEX: 2.11 CM/M2
ECHO LA DIAMETER: 4.3 CM
ECHO LA MAJOR AXIS: 5.6 CM
ECHO LA MINOR AXIS: 5.9 CM
ECHO LA TO AORTIC ROOT RATIO: 1.48
ECHO LA VOL BP: 67 ML (ref 22–52)
ECHO LA VOL MOD A2C: 72 ML (ref 22–52)
ECHO LA VOL MOD A4C: 59 ML (ref 22–52)
ECHO LA VOL/BSA BIPLANE: 33 ML/M2 (ref 16–34)
ECHO LA VOLUME INDEX MOD A2C: 35 ML/M2 (ref 16–34)
ECHO LA VOLUME INDEX MOD A4C: 29 ML/M2 (ref 16–34)
ECHO LV E' LATERAL VELOCITY: 9 CM/S
ECHO LV E' SEPTAL VELOCITY: 7 CM/S
ECHO LV FRACTIONAL SHORTENING: 36 % (ref 28–44)
ECHO LV INTERNAL DIMENSION DIASTOLE INDEX: 2.21 CM/M2
ECHO LV INTERNAL DIMENSION DIASTOLIC: 4.5 CM (ref 3.9–5.3)
ECHO LV INTERNAL DIMENSION SYSTOLIC INDEX: 1.42 CM/M2
ECHO LV INTERNAL DIMENSION SYSTOLIC: 2.9 CM
ECHO LV IVSD: 1 CM (ref 0.6–0.9)
ECHO LV MASS 2D: 142.9 G (ref 67–162)
ECHO LV MASS INDEX 2D: 70 G/M2 (ref 43–95)
ECHO LV POSTERIOR WALL DIASTOLIC: 0.9 CM (ref 0.6–0.9)
ECHO LV RELATIVE WALL THICKNESS RATIO: 0.4
ECHO LVOT AREA: 3.1 CM2
ECHO LVOT AV VTI INDEX: 0.77
ECHO LVOT DIAM: 2 CM
ECHO LVOT MEAN GRADIENT: 4 MMHG
ECHO LVOT PEAK GRADIENT: 7 MMHG
ECHO LVOT PEAK VELOCITY: 1.3 M/S
ECHO LVOT STROKE VOLUME INDEX: 44.9 ML/M2
ECHO LVOT SV: 91.7 ML
ECHO LVOT VTI: 29.2 CM
ECHO MV A VELOCITY: 1.18 M/S
ECHO MV E VELOCITY: 1.1 M/S
ECHO MV E/A RATIO: 0.93
ECHO MV E/E' LATERAL: 12.22
ECHO MV E/E' RATIO (AVERAGED): 13.97
ECHO RIGHT VENTRICULAR SYSTOLIC PRESSURE (RVSP): 36 MMHG
ECHO RV BASAL DIMENSION: 3.9 CM
ECHO RV FREE WALL PEAK S': 14 CM/S
ECHO TV REGURGITANT MAX VELOCITY: 2.87 M/S
ECHO TV REGURGITANT PEAK GRADIENT: 33 MMHG
EOSINOPHIL # BLD: 0.1 K/UL (ref 0–0.4)
EOSINOPHILS RELATIVE PERCENT: 2 % (ref 1–4)
ERYTHROCYTE [DISTWIDTH] IN BLOOD BY AUTOMATED COUNT: 16 % (ref 12.5–15.4)
GFR SERPL CREATININE-BSD FRML MDRD: 57 ML/MIN/1.73M2
GLUCOSE SERPL-MCNC: 120 MG/DL (ref 70–99)
HCT VFR BLD AUTO: 21 % (ref 36–46)
HDLC SERPL-MCNC: 22 MG/DL
HGB BLD-MCNC: 7 G/DL (ref 12–16)
LDLC SERPL CALC-MCNC: 31 MG/DL (ref 0–100)
LYMPHOCYTES NFR BLD: 0.5 K/UL (ref 1–4.8)
LYMPHOCYTES RELATIVE PERCENT: 11 % (ref 24–44)
MCH RBC QN AUTO: 31.8 PG (ref 26–34)
MCHC RBC AUTO-ENTMCNC: 33.4 G/DL (ref 31–37)
MCV RBC AUTO: 95.1 FL (ref 80–100)
MONOCYTES NFR BLD: 0.4 K/UL (ref 0.1–1.2)
MONOCYTES NFR BLD: 8 % (ref 2–11)
NEUTROPHILS NFR BLD: 79 % (ref 36–66)
NEUTS SEG NFR BLD: 4 K/UL (ref 1.8–7.7)
PLATELET # BLD AUTO: 232 K/UL (ref 140–450)
PMV BLD AUTO: 9.1 FL (ref 6–12)
POTASSIUM SERPL-SCNC: 4.2 MMOL/L (ref 3.7–5.3)
PROT SERPL-MCNC: 6.4 G/DL (ref 6.4–8.3)
RBC # BLD AUTO: 2.21 M/UL (ref 4–5.2)
SODIUM SERPL-SCNC: 138 MMOL/L (ref 135–144)
SPECIMEN VOL ?TM UR: ABNORMAL ML
TRIGL SERPL-MCNC: 77 MG/DL
VLDLC SERPL CALC-MCNC: 15 MG/DL
WBC OTHER # BLD: 5 K/UL (ref 3.5–11)

## 2024-02-21 PROCEDURE — 6370000000 HC RX 637 (ALT 250 FOR IP): Performed by: INTERNAL MEDICINE

## 2024-02-21 PROCEDURE — 6360000002 HC RX W HCPCS: Performed by: INTERNAL MEDICINE

## 2024-02-21 PROCEDURE — 99232 SBSQ HOSP IP/OBS MODERATE 35: CPT | Performed by: INTERNAL MEDICINE

## 2024-02-21 PROCEDURE — 2580000003 HC RX 258: Performed by: INTERNAL MEDICINE

## 2024-02-21 PROCEDURE — 6370000000 HC RX 637 (ALT 250 FOR IP)

## 2024-02-21 PROCEDURE — C9113 INJ PANTOPRAZOLE SODIUM, VIA: HCPCS | Performed by: INTERNAL MEDICINE

## 2024-02-21 PROCEDURE — 36415 COLL VENOUS BLD VENIPUNCTURE: CPT

## 2024-02-21 PROCEDURE — 93306 TTE W/DOPPLER COMPLETE: CPT

## 2024-02-21 PROCEDURE — A4216 STERILE WATER/SALINE, 10 ML: HCPCS | Performed by: INTERNAL MEDICINE

## 2024-02-21 PROCEDURE — 6360000002 HC RX W HCPCS: Performed by: PSYCHIATRY & NEUROLOGY

## 2024-02-21 PROCEDURE — 80048 BASIC METABOLIC PNL TOTAL CA: CPT

## 2024-02-21 PROCEDURE — 99232 SBSQ HOSP IP/OBS MODERATE 35: CPT | Performed by: FAMILY MEDICINE

## 2024-02-21 PROCEDURE — C9254 INJECTION, LACOSAMIDE: HCPCS | Performed by: PSYCHIATRY & NEUROLOGY

## 2024-02-21 PROCEDURE — 80061 LIPID PANEL: CPT

## 2024-02-21 PROCEDURE — 2060000000 HC ICU INTERMEDIATE R&B

## 2024-02-21 PROCEDURE — 93306 TTE W/DOPPLER COMPLETE: CPT | Performed by: INTERNAL MEDICINE

## 2024-02-21 PROCEDURE — 2580000003 HC RX 258: Performed by: PSYCHIATRY & NEUROLOGY

## 2024-02-21 PROCEDURE — 6360000004 HC RX CONTRAST MEDICATION: Performed by: PSYCHIATRY & NEUROLOGY

## 2024-02-21 PROCEDURE — 6360000002 HC RX W HCPCS

## 2024-02-21 PROCEDURE — 85025 COMPLETE CBC W/AUTO DIFF WBC: CPT

## 2024-02-21 PROCEDURE — 80076 HEPATIC FUNCTION PANEL: CPT

## 2024-02-21 RX ORDER — LORAZEPAM 2 MG/ML
2 INJECTION INTRAMUSCULAR ONCE
Status: DISCONTINUED | OUTPATIENT
Start: 2024-02-21 | End: 2024-02-21

## 2024-02-21 RX ORDER — LORAZEPAM 2 MG/ML
1 INJECTION INTRAMUSCULAR ONCE
Status: COMPLETED | OUTPATIENT
Start: 2024-02-21 | End: 2024-02-21

## 2024-02-21 RX ADMIN — SODIUM CHLORIDE, PRESERVATIVE FREE 10 ML: 5 INJECTION INTRAVENOUS at 00:19

## 2024-02-21 RX ADMIN — IOPAMIDOL 75 ML: 755 INJECTION, SOLUTION INTRAVENOUS at 00:18

## 2024-02-21 RX ADMIN — DULOXETINE HYDROCHLORIDE 30 MG: 30 CAPSULE, DELAYED RELEASE ORAL at 11:06

## 2024-02-21 RX ADMIN — SODIUM CHLORIDE: 9 INJECTION, SOLUTION INTRAVENOUS at 12:19

## 2024-02-21 RX ADMIN — NORTRIPTYLINE HYDROCHLORIDE 10 MG: 10 CAPSULE ORAL at 20:44

## 2024-02-21 RX ADMIN — BUMETANIDE 2 MG: 0.25 INJECTION INTRAMUSCULAR; INTRAVENOUS at 11:14

## 2024-02-21 RX ADMIN — LEVOTHYROXINE SODIUM 150 MCG: 75 TABLET ORAL at 04:06

## 2024-02-21 RX ADMIN — LACOSAMIDE 100 MG: 10 INJECTION INTRAVENOUS at 20:52

## 2024-02-21 RX ADMIN — Medication 1 TABLET: at 11:06

## 2024-02-21 RX ADMIN — RIFAXIMIN 550 MG: 550 TABLET ORAL at 20:44

## 2024-02-21 RX ADMIN — RIFAXIMIN 550 MG: 550 TABLET ORAL at 11:06

## 2024-02-21 RX ADMIN — SODIUM CHLORIDE, PRESERVATIVE FREE 10 ML: 5 INJECTION INTRAVENOUS at 20:44

## 2024-02-21 RX ADMIN — SODIUM CHLORIDE 80 ML: 9 INJECTION, SOLUTION INTRAVENOUS at 00:17

## 2024-02-21 RX ADMIN — SPIRONOLACTONE 25 MG: 25 TABLET, FILM COATED ORAL at 16:22

## 2024-02-21 RX ADMIN — LACOSAMIDE 100 MG: 10 INJECTION INTRAVENOUS at 12:21

## 2024-02-21 RX ADMIN — MIDODRINE HYDROCHLORIDE 5 MG: 5 TABLET ORAL at 11:19

## 2024-02-21 RX ADMIN — MICONAZOLE NITRATE: 20 CREAM TOPICAL at 21:45

## 2024-02-21 RX ADMIN — Medication 100 MG: at 11:06

## 2024-02-21 RX ADMIN — LORAZEPAM 1 MG: 2 INJECTION INTRAMUSCULAR; INTRAVENOUS at 15:30

## 2024-02-21 RX ADMIN — LACTULOSE 20 G: 10 SOLUTION ORAL at 20:44

## 2024-02-21 RX ADMIN — MIDODRINE HYDROCHLORIDE 5 MG: 5 TABLET ORAL at 16:22

## 2024-02-21 RX ADMIN — SPIRONOLACTONE 25 MG: 25 TABLET, FILM COATED ORAL at 11:06

## 2024-02-21 RX ADMIN — MIDODRINE HYDROCHLORIDE 5 MG: 5 TABLET ORAL at 20:44

## 2024-02-21 RX ADMIN — SODIUM CHLORIDE, PRESERVATIVE FREE 40 MG: 5 INJECTION INTRAVENOUS at 11:08

## 2024-02-21 RX ADMIN — SODIUM CHLORIDE, PRESERVATIVE FREE 10 ML: 5 INJECTION INTRAVENOUS at 11:07

## 2024-02-21 RX ADMIN — FOLIC ACID 1 MG: 1 TABLET ORAL at 11:06

## 2024-02-21 ASSESSMENT — PAIN SCALES - GENERAL: PAINLEVEL_OUTOF10: 0

## 2024-02-21 NOTE — PLAN OF CARE
Problem: Discharge Planning  Goal: Discharge to home or other facility with appropriate resources  Outcome: Progressing  Flowsheets  Taken 2/21/2024 1600  Discharge to home or other facility with appropriate resources:   Identify barriers to discharge with patient and caregiver   Arrange for needed discharge resources and transportation as appropriate   Identify discharge learning needs (meds, wound care, etc)  Taken 2/21/2024 1230  Discharge to home or other facility with appropriate resources:   Identify barriers to discharge with patient and caregiver   Arrange for needed discharge resources and transportation as appropriate   Identify discharge learning needs (meds, wound care, etc)  Taken 2/21/2024 0800  Discharge to home or other facility with appropriate resources:   Identify barriers to discharge with patient and caregiver   Arrange for needed discharge resources and transportation as appropriate   Identify discharge learning needs (meds, wound care, etc)     Problem: Pain  Goal: Verbalizes/displays adequate comfort level or baseline comfort level  Outcome: Progressing  Flowsheets (Taken 2/21/2024 0845)  Verbalizes/displays adequate comfort level or baseline comfort level:   Encourage patient to monitor pain and request assistance   Assess pain using appropriate pain scale   Administer analgesics based on type and severity of pain and evaluate response   Implement non-pharmacological measures as appropriate and evaluate response   Consider cultural and social influences on pain and pain management   Notify Licensed Independent Practitioner if interventions unsuccessful or patient reports new pain     Problem: Confusion  Goal: Confusion, delirium, dementia, or psychosis is improved or at baseline  Description: INTERVENTIONS:  1. Assess for possible contributors to thought disturbance, including medications, impaired vision or hearing, underlying metabolic abnormalities, dehydration, psychiatric diagnoses,  blood pressure and heart rate  Taken 2/21/2024 1230  Maintains optimal cardiac output and hemodynamic stability: Monitor blood pressure and heart rate     Problem: Skin/Tissue Integrity - Adult  Goal: Skin integrity remains intact  Outcome: Progressing  Flowsheets  Taken 2/21/2024 1600  Skin Integrity Remains Intact:   Monitor for areas of redness and/or skin breakdown   Assess vascular access sites hourly  Taken 2/21/2024 1230  Skin Integrity Remains Intact:   Assess vascular access sites hourly   Monitor for areas of redness and/or skin breakdown  Taken 2/21/2024 0800  Skin Integrity Remains Intact:   Monitor for areas of redness and/or skin breakdown   Assess vascular access sites hourly     Problem: Musculoskeletal - Adult  Goal: Return mobility to safest level of function  Outcome: Progressing  Flowsheets  Taken 2/21/2024 1230  Return Mobility to Safest Level of Function: Assess patient stability and activity tolerance for standing, transferring and ambulating with or without assistive devices  Taken 2/21/2024 0800  Return Mobility to Safest Level of Function: Assess patient stability and activity tolerance for standing, transferring and ambulating with or without assistive devices     Problem: Gastrointestinal - Adult  Goal: Minimal or absence of nausea and vomiting  Outcome: Progressing  Flowsheets  Taken 2/21/2024 1600  Minimal or absence of nausea and vomiting: Administer IV fluids as ordered to ensure adequate hydration  Taken 2/21/2024 0800  Minimal or absence of nausea and vomiting:   Administer IV fluids as ordered to ensure adequate hydration   Maintain NPO status until nausea and vomiting are resolved     Problem: Genitourinary - Adult  Goal: Absence of urinary retention  Outcome: Progressing  Flowsheets  Taken 2/21/2024 1600  Absence of urinary retention: Assess patient’s ability to void and empty bladder  Taken 2/21/2024 1230  Absence of urinary retention: Assess patient’s ability to void and empty

## 2024-02-21 NOTE — PROGRESS NOTES
(24hrs), Av °F (36.7 °C), Min:97.3 °F (36.3 °C), Max:98.6 °F (37 °C)    Recent Labs     02/20/24  1644 02/20/24  2018   POCGLU 95 80       I/O (24Hr):    Intake/Output Summary (Last 24 hours) at 2024 1200  Last data filed at 2024 0605  Gross per 24 hour   Intake 4028 ml   Output 1700 ml   Net 2328 ml         Labs:  Hematology:  Recent Labs     24  0624  0524   WBC 4.1 4.1 5.0   RBC 2.35* 2.25* 2.21*   HGB 7.4* 7.2* 7.0*   HCT 22.6* 21.6* 21.0*   MCV 96.1 96.4 95.1   MCH 31.3 31.8 31.8   MCHC 32.5 33.1 33.4   RDW 16.0* 15.8* 16.0*    195 232   MPV 9.5 9.0 9.1       Chemistry:  Recent Labs     02/19/24  0638 02/20/24  0524  0644    141 138   K 4.4 4.3 4.2    103 99   CO2 29 31 32*   GLUCOSE 102* 115* 120*   BUN 20 21 21   CREATININE 1.1* 1.1* 1.1*   MG 2.2 2.1  --    ANIONGAP 7* 7* 7*   LABGLOM 57* 57* 57*   CALCIUM 8.3* 8.6 8.5*   PHOS 4.0 4.3  --        Recent Labs     24  0644   PROT 6.4  --   --   --  6.4   LABALBU 2.6*  --   --   --  2.8*   LABA1C  --  4.9  --   --   --    TSH 28.67*  --   --   --   --    AST 23  --   --   --  21   ALT 10  --   --   --  9   ALKPHOS 148*  --   --   --  144*   BILITOT 0.3  --   --   --  0.3   BILIDIR 0.1  --   --   --  0.1   CHOL  --   --   --   --  68   HDL  --   --   --   --  22*   LDLCHOLESTEROL  --   --   --   --  31   CHOLHDLRATIO  --   --   --   --  3.0   TRIG  --   --   --   --  77   VLDL  --   --   --   --  15   POCGLU  --   --  95 80  --        ABG:No results found for: \"POCPH\", \"PHART\", \"PH\", \"POCPCO2\", \"WSH5ZZO\", \"PCO2\", \"POCPO2\", \"PO2ART\", \"PO2\", \"POCHCO3\", \"MWW2GQM\", \"HCO3\", \"NBEA\", \"PBEA\", \"BEART\", \"BE\", \"THGBART\", \"THB\", \"ETW2OVF\", \"ZCPR1JGE\", \"M8MGKSRC\", \"O2SAT\", \"FIO2\"  Lab Results   Component Value Date/Time    SPECIAL 10ml left hand 2024 04:13 PM     Lab Results   Component Value Date/Time    CULTURE NO GROWTH 6 DAYS  02/12/2024 05:01 PM       Radiology:  US LIVER    Result Date: 2/12/2024  Morphologic findings consistent with cirrhosis. The major hepatic veins, main hepatic artery and imaged IVC are patent.       Physical Examination:   Physical Exam  Vitals reviewed.   Cardiovascular:      Rate and Rhythm: Normal rate and regular rhythm.      Pulses: Normal pulses.      Heart sounds: Normal heart sounds. No murmur heard.  Pulmonary:      Effort: Pulmonary effort is normal. No respiratory distress.      Breath sounds: No wheezing or rales.   Chest:      Chest wall: No tenderness.   Abdominal:      General: There is distension (stable).      Tenderness: There is no abdominal tenderness. There is no guarding.   Musculoskeletal:      Right upper arm: Edema present.      Left upper arm: Edema present.      Right lower leg: Edema (pitting +4) present.      Left lower leg: Edema (pitting +4) present.      Comments: Full body edema   Skin:     Comments: Flaking of skin noticed on upper thighs and bottom of feet   Neurological:      Mental Status: She is alert. She is disoriented.   Psychiatric:         Cognition and Memory: Memory is impaired.       Assessment:     Hospital Problems             Last Modified POA    * (Principal) Acute metabolic encephalopathy 2/13/2024 Yes    Gait instability 2/13/2024 Yes    Other specified anemias 2/13/2024 Yes    Anemia 2/13/2024 Yes    Encephalopathy 2/17/2024 Yes    Other cirrhosis of liver (HCC) 2/20/2024 Yes    Alcohol abuse 2/12/2024 Yes    Severe protein-calorie malnutrition (HCC) 2/13/2024 Yes    S/P percutaneous endoscopic gastrostomy (PEG) tube placement (HCC) 2/13/2024 Yes    Alcoholic polyneuropathy (HCC) 2/13/2024 Yes    Gastroparesis 2/13/2024 Yes    Neurogenic bladder 2/13/2024 Yes    Seizure-like activity (HCC) 2/15/2024 Yes    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    Hypernatremia 2/16/2024 Yes    Anasarca 2/16/2024 Yes    Hypoalbuminemia 2/16/2024 Yes    Non-smoker 2/16/2024 Yes  b/l  apply light figure 8 compressive dressings with ACE wrap to b/l LE daily  Maintain heels off of bed    Depression  continue home nortriptyline 10mg nightly  Continue Cymbalta 30mg daily    Hypertension  Bumex 2mg IV BID  metalozone 5mg daily, and aldactone 25mg BID  Start proamatine 5mg TID, hold SBP >120    Hypothyroidism  continue synthroid to 150mcg    Neuropathic pain  gabapentin TID PRN    Telemetry: NSR 87  Anticoagulation: intermittent compression devices  Dispo: SNF  Diet: Diet NPO  ADULT TUBE FEEDING; PEG; Standard without Fiber; Continuous; 30; Yes; 10; Q 8 hours; 60; 200; Q 6 hours; Protein; 1 Dose; Daily    Patient was seen, evaluated and discussed with MD Warner Jung DO  Family Medicine Resident, PGY-1   2/16/2024  10:18 AM    Patient seen in conjunction with intern resident.  During morning rounds patient was consistent with her baseline altered mentation since admission.  In the afternoon was notified by nursing staff the patient had become agitated was pulling at lines, attempting to bite staff and was shouting.  Patient was given Ativan 2 mg IV which resolved her agitation.  Patient is scheduled for lumbar puncture tomorrow.  Echocardiogram was performed which demonstrated EF 60 to 65%.  CT head without contrast demonstrated no abnormality.  Continuing diuresis due to continued significant bilateral lower extremity pitting edema.    Chris Jacobs DO  Family Medicine Resident PGY-3  2/21/2024  7:32 PM

## 2024-02-21 NOTE — PLAN OF CARE
Problem: Discharge Planning  Goal: Discharge to home or other facility with appropriate resources  2/21/2024 0317 by Chaparrita Rossi RN  Outcome: Progressing  Flowsheets (Taken 2/20/2024 2000)  Discharge to home or other facility with appropriate resources: Identify barriers to discharge with patient and caregiver     Problem: Pain  Goal: Verbalizes/displays adequate comfort level or baseline comfort level  2/21/2024 0317 by Chaparrita Rossi RN  Outcome: Progressing   NO NEW SIGNS OR SYMPTOMS OF PAIN NOTED, PAIN RATING <3 ON SCALE OF 0-10 THIS SHIFT. PAIN CONTROLLED WITH MEDICATION AND REPOSITIONING.    Problem: Confusion  Goal: Confusion, delirium, dementia, or psychosis is improved or at baseline  Description: INTERVENTIONS:  1. Assess for possible contributors to thought disturbance, including medications, impaired vision or hearing, underlying metabolic abnormalities, dehydration, psychiatric diagnoses, and notify attending LIP  2. [place] high risk fall precautions, as indicated  3. Provide frequent short contacts to provide reality reorientation, refocusing and direction  4. Decrease environmental stimuli, including noise as appropriate  5. Monitor and intervene to maintain adequate nutrition, hydration, elimination, sleep and activity  6. If unable to ensure safety without constant attention obtain sitter and review sitter guidelines with assigned personnel  7. Initiate Psychosocial CNS and Spiritual Care consult, as indicated  2/21/2024 0317 by Chaparrita Rossi RN  Outcome: Progressing  Flowsheets (Taken 2/20/2024 2000)  Effect of thought disturbance (confusion, delirium, dementia, or psychosis) are managed with adequate functional status: Assess for contributors to thought disturbance, including medications, impaired vision or hearing, underlying metabolic abnormalities, dehydration, psychiatric diagnoses, notify LIP     Problem: Skin/Tissue Integrity  Goal: Absence of new skin

## 2024-02-21 NOTE — PROGRESS NOTES
Renal Progress Note    Patient :  Clemente Garcia; 63 y.o. MRN# [id number]  Location:  329/329-01  Attending:  Johanne Philip MD  Admit Date:  2/12/2024   Hospital Day: 9    Subjective:     Patient was seen and examined. She is awake and alert and comfortable, on room air.  When asked where she was she said Wednesday.  She did recognize her  at the bedside.  She did know her children's names.  Loaiza catheter remains in place.  Patient continues on IV Bumex 2 mg every 12 hours and spironolactone. I did discontinue the patient's metolazone this morning with sodium down to 138.  We will also back off on her free water replacement given with her tube feeds, from 250 mL every 6 hours to 200 mL every 6 hours.  Patient was not positive yesterday by on 0.6 L.  She continues to have ascites and significant edema. I did speak with the family medicine team.  No new issues reported overnight.     BMP result from today showed sodium 138, potassium 4.2, chloride 99, bicarb 32, calcium 8.5 BUN 21, creatinine stable at 1.1 mg/dl.  Albumin is 2.8.  Hemoglobin is 7 with white blood cells 5 and platelets 232.   Urine output documented as 2.4 L in the last 24 hours.  Patient is currently receiving IV albumin.     She is also on midodrine 5 mg 3 times a day.       No overt GI bleeding s/p EGD which showed swelling and edema in GE junction, gastritis as per gastroenterology notes.    Outpatient Medications:     Medications Prior to Admission: bumetanide (BUMEX) 1 MG tablet, Take 2 tablets by mouth daily  bisacodyl (DULCOLAX) 10 MG suppository, Place 1 suppository rectally daily as needed for Constipation  DULoxetine (CYMBALTA) 60 MG extended release capsule, 1 capsule by Per G Tube route daily  fentaNYL (DURAGESIC) 12 MCG/HR, Place 1 patch onto the skin every 72 hours. Max Daily Amount: 1 patch  gabapentin (NEURONTIN) 100 MG capsule, 1 capsule by PEG Tube route at bedtime.  bisacodyl (DULCOLAX) 5 MG EC tablet, Take 2 tablets by mouth

## 2024-02-21 NOTE — PROGRESS NOTES
SPIRITUAL CARE DEPARTMENT - [facility]  PROGRESS NOTE    Room # 329/329-01   Name: Clemente Garcia             Reason for visit: Routine    I visited the patient.    Admit Date & Time: 2/12/2024  1:23 AM    Assessment:  Clemente Garcia is a 63 y.o. female in the hospital because \"encephalopathy\". Upon entering the room pt was lying in bed. Patient's spouse and another family member were seated bedside. Patient said \"I am doing better today than yesterday\" Pt and family did not express any needs or concerns to writer at this time.       Intervention:  I introduced myself and my title as  I offered space for patient  to express feelings, needs, and concerns and provided a ministry presence. Writer actively listened to patient and pt's family and offered words of affirmation.     Outcome:  Pt and family expressed gratitude for visit     Plan:  Chaplains will remain available to offer spiritual and emotional support as needed.    Electronically signed by Chaplain Hannah, on 2/21/2024 at 12:59 PM.  Spiritual Care Department  [facility] -       02/21/24 1257   Encounter Summary   Encounter Overview/Reason  Follow-up   Service Provided For: Patient and family together   Referral/Consult From: Palliative Care   Support System Spouse;Family members   Last Encounter  02/21/24   Complexity of Encounter Low   Begin Time 1250   End Time  1255   Total Time Calculated 5 min   Spiritual/Emotional needs   Type Spiritual Support   Assessment/Intervention/Outcome   Assessment Calm;Coping   Intervention Active listening;Sustaining Presence/Ministry of presence   Outcome Engaged in conversation;Expressed Gratitude;Coping   Plan and Referrals   Plan/Referrals Continue Support (comment)

## 2024-02-21 NOTE — SIGNIFICANT EVENT
[facility]  Inpatient Service     Second Visit Note  For more detailed information please refer to the progress note of the day      2/21/2024    3:24 PM    Name:   Clemente Garcia  MRN:     [id number]     Acct:      [id number]   Room:   329/329-01   Day:  9  Admit Date:  2/12/2024  1:23 AM    PCP:   Milana Whalen MD  Code Status:  Full Code          Got perfect served about patient acting aggressively biting, hitting and pulling at her peg tube.    On arrival patient was actively hallucinating and asking family at bedside if we were drugging her. Patient would not allow nursing staff to take vitals. Patient proceeded to call for family member and began taking off her gown. According to nursing staff patient has been saying that there were babies in the room during her the day. When asked patient states she is having no current medical issues such as pain, shortness of breath, or nausea. Educated patient that we are only here to help.     Pt vitals were reviewed   New labs were reviewed     Updated Plan:     Will give a dose of 1mg ativan IV for the increased anxiety  Continue to monitor mood    Warner Perkins DO  Family Medicine Resident, PGY-1   2/21/2024  3:24 PM

## 2024-02-21 NOTE — PROGRESS NOTES
[facility] Neurology   IN-PATIENT SERVICE      NEUROLOGY PROGRESS  NOTE            Date:   2/21/2024  Patient name:  Clemente Garcia  Date of admission:  2/12/2024  YOB: 1960      Interval History:     No significant changes.  Underwent CTA head and neck with no significant stenosis or occlusion.  2D echo largely within normal limits.  She is having some involuntary movements of both the right and left upper extremity at this time.   at bedside.    History of Present Illness:     Obtained from my partner:     History is obtained mostly from the patient's son and other family members at bedside. Also from the medical record and from the caregivers. Chart is reviewed and patient is examined.   Briefly, this is a  63 y.o. rt handed  female with hx of HTN, dyslipidemia, [place]UR[place] nonalcoholic steatohepatitis, nephrolithiasis and osteoarthritis was admitted on 2/12/2024 as a transfer from [facility] with altered mentation..  As per family members; patient was found less responsive with blood sugar in 40s and also was found to be dehydrated and malnourished.  She was taken to [facility] ER where she was noted to be with urosepsis for which she was initiated on antibiotic therapy.  She had some improvement but it did not persist.  She was having fluctuating mentation for which she was brought to [facility] facility on 2/12/2024.  She was continued on vancomycin and ceftriaxone antibiotic therapy.  Because of acute on chronic encephalopathy; neurology consultation requested.  During this encounter; patient was alert and awake but was noted to be with abnormal movements of extremities as if she was trying to grab something out of air.     Family members at bedside stated that patient has chronic dysphagia and failed swallow studies earlier.  Patient has been on PEG tube for the past 10 days.      Past Medical History:     History reviewed. No pertinent past medical history.  finger-nose-finger testing on left.      Reflex function Absent in lower extremities.  1/4 upper extremities.      Gait                  Pt has not ambulated since October             Diagnostics:      Laboratory Testing:  CBC:   Recent Labs     02/19/24  0638 02/20/24  0544 02/21/24  0644   WBC 4.1 4.1 5.0   HGB 7.4* 7.2* 7.0*    195 232     BMP:    Recent Labs     02/19/24  0638 02/20/24  0544 02/21/24  0644    141 138   K 4.4 4.3 4.2    103 99   CO2 29 31 32*   BUN 20 21 21   CREATININE 1.1* 1.1* 1.1*   GLUCOSE 102* 115* 120*         Lab Results   Component Value Date    CHOL 188 10/16/2023    LDLCHOLESTEROL 72 10/16/2023    HDL 96 10/16/2023    TRIG 86 02/14/2024    ALT 9 02/21/2024    AST 21 02/21/2024    TSH 28.67 (H) 02/19/2024    INR 1.1 02/13/2024    LABA1C 4.9 02/20/2024    [id number] 689 02/12/2024    MG 2.1 02/20/2024    PHOS 4.3 02/20/2024         Imaging/Diagnostics:      EEG: Moderate nonspecific encephalopathy        CT HEAD WO CONTRAST    Narrative  EXAMINATION:  CT OF THE HEAD WITHOUT CONTRAST  2/13/2024 7:44 pm    TECHNIQUE:  CT of the head was performed without the administration of intravenous  contrast. Automated exposure control, iterative reconstruction, and/or weight  based adjustment of the mA/kV was utilized to reduce the radiation dose to as  low as reasonably achievable.    COMPARISON:  None.    HISTORY:  ORDERING SYSTEM PROVIDED HISTORY: altered mental status  TECHNOLOGIST PROVIDED HISTORY:  altered mental status    Reason for Exam: altered mental status    FINDINGS:  Motion limited evaluation.    BRAIN/VENTRICLES: There is no acute intracranial hemorrhage, mass effect or  midline shift.  No abnormal extra-axial fluid collection.  The gray-white  differentiation is maintained without evidence of an acute infarct.  There is  no evidence of hydrocephalus.    ORBITS: The visualized portion of the orbits demonstrate no acute abnormality.    SINUSES: The visualized  family and present bedside  We will follow      Electronically signed by Narayan Mendoza DO on 2/21/2024 at 10:11 AM      Narayan Mendoza DO  [facility] [place]  Neurology

## 2024-02-22 ENCOUNTER — APPOINTMENT (OUTPATIENT)
Age: 64
DRG: 052 | End: 2024-02-22
Attending: PSYCHIATRY & NEUROLOGY
Payer: MEDICAID

## 2024-02-22 PROBLEM — I07.1 MODERATE TRICUSPID REGURGITATION: Status: ACTIVE | Noted: 2024-02-22

## 2024-02-22 LAB
ALBUMIN CSF-MCNC: 100 MG/L (ref 70–350)
ALBUMIN INDEX: 35.7
ALBUMIN SERPL-MCNC: 2.8 G/DL (ref 3.5–5.2)
ANION GAP SERPL CALCULATED.3IONS-SCNC: 9 MMOL/L (ref 9–17)
APPEARANCE CSF: CLEAR
BASOPHILS # BLD: 0 K/UL (ref 0–0.2)
BASOPHILS NFR BLD: 0 % (ref 0–2)
BUN SERPL-MCNC: 21 MG/DL (ref 8–23)
C GATTII+NEOFOR DNA CSF QL NAA+NON-PROBE: NOT DETECTED
CALCIUM SERPL-MCNC: 8.4 MG/DL (ref 8.6–10.4)
CHLORIDE SERPL-SCNC: 98 MMOL/L (ref 98–107)
CMV DNA CSF QL NAA+NON-PROBE: NOT DETECTED
CO2 SERPL-SCNC: 31 MMOL/L (ref 20–31)
CREAT SERPL-MCNC: 1.1 MG/DL (ref 0.5–0.9)
E COLI K1 DNA CSF QL NAA+NON-PROBE: NOT DETECTED
EOSINOPHIL # BLD: 0.11 K/UL (ref 0–0.4)
EOSINOPHILS RELATIVE PERCENT: 2 % (ref 1–4)
ERYTHROCYTE [DISTWIDTH] IN BLOOD BY AUTOMATED COUNT: 15.4 % (ref 12.5–15.4)
EV RNA CSF QL NAA+NON-PROBE: NOT DETECTED
GFR SERPL CREATININE-BSD FRML MDRD: 57 ML/MIN/1.73M2
GLUCOSE CSF-MCNC: 71 MG/DL (ref 40–70)
GLUCOSE SERPL-MCNC: 109 MG/DL (ref 70–99)
GP B STREP DNA CSF QL NAA+NON-PROBE: NOT DETECTED
HAEM INFLU DNA CSF QL NAA+NON-PROBE: NOT DETECTED
HCT VFR BLD AUTO: 25.1 % (ref 36–46)
HGB BLD-MCNC: 7.9 G/DL (ref 12–16)
HHV6 DNA CSF QL NAA+NON-PROBE: NOT DETECTED
HSV1 DNA CSF QL NAA+NON-PROBE: NOT DETECTED
HSV2 DNA CSF QL NAA+NON-PROBE: NOT DETECTED
IGG CSF-MCNC: 3.4 MG/DL (ref 0.5–7)
IGG INDEX CSF: 0.49
IGG SERPL-MCNC: 1950 MG/DL (ref 700–1600)
IGG SYNTHESIS RATE CSF: < 3.3 MG/24 H
INR PPP: 1
L MONOCYTOG DNA CSF QL NAA+NON-PROBE: NOT DETECTED
LACOSAMIDE: 7.3 UG/ML (ref 1–10)
LYMPHOCYTES NFR BLD: 0.68 K/UL (ref 1–4.8)
LYMPHOCYTES RELATIVE PERCENT: 12 % (ref 24–44)
MCH RBC QN AUTO: 31.7 PG (ref 26–34)
MCHC RBC AUTO-ENTMCNC: 31.5 G/DL (ref 31–37)
MCV RBC AUTO: 100.8 FL (ref 80–100)
MONOCYTES NFR BLD: 0.4 K/UL (ref 0.1–0.8)
MONOCYTES NFR BLD: 7 % (ref 1–7)
MORPHOLOGY: NORMAL
N MEN DNA CSF QL NAA+NON-PROBE: NOT DETECTED
NEUTROPHILS NFR BLD: 79 % (ref 36–66)
NEUTS SEG NFR BLD: 4.51 K/UL (ref 1.8–7.7)
NUC CELL # FLD MANUAL: 0 CELLS/UL
OLIGOCLONAL BANDS: ABNORMAL
PARECHOVIRUS A RNA CSF QL NAA+NON-PROBE: NOT DETECTED
PLATELET # BLD AUTO: 281 K/UL (ref 140–450)
PMV BLD AUTO: 10.7 FL (ref 8–14)
POTASSIUM SERPL-SCNC: 4.3 MMOL/L (ref 3.7–5.3)
PROT CSF-MCNC: 24.3 MG/DL (ref 15–45)
PROTHROMBIN TIME: 10.8 SEC (ref 9.4–12.6)
RBC # BLD AUTO: 2.49 M/UL (ref 4–5.2)
RBC # FLD MANUAL: 2 CELLS/UL
S PNEUM DNA CSF QL NAA+NON-PROBE: NOT DETECTED
SODIUM SERPL-SCNC: 138 MMOL/L (ref 135–144)
SPECIMEN DESCRIPTION: NORMAL
SPECIMEN VOL CSF: ABNORMAL ML
TUBE # CSF: 3
VZV DNA CSF QL NAA+NON-PROBE: NOT DETECTED
WBC OTHER # BLD: 5.7 K/UL (ref 3.5–11)
XANTHOCHROMIA CSF QL: ABNORMAL

## 2024-02-22 PROCEDURE — 6370000000 HC RX 637 (ALT 250 FOR IP): Performed by: INTERNAL MEDICINE

## 2024-02-22 PROCEDURE — 87205 SMEAR GRAM STAIN: CPT

## 2024-02-22 PROCEDURE — 6370000000 HC RX 637 (ALT 250 FOR IP)

## 2024-02-22 PROCEDURE — 97162 PT EVAL MOD COMPLEX 30 MIN: CPT

## 2024-02-22 PROCEDURE — 99232 SBSQ HOSP IP/OBS MODERATE 35: CPT | Performed by: INTERNAL MEDICINE

## 2024-02-22 PROCEDURE — 2580000003 HC RX 258: Performed by: INTERNAL MEDICINE

## 2024-02-22 PROCEDURE — 87483 CNS DNA AMP PROBE TYPE 12-25: CPT

## 2024-02-22 PROCEDURE — 99232 SBSQ HOSP IP/OBS MODERATE 35: CPT | Performed by: FAMILY MEDICINE

## 2024-02-22 PROCEDURE — 2500000003 HC RX 250 WO HCPCS: Performed by: RADIOLOGY

## 2024-02-22 PROCEDURE — 6360000002 HC RX W HCPCS: Performed by: FAMILY MEDICINE

## 2024-02-22 PROCEDURE — 6360000002 HC RX W HCPCS: Performed by: INTERNAL MEDICINE

## 2024-02-22 PROCEDURE — 83916 OLIGOCLONAL BANDS: CPT

## 2024-02-22 PROCEDURE — 99232 SBSQ HOSP IP/OBS MODERATE 35: CPT | Performed by: PSYCHIATRY & NEUROLOGY

## 2024-02-22 PROCEDURE — 82040 ASSAY OF SERUM ALBUMIN: CPT

## 2024-02-22 PROCEDURE — 36415 COLL VENOUS BLD VENIPUNCTURE: CPT

## 2024-02-22 PROCEDURE — 2580000003 HC RX 258: Performed by: PSYCHIATRY & NEUROLOGY

## 2024-02-22 PROCEDURE — 86618 LYME DISEASE ANTIBODY: CPT

## 2024-02-22 PROCEDURE — 99233 SBSQ HOSP IP/OBS HIGH 50: CPT

## 2024-02-22 PROCEDURE — A4216 STERILE WATER/SALINE, 10 ML: HCPCS | Performed by: INTERNAL MEDICINE

## 2024-02-22 PROCEDURE — 2060000000 HC ICU INTERMEDIATE R&B

## 2024-02-22 PROCEDURE — C9254 INJECTION, LACOSAMIDE: HCPCS | Performed by: PSYCHIATRY & NEUROLOGY

## 2024-02-22 PROCEDURE — 85025 COMPLETE CBC W/AUTO DIFF WBC: CPT

## 2024-02-22 PROCEDURE — 009U3ZX DRAINAGE OF SPINAL CANAL, PERCUTANEOUS APPROACH, DIAGNOSTIC: ICD-10-PCS | Performed by: RADIOLOGY

## 2024-02-22 PROCEDURE — 80048 BASIC METABOLIC PNL TOTAL CA: CPT

## 2024-02-22 PROCEDURE — 82784 ASSAY IGA/IGD/IGG/IGM EACH: CPT

## 2024-02-22 PROCEDURE — 86788 WEST NILE VIRUS AB IGM: CPT

## 2024-02-22 PROCEDURE — 6360000002 HC RX W HCPCS: Performed by: PSYCHIATRY & NEUROLOGY

## 2024-02-22 PROCEDURE — 86789 WEST NILE VIRUS ANTIBODY: CPT

## 2024-02-22 PROCEDURE — 89050 BODY FLUID CELL COUNT: CPT

## 2024-02-22 PROCEDURE — 97110 THERAPEUTIC EXERCISES: CPT

## 2024-02-22 PROCEDURE — 86592 SYPHILIS TEST NON-TREP QUAL: CPT

## 2024-02-22 PROCEDURE — 62270 DX LMBR SPI PNXR: CPT

## 2024-02-22 PROCEDURE — C9113 INJ PANTOPRAZOLE SODIUM, VIA: HCPCS | Performed by: INTERNAL MEDICINE

## 2024-02-22 PROCEDURE — 85610 PROTHROMBIN TIME: CPT

## 2024-02-22 PROCEDURE — 84157 ASSAY OF PROTEIN OTHER: CPT

## 2024-02-22 PROCEDURE — 82042 OTHER SOURCE ALBUMIN QUAN EA: CPT

## 2024-02-22 PROCEDURE — 87070 CULTURE OTHR SPECIMN AEROBIC: CPT

## 2024-02-22 PROCEDURE — 82945 GLUCOSE OTHER FLUID: CPT

## 2024-02-22 RX ORDER — LIDOCAINE HYDROCHLORIDE 10 MG/ML
INJECTION, SOLUTION EPIDURAL; INFILTRATION; INTRACAUDAL; PERINEURAL PRN
Status: COMPLETED | OUTPATIENT
Start: 2024-02-22 | End: 2024-02-22

## 2024-02-22 RX ORDER — BUMETANIDE 0.25 MG/ML
2 INJECTION INTRAMUSCULAR; INTRAVENOUS EVERY 8 HOURS
Status: DISCONTINUED | OUTPATIENT
Start: 2024-02-22 | End: 2024-02-23 | Stop reason: HOSPADM

## 2024-02-22 RX ADMIN — LACOSAMIDE 100 MG: 10 INJECTION INTRAVENOUS at 11:16

## 2024-02-22 RX ADMIN — NORTRIPTYLINE HYDROCHLORIDE 10 MG: 10 CAPSULE ORAL at 20:22

## 2024-02-22 RX ADMIN — MIDODRINE HYDROCHLORIDE 5 MG: 5 TABLET ORAL at 10:53

## 2024-02-22 RX ADMIN — SPIRONOLACTONE 25 MG: 25 TABLET, FILM COATED ORAL at 16:30

## 2024-02-22 RX ADMIN — LEVOTHYROXINE SODIUM 150 MCG: 75 TABLET ORAL at 05:36

## 2024-02-22 RX ADMIN — RIFAXIMIN 550 MG: 550 TABLET ORAL at 20:22

## 2024-02-22 RX ADMIN — FOLIC ACID 1 MG: 1 TABLET ORAL at 10:53

## 2024-02-22 RX ADMIN — LIDOCAINE HYDROCHLORIDE 2 ML: 10 INJECTION, SOLUTION EPIDURAL; INFILTRATION; INTRACAUDAL; PERINEURAL at 10:04

## 2024-02-22 RX ADMIN — BUMETANIDE 2 MG: 0.25 INJECTION INTRAMUSCULAR; INTRAVENOUS at 20:22

## 2024-02-22 RX ADMIN — Medication 1 TABLET: at 10:53

## 2024-02-22 RX ADMIN — DULOXETINE HYDROCHLORIDE 30 MG: 30 CAPSULE, DELAYED RELEASE ORAL at 10:53

## 2024-02-22 RX ADMIN — MIDODRINE HYDROCHLORIDE 5 MG: 5 TABLET ORAL at 20:22

## 2024-02-22 RX ADMIN — SODIUM CHLORIDE, PRESERVATIVE FREE 10 ML: 5 INJECTION INTRAVENOUS at 20:23

## 2024-02-22 RX ADMIN — RIFAXIMIN 550 MG: 550 TABLET ORAL at 10:53

## 2024-02-22 RX ADMIN — MICONAZOLE NITRATE: 20 CREAM TOPICAL at 20:28

## 2024-02-22 RX ADMIN — LACOSAMIDE 100 MG: 10 INJECTION INTRAVENOUS at 20:31

## 2024-02-22 RX ADMIN — Medication 100 MG: at 10:53

## 2024-02-22 RX ADMIN — SODIUM CHLORIDE, PRESERVATIVE FREE 40 MG: 5 INJECTION INTRAVENOUS at 10:53

## 2024-02-22 RX ADMIN — SPIRONOLACTONE 25 MG: 25 TABLET, FILM COATED ORAL at 10:53

## 2024-02-22 RX ADMIN — BUMETANIDE 2 MG: 0.25 INJECTION INTRAMUSCULAR; INTRAVENOUS at 11:08

## 2024-02-22 RX ADMIN — MIDODRINE HYDROCHLORIDE 5 MG: 5 TABLET ORAL at 16:31

## 2024-02-22 NOTE — PROGRESS NOTES
[facility] Neurology   IN-PATIENT SERVICE      NEUROLOGY PROGRESS  NOTE            Date:   2/22/2024  Patient name:  Clemente Garcia  Date of admission:  2/12/2024  YOB: 1960      Interval History:     Patient lying down in bed, status post lumbar puncture done just now by IR.  Preliminary results reveal 0 WBC, 2 RBC, normal protein, glucose 71.    History of Present Illness:     Obtained from my partner:     History is obtained mostly from the patient's son and other family members at bedside. Also from the medical record and from the caregivers. Chart is reviewed and patient is examined.   Briefly, this is a  63 y.o. rt handed  female with hx of HTN, dyslipidemia, THURSTON nonalcoholic steatohepatitis, nephrolithiasis and osteoarthritis was admitted on 2/12/2024 as a transfer from [facility] with altered mentation..  As per family members; patient was found less responsive with blood sugar in 40s and also was found to be dehydrated and malnourished.  She was taken to [facility] ER where she was noted to be with urosepsis for which she was initiated on antibiotic therapy.  She had some improvement but it did not persist.  She was having fluctuating mentation for which she was brought to [facility] facility on 2/12/2024.  She was continued on vancomycin and ceftriaxone antibiotic therapy.  Because of acute on chronic encephalopathy; neurology consultation requested.  During this encounter; patient was alert and awake but was noted to be with abnormal movements of extremities as if she was trying to grab something out of air.     Family members at bedside stated that patient has chronic dysphagia and failed swallow studies earlier.  Patient has been on PEG tube for the past 10 days.    Past Medical History:     History reviewed. No pertinent past medical history.     Past Surgical History:     Past Surgical History:   Procedure Laterality Date    ABDOMEN SURGERY       Negative bubble study.  LA mildly dilated.        I personally reviewed all of the above medications, clinical laboratory, imaging and other diagnostic tests.         Impression:      Episode of unresponsiveness followed by residual left-sided hemiparesis, change in mentation with increasing confusion and speech difficulties.  Complicated by prolonged hospital stay at [facility] for over 3.5 weeks per family treated for sepsis, UTI, ileus, hypoglycemia.  Raises concern for initial event being a right hemispheric stroke causing patient to be found down.  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  Remaining CSF pending, others are send out such as encephalitis panel and CJD panel which will take 1 to 2 weeks to result.  Continue aspirin, statin  Continue Vimpat 100 mg twice daily  Patient has had an MRI done in November, 2023.  Unfortunately despite this information our MRI department will not perform MRI here secondary to Lap-Band.   TSH is significantly elevated at 28, further management as per primary team.  PT OT  Asked  if he could find any information regarding her Lap-Band and MRI capability.  Otherwise I would recommend she have MRI done at other facility that would facilitate it.  Discharge planning to SNF currently in progress.  We will follow        Electronically signed by Narayan Mendoza DO on 2/22/2024 at 4:21 PM      Narayan Mendoza DO  [facility] [place]  Neurology

## 2024-02-22 NOTE — BRIEF OP NOTE
Brief Postoperative Note Lumbar Puncture    Clemente Garcia  YOB: 1960  [id number]    Pre-operative Diagnosis: Acute Encaphalopathy    Post-operative Diagnosis: Same    Procedure: Fluoro guided Lumbar Puncture    Anesthesia: 1% Lidocaine    Surgeons/Assistants: ZACKERY KRAUS MD    Complications: None    Specimens: Obtained and sent to lab    Fluoro guided lumbar puncture. 20 gauge spinal needle. L2-L3. 8 ml clear CSF obtained.  Dressing applied.  Vital signs were reviewed and were stable after the procedure.  Discharged to floor.    Electronically signed by ZACKERY KRAUS MD on 2/22/2024 at 10:38 AM

## 2024-02-22 NOTE — PROGRESS NOTES
Physical Therapy  Facility/Department: [address]  Physical Therapy Initial Assessment    Name: Clemente Garcia  : 1960  MRN: [id number]  Date of Service: 2024  No chief complaint on file.     Discharge Recommendations:  Patient would benefit from continued therapy after discharge   PT Equipment Recommendations  Equipment Needed: No      Patient Diagnosis(es): The primary encounter diagnosis was Left hemiparesis (HCC). A diagnosis of Anemia, unspecified type was also pertinent to this visit.  Past Medical History:  has no past medical history on file.  Past Surgical History:  has a past surgical history that includes Hysterectomy; Upper gastrointestinal endoscopy (N/A, 2024); and Abdomen surgery.    Assessment   Body Structures, Functions, Activity Limitations Requiring Skilled Therapeutic Intervention: Decreased functional mobility ;Decreased ROM;Decreased strength;Decreased safe awareness  Assessment: Pt being seen for mobility and strength deficits. Per family, pt has not ambulated since 2023 when pt was using RW. Mostly bed<>gurney transfer since then; very limited mobility with pt sitting EOB with therapy during stay at [facility] (around late 2024 to early 2024, son unsure of exact date when therapy worked with pt). Pt also had a short rehab stay x 6 days. During this eval, pt was only able to tolerate gentle BLE ex within very limited available ROM; max to dep x 2 for attempted rolling in bed. Per son, plan is to go to facility. Pt would benefit from continued therapy during this hospital stay and at discharge in order to work on functional mobility, balance retraining, strengthening, and endurance as able.    Therapy Prognosis: Fair  Decision Making: Medium Complexity  Requires PT Follow-Up: Yes  Activity Tolerance  Activity Tolerance: Patient limited by endurance  Activity Tolerance Comments: frequent rest breaks and proper LE positioning due to edema     Plan  History  Social/Functional History  Lives With: Spouse (lived at home with spouse but has not been home since Jan 2024)  Type of Home:  (has not been home since Jan 13, 2024; was staying at home with spouse; [facility], also was staying for a short duration in a SNFx 6 days)  Home Layout: Multi-level (tri-level home but plan is to go to a facility post-discharge from hospital stay)  ADL Assistance: Needs assistance  Ambulation Assistance: Non-ambulatory (Pt last ambulated in October 2023 with RW, with assistance.)  Transfer Assistance: Needs assistance (has only been transferred bed<>[facility]; most that pt done was sitting EOB with therapy around last week Jan 2024 - first week Feb 2024)  Occupation: Retired  Leisure & Hobbies: playing on her phone  Additional Comments: R handed; social/functional info gathered from son and sister at bedside  Vision/Hearing  Vision  Vision: Impaired  Vision Exceptions: Wears glasses for reading  Hearing  Hearing: Within functional limits    Cognition   Orientation  Orientation Level: Disoriented to time;Disoriented to place;Oriented to person (oriented to family in room; requires max assist for orientation to place)  Cognition  Overall Cognitive Status: Exceptions  Arousal/Alertness: Delayed responses to stimuli  Following Commands: Inconsistently follows commands  Attention Span: Attends with cues to redirect  Memory: Decreased recall of biographical Information;Decreased recall of recent events;Decreased short term memory  Safety Judgement: Decreased awareness of need for assistance;Decreased awareness of need for safety  Problem Solving: Assistance required to generate solutions;Assistance required to correct errors made;Assistance required to implement solutions;Decreased awareness of errors;Assistance required to identify errors made  Insights: Decreased awareness of deficits  Initiation: Requires cues for some  Sequencing: Requires cues for some

## 2024-02-22 NOTE — PROGRESS NOTES
Comprehensive Nutrition Assessment    Type and Reason for Visit:  Reassess    Nutrition Recommendations/Plan:   Recommend con't TF: Osmolite 1.2 @ 60 ml/hr  200 ml free water flush q 12 hours  Continue NPO  Monitor weight, intake, labs, skin     Malnutrition Assessment:  Malnutrition Status:  No malnutrition (02/22/24 1359)    Context:  Acute Illness     Findings of the 6 clinical characteristics of malnutrition:  Energy Intake:  No significant decrease in energy intake (TF at goal)  Weight Loss:  No significant weight loss     Body Fat Loss:  No significant body fat loss     Muscle Mass Loss:  No significant muscle mass loss    Fluid Accumulation:  Mild (flushes decreased, Bumex for diuresis)     Strength:  Not Performed    Nutrition Assessment:    Pt admitted with acute metabolic encephalopathy. Pt had LP this morning. Free water flushes have been reduced to 200 ml q 12 hours d/t increased edema. Noted weight has trended up since admission, anticipate d/t fluid retention. +2 BUE, +4 BLE edema noted today.    Nutrition Related Findings:    +2 BUE, +4 BLE edema noted. Cr 1.1, Glu 109, GFR 57. All other labs/meds reviewed. Wound Type: Multiple       Current Nutrition Intake & Therapies:    Average Meal Intake: NPO  Average Supplements Intake: NPO  Diet NPO  ADULT TUBE FEEDING; PEG; Standard without Fiber; Continuous; 30; Yes; 10; Q 8 hours; 60; 200; Other (specify); 12 hours; Protein; 1 Dose; Daily  Current Tube Feeding (TF) Orders:  Feeding Route: PEG  Formula: Standard with Fiber  Schedule: Continuous  Feeding Regimen: Osmolite 1.2 @ 60 ml/hr  Additives/Modulars: Protein (1 modular daily)  Water Flushes: 200 ml q 12 hours  Current TF & Flush Orders Provides: 1832 kcal, 105 g protein, 1380 ml fluid  Goal TF & Flush Orders Provides: 1832 kcal, 105 g pro, 2470 ml fluid (Osmolite 1.2 @ 60 ml/hr with 1 protein modular daily, 215 ml free water flush Q 4 hours)    Anthropometric Measures:  Height: 160 cm (5'

## 2024-02-22 NOTE — PROGRESS NOTES
POA paperwork in chart is incomplete. Writer called [facility] and requested a copy be faxed over. Copy placed in chart.

## 2024-02-22 NOTE — PLAN OF CARE
Problem: Nutrition Deficit:  Goal: Optimize nutritional status  2/22/2024 1403 by Kaela Porter, RD  Outcome: Progressing  Flowsheets (Taken 2/22/2024 1354)  Nutrient intake appropriate for improving, restoring, or maintaining nutritional needs:   Assess nutritional status and recommend course of action   Recommend, monitor, and adjust tube feedings and TPN/PPN based on assessed needs  2/22/2024 6429 by Chaparrita Rossi, RN  Outcome: Progressing

## 2024-02-22 NOTE — PROGRESS NOTES
alternative oral replacement **OR** potassium chloride, magnesium sulfate, sodium chloride flush, sodium chloride, ondansetron **OR** ondansetron, acetaminophen **OR** acetaminophen    ROS:   ROS is negative except for points noted above.    Data:     Vitals:  /70   Pulse 85   Temp 98.1 °F (36.7 °C) (Oral)   Resp 16   Ht 1.6 m (5' 3\")   Wt 103 kg (227 lb)   SpO2 100%   BMI 40.21 kg/m²   Temp (24hrs), Av °F (36.7 °C), Min:97.3 °F (36.3 °C), Max:98.4 °F (36.9 °C)    Recent Labs     24  1644 24  2018   POCGLU 95 80         I/O (24Hr):    Intake/Output Summary (Last 24 hours) at 2024 1321  Last data filed at 2024 1245  Gross per 24 hour   Intake 2491 ml   Output 2200 ml   Net 291 ml         Labs:  Hematology:  Recent Labs     24  0624  0647 24  0930   WBC 4.1 5.0 5.7  --    RBC 2.25* 2.21* 2.49*  --    HGB 7.2* 7.0* 7.9*  --    HCT 21.6* 21.0* 25.1*  --    MCV 96.4 95.1 100.8*  --    MCH 31.8 31.8 31.7  --    MCHC 33.1 33.4 31.5  --    RDW 15.8* 16.0* 15.4  --     232 281  --    MPV 9.0 9.1 10.7  --    INR  --   --   --  1.0       Chemistry:  Recent Labs     24  0644 24  0647    138 138   K 4.3 4.2 4.3    99 98   CO2 31 32* 31   GLUCOSE 115* 120* 109*   BUN 21 21 21   CREATININE 1.1* 1.1* 1.1*   MG 2.1  --   --    ANIONGAP 7* 7* 9   LABGLOM 57* 57* 57*   CALCIUM 8.6 8.5* 8.4*   PHOS 4.3  --   --        Recent Labs     24  0544 24  1644 24  0644   PROT  --   --   --  6.4   LABALBU  --   --   --  2.8*   LABA1C 4.9  --   --   --    AST  --   --   --  21   ALT  --   --   --  9   ALKPHOS  --   --   --  144*   BILITOT  --   --   --  0.3   BILIDIR  --   --   --  0.1   CHOL  --   --   --  68   HDL  --   --   --  22*   LDLCHOLESTEROL  --   --   --  31   CHOLHDLRATIO  --   --   --  3.0   TRIG  --   --   --  77   VLDL  --   --   --  15   POCGLU  --  95 80  --        ABG:No  results found for: \"POCPH\", \"PHART\", \"PH\", \"POCPCO2\", \"GZJ0YTU\", \"PCO2\", \"POCPO2\", \"PO2ART\", \"PO2\", \"POCHCO3\", \"DLQ5NZG\", \"HCO3\", \"NBEA\", \"PBEA\", \"BEART\", \"BE\", \"THGBART\", \"THB\", \"WMP5WJO\", \"YELI4VFQ\", \"A6FPNDXC\", \"O2SAT\", \"FIO2\"  Lab Results   Component Value Date/Time    SPECIAL 10ml left hand 02/12/2024 04:13 PM     Lab Results   Component Value Date/Time    CULTURE PENDING 02/22/2024 10:16 AM       Radiology:  US LIVER    Result Date: 2/12/2024  Morphologic findings consistent with cirrhosis. The major hepatic veins, main hepatic artery and imaged IVC are patent.       Physical Examination:   Physical Exam  Vitals reviewed.   Cardiovascular:      Rate and Rhythm: Normal rate and regular rhythm.      Pulses: Normal pulses.      Heart sounds: Normal heart sounds. No murmur heard.  Pulmonary:      Effort: Pulmonary effort is normal. No respiratory distress.      Breath sounds: No wheezing or rales.   Chest:      Chest wall: No tenderness.   Abdominal:      General: There is distension (stable).      Tenderness: There is no abdominal tenderness. There is no guarding.   Musculoskeletal:      Right upper arm: Edema present.      Left upper arm: Edema present.      Right lower leg: Edema (pitting +4) present.      Left lower leg: Edema (pitting +4) present.      Comments: Full body edema   Skin:     Comments: Flaking of skin noticed on upper thighs and bottom of feet   Neurological:      Mental Status: She is disoriented.      Comments: Non aggressive in a good mood today. Oriented to self and able to state her own birthday. Able to state she is in the hospital could not state which despite reorientation w/ every encounter   Psychiatric:         Cognition and Memory: Memory is impaired.       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    Gastroparesis 2/13/2024 Yes    Neurogenic bladder 2/13/2024 Yes    Seizure-like activity (HCC) 2/15/2024 Yes    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    Hypernatremia 2/16/2024 Yes    Anasarca 2/16/2024 Yes    Hypoalbuminemia 2/16/2024 Yes    Non-smoker 2/16/2024 Yes    Tardive dyskinesia 2/17/2024 Yes    Acute encephalopathy 2/17/2024 Yes    Wernicke encephalopathy syndrome 2/17/2024 Yes    Left arm weakness 2/19/2024 Yes    Cognitive impairment 2/19/2024 Yes    Acquired hypothyroidism 2/19/2024 Yes    JASPER (acute kidney injury) (HCC) 2/20/2024 Yes    Alcoholic cirrhosis (HCC) 2/20/2024 Yes    Encounter for palliative care 2/20/2024 Yes    Goals of care, counseling/discussion 2/20/2024 Yes    Left hemiparesis (HCC) 2/20/2024 Yes    Urinary retention 2/21/2024 Yes    Chronic indwelling Loaiza catheter 2/21/2024 Yes    Moderate tricuspid regurgitation 2/22/2024 Yes   61-year-old female with PMH of depression, fibromyalgia, hypertension, dyslipidemia, THURSTON, nephrolithiasis, neuropathy, osteoarthritis, anemia, degenerative disc disease who presented from [facility] due to concern about altered mental status and confusion.  History additionally notable for alcohol abuse, last drink within the past couple of months per chart review.  CT abdomen showed mild to moderate ascites with liver cirrhosis low.  And patient was given 1 unit of blood and started on IV antibiotics for UTI and transferred to our facility for further evaluation. GI was consulted and got EGD and US guided paracentesis 2/13. New blood cultures were taken and NGTD. Patient continued to have worsening hypernatremia which may be contributing

## 2024-02-22 NOTE — PROGRESS NOTES
Renal Progress Note    Patient :  Clemente Garcia; 63 y.o. MRN# [id number]  Location:  329/329-01  Attending:  Johanne Philip MD  Admit Date:  2/12/2024   Hospital Day: 10    Subjective:     Patient was seen and examined.  Patient is just back from lumbar puncture.  Results are pending.  Patient was in her bed.  She was sleeping but able to open her eyes with command.  She was alert oriented to person or place.  Labs reviewed and essentially unchanged  Intake and output charting shows improvement in urine output and put out 2.4 L in last 24 hours.  Patient is on 200 mL of free water twice a day.  She is receiving Bumex  Most recent sodium was 138 with a creatinine of 1.1  No overt GI bleeding s/p EGD which showed swelling and edema in GE junction, gastritis as per gastroenterology notes.    Outpatient Medications:     Medications Prior to Admission: bumetanide (BUMEX) 1 MG tablet, Take 2 tablets by mouth daily  bisacodyl (DULCOLAX) 10 MG suppository, Place 1 suppository rectally daily as needed for Constipation  DULoxetine (CYMBALTA) 60 MG extended release capsule, 1 capsule by Per G Tube route daily  fentaNYL (DURAGESIC) 12 MCG/HR, Place 1 patch onto the skin every 72 hours. Max Daily Amount: 1 patch  gabapentin (NEURONTIN) 100 MG capsule, 1 capsule by PEG Tube route at bedtime.  bisacodyl (DULCOLAX) 5 MG EC tablet, Take 2 tablets by mouth daily as needed for Constipation Per peg TUbe  lactulose (CHRONULAC) 10 GM/15ML solution, 45 mLs by PEG Tube route 2 times daily  levothyroxine (SYNTHROID) 125 MCG tablet, 1 tablet by PEG Tube route Daily  magnesium hydroxide (MILK OF MAGNESIA) 400 MG/5ML suspension, 30 mLs by Per G Tube route daily as needed for Constipation  morphine 10 MG/5ML solution, 5 mLs by PEG Tube route every 4 hours as needed for Pain. Max Daily Amount: 60 mg  Multiple Vitamins-Minerals (CENTRUM/CERTA-NIEVES WITH MINERALS ORAL) solution, Take 15 mLs by mouth daily  nortriptyline (PAMELOR) 10 MG capsule, 1    BP: 117/70  BP Range: Systolic (24hrs), Av , Min:95 , Max:120       Diastolic (24hrs), Av, Min:52, Max:76      Physical Examination:     General:  Lying flat, able to answer simple question in yes and no but not oriented to  Eyes:   Pupils were reactive to light   neck:   Supple and no lymphadenopathy   chest:   Bilateral rails.  Poor air entry at the bases  Cardiac:  S1 and S2 audible  Abdomen: Soft and nontender abdominal wall edema was present on the lateral abdominal wall   Neuro:  Very sleepy and just back from procedure  SKIN:  No rashes, good skin turgor.  Extremities:   3-4+ pitting edema of the lower extremities with diffuse erythema of the lower extremities and some skin peeling over the thighs and 3plusupper extremity edema    Labs:       Recent Labs     24  0544 24  0644 24  0647   WBC 4.1 5.0 5.7   RBC 2.25* 2.21* 2.49*   HGB 7.2* 7.0* 7.9*   HCT 21.6* 21.0* 25.1*   MCV 96.4 95.1 100.8*   MCH 31.8 31.8 31.7   MCHC 33.1 33.4 31.5   RDW 15.8* 16.0* 15.4    232 281   MPV 9.0 9.1 10.7      BMP:   Recent Labs     24  0544 24  0644 24  0647    138 138   K 4.3 4.2 4.3    99 98   CO2 31 32* 31   BUN  21   CREATININE 1.1* 1.1* 1.1*   GLUCOSE 115* 120* 109*   CALCIUM 8.6 8.5* 8.4*      Phosphorus:     Recent Labs     24  0544   PHOS 4.3     Magnesium:    Recent Labs     24  0544   MG 2.1     Albumin:    Recent Labs     24  0644   LABALBU 2.8*     MANUEL:      Lab Results   Component Value Date/Time    MANUEL NEGATIVE 2024 01:27 PM     SPEP:  Lab Results   Component Value Date/Time    PROT 6.4 2024 06:44 AM     Hep BsAg:         Lab Results   Component Value Date/Time    HEPBSAG NONREACTIVE 2024 01:27 PM     Hep C AB:          Lab Results   Component Value Date/Time    HEPCAB NONREACTIVE 2024 01:27 PM     Urinalysis/Chemistries:      Urine Sodium:     Lab Results   Component Value Date/Time    FENG 96

## 2024-02-22 NOTE — PROGRESS NOTES
Palliative Care Progress Note    NAME:  Clemente Garcia  MEDICAL RECORD NUMBER:  [id number]  AGE: 63 y.o.   GENDER: female  : 1960  TODAY'S DATE:  2024    Reason for Consult:  goals of care      Plan        Palliative Interaction: Patient seen and examined on rounds. Patient alert, however remains confused at this time. Unable to participate in any meaningful goals of care conversations.     Patients son Marek and sister present at bedside. Introduce myself and the palliative role in her care.     We discuss goals of care at length as Marek had several questions. He shares that he is struggling to make any decisions for patient not knowing a true diagnosis or what her prognosis and recovery will look like.     We discuss quality of life in depth and what would be acceptable to the patient. He notes that should this be her new baseline where she is confused and requires total care she would not want this for herself. However if she were to mentally make a recovery that may be a different story.     He states his ultimate goal at this time is for patient to be able to sit up, eat on her own, and have a meaningful conversation. He understands that this may not happen.     We discuss options moving forward. Marek states he feels like at this time he would want the patient to go to a facility to see how she recovers. We discussed a time limited trial and what this means. Marek states he feels like this is a good option moving forward.     Offer an informational meeting with palliative/hospice. Family open to setting patient up with palliative care with the possibility of transitioning patient to hospice down the road. Case management updated and is facilitating meeting.     We discuss code status as well as patient is currently a full code. He states family has discussed however they have differing opinions and are still working to come to an agreement regarding this.     Marek presented original copies of POA  Tube route Daily      magnesium hydroxide (MILK OF MAGNESIA) 400 MG/5ML suspension 30 mLs by Per G Tube route daily as needed for Constipation      morphine 10 MG/5ML solution 5 mLs by PEG Tube route every 4 hours as needed for Pain. Max Daily Amount: 60 mg      Multiple Vitamins-Minerals (CENTRUM/CERTA-NIEVES WITH MINERALS ORAL) solution Take 15 mLs by mouth daily      nortriptyline (PAMELOR) 10 MG capsule 1 capsule by PEG Tube route nightly      acetaminophen (TYLENOL) 325 MG tablet 2 tablets by PEG Tube route every 6 hours as needed for Pain For pain/fever      venlafaxine (EFFEXOR) 75 MG tablet Take 1 tablet by mouth 2 times daily         PAST MEDICAL HISTORY  History reviewed. No pertinent past medical history.    FAMILY HISTORY  History reviewed. No pertinent family history.    SOCIAL HISTORY  Social History       Tobacco History       Smoking Status  Never      Smokeless Tobacco Use  Unknown              Alcohol History       Alcohol Use Status  Not Currently              Drug Use       Drug Use Status  Never              Sexual Activity       Sexually Active  Defer                     ALLERGIES  No Known Allergies    Data      /70   Pulse 85   Temp 98.1 °F (36.7 °C) (Oral)   Resp 16   Ht 1.6 m (5' 3\")   Wt 103 kg (227 lb)   SpO2 100%   BMI 40.21 kg/m²     Wt Readings from Last 3 Encounters:   02/21/24 103 kg (227 lb)   10/17/23 69.4 kg (153 lb)        Lab Results   Component Value Date    WBC 5.7 02/22/2024    HGB 7.9 (L) 02/22/2024    HCT 25.1 (L) 02/22/2024    .8 (H) 02/22/2024     02/22/2024    ,   Lab Results   Component Value Date/Time     02/22/2024 06:47 AM    K 4.3 02/22/2024 06:47 AM    CL 98 02/22/2024 06:47 AM    CO2 31 02/22/2024 06:47 AM    BUN 21 02/22/2024 06:47 AM    CREATININE 1.1 02/22/2024 06:47 AM    GLUCOSE 109 02/22/2024 06:47 AM    CALCIUM 8.4 02/22/2024 06:47 AM    ,   Lab Results   Component Value Date    INR 1.0 02/22/2024    INR 1.1 02/13/2024     mildly dilated. Left atrial volume index is normal (16-34 mL/m2). LA Vol Index is  33 ml/m2.    Interatrial Septum: Agitated saline study was negative with and without provocation.    Right Atrium: Right atrium is mildly dilated.    Image quality is adequate. Procedure performed with the patient in a supine position.    Signed by: Memo Akins DO on 2/21/2024 11:48 AM       Encounter Date: 02/12/24   EKG 12 Lead   Result Value    Ventricular Rate 87    Atrial Rate 87    P-R Interval 118    QRS Duration 76    Q-T Interval 416    QTc Calculation (Bazett) 500    P Axis 33    R Axis 17    T Axis 32    Narrative    Normal sinus rhythm  Low voltage QRS  Prolonged QT  Abnormal ECG  When compared with ECG of 16-OCT-2023 16:30,  Nonspecific T wave abnormality no longer evident in Anterolateral leads          Assessment        REVIEW OF SYSTEMS    []   UNABLE TO OBTAIN:     Constitutional:  []   Chills   []  Fatigue   []  Fevers   []  Malaise   []  Weight loss   [] Other:     Respiratory:   []  Cough    []  Shortness of breath    []  Chest pain    [] Other:     Cardiovascular:   []  Chest pain  []  Dyspnea    []  Exertional chest pressure/discomfort     [] Fatigue      []  Palpitations    []  Syncope   [] Other:     Gastrointestinal:   []  Abdominal pain   []  Constipation    []  Diarrhea    []   Dysphagia   []  Reflux             []  Vomiting   [] Other:     Genitourinary:  []  Dysuria     []  Frequency   []  Hematuria   [] Nocturia   []  Urinary incontinence   [] Other:     Musculoskeletal:   [] Back pain    []  Muscle weakness   []  Myalgias    []  Neck pain   []  Stiff joints   []  Other:     Behavioral/Psych:   [] Anxiety    []  Depression     []  Mood swings   [] Other:     PHYSICAL ASSESSMENT:     General: []  Oriented x3      [] well appearing      [] Intubated      [] ill appearing      [] Other:    Mental Status: [] normal mental status exam      [] drowsy      [x] Confused      [] Other:     Cardiovascular: [x]

## 2024-02-22 NOTE — PLAN OF CARE
Problem: Discharge Planning  Goal: Discharge to home or other facility with appropriate resources  2/22/2024 0458 by Chaparrita Rossi RN  Outcome: Progressing  Flowsheets (Taken 2/21/2024 2000)  Discharge to home or other facility with appropriate resources: Identify barriers to discharge with patient and caregiver     Problem: Pain  Goal: Verbalizes/displays adequate comfort level or baseline comfort level  2/22/2024 0458 by Chaparrita Rossi RN  Outcome: Progressing   NO NEW SIGNS OR SYMPTOMS OF PAIN NOTED, PAIN RATING <3 ON SCALE OF 0-10 THIS SHIFT. PAIN CONTROLLED WITH MEDICATION AND REPOSITIONING.    Problem: Confusion  Goal: Confusion, delirium, dementia, or psychosis is improved or at baseline  Description: INTERVENTIONS:  1. Assess for possible contributors to thought disturbance, including medications, impaired vision or hearing, underlying metabolic abnormalities, dehydration, psychiatric diagnoses, and notify attending LIP  2. [place] high risk fall precautions, as indicated  3. Provide frequent short contacts to provide reality reorientation, refocusing and direction  4. Decrease environmental stimuli, including noise as appropriate  5. Monitor and intervene to maintain adequate nutrition, hydration, elimination, sleep and activity  6. If unable to ensure safety without constant attention obtain sitter and review sitter guidelines with assigned personnel  7. Initiate Psychosocial CNS and Spiritual Care consult, as indicated  2/22/2024 0458 by Chaparrita Rossi RN  Outcome: Progressing  Flowsheets (Taken 2/21/2024 2000)  Effect of thought disturbance (confusion, delirium, dementia, or psychosis) are managed with adequate functional status: Assess for contributors to thought disturbance, including medications, impaired vision or hearing, underlying metabolic abnormalities, dehydration, psychiatric diagnoses, notify LIP     Problem: Skin/Tissue Integrity  Goal: Absence of new skin  Progressing  Flowsheets (Taken 2/21/2024 2000)  Skin Integrity Remains Intact: Monitor for areas of redness and/or skin breakdown     Problem: Musculoskeletal - Adult  Goal: Return mobility to safest level of function  2/22/2024 0458 by Chaparrita Rossi RN  Outcome: Progressing  Flowsheets (Taken 2/21/2024 2000)  Return Mobility to Safest Level of Function: Assess patient stability and activity tolerance for standing, transferring and ambulating with or without assistive devices     Problem: Gastrointestinal - Adult  Goal: Minimal or absence of nausea and vomiting  2/22/2024 0458 by Chaparrita Rossi RN  Outcome: Progressing  Flowsheets (Taken 2/21/2024 2000)  Minimal or absence of nausea and vomiting: Administer IV fluids as ordered to ensure adequate hydration     Problem: Genitourinary - Adult  Goal: Absence of urinary retention  2/22/2024 0458 by Chaparrita Rossi RN  Outcome: Progressing  Flowsheets (Taken 2/21/2024 2000)  Absence of urinary retention: Assess patient’s ability to void and empty bladder     Problem: Infection - Adult  Goal: Absence of infection at discharge  2/22/2024 0458 by Chaparrita Rossi RN  Outcome: Progressing  Flowsheets (Taken 2/21/2024 2000)  Absence of infection at discharge: Assess and monitor for signs and symptoms of infection     Problem: Metabolic/Fluid and Electrolytes - Adult  Goal: Electrolytes maintained within normal limits  2/22/2024 0458 by Chaparrita Rossi RN  Outcome: Progressing  Flowsheets (Taken 2/21/2024 2000)  Electrolytes maintained within normal limits: Monitor labs and assess patient for signs and symptoms of electrolyte imbalances

## 2024-02-23 VITALS
BODY MASS INDEX: 40.22 KG/M2 | WEIGHT: 227 LBS | HEART RATE: 89 BPM | HEIGHT: 63 IN | RESPIRATION RATE: 18 BRPM | SYSTOLIC BLOOD PRESSURE: 118 MMHG | DIASTOLIC BLOOD PRESSURE: 56 MMHG | TEMPERATURE: 97.7 F | OXYGEN SATURATION: 99 %

## 2024-02-23 LAB
ALBUMIN SERPL-MCNC: 2.5 G/DL (ref 3.5–5.2)
ALBUMIN/GLOB SERPL: 0.6 {RATIO} (ref 1–2.5)
ALP SERPL-CCNC: 142 U/L (ref 35–104)
ALT SERPL-CCNC: 8 U/L (ref 5–33)
ANION GAP SERPL CALCULATED.3IONS-SCNC: 8 MMOL/L (ref 9–17)
AST SERPL-CCNC: 22 U/L
BASOPHILS # BLD: 0 K/UL (ref 0–0.2)
BASOPHILS NFR BLD: 0 % (ref 0–2)
BILIRUB DIRECT SERPL-MCNC: 0.1 MG/DL
BILIRUB INDIRECT SERPL-MCNC: 0.2 MG/DL (ref 0–1)
BILIRUB SERPL-MCNC: 0.3 MG/DL (ref 0.3–1.2)
BUN SERPL-MCNC: 23 MG/DL (ref 8–23)
CALCIUM SERPL-MCNC: 8.4 MG/DL (ref 8.6–10.4)
CHLORIDE SERPL-SCNC: 96 MMOL/L (ref 98–107)
CO2 SERPL-SCNC: 31 MMOL/L (ref 20–31)
CREAT SERPL-MCNC: 1 MG/DL (ref 0.5–0.9)
EOSINOPHIL # BLD: 0.1 K/UL (ref 0–0.4)
EOSINOPHILS RELATIVE PERCENT: 2 % (ref 1–4)
ERYTHROCYTE [DISTWIDTH] IN BLOOD BY AUTOMATED COUNT: 16 % (ref 12.5–15.4)
GFR SERPL CREATININE-BSD FRML MDRD: >60 ML/MIN/1.73M2
GLUCOSE SERPL-MCNC: 125 MG/DL (ref 70–99)
HCT VFR BLD AUTO: 22.4 % (ref 36–46)
HGB BLD-MCNC: 7.5 G/DL (ref 12–16)
LYMPHOCYTES NFR BLD: 0.6 K/UL (ref 1–4.8)
LYMPHOCYTES RELATIVE PERCENT: 11 % (ref 24–44)
MCH RBC QN AUTO: 31.6 PG (ref 26–34)
MCHC RBC AUTO-ENTMCNC: 33.7 G/DL (ref 31–37)
MCV RBC AUTO: 94 FL (ref 80–100)
MONOCYTES NFR BLD: 0.5 K/UL (ref 0.1–1.2)
MONOCYTES NFR BLD: 9 % (ref 2–11)
NEUTROPHILS NFR BLD: 78 % (ref 36–66)
NEUTS SEG NFR BLD: 4.3 K/UL (ref 1.8–7.7)
PLATELET # BLD AUTO: 303 K/UL (ref 140–450)
PMV BLD AUTO: 7.8 FL (ref 6–12)
POTASSIUM SERPL-SCNC: 4.4 MMOL/L (ref 3.7–5.3)
PROT SERPL-MCNC: 6.6 G/DL (ref 6.4–8.3)
RBC # BLD AUTO: 2.38 M/UL (ref 4–5.2)
SODIUM SERPL-SCNC: 135 MMOL/L (ref 135–144)
VDRL CSF QL: NONREACTIVE
WBC OTHER # BLD: 5.5 K/UL (ref 3.5–11)

## 2024-02-23 PROCEDURE — 6370000000 HC RX 637 (ALT 250 FOR IP)

## 2024-02-23 PROCEDURE — 97166 OT EVAL MOD COMPLEX 45 MIN: CPT

## 2024-02-23 PROCEDURE — 6370000000 HC RX 637 (ALT 250 FOR IP): Performed by: PSYCHIATRY & NEUROLOGY

## 2024-02-23 PROCEDURE — 99238 HOSP IP/OBS DSCHRG MGMT 30/<: CPT | Performed by: FAMILY MEDICINE

## 2024-02-23 PROCEDURE — 99232 SBSQ HOSP IP/OBS MODERATE 35: CPT | Performed by: INTERNAL MEDICINE

## 2024-02-23 PROCEDURE — 6360000002 HC RX W HCPCS: Performed by: INTERNAL MEDICINE

## 2024-02-23 PROCEDURE — C9254 INJECTION, LACOSAMIDE: HCPCS | Performed by: PSYCHIATRY & NEUROLOGY

## 2024-02-23 PROCEDURE — 6370000000 HC RX 637 (ALT 250 FOR IP): Performed by: FAMILY MEDICINE

## 2024-02-23 PROCEDURE — 97530 THERAPEUTIC ACTIVITIES: CPT

## 2024-02-23 PROCEDURE — 85025 COMPLETE CBC W/AUTO DIFF WBC: CPT

## 2024-02-23 PROCEDURE — 36415 COLL VENOUS BLD VENIPUNCTURE: CPT

## 2024-02-23 PROCEDURE — 6370000000 HC RX 637 (ALT 250 FOR IP): Performed by: INTERNAL MEDICINE

## 2024-02-23 PROCEDURE — 80048 BASIC METABOLIC PNL TOTAL CA: CPT

## 2024-02-23 PROCEDURE — 97535 SELF CARE MNGMENT TRAINING: CPT

## 2024-02-23 PROCEDURE — 2580000003 HC RX 258: Performed by: PSYCHIATRY & NEUROLOGY

## 2024-02-23 PROCEDURE — 2580000003 HC RX 258: Performed by: INTERNAL MEDICINE

## 2024-02-23 PROCEDURE — C9113 INJ PANTOPRAZOLE SODIUM, VIA: HCPCS | Performed by: INTERNAL MEDICINE

## 2024-02-23 PROCEDURE — 97110 THERAPEUTIC EXERCISES: CPT

## 2024-02-23 PROCEDURE — 6360000002 HC RX W HCPCS: Performed by: PSYCHIATRY & NEUROLOGY

## 2024-02-23 PROCEDURE — 80076 HEPATIC FUNCTION PANEL: CPT

## 2024-02-23 RX ORDER — LEVOTHYROXINE SODIUM 0.15 MG/1
150 TABLET ORAL DAILY
Qty: 30 TABLET | Refills: 3 | DISCHARGE
Start: 2024-02-24

## 2024-02-23 RX ORDER — ATORVASTATIN CALCIUM 40 MG/1
40 TABLET, FILM COATED ORAL NIGHTLY
Status: DISCONTINUED | OUTPATIENT
Start: 2024-02-23 | End: 2024-02-23 | Stop reason: HOSPADM

## 2024-02-23 RX ORDER — GABAPENTIN 100 MG/1
100 CAPSULE ORAL EVERY 8 HOURS PRN
Qty: 90 CAPSULE | Refills: 0 | DISCHARGE
Start: 2024-02-23 | End: 2024-03-24

## 2024-02-23 RX ORDER — THIAMINE MONONITRATE (VIT B1) 100 MG
100 TABLET ORAL DAILY
Refills: 0 | DISCHARGE
Start: 2024-02-24

## 2024-02-23 RX ORDER — SPIRONOLACTONE 25 MG/1
25 TABLET ORAL 2 TIMES DAILY
Qty: 30 TABLET | Refills: 3 | DISCHARGE
Start: 2024-02-23

## 2024-02-23 RX ORDER — BUMETANIDE 2 MG/1
2 TABLET ORAL 3 TIMES DAILY
Refills: 0 | DISCHARGE
Start: 2024-02-23 | End: 2024-03-24

## 2024-02-23 RX ORDER — ACETAMINOPHEN 650 MG/1
650 SUPPOSITORY RECTAL EVERY 6 HOURS PRN
Status: DISCONTINUED | OUTPATIENT
Start: 2024-02-23 | End: 2024-02-23 | Stop reason: HOSPADM

## 2024-02-23 RX ORDER — DULOXETIN HYDROCHLORIDE 30 MG/1
30 CAPSULE, DELAYED RELEASE ORAL DAILY
Qty: 30 CAPSULE | Refills: 3 | DISCHARGE
Start: 2024-02-24

## 2024-02-23 RX ORDER — ATORVASTATIN CALCIUM 40 MG/1
40 TABLET, FILM COATED ORAL NIGHTLY
Qty: 30 TABLET | Refills: 3 | DISCHARGE
Start: 2024-02-23

## 2024-02-23 RX ORDER — FOLIC ACID 1 MG/1
1 TABLET ORAL DAILY
Qty: 30 TABLET | Refills: 3 | DISCHARGE
Start: 2024-02-24

## 2024-02-23 RX ORDER — MIDODRINE HYDROCHLORIDE 5 MG/1
5 TABLET ORAL 3 TIMES DAILY
Qty: 90 TABLET | Refills: 3 | DISCHARGE
Start: 2024-02-23

## 2024-02-23 RX ORDER — ASPIRIN 81 MG/1
81 TABLET ORAL DAILY
Qty: 30 TABLET | Refills: 3 | DISCHARGE
Start: 2024-02-23

## 2024-02-23 RX ORDER — ACETAMINOPHEN 160 MG/5ML
650 LIQUID ORAL EVERY 6 HOURS PRN
Status: DISCONTINUED | OUTPATIENT
Start: 2024-02-23 | End: 2024-02-23 | Stop reason: HOSPADM

## 2024-02-23 RX ORDER — LACOSAMIDE 100 MG/1
100 TABLET ORAL 2 TIMES DAILY
Qty: 60 TABLET | Refills: 0 | Status: SHIPPED | OUTPATIENT
Start: 2024-02-23 | End: 2024-03-24

## 2024-02-23 RX ORDER — LACTULOSE 10 G/15ML
20 SOLUTION ORAL 3 TIMES DAILY
Refills: 1 | DISCHARGE
Start: 2024-02-23

## 2024-02-23 RX ORDER — ASPIRIN 81 MG/1
81 TABLET ORAL DAILY
Status: DISCONTINUED | OUTPATIENT
Start: 2024-02-23 | End: 2024-02-23 | Stop reason: HOSPADM

## 2024-02-23 RX ADMIN — Medication 1 TABLET: at 08:45

## 2024-02-23 RX ADMIN — RIFAXIMIN 550 MG: 550 TABLET ORAL at 08:44

## 2024-02-23 RX ADMIN — MIDODRINE HYDROCHLORIDE 5 MG: 5 TABLET ORAL at 13:23

## 2024-02-23 RX ADMIN — MIDODRINE HYDROCHLORIDE 5 MG: 5 TABLET ORAL at 08:45

## 2024-02-23 RX ADMIN — DULOXETINE HYDROCHLORIDE 30 MG: 30 CAPSULE, DELAYED RELEASE ORAL at 08:45

## 2024-02-23 RX ADMIN — Medication 100 MG: at 08:45

## 2024-02-23 RX ADMIN — SPIRONOLACTONE 25 MG: 25 TABLET, FILM COATED ORAL at 17:50

## 2024-02-23 RX ADMIN — LACOSAMIDE 100 MG: 10 INJECTION INTRAVENOUS at 08:53

## 2024-02-23 RX ADMIN — LEVOTHYROXINE SODIUM 150 MCG: 75 TABLET ORAL at 05:06

## 2024-02-23 RX ADMIN — SODIUM CHLORIDE, PRESERVATIVE FREE 40 MG: 5 INJECTION INTRAVENOUS at 08:44

## 2024-02-23 RX ADMIN — BUMETANIDE 2 MG: 0.25 INJECTION INTRAMUSCULAR; INTRAVENOUS at 02:53

## 2024-02-23 RX ADMIN — SODIUM CHLORIDE, PRESERVATIVE FREE 10 ML: 5 INJECTION INTRAVENOUS at 08:45

## 2024-02-23 RX ADMIN — SPIRONOLACTONE 25 MG: 25 TABLET, FILM COATED ORAL at 08:44

## 2024-02-23 RX ADMIN — BUMETANIDE 2 MG: 0.25 INJECTION INTRAMUSCULAR; INTRAVENOUS at 09:33

## 2024-02-23 RX ADMIN — FOLIC ACID 1 MG: 1 TABLET ORAL at 08:44

## 2024-02-23 RX ADMIN — ASPIRIN 81 MG: 81 TABLET, COATED ORAL at 12:35

## 2024-02-23 RX ADMIN — ACETAMINOPHEN 650 MG: 650 SOLUTION ORAL at 05:46

## 2024-02-23 RX ADMIN — MICONAZOLE NITRATE: 20 CREAM TOPICAL at 08:47

## 2024-02-23 ASSESSMENT — PAIN DESCRIPTION - DESCRIPTORS: DESCRIPTORS: ACHING

## 2024-02-23 ASSESSMENT — PAIN SCALES - GENERAL: PAINLEVEL_OUTOF10: 3

## 2024-02-23 ASSESSMENT — PAIN DESCRIPTION - LOCATION: LOCATION: HEAD

## 2024-02-23 NOTE — PROGRESS NOTES
[facility] Neurology   IN-PATIENT SERVICE      NEUROLOGY PROGRESS  NOTE            Date:   2/23/2024  Patient name:  Clemente Garcia  Date of admission:  2/12/2024  YOB: 1960      Interval History:     No current complaints, overall unchanged.   at bedside.  Preliminary CSF results essentially unremarkable.    History of Present Illness:     Obtained from my partner:     History is obtained mostly from the patient's son and other family members at bedside. Also from the medical record and from the caregivers. Chart is reviewed and patient is examined.   Briefly, this is a  63 y.o. rt handed  female with hx of HTN, dyslipidemia, THURSTON nonalcoholic steatohepatitis, nephrolithiasis and osteoarthritis was admitted on 2/12/2024 as a transfer from [facility] with altered mentation..  As per family members; patient was found less responsive with blood sugar in 40s and also was found to be dehydrated and malnourished.  She was taken to [facility] ER where she was noted to be with urosepsis for which she was initiated on antibiotic therapy.  She had some improvement but it did not persist.  She was having fluctuating mentation for which she was brought to [facility] facility on 2/12/2024.  She was continued on vancomycin and ceftriaxone antibiotic therapy.  Because of acute on chronic encephalopathy; neurology consultation requested.  During this encounter; patient was alert and awake but was noted to be with abnormal movements of extremities as if she was trying to grab something out of air.     Family members at bedside stated that patient has chronic dysphagia and failed swallow studies earlier.  Patient has been on PEG tube for the past 10 days      Past Medical History:     History reviewed. No pertinent past medical history.     Past Surgical History:     Past Surgical History:   Procedure Laterality Date    ABDOMEN SURGERY      Gastric bypass with stimulator     not ambulated since October               Diagnostics:      Laboratory Testing:  CBC:   Recent Labs     02/21/24  0644 02/22/24  0647 02/23/24  0623   WBC 5.0 5.7 5.5   HGB 7.0* 7.9* 7.5*    281 303     BMP:    Recent Labs     02/21/24  0644 02/22/24  0647 02/23/24  0623    138 135   K 4.2 4.3 4.4   CL 99 98 96*   CO2 32* 31 31   BUN 21 21 23   CREATININE 1.1* 1.1* 1.0*   GLUCOSE 120* 109* 125*         Lab Results   Component Value Date    CHOL 68 02/21/2024    LDLCHOLESTEROL 31 02/21/2024    HDL 22 (L) 02/21/2024    TRIG 77 02/21/2024    ALT 8 02/23/2024    AST 22 02/23/2024    TSH 28.67 (H) 02/19/2024    INR 1.0 02/22/2024    LABA1C 4.9 02/20/2024    [id number] 689 02/12/2024    MG 2.1 02/20/2024    PHOS 4.3 02/20/2024       Imaging/Diagnostics:      EEG:  Moderate nonspecific encephalopathy         CT HEAD WO CONTRAST    Narrative  EXAMINATION:  CT OF THE HEAD WITHOUT CONTRAST  2/13/2024 7:44 pm    TECHNIQUE:  CT of the head was performed without the administration of intravenous  contrast. Automated exposure control, iterative reconstruction, and/or weight  based adjustment of the mA/kV was utilized to reduce the radiation dose to as  low as reasonably achievable.    COMPARISON:  None.    HISTORY:  ORDERING SYSTEM PROVIDED HISTORY: altered mental status  TECHNOLOGIST PROVIDED HISTORY:  altered mental status    Reason for Exam: altered mental status    FINDINGS:  Motion limited evaluation.    BRAIN/VENTRICLES: There is no acute intracranial hemorrhage, mass effect or  midline shift.  No abnormal extra-axial fluid collection.  The gray-white  differentiation is maintained without evidence of an acute infarct.  There is  no evidence of hydrocephalus.    ORBITS: The visualized portion of the orbits demonstrate no acute abnormality.    SINUSES: The visualized paranasal sinuses and mastoid air cells demonstrate  no acute abnormality.    SOFT TISSUES/SKULL:  No acute abnormality of the visualized skull or

## 2024-02-23 NOTE — PROGRESS NOTES
Occupational Therapy  Facility/Department: [address]  Occupational Therapy Initial Assessment    Name: Clemente Garcia  : 1960  MRN: [id number]  Date of Service: 2024    Discharge Recommendations:  Patient would benefit from continued therapy after discharge  OT Equipment Recommendations  Equipment Needed:  (AE/DME recommendations TBD)     Patient Diagnosis(es): The primary encounter diagnosis was Left hemiparesis (HCC). A diagnosis of Anemia, unspecified type was also pertinent to this visit.  Past Medical History:  has no past medical history on file.  Past Surgical History:  has a past surgical history that includes Hysterectomy; Upper gastrointestinal endoscopy (N/A, 2024); and Abdomen surgery.    Assessment   Performance deficits / Impairments: Decreased functional mobility ;Decreased safe awareness;Decreased balance;Decreased coordination;Decreased ADL status;Decreased cognition;Decreased posture;Decreased ROM;Decreased endurance;Decreased strength;Decreased fine motor control;Decreased high-level IADLs    Assessment: Pt seen for OT eval/treatment s/p admission with acute metabolic encephalopathy. From PT eval previous date \"Per family, pt has not ambulated since 2023 when pt was using RW. Mostly bed<>gurney transfer since then; very limited mobility with pt sitting EOB with therapy during stay at [facility] (around late 2024 to early 2024, son unsure of exact date when therapy worked with pt). Pt also had a short rehab stay x 6 days.\" Family providing limited information during OT eval. At this time pt is MAX A x2-DEP for all bed mobility, sat EOB for ~4 mins with brief moments of MIN A and majority MAX A; DEP toileting. Pt is currently unsafe to return home due to functioanl deficits listed above. Pt would benefit from skilled OT services during hospitalization and following discharge to support return to [facility].    Prognosis: Fair  Decision Making: Medium  Complexity    REQUIRES OT FOLLOW-UP: Yes  Activity Tolerance  Activity Tolerance: Treatment limited secondary to decreased cognition;Patient limited by fatigue        Plan   Occupational Therapy Plan  Times Per Week: 3-5x/wk  Times Per Day: Once a day  Current Treatment Recommendations: Strengthening, ROM, Balance training, Functional mobility training, Endurance training, Safety education & training, Patient/Caregiver education & training, Equipment evaluation, education, & procurement, Self-Care / ADL, Pain management, Modalities, Positioning     Restrictions  Restrictions/Precautions  Restrictions/Precautions: Other (comment), Bed Alarm (HOB 30 deg, has tube feed; sindhu heel protectors in place)  Required Braces or Orthoses?: Yes ((B) heel protectors)  Implants present? :  (per EMR, has stimulator)  Position Activity Restriction  Other position/activity restrictions: keep HOB 30 degrees due to tube feed; ok for PT per nursing    Subjective   General  Patient assessed for rehabilitation services?: Yes  Family / Caregiver Present: Yes (Spouse present)  Subjective  Subjective: RN ok'd pt for OT eval and PT tx this AM. Pt reporting pain \"all over\" however does not rate pain. Pt agreeable/cooperative throughout and repositioned for comfort.    Social/Functional History  Social/Functional History  Lives With: Spouse (lived at home with spouse but has not been home since Jan 2024)  Type of Home:  (has not been home since Jan 13, 2024; was staying at home with spouse; from [facility] to [facility], also was staying for a short duration in a SNFx 6 days)  Home Layout: Multi-level (tri-level home but plan is to go to a facility post-discharge from hospital stay)  Home Equipment: Rollator  Receives Help From: Family  ADL Assistance: Needs assistance (Reports setup for feeding, grooming; assist for additional ADLs)  Toileting: Needs assistance  Homemaking Assistance: Needs assistance  Homemaking Responsibilities:  Skilled Clinical Factors: MAX A x2-DEP bed mobility  Skin Care: Chlorhexidine wipes;Bath wipes    Activity Tolerance  Activity Tolerance: Patient limited by endurance;Patient limited by pain    Bed mobility  Rolling to Left: Maximum assistance;2 Person assistance  Rolling to Right: 2 Person assistance;Maximum assistance  Supine to Sit: Maximum assistance;2 Person assistance  Sit to Supine: Dependent/Total  Scooting: Maximal assistance;2 Person assistance  Bed Mobility Comments: MAX A x2 supine>sitting EOB with HOB at 30 degrees, sat EOB for ~4 mins ranging MIN-MAX A for balance; DEP for sit>supine and was found with soiled linen; MAX A x2 rolling R<>L for cleaning    Vision  Vision: Impaired  Vision Exceptions: Wears glasses for reading  Hearing  Hearing: Within functional limits    Cognition  Overall Cognitive Status: Exceptions  Arousal/Alertness: Delayed responses to stimuli  Following Commands: Inconsistently follows commands  Attention Span: Attends with cues to redirect  Memory: Decreased recall of biographical Information;Decreased recall of recent events;Decreased short term memory  Safety Judgement: Decreased awareness of need for assistance;Decreased awareness of need for safety  Problem Solving: Assistance required to generate solutions;Assistance required to correct errors made;Assistance required to implement solutions;Decreased awareness of errors;Assistance required to identify errors made  Insights: Decreased awareness of deficits  Initiation: Requires cues for some  Sequencing: Requires cues for some  Orientation  Overall Orientation Status: Impaired  Orientation Level: Disoriented to time;Disoriented to place;Disoriented to situation;Oriented to person    Education Given To: Patient;Family  Education Provided: Role of Therapy;Transfer Training;Equipment;Plan of Care  Education Provided Comments: L hand/wrist positioning; provided with built up utensil foam to promote positioning of L  digits  Education Method: Demonstration;Verbal  Barriers to Learning: Cognition  Education Outcome: Verbalized understanding;Continued education needed    Hand Dominance  Hand Dominance: Right    AM-PAC - ADL  AM-PAC Daily Activity - Inpatient   How much help is needed for putting on and taking off regular lower body clothing?: Total  How much help is needed for bathing (which includes washing, rinsing, drying)?: A Lot  How much help is needed for toileting (which includes using toilet, bedpan, or urinal)?: Total  How much help is needed for putting on and taking off regular upper body clothing?: A Lot  How much help is needed for taking care of personal grooming?: A Lot  How much help for eating meals?: A Little  AM-PAC Inpatient Daily Activity Raw Score: 11  AM-PAC Inpatient ADL T-Scale Score : 29.04  ADL Inpatient CMS 0-100% Score: 70.42  ADL Inpatient CMS G-Code Modifier : CL    Goals  Short Term Goals  Time Frame for Short Term Goals: 14 visits  Short Term Goal 1: Pt to complete bed mobility at MAX A x1  Short Term Goal 2: Pt to complete ADL seated EOB at MOD A x1 for >10 mins with no LOB  Short Term Goal 3: Pt to complete grooming/self-feeding at setup  Short Term Goal 4: Pt to tolerate >15 mins of BUE HEP (resistance training, AROM, AAROM, PROM)    Therapy Time   Individual Concurrent Group Co-treatment   Time In 0942         Time Out 1028         Minutes 46         Timed Code Treatment Minutes: 38 Minutes     Co- treatment with PT warranted secondary to decreased patient safety and independence with functional mobility requiring skilled physical assistance of two professionals to simultaneously address individualized discipline goals. OT is addressing activity tolerance/ADL transfers, while PT is addressing their individualized functional mobility task.    SOFIA GabrielL

## 2024-02-23 NOTE — PROGRESS NOTES
Renal Progress Note    Patient :  Clemente Garcia; 63 y.o. MRN# [id number]  Location:  329/329-01  Attending:  Johanne Philip MD  Admit Date:  2/12/2024   Hospital Day: 11    Subjective:     Patient was seen and examined.  Patient is sleepy and oriented to person only.  No chest pain or SOB, on 2 liters of oxygen by nasal cannula. Patient was in her bed.  She remains very edematous with shiny skin because of persistent lower extremity edema and ascites.    Labs are reviewed and show sodium 135 potassium 4.4 with chloride 96 and CO2 of 31, BUN 23 and creatinine of 1 mg/dl.  Intake and output charting shows improvement in urine output and put out 3.5 L in last 24 hours, .  Patient is on 200 mL of free water twice a day.  She is receiving Bumex 2 mg IV every 8 hours.     No overt GI bleeding s/p EGD which showed swelling and edema in GE junction, gastritis as per gastroenterology notes.    Outpatient Medications:     Medications Prior to Admission: bumetanide (BUMEX) 1 MG tablet, Take 2 tablets by mouth daily  bisacodyl (DULCOLAX) 10 MG suppository, Place 1 suppository rectally daily as needed for Constipation  DULoxetine (CYMBALTA) 60 MG extended release capsule, 1 capsule by Per G Tube route daily  fentaNYL (DURAGESIC) 12 MCG/HR, Place 1 patch onto the skin every 72 hours. Max Daily Amount: 1 patch  gabapentin (NEURONTIN) 100 MG capsule, 1 capsule by PEG Tube route at bedtime.  bisacodyl (DULCOLAX) 5 MG EC tablet, Take 2 tablets by mouth daily as needed for Constipation Per peg TUbe  lactulose (CHRONULAC) 10 GM/15ML solution, 45 mLs by PEG Tube route 2 times daily  levothyroxine (SYNTHROID) 125 MCG tablet, 1 tablet by PEG Tube route Daily  magnesium hydroxide (MILK OF MAGNESIA) 400 MG/5ML suspension, 30 mLs by Per G Tube route daily as needed for Constipation  morphine 10 MG/5ML solution, 5 mLs by PEG Tube route every 4 hours as needed for Pain. Max Daily Amount: 60 mg  Multiple Vitamins-Minerals (CENTRUM/CERTA-NIEVES WITH  MINERALS ORAL) solution, Take 15 mLs by mouth daily  nortriptyline (PAMELOR) 10 MG capsule, 1 capsule by PEG Tube route nightly  acetaminophen (TYLENOL) 325 MG tablet, 2 tablets by PEG Tube route every 6 hours as needed for Pain For pain/fever  venlafaxine (EFFEXOR) 75 MG tablet, Take 1 tablet by mouth 2 times daily  [DISCONTINUED] levothyroxine (SYNTHROID) 137 MCG tablet, Take by mouth daily    Current Medications:     Scheduled Meds:    bumetanide  2 mg IntraVENous Q8H    midodrine  5 mg Oral TID    levothyroxine  150 mcg PEG Tube Daily    spironolactone  25 mg Oral BID    folic acid  1 mg PEG Tube Daily    thiamine mononitrate  100 mg Per G Tube Daily    therapeutic multivitamin-minerals  1 tablet Per G Tube Daily    lacosamide (VIMPAT) 100 mg in sodium chloride 0.9 % 60 mL IVPB  100 mg IntraVENous BID    miconazole   Topical BID    nortriptyline  10 mg PEG Tube Nightly    glycopyrrolate  0.4 mg IntraVENous Once    DULoxetine  30 mg Per G Tube Daily    sodium chloride flush  5-40 mL IntraVENous 2 times per day    pantoprazole (PROTONIX) 40 mg in sodium chloride (PF) 0.9 % 10 mL injection  40 mg IntraVENous Daily    lactulose  20 g PEG Tube TID    rifAXIMin  550 mg PEG Tube BID    fentaNYL  1 patch TransDERmal Q72H     Continuous Infusions:    sodium chloride Stopped (24 1305)     PRN Meds:  acetaminophen **OR** acetaminophen, gabapentin, potassium chloride **OR** potassium alternative oral replacement **OR** potassium chloride, magnesium sulfate, sodium chloride flush, sodium chloride, ondansetron **OR** ondansetron    Input/Output:       I/O last 3 completed shifts:  In: 3499 [NG/GT:3499]  Out: 4400 [Urine:4400].    Patient Vitals for the past 96 hrs (Last 3 readings):   Weight   24 1058 103 kg (227 lb)   24 0145 103 kg (227 lb 1.2 oz)       Vital Signs:   Temperature:  Temp: 98.1 °F (36.7 °C)  TMax:   Temp (24hrs), Av.8 °F (36.6 °C), Min:97.5 °F (36.4 °C), Max:98.1 °F (36.7  BsAg:         Lab Results   Component Value Date/Time    HEPBSAG NONREACTIVE 02/12/2024 01:27 PM     Hep C AB:          Lab Results   Component Value Date/Time    HEPCAB NONREACTIVE 02/12/2024 01:27 PM     Urinalysis/Chemistries:      Urine Sodium:     Lab Results   Component Value Date/Time    FENG 96 02/14/2024 03:47 PM     Urine Osmolarity:   Lab Results   Component Value Date/Time    OSMOU 400 02/14/2024 03:47 PM     Radiology:     Reviewed.     Assessment:     Acute Kidney Injury likely prerenal from decreased circulating volume related to third spacing in the setting of liver cirrhosis, now seems to be improving.  Baseline creatinine normal in the range of 0.5-0.6 mg/dl, now is currently plateauing and creatinine over the past 9 days is in the range of 1-1.1 mg/dl.  Hypernatremia, resolved  Generalized anasarca: Patient is on Bumex 2 mg IV every 8 hours with 25 mg of Aldactone   Encephalopathy likely related to liver issues.  Neurology is on board and had an LP on 2/22/2024  Drop in hemoglobin- No overt GI bleeding s/p EGD 2/13/2023 which showed swelling and edema in GE junction, gastritis as per gastroenterology notes.last hemoglobin today 7 g/dl  Cirrhosis of liver likely due to THURSTON with portal hypertension and ascites status post paracentesis with 1 L clear yellow fluid removal done on 2/13/2023.  Total body volume overload  Dysphagia status post PEG tube in place.  Chronic, indwelling Loaiza catheter      Plan:   Stable Bumex dose to continue at 2 mg IV every 8 hours  Monitor strict I's and O's and renal function.  Continue ProAmatine 5 mg 3 times a day.  With serum sodium down to 135 mmol/L will decrease free water down to 50 mL every 12 hours with the continuous tube feeds ongoing  Follow labs as ordered      Nutrition   Nutrition is via tube feeding with the patient n.p.o. Avoid nephrotoxic drugs/contrast exposure.    Daniel Danielle MD  [facility]  2/22/2024     This note is created  with the assistance of a speech-recognition program. While intending to generate a document that actually reflects the content of the visit, no guarantees can be provided that every mistake has been identified and corrected by editing.

## 2024-02-23 NOTE — PROGRESS NOTES
Report called to [place] [facility] RN, ED. RN verbalized understanding with no questions at this time.

## 2024-02-23 NOTE — CARE COORDINATION
Patient cleared from neurology for discharge to skilled nursing facility. Dr. Murphy notified of clearance and plans to discharge today. Call placed to facility liaison, Betty, and she indicated they can accept today after 1730. Transport request made for 1800 with [facility]. Son, Marek, notified of plan for discharge but need update when time confirmed. , Joni, in room and was told of plan for 1800 discharge. Nurse to complete DELMY for discharge. Nurse can call report to [phone].

## 2024-02-23 NOTE — PROGRESS NOTES
SPIRITUAL CARE DEPARTMENT - [facility]  PROGRESS NOTE    Room # 329/329-01   Name: Clemente Garcia               Reason for visit: Routine    I visited the  patient and family .    Admit Date & Time: 2/12/2024  1:23 AM    Assessment:  Clemente Garcia is a 63 y.o. female in the hospital because \"acute metablic encephalopathy\". Upon entering the room pt was lying in bed. Pt's spouse and another family member were seated bedside. Pt's family shared that pt is going to nursing home today. Pt's family expressed feeling good about pt going to nursing home, and expressed gratitude that the nursing home is closer to their home. Pt welcomed prayer. Pt appeared to be calm, and passive.       Intervention:  I introduced myself and my title as  I offered space for patient and family  to express feelings, needs, and concerns and provided a ministry presence. Writer actively listened to pt and family. Writer offered words of affirmation. Writer offered a prayer for pt.     Outcome:  Pt and family expressed gratitude for visit     Plan:  Chaplains will remain available to offer spiritual and emotional support as needed.    Electronically signed by Chaplain Hannah, on 2/23/2024 at 2:17 PM.  Spiritual Care Department  [facility]        02/23/24 1416   Encounter Summary   Encounter Overview/Reason  Follow-up   Service Provided For: Patient and family together   Referral/Consult From: Palliative Care   Support System Spouse;Family members   Last Encounter  02/23/24   Complexity of Encounter Low   Begin Time 1410   End Time  1417   Total Time Calculated 7 min   Spiritual/Emotional needs   Type Spiritual Support   Assessment/Intervention/Outcome   Assessment Calm;Coping;Impaired social interaction   Intervention Active listening;Sustaining Presence/Ministry of presence;Prayer (assurance of)/[place]   Outcome Engaged in conversation;Expressed Gratitude;Coping;Receptive   Plan and Referrals   Plan/Referrals Continue  Support (comment)

## 2024-02-23 NOTE — PLAN OF CARE
Problem: Discharge Planning  Goal: Discharge to home or other facility with appropriate resources  Outcome: Progressing  Flowsheets (Taken 2/22/2024 2000)  Discharge to home or other facility with appropriate resources: Identify barriers to discharge with patient and caregiver     Problem: Pain  Goal: Verbalizes/displays adequate comfort level or baseline comfort level  Outcome: Progressing     Problem: Confusion  Goal: Confusion, delirium, dementia, or psychosis is improved or at baseline  Description: INTERVENTIONS:  1. Assess for possible contributors to thought disturbance, including medications, impaired vision or hearing, underlying metabolic abnormalities, dehydration, psychiatric diagnoses, and notify attending LIP  2. [place] high risk fall precautions, as indicated  3. Provide frequent short contacts to provide reality reorientation, refocusing and direction  4. Decrease environmental stimuli, including noise as appropriate  5. Monitor and intervene to maintain adequate nutrition, hydration, elimination, sleep and activity  6. If unable to ensure safety without constant attention obtain sitter and review sitter guidelines with assigned personnel  7. Initiate Psychosocial CNS and Spiritual Care consult, as indicated  Outcome: Progressing  Flowsheets (Taken 2/22/2024 2000)  Effect of thought disturbance (confusion, delirium, dementia, or psychosis) are managed with adequate functional status: Assess for contributors to thought disturbance, including medications, impaired vision or hearing, underlying metabolic abnormalities, dehydration, psychiatric diagnoses, notify LIP     Problem: Skin/Tissue Integrity  Goal: Absence of new skin breakdown  Description: 1.  Monitor for areas of redness and/or skin breakdown  2.  Assess vascular access sites hourly  3.  Every 4-6 hours minimum:  Change oxygen saturation probe site  4.  Every 4-6 hours:  If on nasal continuous positive airway pressure, respiratory

## 2024-02-23 NOTE — PLAN OF CARE
Problem: Discharge Planning  Goal: Discharge to home or other facility with appropriate resources  2/23/2024 1054 by Polly Mistry RN  Outcome: Progressing  Flowsheets (Taken 2/23/2024 0853)  Discharge to home or other facility with appropriate resources: Identify barriers to discharge with patient and caregiver  2/23/2024 0422 by Chaparrita Rossi RN  Outcome: Progressing  Flowsheets (Taken 2/22/2024 2000)  Discharge to home or other facility with appropriate resources: Identify barriers to discharge with patient and caregiver     Problem: Pain  Goal: Verbalizes/displays adequate comfort level or baseline comfort level  2/23/2024 1054 by Polly Mistry RN  Outcome: Progressing  2/23/2024 0422 by Chaparrita Rossi RN  Outcome: Progressing     Problem: Confusion  Goal: Confusion, delirium, dementia, or psychosis is improved or at baseline  Description: INTERVENTIONS:  1. Assess for possible contributors to thought disturbance, including medications, impaired vision or hearing, underlying metabolic abnormalities, dehydration, psychiatric diagnoses, and notify attending LIP  2. [place] high risk fall precautions, as indicated  3. Provide frequent short contacts to provide reality reorientation, refocusing and direction  4. Decrease environmental stimuli, including noise as appropriate  5. Monitor and intervene to maintain adequate nutrition, hydration, elimination, sleep and activity  6. If unable to ensure safety without constant attention obtain sitter and review sitter guidelines with assigned personnel  7. Initiate Psychosocial CNS and Spiritual Care consult, as indicated  2/23/2024 1054 by Polly Mistry RN  Outcome: Progressing  2/23/2024 0422 by Chaparrita Rossi RN  Outcome: Progressing  Flowsheets (Taken 2/22/2024 2000)  Effect of thought disturbance (confusion, delirium, dementia, or psychosis) are managed with adequate functional status: Assess for contributors to thought  RN  Outcome: Progressing  Flowsheets (Taken 2/22/2024 2000)  Achieves optimal ventilation and oxygenation: Assess for changes in respiratory status     Problem: Cardiovascular - Adult  Goal: Maintains optimal cardiac output and hemodynamic stability  2/23/2024 1054 by Polly Mistry RN  Outcome: Progressing  Flowsheets (Taken 2/23/2024 0853)  Maintains optimal cardiac output and hemodynamic stability: Monitor blood pressure and heart rate  2/23/2024 0422 by Chaparrita Rossi RN  Outcome: Progressing  Flowsheets (Taken 2/22/2024 2000)  Maintains optimal cardiac output and hemodynamic stability: Monitor blood pressure and heart rate     Problem: Skin/Tissue Integrity - Adult  Goal: Skin integrity remains intact  2/23/2024 1054 by Polly Mistry RN  Outcome: Progressing  Flowsheets (Taken 2/23/2024 0853)  Skin Integrity Remains Intact: Monitor for areas of redness and/or skin breakdown  2/23/2024 0422 by Chaparrita Rossi RN  Outcome: Progressing  Flowsheets (Taken 2/22/2024 2000)  Skin Integrity Remains Intact: Monitor for areas of redness and/or skin breakdown     Problem: Musculoskeletal - Adult  Goal: Return mobility to safest level of function  2/23/2024 1054 by Polly Mistry RN  Outcome: Progressing  2/23/2024 0422 by Chaparrita Rossi RN  Outcome: Progressing     Problem: Gastrointestinal - Adult  Goal: Minimal or absence of nausea and vomiting  2/23/2024 1054 by Polly Mistry RN  Outcome: Progressing  2/23/2024 0422 by Chaparrita Rossi RN  Outcome: Progressing  Flowsheets (Taken 2/22/2024 2000)  Minimal or absence of nausea and vomiting: Administer IV fluids as ordered to ensure adequate hydration     Problem: Genitourinary - Adult  Goal: Absence of urinary retention  2/23/2024 1054 by Polly Mistry RN  Outcome: Progressing  2/23/2024 0422 by Chaparrita Rossi RN  Outcome: Progressing  Flowsheets (Taken 2/22/2024 2000)  Absence of urinary retention: Assess patient’s

## 2024-02-23 NOTE — DISCHARGE SUMMARY
care physician in 3 days.  - Follow up with your gastroenterology in 4  weeks.  - Follow up with nephrology in 2-4 weeks  - Have volume status evaluated by facility doctor in 1 week. Bumex acutely increased to 2mg TID from baseline of 2mg BID.   - Please call tertiary center such as [facility], [facility] or [facility] for second opinion with neurology     MEDICATIONS  - Continue taking your other home medications as directed.    ADDITIONAL INSTRUCTIONS  - Please return immediately to the [facility] Emergency Department if there is worsening altered mental status, increased work of breathing, chest pain, fever, or any other concerning symptoms.        Discharge Medications:      Medication List        ASK your doctor about these medications      acetaminophen 325 MG tablet  Commonly known as: TYLENOL     * bisacodyl 10 MG suppository  Commonly known as: DULCOLAX     * bisacodyl 5 MG EC tablet  Commonly known as: DULCOLAX     bumetanide 1 MG tablet  Commonly known as: BUMEX     CENTRUM/CERTA-NIEVES with minerals oral solution     DULoxetine 60 MG extended release capsule  Commonly known as: CYMBALTA     fentaNYL 12 MCG/HR  Commonly known as: DURAGESIC     gabapentin 100 MG capsule  Commonly known as: NEURONTIN     lactulose 10 GM/15ML solution  Commonly known as: CHRONULAC     levothyroxine 125 MCG tablet  Commonly known as: SYNTHROID  Ask about: Which instructions should I use?     magnesium hydroxide 400 MG/5ML suspension  Commonly known as: MILK OF MAGNESIA     morphine 10 MG/5ML solution     nortriptyline 10 MG capsule  Commonly known as: PAMELOR     venlafaxine 75 MG tablet  Commonly known as: EFFEXOR           * This list has 2 medication(s) that are the same as other medications prescribed for you. Read the directions carefully, and ask your doctor or other care provider to review them with you.                  Time spent on discharge is less than 30 minutes in patient  examination, evaluation, counseling as well as medication reconciliation, prescriptions for required medications, discharge plan and follow up.    Discharge plan was discussed with MD Warner Martínez DO  Family Medicine Resident, PGY-1   2/23/2024  11:47 AM    Attending Attestation    I have seen the patient and reviewed the documentation/discharge summary.  I agree with the documentation with any changes/additions as noted below.  Please see the patient note on day of discharge for further details.     Electronically signed by Roscoe Murphy MD on 2/25/2024 at 7:40 PM        Thank you Milana Gonzalez MD for the opportunity to be involved in this patient's care.

## 2024-02-23 NOTE — DISCHARGE INSTRUCTIONS
Date of admission: 2/12/2024  Date of discharge: 02/23/24    Your care was provided by the attending and resident physicians of the [facility] Program. The primary encounter diagnosis was Left hemiparesis (HCC). A diagnosis of Anemia, unspecified type was also pertinent to this visit.    FOLLOW-UP  - Follow up with your primary care physician in 3 days.  - Follow up with your gastroenterology in 4  weeks.  - Follow up with nephrology in 2-4 weeks  - Have volume status evaluated by facility doctor in 1 week. Bumex acutely increased to 2mg TID from baseline of 2mg BID.   - Please call tertiary center such as [facility], [facility] or [facility] for second opinion with neurology     MEDICATIONS  - Continue taking your other home medications as directed.    ADDITIONAL INSTRUCTIONS  - Please return immediately to the [facility] Emergency Department if there is worsening altered mental status, increased work of breathing, chest pain, fever, or any other concerning symptoms.

## 2024-02-23 NOTE — PLAN OF CARE
Problem: Discharge Planning  Goal: Discharge to home or other facility with appropriate resources  2/23/2024 1823 by Polly Mistry RN  Outcome: Completed  2/23/2024 1054 by Polly Mistry RN  Outcome: Progressing  Flowsheets (Taken 2/23/2024 0853)  Discharge to home or other facility with appropriate resources: Identify barriers to discharge with patient and caregiver     Problem: Pain  Goal: Verbalizes/displays adequate comfort level or baseline comfort level  2/23/2024 1823 by Polly Mistry RN  Outcome: Completed  2/23/2024 1054 by Polly Mistry RN  Outcome: Progressing     Problem: Confusion  Goal: Confusion, delirium, dementia, or psychosis is improved or at baseline  Description: INTERVENTIONS:  1. Assess for possible contributors to thought disturbance, including medications, impaired vision or hearing, underlying metabolic abnormalities, dehydration, psychiatric diagnoses, and notify attending LIP  2. [place] high risk fall precautions, as indicated  3. Provide frequent short contacts to provide reality reorientation, refocusing and direction  4. Decrease environmental stimuli, including noise as appropriate  5. Monitor and intervene to maintain adequate nutrition, hydration, elimination, sleep and activity  6. If unable to ensure safety without constant attention obtain sitter and review sitter guidelines with assigned personnel  7. Initiate Psychosocial CNS and Spiritual Care consult, as indicated  2/23/2024 1823 by Polly Mistry RN  Outcome: Completed  2/23/2024 1054 by Polly Mistry RN  Outcome: Progressing     Problem: Skin/Tissue Integrity  Goal: Absence of new skin breakdown  Description: 1.  Monitor for areas of redness and/or skin breakdown  2.  Assess vascular access sites hourly  3.  Every 4-6 hours minimum:  Change oxygen saturation probe site  4.  Every 4-6 hours:  If on nasal continuous positive airway pressure, respiratory therapy assess nares and determine need for    Problem: Musculoskeletal - Adult  Goal: Return mobility to safest level of function  2/23/2024 1823 by Polly Mistry RN  Outcome: Completed  2/23/2024 1054 by Polly Mistry RN  Outcome: Progressing     Problem: Gastrointestinal - Adult  Goal: Minimal or absence of nausea and vomiting  2/23/2024 1823 by Polly Mistry RN  Outcome: Completed  2/23/2024 1054 by Polly Mistry RN  Outcome: Progressing     Problem: Genitourinary - Adult  Goal: Absence of urinary retention  2/23/2024 1823 by Polly Mistry RN  Outcome: Completed  2/23/2024 1054 by Polly Mistry RN  Outcome: Progressing     Problem: Infection - Adult  Goal: Absence of infection at discharge  2/23/2024 1823 by Polly Mistry RN  Outcome: Completed  2/23/2024 1054 by Polly Mistry RN  Outcome: Progressing  Flowsheets (Taken 2/23/2024 0853)  Absence of infection at discharge: Assess and monitor for signs and symptoms of infection     Problem: Metabolic/Fluid and Electrolytes - Adult  Goal: Electrolytes maintained within normal limits  2/23/2024 1823 by Polly Mistry RN  Outcome: Completed  2/23/2024 1054 by Polly Mistry RN  Outcome: Progressing  Flowsheets (Taken 2/23/2024 0853)  Electrolytes maintained within normal limits: Monitor labs and assess patient for signs and symptoms of electrolyte imbalances    D/c edu and paperwork complete. Patient's son verbalized understanding. PIV removed without complications. [place] and PEG tube WNL. D/C with montejo in place per Dr. Jacobs order. Vimpat paper prescription in discharge packet. Awaiting transport arrival at this time.

## 2024-02-23 NOTE — PROGRESS NOTES
Physical Therapy  Facility/Department: [address]  Physical Therapy Daily Progress Note    Name: Clemente Garcia  : 1960  MRN: [id number]  Date of Service: 2024    Discharge Recommendations:  Patient would benefit from continued therapy after discharge   PT Equipment Recommendations  Equipment Needed: No      Patient Diagnosis(es): The primary encounter diagnosis was Left hemiparesis (HCC). A diagnosis of Anemia, unspecified type was also pertinent to this visit.  Past Medical History:  has no past medical history on file.  Past Surgical History:  has a past surgical history that includes Hysterectomy; Upper gastrointestinal endoscopy (N/A, 2024); and Abdomen surgery.    Assessment   Body Structures, Functions, Activity Limitations Requiring Skilled Therapeutic Intervention: Decreased functional mobility ;Decreased ROM;Decreased strength;Decreased safe awareness  Assessment: Pt being seen for mobility and strength deficits. Per family, pt has not ambulated since 2023 when pt was using RW. Mostly bed<>gurney transfer since then; very limited mobility. Pt requires Max assist x 2 for bed mobility and sat edge of bed ~ 4 minutes with Min-Max assist to maintain. Limited mobility in LE's due to edema and pain.  Per son, plan is to go to facility upon discharge for further therapy.  Therapy Prognosis: Fair  Requires PT Follow-Up: Yes  Activity Tolerance  Activity Tolerance: Patient limited by endurance;Patient limited by pain     Plan   Physical Therapy Plan  General Plan: 3-5 times per week  Current Treatment Recommendations: Strengthening, ROM, Functional mobility training, Balance training, Therapeutic activities  Safety Devices  Type of Devices: Bed alarm in place, Left in bed, Nurse notified, Heels elevated for pressure relief, Call light within reach  Restraints  Restraints Initially in Place: No     Restrictions  Restrictions/Precautions  Restrictions/Precautions: Other (comment), Bed Alarm

## 2024-02-23 NOTE — PROGRESS NOTES
[facility]  Inpatient Service     Progress Note  2/23/2024    8:21 AM    Name:   Clemente Garcia  MRN:     [id number]     Acct:      [id number]   Room:   [facility]329-01   Day:  11  Admit Date:  2/12/2024  1:23 AM    PCP:   Milana Whalen MD  Code Status:  Full Code    Subjective:     Overnight events: None    Patient has no complaints besides feeling cold this morning which was remedied by adding a blanket and turning off fan.  Patient is alert to self and speech has continued to improve with less hesitation between words.  Alert to self, but has continued issues with memory.  She recognizes she is in the hospital but still does not know which despite being reorientated daily.  Has no back pain despite lumbar puncture yesterday and does not remember the procedure.  Continues to deny shortness of breath, chest pain, nausea, vomiting, abdominal pain, or any other acute complaints.  Still unsure of the reliability of ROS.  Medications:     Allergies:  No Known Allergies    Current Meds:   Scheduled Meds:    bumetanide  2 mg IntraVENous Q8H    midodrine  5 mg Oral TID    levothyroxine  150 mcg PEG Tube Daily    spironolactone  25 mg Oral BID    folic acid  1 mg PEG Tube Daily    thiamine mononitrate  100 mg Per G Tube Daily    therapeutic multivitamin-minerals  1 tablet Per G Tube Daily    lacosamide (VIMPAT) 100 mg in sodium chloride 0.9 % 60 mL IVPB  100 mg IntraVENous BID    miconazole   Topical BID    nortriptyline  10 mg PEG Tube Nightly    glycopyrrolate  0.4 mg IntraVENous Once    DULoxetine  30 mg Per G Tube Daily    sodium chloride flush  5-40 mL IntraVENous 2 times per day    pantoprazole (PROTONIX) 40 mg in sodium chloride (PF) 0.9 % 10 mL injection  40 mg IntraVENous Daily    lactulose  20 g PEG Tube TID    rifAXIMin  550 mg PEG Tube BID    fentaNYL  1 patch TransDERmal Q72H     Continuous Infusions:    sodium chloride Stopped (02/21/24 1305)     PRN Meds:  --  15  --   --    POCGLU 95 80  --   --   --        ABG:No results found for: \"POCPH\", \"PHART\", \"PH\", \"POCPCO2\", \"YOF7JWW\", \"PCO2\", \"POCPO2\", \"PO2ART\", \"PO2\", \"POCHCO3\", \"BZG0OPU\", \"HCO3\", \"NBEA\", \"PBEA\", \"BEART\", \"BE\", \"THGBART\", \"THB\", \"YAI7REI\", \"KDRO0DJN\", \"F0HJXTAB\", \"O2SAT\", \"FIO2\"  Lab Results   Component Value Date/Time    SPECIAL 10ml left hand 02/12/2024 04:13 PM     Lab Results   Component Value Date/Time    CULTURE NO GROWTH 14 HOURS 02/22/2024 10:16 AM       Radiology:  US LIVER    Result Date: 2/12/2024  Morphologic findings consistent with cirrhosis. The major hepatic veins, main hepatic artery and imaged IVC are patent.       Physical Examination:   Physical Exam  Vitals reviewed.   Cardiovascular:      Rate and Rhythm: Normal rate and regular rhythm.      Pulses: Normal pulses.      Heart sounds: Normal heart sounds. No murmur heard.  Pulmonary:      Effort: Pulmonary effort is normal. No respiratory distress.      Breath sounds: No wheezing or rales.   Chest:      Chest wall: No tenderness.   Abdominal:      General: There is distension (stable).      Tenderness: There is no abdominal tenderness. There is no guarding.   Musculoskeletal:      Right upper arm: Edema present.      Left upper arm: Edema present.      Right lower leg: Edema (pitting +4, similar) present.      Left lower leg: Edema (pitting +4, similar) present.      Comments: Full body edema   Skin:     Comments: Flaking of skin noticed on upper thighs and bottom of feet. Stable   Neurological:      Mental Status: She is disoriented.      Comments: Oriented to Name and situation. Unable to state name of hospital and own birthday. Continued memory issues   Psychiatric:         Cognition and Memory: Memory is impaired.     Assessment:     Hospital Problems             Last Modified POA    * (Principal) Acute metabolic encephalopathy 2/13/2024 Yes    Gait instability 2/13/2024 Yes    Other specified anemias 2/13/2024 Yes    Anemia  be contributing to mental status which has improved with the decreasing sodium levels. GI at this time does not believe that there is a GI etiology and neurology was consulted who believes there is a possibility of temporal lobe seizures which was not seen on EEG. Neurology wanted MRI, but unfortunately radiology requires op notes due to gastric stimulator possibly not being compatible with MRI.  Patient underwent lumbar puncture on Thursday 2/22 with interventional radiology, and CTA head neck and CT head without contrast were done showing no acute abnormalities.     Plan:     Acute metabolic encephalopathy, improving  Altered mental status  Cirrhosis of liver w/ mild ascites  Elevated liver enzymes  c/w xifaxin (rifaximin 550mg BID) and titrate lactulose 20g for 2-3 BM/day   Neurology consulted out of concern for possible seizures  Vimpat 100mg IV BID  Lumbar puncture 2/22/2024  Oligoclonal bands, [place], VDRL, lyme are all pending  Meningitis encephalitis panel negative  CSF protein 24.3, glucose 71, no bacteria seen on CSF NGTD      Anemia, hgb stable  Hemoglobin (g/dL)   Date Value   02/23/2024 7.5 (L)   02/22/2024 7.9 (L)   02/21/2024 7.0 (LL)   02/20/2024 7.2 (L)     S/P 1 U PRBC  Trending HH, transfuse if hgb <7  Monitor for signs of bleeding  continue daily CBC    Severe protein calorie malnutrition  S/P percutaneous endoscopic gastrostomy (PEG) tube placement (HCC)  Hypoalbuminemia, decreased today  Sodium (mmol/L)   Date Value   02/23/2024 135   02/22/2024 138   02/21/2024 138   02/20/2024 141     Albumin (g/dL)   Date Value   02/23/2024 2.5 (L)   02/22/2024 2.8 (L)   02/21/2024 2.8 (L)     Continue decrease free water 200mL BID  Dietary consulted  Nephrology consulted w/ recommendations appreciated  increased bumex 2mg IV to TID  Continue metalozone 5mg daily  continue aldactone 25mg BID  Continue proamatine 5mg TID, hold SBP >120  Continue Folic acid 1mg daily, Thiamine mononitrate 100mg daily, and

## 2024-02-25 LAB
CSF ISOELECTRIC FOCUSING INTERPRETATION: NORMAL
MICROORGANISM SPEC CULT: NORMAL
MICROORGANISM/AGENT SPEC: NORMAL
OLIGOCLONAL BANDS CSF IEF: 0 BANDS (ref 0–1)
OLIGOCLONAL BANDS: NEGATIVE
SPECIMEN DESCRIPTION: NORMAL

## 2024-02-26 LAB
B BURGDOR AB CSF IA-ACNC: 0.19 IV
WEST NILE IGG, CSF: 0.14 IV
WEST NILE IGM ANTIBODY CSF: 0.01 IV

## 2024-03-01 ENCOUNTER — HOSPITAL ENCOUNTER (OUTPATIENT)
Dept: ULTRASOUND IMAGING | Age: 64
End: 2024-03-01
Payer: MEDICAID

## 2024-03-01 ENCOUNTER — TELEPHONE (OUTPATIENT)
Dept: GASTROENTEROLOGY | Age: 64
End: 2024-03-01

## 2024-03-01 DIAGNOSIS — R18.8 OTHER ASCITES: ICD-10-CM

## 2024-03-01 PROCEDURE — 49083 ABD PARACENTESIS W/IMAGING: CPT

## 2024-03-01 NOTE — FLOWSHEET NOTE
Paracentesis     KRISH Alvarado RN  MF  RDMS    Pt. To ultrasound room 8  Identified, allergies confirmed  Supine position  Stable    Time out complete. Prep to abd.    1150 L of CLEAR YELLOW colored fluid drawn off.   2x2 with Tegaderm to puncture site.   NO samples at THIS  time.   Patient tolerated well. No problems noted.     Patient stable, off monitor and AWAITING TRANSPORT IN X-RAY HOLDING AREA -WRITER SPOKE WITH TRANSPORTER

## 2024-03-01 NOTE — BRIEF OP NOTE
Brief Postoperative Note for Paracentesis    Clemente Garcia  YOB: 1960  [id number]    Pre-operative Diagnosis:  Ascites     Post-operative Diagnosis: Same    Procedure: Ultrasound guided Paracentesis     Anesthesia: 1% Lidocaine     Surgeons/Assistants: Sonu Dhaliwal PA-C    Complications: none    EBL: Minimal    Specimens: Were obtained    Ultrasound guided paracentesis performed. 1150 ml clear yellow fluid obtained.  Dressing applied.     Electronically signed by ALIYA Dillon on 3/1/2024 at 2:28 PM

## 2024-03-01 NOTE — TELEPHONE ENCOUNTER
Kiley from [facility] calling to make a 4 week follow up appointment with MELANIE Sandhu/C from [facility] 02/23/2024, nitin Cao, no openings, please call  [facility] [phone] psc/sc

## 2024-03-04 ENCOUNTER — TELEPHONE (OUTPATIENT)
Dept: NEUROLOGY | Age: 64
End: 2024-03-04

## 2024-03-04 LAB
COLLECT DURATION TIME SPEC: 24 H
COPPER, URINE /24 HR: 14.7 UG/D (ref 3–45)
COPPER, URINE /VOLUME: 1.4 UG/DL
COPPER, URINE RATIO CREATININE: 35.9 UG/G CRT (ref 10–45)
CREAT 24H UR-MCNC: 39 MG/DL
CREATININE URINE /24 HR: 410 MG/D (ref 500–1400)
SPECIMEN VOL ?TM UR: ABNORMAL ML

## 2024-03-04 NOTE — TELEPHONE ENCOUNTER
Marek called the office and stated that his mother (Clemente) was inpatient at [facility] and seen by Dr. Donovan. He is trying to get the results of Clemente's lumbar puncture and high copper urine test.    Please review and respond.   Thank you.

## 2024-03-06 NOTE — TELEPHONE ENCOUNTER
Please let the patient know that I did not order CSF testing. It was done by different doctor.   Will discuss about it during upcoming follow up visit.   Thank you.   -dr. kunz

## 2024-03-21 ENCOUNTER — APPOINTMENT (OUTPATIENT)
Dept: GENERAL RADIOLOGY | Age: 64
DRG: 466 | End: 2024-03-21
Payer: MEDICAID

## 2024-03-21 ENCOUNTER — HOSPITAL ENCOUNTER (INPATIENT)
Age: 64
LOS: 4 days | Discharge: HOME HEALTH | DRG: 466 | End: 2024-03-25
Attending: EMERGENCY MEDICINE | Admitting: FAMILY MEDICINE
Payer: MEDICAID

## 2024-03-21 DIAGNOSIS — N39.0 URINARY TRACT INFECTION WITH HEMATURIA, SITE UNSPECIFIED: ICD-10-CM

## 2024-03-21 DIAGNOSIS — R56.9 SEIZURE-LIKE ACTIVITY (HCC): ICD-10-CM

## 2024-03-21 DIAGNOSIS — G89.29 OTHER CHRONIC BACK PAIN: ICD-10-CM

## 2024-03-21 DIAGNOSIS — M54.9 OTHER CHRONIC BACK PAIN: ICD-10-CM

## 2024-03-21 DIAGNOSIS — R31.9 URINARY TRACT INFECTION WITH HEMATURIA, SITE UNSPECIFIED: ICD-10-CM

## 2024-03-21 DIAGNOSIS — K70.31 ASCITES DUE TO ALCOHOLIC CIRRHOSIS (HCC): ICD-10-CM

## 2024-03-21 DIAGNOSIS — D64.9 ANEMIA, UNSPECIFIED TYPE: ICD-10-CM

## 2024-03-21 DIAGNOSIS — G40.209: ICD-10-CM

## 2024-03-21 DIAGNOSIS — J96.01 ACUTE RESPIRATORY FAILURE WITH HYPOXIA (HCC): Primary | ICD-10-CM

## 2024-03-21 DIAGNOSIS — I50.9 ACUTE ON CHRONIC CONGESTIVE HEART FAILURE, UNSPECIFIED HEART FAILURE TYPE (HCC): ICD-10-CM

## 2024-03-21 PROBLEM — J96.00 ACUTE RESPIRATORY FAILURE (HCC): Status: ACTIVE | Noted: 2024-03-21

## 2024-03-21 PROBLEM — N30.01 ACUTE CYSTITIS WITH HEMATURIA: Status: ACTIVE | Noted: 2024-03-21

## 2024-03-21 LAB
ALBUMIN SERPL-MCNC: 3 G/DL (ref 3.5–5.2)
ALBUMIN/GLOB SERPL: 0.5 {RATIO} (ref 1–2.5)
ALP SERPL-CCNC: 172 U/L (ref 35–104)
ALT SERPL-CCNC: 13 U/L (ref 5–33)
ANION GAP SERPL CALCULATED.3IONS-SCNC: 10 MMOL/L (ref 9–17)
AST SERPL-CCNC: 29 U/L
BACTERIA URNS QL MICRO: ABNORMAL
BASOPHILS # BLD: 0 K/UL (ref 0–0.2)
BASOPHILS NFR BLD: 0 % (ref 0–2)
BILIRUB SERPL-MCNC: 0.4 MG/DL (ref 0.3–1.2)
BILIRUB UR QL STRIP: NEGATIVE
BNP SERPL-MCNC: ABNORMAL PG/ML
BUN SERPL-MCNC: 64 MG/DL (ref 8–23)
CALCIUM SERPL-MCNC: 8.7 MG/DL (ref 8.6–10.4)
CHARACTER UR: ABNORMAL
CHLORIDE SERPL-SCNC: 96 MMOL/L (ref 98–107)
CLARITY UR: ABNORMAL
CO2 SERPL-SCNC: 30 MMOL/L (ref 20–31)
COLOR UR: YELLOW
CREAT SERPL-MCNC: 1.7 MG/DL (ref 0.5–0.9)
CRP SERPL HS-MCNC: 61.5 MG/L (ref 0–5)
EOSINOPHIL # BLD: 0.16 K/UL (ref 0–0.4)
EOSINOPHILS RELATIVE PERCENT: 2 % (ref 1–4)
EPI CELLS #/AREA URNS HPF: ABNORMAL /HPF (ref 0–5)
ERYTHROCYTE [DISTWIDTH] IN BLOOD BY AUTOMATED COUNT: 16.4 % (ref 12.5–15.4)
ERYTHROCYTE [SEDIMENTATION RATE] IN BLOOD BY PHOTOMETRIC METHOD: 46 MM/HR (ref 0–30)
FIO2: 100
GFR SERPL CREATININE-BSD FRML MDRD: 34 ML/MIN/1.73M2
GLUCOSE SERPL-MCNC: 93 MG/DL (ref 70–99)
GLUCOSE UR STRIP-MCNC: NEGATIVE MG/DL
HCO3 VENOUS: 29.3 MMOL/L (ref 22–29)
HCT VFR BLD AUTO: 19.6 % (ref 36–46)
HCT VFR BLD AUTO: 22.4 % (ref 36–46)
HGB BLD-MCNC: 6.9 G/DL (ref 12–16)
HGB BLD-MCNC: 7.8 G/DL (ref 12–16)
HGB UR QL STRIP.AUTO: ABNORMAL
KETONES UR STRIP-MCNC: NEGATIVE MG/DL
LACTATE BLDV-SCNC: 0.7 MMOL/L (ref 0.5–2.2)
LEUKOCYTE ESTERASE UR QL STRIP: NEGATIVE
LYMPHOCYTES NFR BLD: 0.55 K/UL (ref 1–4.8)
LYMPHOCYTES RELATIVE PERCENT: 7 % (ref 24–44)
MCH RBC QN AUTO: 33 PG (ref 26–34)
MCHC RBC AUTO-ENTMCNC: 34.9 G/DL (ref 31–37)
MCV RBC AUTO: 94.5 FL (ref 80–100)
MONOCYTES NFR BLD: 0.55 K/UL (ref 0.1–0.8)
MONOCYTES NFR BLD: 7 % (ref 1–7)
MORPHOLOGY: ABNORMAL
NEUTROPHILS NFR BLD: 84 % (ref 36–66)
NEUTS SEG NFR BLD: 6.64 K/UL (ref 1.8–7.7)
NITRITE UR QL STRIP: NEGATIVE
O2 DELIVERY DEVICE: ABNORMAL
O2 SAT, VEN: 83.9 % (ref 60–85)
PCO2, VEN: 45.1 MM HG (ref 41–51)
PH UR STRIP: 6 [PH] (ref 5–8)
PH VENOUS: 7.42 (ref 7.32–7.43)
PLATELET # BLD AUTO: 460 K/UL (ref 140–450)
PMV BLD AUTO: 6.8 FL (ref 6–12)
PO2, VEN: 48 MM HG (ref 30–50)
POSITIVE BASE EXCESS, VEN: 4.4 MMOL/L (ref 0–3)
POTASSIUM SERPL-SCNC: 5.1 MMOL/L (ref 3.7–5.3)
PROT SERPL-MCNC: 8.6 G/DL (ref 6.4–8.3)
PROT UR STRIP-MCNC: ABNORMAL MG/DL
RBC # BLD AUTO: 2.08 M/UL (ref 4–5.2)
RBC #/AREA URNS HPF: ABNORMAL /HPF (ref 0–2)
SODIUM SERPL-SCNC: 136 MMOL/L (ref 135–144)
SP GR UR STRIP: 1.01 (ref 1–1.03)
TROPONIN I SERPL HS-MCNC: 125 NG/L (ref 0–14)
TROPONIN I SERPL HS-MCNC: 130 NG/L (ref 0–14)
TROPONIN I SERPL HS-MCNC: 131 NG/L (ref 0–14)
UROBILINOGEN UR STRIP-ACNC: NORMAL EU/DL (ref 0–1)
WBC #/AREA URNS HPF: ABNORMAL /HPF (ref 0–5)
WBC OTHER # BLD: 7.9 K/UL (ref 3.5–11)

## 2024-03-21 PROCEDURE — 86900 BLOOD TYPING SEROLOGIC ABO: CPT

## 2024-03-21 PROCEDURE — P9016 RBC LEUKOCYTES REDUCED: HCPCS

## 2024-03-21 PROCEDURE — 2700000000 HC OXYGEN THERAPY PER DAY

## 2024-03-21 PROCEDURE — 87186 SC STD MICRODIL/AGAR DIL: CPT

## 2024-03-21 PROCEDURE — 93005 ELECTROCARDIOGRAM TRACING: CPT

## 2024-03-21 PROCEDURE — 74018 RADEX ABDOMEN 1 VIEW: CPT

## 2024-03-21 PROCEDURE — 85014 HEMATOCRIT: CPT

## 2024-03-21 PROCEDURE — 87077 CULTURE AEROBIC IDENTIFY: CPT

## 2024-03-21 PROCEDURE — 96374 THER/PROPH/DIAG INJ IV PUSH: CPT

## 2024-03-21 PROCEDURE — 6370000000 HC RX 637 (ALT 250 FOR IP): Performed by: EMERGENCY MEDICINE

## 2024-03-21 PROCEDURE — 85025 COMPLETE CBC W/AUTO DIFF WBC: CPT

## 2024-03-21 PROCEDURE — 83880 ASSAY OF NATRIURETIC PEPTIDE: CPT

## 2024-03-21 PROCEDURE — 86920 COMPATIBILITY TEST SPIN: CPT

## 2024-03-21 PROCEDURE — 87086 URINE CULTURE/COLONY COUNT: CPT

## 2024-03-21 PROCEDURE — 85018 HEMOGLOBIN: CPT

## 2024-03-21 PROCEDURE — 82803 BLOOD GASES ANY COMBINATION: CPT

## 2024-03-21 PROCEDURE — 81001 URINALYSIS AUTO W/SCOPE: CPT

## 2024-03-21 PROCEDURE — 87040 BLOOD CULTURE FOR BACTERIA: CPT

## 2024-03-21 PROCEDURE — 83605 ASSAY OF LACTIC ACID: CPT

## 2024-03-21 PROCEDURE — 80053 COMPREHEN METABOLIC PANEL: CPT

## 2024-03-21 PROCEDURE — 6370000000 HC RX 637 (ALT 250 FOR IP)

## 2024-03-21 PROCEDURE — 6360000002 HC RX W HCPCS: Performed by: EMERGENCY MEDICINE

## 2024-03-21 PROCEDURE — 36430 TRANSFUSION BLD/BLD COMPNT: CPT

## 2024-03-21 PROCEDURE — 94640 AIRWAY INHALATION TREATMENT: CPT

## 2024-03-21 PROCEDURE — 36415 COLL VENOUS BLD VENIPUNCTURE: CPT

## 2024-03-21 PROCEDURE — 6360000002 HC RX W HCPCS

## 2024-03-21 PROCEDURE — 86850 RBC ANTIBODY SCREEN: CPT

## 2024-03-21 PROCEDURE — 86140 C-REACTIVE PROTEIN: CPT

## 2024-03-21 PROCEDURE — 94761 N-INVAS EAR/PLS OXIMETRY MLT: CPT

## 2024-03-21 PROCEDURE — 2060000000 HC ICU INTERMEDIATE R&B

## 2024-03-21 PROCEDURE — 84145 PROCALCITONIN (PCT): CPT

## 2024-03-21 PROCEDURE — 94660 CPAP INITIATION&MGMT: CPT

## 2024-03-21 PROCEDURE — 85652 RBC SED RATE AUTOMATED: CPT

## 2024-03-21 PROCEDURE — 71045 X-RAY EXAM CHEST 1 VIEW: CPT

## 2024-03-21 PROCEDURE — 84484 ASSAY OF TROPONIN QUANT: CPT

## 2024-03-21 PROCEDURE — 2580000003 HC RX 258

## 2024-03-21 PROCEDURE — 99285 EMERGENCY DEPT VISIT HI MDM: CPT

## 2024-03-21 PROCEDURE — 2580000003 HC RX 258: Performed by: EMERGENCY MEDICINE

## 2024-03-21 PROCEDURE — 86901 BLOOD TYPING SEROLOGIC RH(D): CPT

## 2024-03-21 RX ORDER — BUMETANIDE 0.25 MG/ML
1 INJECTION INTRAMUSCULAR; INTRAVENOUS ONCE
Status: COMPLETED | OUTPATIENT
Start: 2024-03-21 | End: 2024-03-21

## 2024-03-21 RX ORDER — FERROUS SULFATE 325(65) MG
325 TABLET ORAL DAILY
Status: ON HOLD | COMMUNITY

## 2024-03-21 RX ORDER — SODIUM CHLORIDE 0.9 % (FLUSH) 0.9 %
5-40 SYRINGE (ML) INJECTION EVERY 12 HOURS SCHEDULED
Status: DISCONTINUED | OUTPATIENT
Start: 2024-03-21 | End: 2024-03-25 | Stop reason: HOSPADM

## 2024-03-21 RX ORDER — ENOXAPARIN SODIUM 100 MG/ML
40 INJECTION SUBCUTANEOUS DAILY
Status: CANCELLED | OUTPATIENT
Start: 2024-03-21

## 2024-03-21 RX ORDER — ACETAMINOPHEN 325 MG/1
650 TABLET ORAL EVERY 6 HOURS PRN
Status: DISCONTINUED | OUTPATIENT
Start: 2024-03-21 | End: 2024-03-25 | Stop reason: HOSPADM

## 2024-03-21 RX ORDER — VENLAFAXINE 37.5 MG/1
75 TABLET ORAL 2 TIMES DAILY
Status: DISCONTINUED | OUTPATIENT
Start: 2024-03-21 | End: 2024-03-25 | Stop reason: HOSPADM

## 2024-03-21 RX ORDER — GAUZE BANDAGE 2" X 2"
100 BANDAGE TOPICAL DAILY
Status: DISCONTINUED | OUTPATIENT
Start: 2024-03-21 | End: 2024-03-25 | Stop reason: HOSPADM

## 2024-03-21 RX ORDER — AMMONIUM LACTATE 12 G/100G
1 LOTION TOPICAL PRN
Status: ON HOLD | COMMUNITY

## 2024-03-21 RX ORDER — LORAZEPAM 0.5 MG/1
0.5 TABLET ORAL EVERY 6 HOURS PRN
Status: DISCONTINUED | OUTPATIENT
Start: 2024-03-21 | End: 2024-03-25 | Stop reason: HOSPADM

## 2024-03-21 RX ORDER — ONDANSETRON 4 MG/1
4 TABLET, ORALLY DISINTEGRATING ORAL EVERY 8 HOURS PRN
Status: DISCONTINUED | OUTPATIENT
Start: 2024-03-21 | End: 2024-03-25 | Stop reason: HOSPADM

## 2024-03-21 RX ORDER — LORAZEPAM 0.5 MG/1
0.5 TABLET ORAL EVERY 6 HOURS PRN
Status: ON HOLD | COMMUNITY

## 2024-03-21 RX ORDER — FERROUS SULFATE 300 MG/5ML
325 LIQUID (ML) ORAL DAILY
Status: DISCONTINUED | OUTPATIENT
Start: 2024-03-22 | End: 2024-03-25 | Stop reason: HOSPADM

## 2024-03-21 RX ORDER — AMMONIUM LACTATE 12 G/100G
1 LOTION TOPICAL DAILY
Status: DISCONTINUED | OUTPATIENT
Start: 2024-03-22 | End: 2024-03-25 | Stop reason: HOSPADM

## 2024-03-21 RX ORDER — FOLIC ACID 1 MG/1
1 TABLET ORAL DAILY
Status: DISCONTINUED | OUTPATIENT
Start: 2024-03-21 | End: 2024-03-25 | Stop reason: HOSPADM

## 2024-03-21 RX ORDER — DULOXETIN HYDROCHLORIDE 30 MG/1
30 CAPSULE, DELAYED RELEASE ORAL DAILY
Status: DISCONTINUED | OUTPATIENT
Start: 2024-03-21 | End: 2024-03-22

## 2024-03-21 RX ORDER — MIDODRINE HYDROCHLORIDE 5 MG/1
5 TABLET ORAL 3 TIMES DAILY
Status: DISCONTINUED | OUTPATIENT
Start: 2024-03-21 | End: 2024-03-25 | Stop reason: HOSPADM

## 2024-03-21 RX ORDER — NORTRIPTYLINE HYDROCHLORIDE 10 MG/1
10 CAPSULE ORAL NIGHTLY
Status: DISCONTINUED | OUTPATIENT
Start: 2024-03-21 | End: 2024-03-25 | Stop reason: HOSPADM

## 2024-03-21 RX ORDER — POLYETHYLENE GLYCOL 3350 17 G/17G
17 POWDER, FOR SOLUTION ORAL DAILY PRN
Status: DISCONTINUED | OUTPATIENT
Start: 2024-03-21 | End: 2024-03-25 | Stop reason: HOSPADM

## 2024-03-21 RX ORDER — LACOSAMIDE 100 MG/1
100 TABLET ORAL 2 TIMES DAILY
Status: DISCONTINUED | OUTPATIENT
Start: 2024-03-21 | End: 2024-03-22

## 2024-03-21 RX ORDER — ATORVASTATIN CALCIUM 40 MG/1
40 TABLET, FILM COATED ORAL NIGHTLY
Status: DISCONTINUED | OUTPATIENT
Start: 2024-03-21 | End: 2024-03-22

## 2024-03-21 RX ORDER — PANTOPRAZOLE SODIUM 40 MG/1
40 TABLET, DELAYED RELEASE ORAL DAILY
Status: DISCONTINUED | OUTPATIENT
Start: 2024-03-21 | End: 2024-03-22

## 2024-03-21 RX ORDER — SPIRONOLACTONE 25 MG/1
25 TABLET ORAL 2 TIMES DAILY
Status: DISCONTINUED | OUTPATIENT
Start: 2024-03-21 | End: 2024-03-25 | Stop reason: HOSPADM

## 2024-03-21 RX ORDER — GABAPENTIN 100 MG/1
100 CAPSULE ORAL EVERY 8 HOURS PRN
Status: DISCONTINUED | OUTPATIENT
Start: 2024-03-21 | End: 2024-03-25 | Stop reason: HOSPADM

## 2024-03-21 RX ORDER — LEVOTHYROXINE SODIUM 0.07 MG/1
150 TABLET ORAL DAILY
Status: DISCONTINUED | OUTPATIENT
Start: 2024-03-22 | End: 2024-03-25 | Stop reason: HOSPADM

## 2024-03-21 RX ORDER — ONDANSETRON 2 MG/ML
4 INJECTION INTRAMUSCULAR; INTRAVENOUS EVERY 6 HOURS PRN
Status: DISCONTINUED | OUTPATIENT
Start: 2024-03-21 | End: 2024-03-25 | Stop reason: HOSPADM

## 2024-03-21 RX ORDER — FUROSEMIDE 10 MG/ML
20 INJECTION INTRAMUSCULAR; INTRAVENOUS ONCE
Status: DISCONTINUED | OUTPATIENT
Start: 2024-03-21 | End: 2024-03-21

## 2024-03-21 RX ORDER — PANTOPRAZOLE SODIUM 40 MG/1
40 TABLET, DELAYED RELEASE ORAL DAILY
Status: ON HOLD | COMMUNITY

## 2024-03-21 RX ORDER — MAGNESIUM SULFATE IN WATER 40 MG/ML
2000 INJECTION, SOLUTION INTRAVENOUS PRN
Status: DISCONTINUED | OUTPATIENT
Start: 2024-03-21 | End: 2024-03-21

## 2024-03-21 RX ORDER — IPRATROPIUM BROMIDE AND ALBUTEROL SULFATE 2.5; .5 MG/3ML; MG/3ML
1 SOLUTION RESPIRATORY (INHALATION)
Status: DISCONTINUED | OUTPATIENT
Start: 2024-03-21 | End: 2024-03-22

## 2024-03-21 RX ORDER — BUMETANIDE 1 MG/1
2 TABLET ORAL 3 TIMES DAILY
Status: DISCONTINUED | OUTPATIENT
Start: 2024-03-21 | End: 2024-03-25

## 2024-03-21 RX ORDER — ACETAMINOPHEN 650 MG/1
650 SUPPOSITORY RECTAL EVERY 6 HOURS PRN
Status: DISCONTINUED | OUTPATIENT
Start: 2024-03-21 | End: 2024-03-25 | Stop reason: HOSPADM

## 2024-03-21 RX ORDER — POTASSIUM CHLORIDE 7.45 MG/ML
10 INJECTION INTRAVENOUS PRN
Status: DISCONTINUED | OUTPATIENT
Start: 2024-03-21 | End: 2024-03-21

## 2024-03-21 RX ORDER — ASPIRIN 81 MG/1
81 TABLET ORAL DAILY
Status: ON HOLD | COMMUNITY

## 2024-03-21 RX ORDER — SODIUM CHLORIDE 9 MG/ML
INJECTION, SOLUTION INTRAVENOUS PRN
Status: DISCONTINUED | OUTPATIENT
Start: 2024-03-21 | End: 2024-03-25 | Stop reason: HOSPADM

## 2024-03-21 RX ORDER — SODIUM CHLORIDE 0.9 % (FLUSH) 0.9 %
5-40 SYRINGE (ML) INJECTION PRN
Status: DISCONTINUED | OUTPATIENT
Start: 2024-03-21 | End: 2024-03-25 | Stop reason: HOSPADM

## 2024-03-21 RX ORDER — POTASSIUM CHLORIDE 20 MEQ/1
40 TABLET, EXTENDED RELEASE ORAL PRN
Status: DISCONTINUED | OUTPATIENT
Start: 2024-03-21 | End: 2024-03-21

## 2024-03-21 RX ADMIN — DULOXETINE HYDROCHLORIDE 30 MG: 30 CAPSULE, DELAYED RELEASE ORAL at 22:25

## 2024-03-21 RX ADMIN — NORTRIPTYLINE HYDROCHLORIDE 10 MG: 10 CAPSULE ORAL at 22:24

## 2024-03-21 RX ADMIN — VENLAFAXINE 75 MG: 37.5 TABLET ORAL at 22:25

## 2024-03-21 RX ADMIN — ATORVASTATIN CALCIUM 40 MG: 40 TABLET, FILM COATED ORAL at 22:25

## 2024-03-21 RX ADMIN — Medication 100 MG: at 22:24

## 2024-03-21 RX ADMIN — CEFTRIAXONE SODIUM 1000 MG: 1 INJECTION, POWDER, FOR SOLUTION INTRAMUSCULAR; INTRAVENOUS at 18:03

## 2024-03-21 RX ADMIN — CEFEPIME 1000 MG: 1 INJECTION, POWDER, FOR SOLUTION INTRAMUSCULAR; INTRAVENOUS at 20:16

## 2024-03-21 RX ADMIN — IPRATROPIUM BROMIDE AND ALBUTEROL SULFATE 1 DOSE: .5; 2.5 SOLUTION RESPIRATORY (INHALATION) at 15:54

## 2024-03-21 RX ADMIN — FOLIC ACID 1 MG: 1 TABLET ORAL at 22:25

## 2024-03-21 RX ADMIN — SODIUM CHLORIDE, PRESERVATIVE FREE 10 ML: 5 INJECTION INTRAVENOUS at 22:26

## 2024-03-21 RX ADMIN — LORAZEPAM 0.5 MG: 0.5 TABLET ORAL at 22:24

## 2024-03-21 RX ADMIN — BUMETANIDE 1 MG: 0.25 INJECTION INTRAMUSCULAR; INTRAVENOUS at 15:48

## 2024-03-21 RX ADMIN — SPIRONOLACTONE 25 MG: 25 TABLET, FILM COATED ORAL at 22:25

## 2024-03-21 RX ADMIN — IPRATROPIUM BROMIDE AND ALBUTEROL SULFATE 1 DOSE: .5; 2.5 SOLUTION RESPIRATORY (INHALATION) at 20:34

## 2024-03-21 RX ADMIN — LACOSAMIDE 100 MG: 100 TABLET, FILM COATED ORAL at 22:25

## 2024-03-21 ASSESSMENT — PAIN SCALES - WONG BAKER: WONGBAKER_NUMERICALRESPONSE: NO HURT

## 2024-03-21 ASSESSMENT — PAIN SCALES - GENERAL: PAINLEVEL_OUTOF10: 0

## 2024-03-21 NOTE — ED PROVIDER NOTES
CLOUDY (A) Clear    Glucose, Ur NEGATIVE NEGATIVE mg/dL    Bilirubin Urine NEGATIVE NEGATIVE    Ketones, Urine NEGATIVE NEGATIVE mg/dL    Specific Gravity, UA 1.015 1.005 - 1.030    Urine Hgb LARGE (A) NEGATIVE    pH, UA 6.0 5.0 - 8.0    Protein, UA 2+ (A) NEGATIVE mg/dL    Urobilinogen, Urine Normal 0.0 - 1.0 EU/dL    Nitrite, Urine NEGATIVE NEGATIVE    Leukocyte Esterase, Urine NEGATIVE NEGATIVE   CBC with Auto Differential   Result Value Ref Range    WBC 7.9 3.5 - 11.0 k/uL    RBC 2.08 (L) 4.0 - 5.2 m/uL    Hemoglobin 6.9 (LL) 12.0 - 16.0 g/dL    Hematocrit 19.6 (L) 36 - 46 %    MCV 94.5 80 - 100 fL    MCH 33.0 26 - 34 pg    MCHC 34.9 31 - 37 g/dL    RDW 16.4 (H) 12.5 - 15.4 %    Platelets 460 (H) 140 - 450 k/uL    MPV 6.8 6.0 - 12.0 fL    Neutrophils % 84 (H) 36 - 66 %    Lymphocytes % 7 (L) 24 - 44 %    Monocytes % 7 1 - 7 %    Eosinophils % 2 1 - 4 %    Basophils % 0 0 - 2 %    Neutrophils Absolute 6.64 1.8 - 7.7 k/uL    Lymphocytes Absolute 0.55 (L) 1.0 - 4.8 k/uL    Monocytes Absolute 0.55 0.1 - 0.8 k/uL    Eosinophils Absolute 0.16 0.0 - 0.4 k/uL    Basophils Absolute 0.00 0.0 - 0.2 k/uL    Morphology ANISOCYTOSIS PRESENT    Comprehensive Metabolic Panel   Result Value Ref Range    Sodium 136 135 - 144 mmol/L    Potassium 5.1 3.7 - 5.3 mmol/L    Chloride 96 (L) 98 - 107 mmol/L    CO2 30 20 - 31 mmol/L    Anion Gap 10 9 - 17 mmol/L    Glucose 93 70 - 99 mg/dL    BUN 64 (H) 8 - 23 mg/dL    Creatinine 1.7 (H) 0.5 - 0.9 mg/dL    Est, Glom Filt Rate 34 (L) >60 mL/min/1.73m2    Calcium 8.7 8.6 - 10.4 mg/dL    Total Protein 8.6 (H) 6.4 - 8.3 g/dL    Albumin 3.0 (L) 3.5 - 5.2 g/dL    Albumin/Globulin Ratio 0.5 (L) 1.0 - 2.5    Total Bilirubin 0.4 0.3 - 1.2 mg/dL    Alkaline Phosphatase 172 (H) 35 - 104 U/L    ALT 13 5 - 33 U/L    AST 29 <32 U/L   Microscopic Urinalysis   Result Value Ref Range    WBC, UA 10 TO 20 0 - 5 /HPF    RBC, UA TOO NUMEROUS TO COUNT 0 - 2 /HPF    Epithelial Cells UA 2 TO 5 0 - 5 /HPF

## 2024-03-21 NOTE — CONSENT
Informed Consent for Blood Component Transfusion Note    I have discussed with the spouse the rationale for blood component transfusion; its benefits in treating or preventing fatigue, organ damage, or death; and its risk which includes mild transfusion reactions, rare risk of blood borne infection, or more serious but rare reactions. I have discussed the alternatives to transfusion, including the risk and consequences of not receiving transfusion. The spouse had an opportunity to ask questions and had agreed to proceed with transfusion of blood components.    Electronically signed by DANNY SIMPSON MD on 3/21/24 at 3:56 PM EDT

## 2024-03-22 PROBLEM — I13.10 CARDIORENAL SYNDROME: Status: ACTIVE | Noted: 2024-03-22

## 2024-03-22 PROBLEM — K76.82 HEPATIC ENCEPHALOPATHY (HCC): Status: ACTIVE | Noted: 2024-02-13

## 2024-03-22 PROBLEM — K70.31 ASCITES DUE TO ALCOHOLIC CIRRHOSIS (HCC): Status: ACTIVE | Noted: 2024-03-22

## 2024-03-22 PROBLEM — R79.89 ELEVATED TROPONIN: Status: ACTIVE | Noted: 2024-03-22

## 2024-03-22 LAB
ABO/RH: NORMAL
ALBUMIN SERPL-MCNC: 2.7 G/DL (ref 3.5–5.2)
ALBUMIN/GLOB SERPL: 0.5 {RATIO} (ref 1–2.5)
ALP SERPL-CCNC: 142 U/L (ref 35–104)
ALT SERPL-CCNC: 14 U/L (ref 5–33)
AMMONIA PLAS-SCNC: 18 UMOL/L (ref 11–51)
ANION GAP SERPL CALCULATED.3IONS-SCNC: 8 MMOL/L (ref 9–17)
ANION GAP SERPL CALCULATED.3IONS-SCNC: 9 MMOL/L (ref 9–17)
ANTIBODY SCREEN: NEGATIVE
ARM BAND NUMBER: NORMAL
AST SERPL-CCNC: 31 U/L
BASOPHILS # BLD: 0 K/UL (ref 0–0.2)
BASOPHILS NFR BLD: 0 % (ref 0–2)
BILIRUB SERPL-MCNC: 0.5 MG/DL (ref 0.3–1.2)
BLOOD BANK BLOOD PRODUCT EXPIRATION DATE: NORMAL
BLOOD BANK DISPENSE STATUS: NORMAL
BLOOD BANK ISBT PRODUCT BLOOD TYPE: 9500
BLOOD BANK PRODUCT CODE: NORMAL
BLOOD BANK SAMPLE EXPIRATION: NORMAL
BLOOD BANK UNIT TYPE AND RH: NORMAL
BNP SERPL-MCNC: ABNORMAL PG/ML
BPU ID: NORMAL
BUN SERPL-MCNC: 62 MG/DL (ref 8–23)
BUN SERPL-MCNC: 64 MG/DL (ref 8–23)
CALCIUM SERPL-MCNC: 8.7 MG/DL (ref 8.6–10.4)
CALCIUM SERPL-MCNC: 8.7 MG/DL (ref 8.6–10.4)
CHLORIDE SERPL-SCNC: 95 MMOL/L (ref 98–107)
CHLORIDE SERPL-SCNC: 97 MMOL/L (ref 98–107)
CO2 SERPL-SCNC: 31 MMOL/L (ref 20–31)
CO2 SERPL-SCNC: 31 MMOL/L (ref 20–31)
COMPONENT: NORMAL
CREAT SERPL-MCNC: 1.8 MG/DL (ref 0.5–0.9)
CREAT SERPL-MCNC: 1.9 MG/DL (ref 0.5–0.9)
CROSSMATCH RESULT: NORMAL
EKG ATRIAL RATE: 75 BPM
EKG P AXIS: 28 DEGREES
EKG P-R INTERVAL: 200 MS
EKG Q-T INTERVAL: 406 MS
EKG QRS DURATION: 94 MS
EKG QTC CALCULATION (BAZETT): 453 MS
EKG R AXIS: 11 DEGREES
EKG T AXIS: 22 DEGREES
EKG VENTRICULAR RATE: 75 BPM
EOSINOPHIL # BLD: 0 K/UL (ref 0–0.4)
EOSINOPHILS RELATIVE PERCENT: 0 % (ref 1–4)
ERYTHROCYTE [DISTWIDTH] IN BLOOD BY AUTOMATED COUNT: 17 % (ref 12.5–15.4)
GFR SERPL CREATININE-BSD FRML MDRD: 29 ML/MIN/1.73M2
GFR SERPL CREATININE-BSD FRML MDRD: 31 ML/MIN/1.73M2
GLUCOSE BLD-MCNC: 104 MG/DL (ref 65–105)
GLUCOSE BLD-MCNC: 46 MG/DL (ref 65–105)
GLUCOSE BLD-MCNC: 51 MG/DL (ref 65–105)
GLUCOSE BLD-MCNC: 57 MG/DL (ref 65–105)
GLUCOSE BLD-MCNC: 58 MG/DL (ref 65–105)
GLUCOSE BLD-MCNC: 60 MG/DL (ref 65–105)
GLUCOSE BLD-MCNC: 63 MG/DL (ref 65–105)
GLUCOSE BLD-MCNC: 64 MG/DL (ref 65–105)
GLUCOSE BLD-MCNC: 69 MG/DL (ref 65–105)
GLUCOSE BLD-MCNC: 71 MG/DL (ref 65–105)
GLUCOSE BLD-MCNC: 74 MG/DL (ref 65–105)
GLUCOSE BLD-MCNC: 75 MG/DL (ref 65–105)
GLUCOSE BLD-MCNC: 79 MG/DL (ref 65–105)
GLUCOSE BLD-MCNC: 81 MG/DL (ref 65–105)
GLUCOSE BLD-MCNC: 83 MG/DL (ref 65–105)
GLUCOSE BLD-MCNC: 90 MG/DL (ref 65–105)
GLUCOSE SERPL-MCNC: 64 MG/DL (ref 70–99)
GLUCOSE SERPL-MCNC: 92 MG/DL (ref 70–99)
HCT VFR BLD AUTO: 23.2 % (ref 36–46)
HGB BLD-MCNC: 7.9 G/DL (ref 12–16)
INR PPP: 1.1
LYMPHOCYTES NFR BLD: 0.9 K/UL (ref 1–4.8)
LYMPHOCYTES RELATIVE PERCENT: 11 % (ref 24–44)
MCH RBC QN AUTO: 31.9 PG (ref 26–34)
MCHC RBC AUTO-ENTMCNC: 34.2 G/DL (ref 31–37)
MCV RBC AUTO: 93.3 FL (ref 80–100)
MONOCYTES NFR BLD: 0.33 K/UL (ref 0.1–0.8)
MONOCYTES NFR BLD: 4 % (ref 1–7)
MORPHOLOGY: NORMAL
NEUTROPHILS NFR BLD: 85 % (ref 36–66)
NEUTS SEG NFR BLD: 6.97 K/UL (ref 1.8–7.7)
PLATELET # BLD AUTO: 430 K/UL (ref 140–450)
PMV BLD AUTO: 6.8 FL (ref 6–12)
POTASSIUM SERPL-SCNC: 4.3 MMOL/L (ref 3.7–5.3)
POTASSIUM SERPL-SCNC: 4.7 MMOL/L (ref 3.7–5.3)
PROCALCITONIN SERPL-MCNC: 0.64 NG/ML (ref 0–0.09)
PROT SERPL-MCNC: 8.2 G/DL (ref 6.4–8.3)
PROTHROMBIN TIME: 11.4 SEC (ref 9.4–12.6)
RBC # BLD AUTO: 2.48 M/UL (ref 4–5.2)
SODIUM SERPL-SCNC: 134 MMOL/L (ref 135–144)
SODIUM SERPL-SCNC: 137 MMOL/L (ref 135–144)
SODIUM UR-SCNC: 35 MMOL/L
TRANSFUSION STATUS: NORMAL
UNIT DIVISION: 0
UNIT ISSUE DATE/TIME: NORMAL
WBC OTHER # BLD: 8.2 K/UL (ref 3.5–11)

## 2024-03-22 PROCEDURE — 6360000002 HC RX W HCPCS: Performed by: INTERNAL MEDICINE

## 2024-03-22 PROCEDURE — 2580000003 HC RX 258: Performed by: STUDENT IN AN ORGANIZED HEALTH CARE EDUCATION/TRAINING PROGRAM

## 2024-03-22 PROCEDURE — 86225 DNA ANTIBODY NATIVE: CPT

## 2024-03-22 PROCEDURE — 6360000002 HC RX W HCPCS: Performed by: STUDENT IN AN ORGANIZED HEALTH CARE EDUCATION/TRAINING PROGRAM

## 2024-03-22 PROCEDURE — 97110 THERAPEUTIC EXERCISES: CPT

## 2024-03-22 PROCEDURE — 94640 AIRWAY INHALATION TREATMENT: CPT

## 2024-03-22 PROCEDURE — 99222 1ST HOSP IP/OBS MODERATE 55: CPT

## 2024-03-22 PROCEDURE — 84155 ASSAY OF PROTEIN SERUM: CPT

## 2024-03-22 PROCEDURE — APPNB60 APP NON BILLABLE TIME 46-60 MINS: Performed by: NURSE PRACTITIONER

## 2024-03-22 PROCEDURE — 83880 ASSAY OF NATRIURETIC PEPTIDE: CPT

## 2024-03-22 PROCEDURE — 2580000003 HC RX 258

## 2024-03-22 PROCEDURE — 84300 ASSAY OF URINE SODIUM: CPT

## 2024-03-22 PROCEDURE — 6370000000 HC RX 637 (ALT 250 FOR IP): Performed by: INTERNAL MEDICINE

## 2024-03-22 PROCEDURE — 99223 1ST HOSP IP/OBS HIGH 75: CPT | Performed by: FAMILY MEDICINE

## 2024-03-22 PROCEDURE — 2700000000 HC OXYGEN THERAPY PER DAY

## 2024-03-22 PROCEDURE — C9113 INJ PANTOPRAZOLE SODIUM, VIA: HCPCS | Performed by: STUDENT IN AN ORGANIZED HEALTH CARE EDUCATION/TRAINING PROGRAM

## 2024-03-22 PROCEDURE — 36415 COLL VENOUS BLD VENIPUNCTURE: CPT

## 2024-03-22 PROCEDURE — 86038 ANTINUCLEAR ANTIBODIES: CPT

## 2024-03-22 PROCEDURE — 84156 ASSAY OF PROTEIN URINE: CPT

## 2024-03-22 PROCEDURE — 2500000003 HC RX 250 WO HCPCS: Performed by: STUDENT IN AN ORGANIZED HEALTH CARE EDUCATION/TRAINING PROGRAM

## 2024-03-22 PROCEDURE — 99255 IP/OBS CONSLTJ NEW/EST HI 80: CPT | Performed by: INTERNAL MEDICINE

## 2024-03-22 PROCEDURE — 80053 COMPREHEN METABOLIC PANEL: CPT

## 2024-03-22 PROCEDURE — 82570 ASSAY OF URINE CREATININE: CPT

## 2024-03-22 PROCEDURE — 85025 COMPLETE CBC W/AUTO DIFF WBC: CPT

## 2024-03-22 PROCEDURE — 82947 ASSAY GLUCOSE BLOOD QUANT: CPT

## 2024-03-22 PROCEDURE — 6370000000 HC RX 637 (ALT 250 FOR IP)

## 2024-03-22 PROCEDURE — 99222 1ST HOSP IP/OBS MODERATE 55: CPT | Performed by: INTERNAL MEDICINE

## 2024-03-22 PROCEDURE — 6360000002 HC RX W HCPCS

## 2024-03-22 PROCEDURE — 97166 OT EVAL MOD COMPLEX 45 MIN: CPT

## 2024-03-22 PROCEDURE — 85610 PROTHROMBIN TIME: CPT

## 2024-03-22 PROCEDURE — 97162 PT EVAL MOD COMPLEX 30 MIN: CPT

## 2024-03-22 PROCEDURE — 84165 PROTEIN E-PHORESIS SERUM: CPT

## 2024-03-22 PROCEDURE — 86334 IMMUNOFIX E-PHORESIS SERUM: CPT

## 2024-03-22 PROCEDURE — 82140 ASSAY OF AMMONIA: CPT

## 2024-03-22 PROCEDURE — 97530 THERAPEUTIC ACTIVITIES: CPT

## 2024-03-22 PROCEDURE — 83516 IMMUNOASSAY NONANTIBODY: CPT

## 2024-03-22 PROCEDURE — 2060000000 HC ICU INTERMEDIATE R&B

## 2024-03-22 PROCEDURE — A4216 STERILE WATER/SALINE, 10 ML: HCPCS | Performed by: STUDENT IN AN ORGANIZED HEALTH CARE EDUCATION/TRAINING PROGRAM

## 2024-03-22 PROCEDURE — 2500000003 HC RX 250 WO HCPCS

## 2024-03-22 PROCEDURE — 80048 BASIC METABOLIC PNL TOTAL CA: CPT

## 2024-03-22 PROCEDURE — 97112 NEUROMUSCULAR REEDUCATION: CPT

## 2024-03-22 RX ORDER — DEXTROSE MONOHYDRATE 25 G/50ML
12.5 INJECTION, SOLUTION INTRAVENOUS PRN
Status: DISCONTINUED | OUTPATIENT
Start: 2024-03-22 | End: 2024-03-25 | Stop reason: HOSPADM

## 2024-03-22 RX ORDER — DULOXETIN HYDROCHLORIDE 20 MG/1
20 CAPSULE, DELAYED RELEASE ORAL EVERY OTHER DAY
Status: DISCONTINUED | OUTPATIENT
Start: 2024-03-23 | End: 2024-03-25 | Stop reason: HOSPADM

## 2024-03-22 RX ORDER — LACOSAMIDE 10 MG/ML
100 SOLUTION ORAL 2 TIMES DAILY
Status: DISCONTINUED | OUTPATIENT
Start: 2024-03-22 | End: 2024-03-25 | Stop reason: HOSPADM

## 2024-03-22 RX ORDER — DEXTROSE MONOHYDRATE 25 G/50ML
12.5 INJECTION, SOLUTION INTRAVENOUS ONCE
Status: DISCONTINUED | OUTPATIENT
Start: 2024-03-22 | End: 2024-03-22

## 2024-03-22 RX ORDER — DEXTROSE MONOHYDRATE 100 MG/ML
INJECTION, SOLUTION INTRAVENOUS CONTINUOUS PRN
Status: DISCONTINUED | OUTPATIENT
Start: 2024-03-22 | End: 2024-03-25 | Stop reason: HOSPADM

## 2024-03-22 RX ORDER — BUMETANIDE 0.25 MG/ML
2 INJECTION INTRAMUSCULAR; INTRAVENOUS 2 TIMES DAILY
Status: DISCONTINUED | OUTPATIENT
Start: 2024-03-22 | End: 2024-03-25

## 2024-03-22 RX ORDER — IPRATROPIUM BROMIDE AND ALBUTEROL SULFATE 2.5; .5 MG/3ML; MG/3ML
1 SOLUTION RESPIRATORY (INHALATION)
Status: DISCONTINUED | OUTPATIENT
Start: 2024-03-22 | End: 2024-03-22

## 2024-03-22 RX ORDER — DEXTROSE MONOHYDRATE 25 G/50ML
12.5 INJECTION, SOLUTION INTRAVENOUS PRN
Status: DISCONTINUED | OUTPATIENT
Start: 2024-03-22 | End: 2024-03-22

## 2024-03-22 RX ORDER — DEXTROSE MONOHYDRATE 25 G/50ML
12.5 INJECTION, SOLUTION INTRAVENOUS ONCE
Status: COMPLETED | OUTPATIENT
Start: 2024-03-22 | End: 2024-03-22

## 2024-03-22 RX ORDER — LACOSAMIDE 10 MG/ML
100 SOLUTION ORAL 2 TIMES DAILY
Status: DISCONTINUED | OUTPATIENT
Start: 2024-03-22 | End: 2024-03-22

## 2024-03-22 RX ORDER — GLUCAGON 1 MG/ML
1 KIT INJECTION PRN
Status: DISCONTINUED | OUTPATIENT
Start: 2024-03-22 | End: 2024-03-25 | Stop reason: HOSPADM

## 2024-03-22 RX ORDER — DEXTROSE MONOHYDRATE 25 G/50ML
INJECTION, SOLUTION INTRAVENOUS
Status: COMPLETED
Start: 2024-03-22 | End: 2024-03-22

## 2024-03-22 RX ORDER — IPRATROPIUM BROMIDE AND ALBUTEROL SULFATE 2.5; .5 MG/3ML; MG/3ML
1 SOLUTION RESPIRATORY (INHALATION) EVERY 4 HOURS PRN
Status: DISCONTINUED | OUTPATIENT
Start: 2024-03-22 | End: 2024-03-25 | Stop reason: HOSPADM

## 2024-03-22 RX ADMIN — FOLIC ACID 1 MG: 1 TABLET ORAL at 17:52

## 2024-03-22 RX ADMIN — DEXTROSE MONOHYDRATE 125 ML: 100 INJECTION, SOLUTION INTRAVENOUS at 21:13

## 2024-03-22 RX ADMIN — LEVOTHYROXINE SODIUM 150 MCG: 75 TABLET ORAL at 06:35

## 2024-03-22 RX ADMIN — SPIRONOLACTONE 25 MG: 25 TABLET, FILM COATED ORAL at 17:52

## 2024-03-22 RX ADMIN — DEXTROSE MONOHYDRATE 12.5 G: 25 INJECTION, SOLUTION INTRAVENOUS at 18:26

## 2024-03-22 RX ADMIN — DEXTROSE MONOHYDRATE 125 ML: 100 INJECTION, SOLUTION INTRAVENOUS at 09:33

## 2024-03-22 RX ADMIN — DEXTROSE MONOHYDRATE 125 ML: 100 INJECTION, SOLUTION INTRAVENOUS at 11:29

## 2024-03-22 RX ADMIN — RIFAXIMIN 550 MG: 550 TABLET ORAL at 20:16

## 2024-03-22 RX ADMIN — SPIRONOLACTONE 25 MG: 25 TABLET, FILM COATED ORAL at 08:31

## 2024-03-22 RX ADMIN — BUMETANIDE 2 MG: 0.25 INJECTION INTRAMUSCULAR; INTRAVENOUS at 11:32

## 2024-03-22 RX ADMIN — LACOSAMIDE 100 MG: 100 TABLET, FILM COATED ORAL at 08:31

## 2024-03-22 RX ADMIN — DEXTROSE MONOHYDRATE 125 ML: 100 INJECTION, SOLUTION INTRAVENOUS at 16:08

## 2024-03-22 RX ADMIN — VENLAFAXINE 75 MG: 37.5 TABLET ORAL at 08:31

## 2024-03-22 RX ADMIN — DEXTROSE MONOHYDRATE 125 ML: 100 INJECTION, SOLUTION INTRAVENOUS at 03:04

## 2024-03-22 RX ADMIN — NORTRIPTYLINE HYDROCHLORIDE 10 MG: 10 CAPSULE ORAL at 20:16

## 2024-03-22 RX ADMIN — LORAZEPAM 0.5 MG: 0.5 TABLET ORAL at 22:39

## 2024-03-22 RX ADMIN — ACETAMINOPHEN 650 MG: 325 TABLET ORAL at 20:15

## 2024-03-22 RX ADMIN — MINERAL SUPPLEMENT IRON 300 MG / 5 ML STRENGTH LIQUID 100 PER BOX UNFLAVORED 325 MG: at 08:32

## 2024-03-22 RX ADMIN — SODIUM CHLORIDE, PRESERVATIVE FREE 10 ML: 5 INJECTION INTRAVENOUS at 20:17

## 2024-03-22 RX ADMIN — Medication 100 MG: at 17:52

## 2024-03-22 RX ADMIN — Medication 100 MG: at 20:15

## 2024-03-22 RX ADMIN — RIFAXIMIN 550 MG: 550 TABLET ORAL at 11:32

## 2024-03-22 RX ADMIN — VENLAFAXINE 75 MG: 37.5 TABLET ORAL at 20:16

## 2024-03-22 RX ADMIN — IPRATROPIUM BROMIDE AND ALBUTEROL SULFATE 1 DOSE: 2.5; .5 SOLUTION RESPIRATORY (INHALATION) at 23:12

## 2024-03-22 RX ADMIN — CEFEPIME 1000 MG: 1 INJECTION, POWDER, FOR SOLUTION INTRAMUSCULAR; INTRAVENOUS at 20:28

## 2024-03-22 RX ADMIN — SODIUM CHLORIDE, PRESERVATIVE FREE 40 MG: 5 INJECTION INTRAVENOUS at 23:26

## 2024-03-22 RX ADMIN — DEXTROSE MONOHYDRATE 12.5 G: 25 INJECTION, SOLUTION INTRAVENOUS at 06:45

## 2024-03-22 RX ADMIN — BUMETANIDE 2 MG: 0.25 INJECTION INTRAMUSCULAR; INTRAVENOUS at 20:16

## 2024-03-22 RX ADMIN — SODIUM CHLORIDE, PRESERVATIVE FREE 10 ML: 5 INJECTION INTRAVENOUS at 11:33

## 2024-03-22 ASSESSMENT — PAIN SCALES - GENERAL
PAINLEVEL_OUTOF10: 3
PAINLEVEL_OUTOF10: 0

## 2024-03-22 ASSESSMENT — PAIN DESCRIPTION - LOCATION: LOCATION: GENERALIZED

## 2024-03-22 NOTE — CONSULTS
Palliative Care Inpatient Consult    NAME:  Clemente Garcia  MEDICAL RECORD NUMBER:  [id number]  AGE: 63 y.o.   GENDER: female  : 1960  TODAY'S DATE:  3/22/2024    Reasons for Consultation:    Symptom and/or pain management  Provision of information regarding PC and/or hospice philosophies  Complex, time-intensive communication and interdisciplinary psychosocial support  Clarification of goals of care and/or assistance with difficult decision-making  Guidance in regards to resources and transition(s)    Members of PC team contributing to this consultation are : Kadi Fonseca, Palliative Care APRN-CNP    Plan      Palliative Interaction: Patient seen and examined on rounds. Patient resting comfortably in bed. She is alert, oriented to self and place. She states her breathing is doing good and she is currently without complaints.     Patient is familiar to our service as we have seen her on previous admission. Her mentation at this time appears to be improved since last admission.     She is able to tell me things have been going okay since discharge although she is not overly pleased with the facility she is at. Although intermittently confused patient does express her wishes to continue with current treatments.    Called and spoke with patients son and EDWIN Jara. He states that since her last admission patient has been doing well, stating she has made leaps and bounds. He notes they never followed up with outpatient palliative considering her improvements. Additionally he notes her  has had a health decline and he has a lot on his plate currently.     Plan to continue with current treatments. Will again discuss palliative services with the family as this could be beneficial for patient.      Will monitor patients progress throughout admission.       Principle Problem/Diagnosis:  Acute respiratory failure (HCC)    Additional Assessments:     1- Symptom management/ pain control     Pain Assessment:  The 
PULMONARY & CRITICAL CARE MEDICINE CONSULT NOTE     Patient:  Clemente Garcia  MRN: [id number]  Admit date: 3/21/2024  Primary Care Physician: Milana Whalen MD  CODE Status: DNR-CCA  LOS: 1  Inpatient consult to Pulmonology  Consult performed by: Alek Bragg MD  Consult ordered by: Yazmin Martinez MD         SUBJECTIVE     CHIEF COMPLAINT/REASON FOR CONSULT: Respiratory Distress (Pt noted to have O2 sats drop to upper 70s on 5lpm O2 per nc, increased to high 80s low 90s on 6 lpm) and Hematuria (Pt had montejo catheter noted with have blood in it, got dislodged prior to leaving ecf. Hgb 7.1)    HISTORY OF PRESENT ILLNESS:  The patient is a 63 y.o. female with multiple comorbidities including hypertension, hyperlipidemia, alcohol abuse, recently diagnosed cirrhosis and recent hospitalization with UTI/metabolic encephalopathy.  She has a multiple paracentesis and also has a PEG tube in place.  Last paracentesis was on 3/1 and 1.15 L of clear yellow fluid was removed.    Patient presents emergency room with altered mental status.  During evaluation in the ED she was hypoxemic and was started on BiPAP.  Lab workup shows potassium 5.1, creatinine 1.7, BUN 64, proBNP 15,376, troponin 130, CRP 61, ESR 46 albumin 3, hemoglobin 7.8, platelet 460.  Urine culture is growing gram-negative rods.  Chest x-ray done yesterday reports moderate left pleural effusion with compressive atelectasis/infiltrate, mild right pleural effusion and mild cardiomegaly.  Echo (2/12/2024) showed EF of 60 to 65%, normal RVSP    She is receiving cefepime and is on rifaximin.  She has been seen by nephrology.  She is on Bumex 2 mg twice daily and 25 mg twice daily of Aldactone.    Patient is lethargic, unable to provide any history.  Mostly history obtained from chart and discussion with son and other family members at bedside.    PAST MEDICAL HISTORY:    No past medical history on file.  PAST SURGICAL HISTORY:        Procedure Laterality Date    ABDOMEN 
sodium chloride       PRN Medications: glucose, dextrose bolus **OR** dextrose bolus, glucagon (rDNA), dextrose, ipratropium 0.5 mg-albuterol 2.5 mg, sodium chloride, sodium chloride flush, sodium chloride, ondansetron **OR** ondansetron, polyethylene glycol, acetaminophen **OR** acetaminophen, gabapentin, LORazepam, magnesium hydroxide    Allergies: No Known Allergies    ROS: Constitutional: negative for chills, fevers and sweats  Eyes: negative for cataracts, icterus and redness  Ears, nose, mouth, throat, and face: negative for epistaxis, hearing loss and sore throat  Respiratory: negative for cough, hemoptysis and sputum  Cardiovascular: negative for chest pain, dyspnea and lower extremity edema  Gastrointestinal: as per HPI  Genitourinary: negative for dysuria, frequency and hematuria  Neurological: negative for coordination problems, dizziness and gait problems  Behavioral/Psych: negative for anxiety, depression and mood swings    Objective:     Physical Exam:   BP (!) 111/100   Pulse (!) 115   Temp 98.1 °F (36.7 °C) (Axillary)   Resp 24   Ht 1.6 m (5' 3\")   Wt 82.4 kg (181 lb 10.5 oz)   SpO2 99%   BMI 32.18 kg/m²      General appearance: alert, appears stated age and cooperative  Skin: Skin color, texture, turgor normal. No rashes or lesions  HEENT:   Neck: no adenopathy, no carotid bruit, no JVD, supple, symmetrical, trachea midline and thyroid not enlarged, symmetric, no tenderness/mass/nodules  Lungs: clear to auscultation bilaterally  Heart: regular rate and rhythm, S1, S2 normal, no murmur, click, rub or gallop  Abdomen: soft, non-tender; bowel sounds normal; no masses,  no organomegaly  Extremities: extremities normal, atraumatic, no cyanosis or edema  Neurologic: Mental status: Alert, oriented, thought content appropriate    Labs:  CBC:   Recent Labs     03/21/24  1536 03/21/24  2240 03/22/24  0147   WBC 7.9  --  8.2   HGB 6.9* 7.8* 7.9*   *  --  430     BMP:    Recent Labs

## 2024-03-22 NOTE — CARE COORDINATION
Case Management Assessment  Initial Evaluation    Date/Time of Evaluation: 3/22/2024 3:11 PM  Assessment Completed by: ABRAHAM ROLDAN CARO    If patient is discharged prior to next notation, then this note serves as note for discharge by case management.    Patient Name: Clemente Garcia                   YOB: 1960  Diagnosis: Acute respiratory failure (HCC) [J96.00]  Acute respiratory failure with hypoxia (HCC) [J96.01]  Urinary tract infection with hematuria, site unspecified [N39.0, R31.9]  Ascites due to alcoholic cirrhosis (HCC) [K70.31]  Anemia, unspecified type [D64.9]  Acute on chronic congestive heart failure, unspecified heart failure type (HCC) [I50.9]                   Date / Time: 3/21/2024  3:16 PM    Patient Admission Status: Inpatient   Readmission Risk (Low < 19, Mod (19-27), High > 27): Readmission Risk Score: 24.3    Current PCP: Milana Whalen MD  PCP verified by CM? Yes    Chart Reviewed: Yes      History Provided by: Child/Family  Patient Orientation: Unresponsive    Patient Cognition: Severely Impaired    Hospitalization in the last 30 days (Readmission):  Yes    If yes, Readmission Assessment in CM Navigator will be completed.    Advance Directives:      Code Status: DNR-CCA   Patient's Primary Decision Maker is: Named in Scanned ACP Document      Discharge Planning:    Patient lives with: Other (Comment) ([facility]) Type of Home: Long-Term Care  Primary Care Giver: Other (Comment) ([facility])  Patient Support Systems include: Spouse/Significant Other, Children, Family Members   Current Financial resources: Medicaid  Current community resources: None  Current services prior to admission: Extended Care Facility            Current DME:              Type of Home Care services:       ADLS  Prior functional level: Assistance with the following:, Bathing, Dressing, Toileting, Mobility  Current functional level: Assistance with the following:, Mobility, Bathing, Dressing, Toileting    PT

## 2024-03-22 NOTE — PLAN OF CARE
Problem: Discharge Planning  Goal: Discharge to home or other facility with appropriate resources  Outcome: Progressing  Flowsheets (Taken 3/22/2024 0112)  Discharge to home or other facility with appropriate resources: Identify barriers to discharge with patient and caregiver     Problem: Safety - Adult  Goal: Free from fall injury  Outcome: Progressing  Flowsheets (Taken 3/22/2024 0112)  Free From Fall Injury: Instruct family/caregiver on patient safety     Problem: Skin/Tissue Integrity  Goal: Absence of new skin breakdown  Description: 1.  Monitor for areas of redness and/or skin breakdown  2.  Assess vascular access sites hourly  3.  Every 4-6 hours minimum:  Change oxygen saturation probe site  4.  Every 4-6 hours:  If on nasal continuous positive airway pressure, respiratory therapy assess nares and determine need for appliance change or resting period.  Outcome: Progressing     Problem: ABCDS Injury Assessment  Goal: Absence of physical injury  Outcome: Progressing  Flowsheets (Taken 3/22/2024 0112)  Absence of Physical Injury: Implement safety measures based on patient assessment     Problem: Respiratory - Adult  Goal: Achieves optimal ventilation and oxygenation  Outcome: Progressing  Flowsheets (Taken 3/22/2024 0112)  Achieves optimal ventilation and oxygenation:   Assess for changes in respiratory status   Assess for changes in mentation and behavior   Position to facilitate oxygenation and minimize respiratory effort   Oxygen supplementation based on oxygen saturation or arterial blood gases     Problem: Skin/Tissue Integrity - Adult  Goal: Skin integrity remains intact  Outcome: Progressing  Flowsheets (Taken 3/22/2024 0112)  Skin Integrity Remains Intact:   Monitor for areas of redness and/or skin breakdown   Assess vascular access sites hourly     Problem: Skin/Tissue Integrity - Adult  Goal: Incisions, wounds, or drain sites healing without S/S of infection  Outcome: Progressing     Problem:

## 2024-03-22 NOTE — H&P
Component Value Date/Time    FERRITIN 1,205 02/12/2024 05:37 AM     SPEP:   Lab Results   Component Value Date/Time    PROT 8.1 03/23/2024 06:13 AM   Urine Sodium:    Lab Results   Component Value Date/Time    FENG 35 03/22/2024 06:55 PM    Urine Osmolarity:    Lab Results   Component Value Date/Time    OSMOU 400 02/14/2024 03:47 PM   Urinalysis:  U/A:   Lab Results   Component Value Date/Time    NITRU NEGATIVE 03/21/2024 04:00 PM    COLORU Yellow 03/21/2024 04:00 PM    PHUR 6.0 03/21/2024 04:00 PM    WBCUA 10 TO 20 03/21/2024 04:00 PM    RBCUA TOO NUMEROUS TO COUNT 03/21/2024 04:00 PM    BACTERIA MANY 03/21/2024 04:00 PM    SPECGRAV 1.015 03/21/2024 04:00 PM    LEUKOCYTESUR NEGATIVE 03/21/2024 04:00 PM    UROBILINOGEN Normal 03/21/2024 04:00 PM    BILIRUBINUR NEGATIVE 03/21/2024 04:00 PM    GLUCOSEU NEGATIVE 03/21/2024 04:00 PM    KETUA NEGATIVE 03/21/2024 04:00 PM         Radiology:  Reviewed as available.    Assessment:  1.  Acute kidney injury most likely due to hypoxic ATN versus hepatorenal syndrome.  Patient has a Loaiza catheter.  Her urine output since admission is greater than 50 mL  2.  Hypoxemia with bilateral pleural effusion and physical examination consistent with volume overload  3.  Hepatic cirrhosis with ascites  4.  Anemia of uncertain etiology at this point  5.  Encephalopathy  6.  Hematuria may be trauma with Loaiza    Plan:  1. Will Check Renal Ultrasound to r/o element of obstruction and to assess the kidney size/echotexture.  2.  Will check ANCA titers, MANUEL  3.  Spot urine for protein and creatinine  4.  Start Bumex 2 mg IV twice daily  5.  Strict intake and output charting  6.  Follow-up with    Thank you for the consultation.  Please do not hesitate to call with questions.    Electronically signed by Caden Hernandez MD on 3/23/2024 at 7:52 AM  
time they anticipate the following needs at discharge: Rehab/extended care facility       Patient is admitted as inpatient status because of co-morbidities listed above, severity of signs and symptoms as outlined, requirement for current medical therapies and most importantly because of direct risk to patient if care not provided in a hospital setting.  Expected length of stay > 48 hours. Patient is admitted in the Progressive Unit/Step down    Patient was discussed with MD Yazmin Gee MD  Family Medicine Resident, PGY-1   3/21/2024  7:06 PM    Senior Resident Attestation:    I have independently seen Clemente Garcia and discussed the assessment and plan with the intern listed above and the attending Roscoe Marie MD. I have reviewed the note and have included any edits or additional information above.     Manuel Saldana DO  Family Medicine Resident, PGY-3   3/21/2024  11:00 PM    Attending Physician Statement  I have discussed the care of Clemente Garcia  including pertinent history and exam findings,  with the resident. I have reviewed the key elements of all parts of the encounter with the resident.  I agree with the assessment, plan and orders as documented by the resident.    Patient is well-known to me discussed at length with resident staff late p.m. of 3/21/2024.  Will round early on patient in a.m. of 3/22/2024.    Roscoe Marie MD   3/21/2024     Copy sent to Milana Gonzalez MD

## 2024-03-22 NOTE — ACP (ADVANCE CARE PLANNING)
Advance Care Planning     Advance Care Planning (ACP) Physician/NP/PA Conversation    Date of Conversation: 3/21/2024  Conducted with:  Healthcare Decision Maker: Named in Advance Directive or Healthcare Power of  (name) Patients marycarmen Garcia.     Healthcare Decision Maker:  Patients marycarmen - Marek Garcia.         Care Preferences:    Hospitalization:  \"If your health worsens and it becomes clear that your chance of recovery is unlikely, what would be your preference regarding hospitalization?\"  The patient would prefer hospitalization.    Ventilation:  \"If you were unable to breath on your own and your chance of recovery was unlikely, what would be your preference about the use of a ventilator (breathing machine) if it was available to you?\"  The patient would NOT desire the use of a ventilator.    Resuscitation:  \"In the event your heart stopped as a result of an underlying serious health condition, would you want attempts made to restart your heart, or would you prefer a natural death?\"  No, do NOT attempt to resuscitate.    treatment goals    Conversation Outcomes / Follow-Up Plan:  ACP in process - information provided, considering goals and options  Reviewed DNR/DNI and patient confirms current DNR status - completed forms on file (place new order if needed)    Length of Voluntary ACP Conversation in minutes:  <16 minutes (Non-Billable)    Kadi Fonseca, APRN - CNP

## 2024-03-23 PROBLEM — L30.4 INTERTRIGO: Status: ACTIVE | Noted: 2024-03-23

## 2024-03-23 LAB
ALBUMIN SERPL-MCNC: 2.8 G/DL (ref 3.5–5.2)
ALBUMIN/GLOB SERPL: 0.5 {RATIO} (ref 1–2.5)
ALP SERPL-CCNC: 144 U/L (ref 35–104)
ALT SERPL-CCNC: 13 U/L (ref 5–33)
ANION GAP SERPL CALCULATED.3IONS-SCNC: 10 MMOL/L (ref 9–17)
AST SERPL-CCNC: 29 U/L
BASOPHILS # BLD: 0.06 K/UL (ref 0–0.2)
BASOPHILS NFR BLD: 1 % (ref 0–2)
BILIRUB SERPL-MCNC: 0.3 MG/DL (ref 0.3–1.2)
BUN SERPL-MCNC: 61 MG/DL (ref 8–23)
CALCIUM SERPL-MCNC: 8.6 MG/DL (ref 8.6–10.4)
CHLORIDE SERPL-SCNC: 95 MMOL/L (ref 98–107)
CO2 SERPL-SCNC: 31 MMOL/L (ref 20–31)
CREAT SERPL-MCNC: 1.9 MG/DL (ref 0.5–0.9)
CREAT UR-MCNC: 38.3 MG/DL (ref 28–217)
EOSINOPHIL # BLD: 0.28 K/UL (ref 0–0.4)
EOSINOPHILS RELATIVE PERCENT: 5 % (ref 1–4)
ERYTHROCYTE [DISTWIDTH] IN BLOOD BY AUTOMATED COUNT: 16.6 % (ref 12.5–15.4)
GFR SERPL CREATININE-BSD FRML MDRD: 29 ML/MIN/1.73M2
GLUCOSE BLD-MCNC: 79 MG/DL (ref 65–105)
GLUCOSE BLD-MCNC: 81 MG/DL (ref 65–105)
GLUCOSE BLD-MCNC: 88 MG/DL (ref 65–105)
GLUCOSE BLD-MCNC: 93 MG/DL (ref 65–105)
GLUCOSE BLD-MCNC: 94 MG/DL (ref 65–105)
GLUCOSE BLD-MCNC: 99 MG/DL (ref 65–105)
GLUCOSE SERPL-MCNC: 80 MG/DL (ref 70–99)
HCT VFR BLD AUTO: 23.3 % (ref 36–46)
HGB BLD-MCNC: 8.1 G/DL (ref 12–16)
LYMPHOCYTES NFR BLD: 0.45 K/UL (ref 1–4.8)
LYMPHOCYTES RELATIVE PERCENT: 8 % (ref 24–44)
MCH RBC QN AUTO: 32.3 PG (ref 26–34)
MCHC RBC AUTO-ENTMCNC: 34.6 G/DL (ref 31–37)
MCV RBC AUTO: 93.2 FL (ref 80–100)
METAMYELOCYTES ABSOLUTE COUNT: 0.06 K/UL
METAMYELOCYTES: 1 %
MONOCYTES NFR BLD: 0.11 K/UL (ref 0.1–0.8)
MONOCYTES NFR BLD: 2 % (ref 1–7)
MORPHOLOGY: NORMAL
NEUTROPHILS NFR BLD: 83 % (ref 36–66)
NEUTS SEG NFR BLD: 4.64 K/UL (ref 1.8–7.7)
PLATELET # BLD AUTO: 385 K/UL (ref 140–450)
PMV BLD AUTO: 7 FL (ref 6–12)
POTASSIUM SERPL-SCNC: 4.6 MMOL/L (ref 3.7–5.3)
PROT SERPL-MCNC: 8.1 G/DL (ref 6.4–8.3)
RBC # BLD AUTO: 2.5 M/UL (ref 4–5.2)
SODIUM SERPL-SCNC: 136 MMOL/L (ref 135–144)
TOTAL PROTEIN, URINE: 113 MG/DL
URINE TOTAL PROTEIN CREATININE RATIO: 2.95
WBC OTHER # BLD: 5.6 K/UL (ref 3.5–11)

## 2024-03-23 PROCEDURE — 6370000000 HC RX 637 (ALT 250 FOR IP)

## 2024-03-23 PROCEDURE — 2580000003 HC RX 258: Performed by: STUDENT IN AN ORGANIZED HEALTH CARE EDUCATION/TRAINING PROGRAM

## 2024-03-23 PROCEDURE — 2580000003 HC RX 258

## 2024-03-23 PROCEDURE — 99233 SBSQ HOSP IP/OBS HIGH 50: CPT | Performed by: INTERNAL MEDICINE

## 2024-03-23 PROCEDURE — 6370000000 HC RX 637 (ALT 250 FOR IP): Performed by: INTERNAL MEDICINE

## 2024-03-23 PROCEDURE — C9113 INJ PANTOPRAZOLE SODIUM, VIA: HCPCS | Performed by: STUDENT IN AN ORGANIZED HEALTH CARE EDUCATION/TRAINING PROGRAM

## 2024-03-23 PROCEDURE — 99232 SBSQ HOSP IP/OBS MODERATE 35: CPT | Performed by: INTERNAL MEDICINE

## 2024-03-23 PROCEDURE — 82947 ASSAY GLUCOSE BLOOD QUANT: CPT

## 2024-03-23 PROCEDURE — A4216 STERILE WATER/SALINE, 10 ML: HCPCS | Performed by: STUDENT IN AN ORGANIZED HEALTH CARE EDUCATION/TRAINING PROGRAM

## 2024-03-23 PROCEDURE — 6370000000 HC RX 637 (ALT 250 FOR IP): Performed by: NURSE PRACTITIONER

## 2024-03-23 PROCEDURE — 85025 COMPLETE CBC W/AUTO DIFF WBC: CPT

## 2024-03-23 PROCEDURE — 99231 SBSQ HOSP IP/OBS SF/LOW 25: CPT | Performed by: NURSE PRACTITIONER

## 2024-03-23 PROCEDURE — 6360000002 HC RX W HCPCS: Performed by: STUDENT IN AN ORGANIZED HEALTH CARE EDUCATION/TRAINING PROGRAM

## 2024-03-23 PROCEDURE — 36415 COLL VENOUS BLD VENIPUNCTURE: CPT

## 2024-03-23 PROCEDURE — 94640 AIRWAY INHALATION TREATMENT: CPT

## 2024-03-23 PROCEDURE — 80053 COMPREHEN METABOLIC PANEL: CPT

## 2024-03-23 PROCEDURE — 6360000002 HC RX W HCPCS: Performed by: INTERNAL MEDICINE

## 2024-03-23 PROCEDURE — 99232 SBSQ HOSP IP/OBS MODERATE 35: CPT | Performed by: FAMILY MEDICINE

## 2024-03-23 PROCEDURE — 2060000000 HC ICU INTERMEDIATE R&B

## 2024-03-23 RX ORDER — HYDROXYZINE HYDROCHLORIDE 25 MG/1
25 TABLET, FILM COATED ORAL ONCE
Status: COMPLETED | OUTPATIENT
Start: 2024-03-24 | End: 2024-03-24

## 2024-03-23 RX ORDER — LACTULOSE 10 G/15ML
20 SOLUTION ORAL 3 TIMES DAILY
Status: DISCONTINUED | OUTPATIENT
Start: 2024-03-23 | End: 2024-03-25 | Stop reason: HOSPADM

## 2024-03-23 RX ADMIN — MINERAL SUPPLEMENT IRON 300 MG / 5 ML STRENGTH LIQUID 100 PER BOX UNFLAVORED 325 MG: at 09:03

## 2024-03-23 RX ADMIN — VENLAFAXINE 75 MG: 37.5 TABLET ORAL at 09:03

## 2024-03-23 RX ADMIN — LORAZEPAM 0.5 MG: 0.5 TABLET ORAL at 14:59

## 2024-03-23 RX ADMIN — IPRATROPIUM BROMIDE AND ALBUTEROL SULFATE 1 DOSE: 2.5; .5 SOLUTION RESPIRATORY (INHALATION) at 23:10

## 2024-03-23 RX ADMIN — NORTRIPTYLINE HYDROCHLORIDE 10 MG: 10 CAPSULE ORAL at 20:52

## 2024-03-23 RX ADMIN — FOLIC ACID 1 MG: 1 TABLET ORAL at 09:03

## 2024-03-23 RX ADMIN — LORAZEPAM 0.5 MG: 0.5 TABLET ORAL at 20:52

## 2024-03-23 RX ADMIN — LEVOTHYROXINE SODIUM 150 MCG: 75 TABLET ORAL at 05:18

## 2024-03-23 RX ADMIN — SODIUM CHLORIDE, PRESERVATIVE FREE 10 ML: 5 INJECTION INTRAVENOUS at 20:53

## 2024-03-23 RX ADMIN — SPIRONOLACTONE 25 MG: 25 TABLET, FILM COATED ORAL at 18:55

## 2024-03-23 RX ADMIN — LACTULOSE 20 G: 10 SOLUTION ORAL at 15:04

## 2024-03-23 RX ADMIN — SODIUM CHLORIDE, PRESERVATIVE FREE 10 ML: 5 INJECTION INTRAVENOUS at 09:04

## 2024-03-23 RX ADMIN — Medication 100 MG: at 20:52

## 2024-03-23 RX ADMIN — RIFAXIMIN 550 MG: 550 TABLET ORAL at 09:04

## 2024-03-23 RX ADMIN — Medication 1 BOTTLE: at 09:21

## 2024-03-23 RX ADMIN — RIFAXIMIN 550 MG: 550 TABLET ORAL at 20:52

## 2024-03-23 RX ADMIN — Medication 100 MG: at 09:04

## 2024-03-23 RX ADMIN — Medication 100 MG: at 09:03

## 2024-03-23 RX ADMIN — SPIRONOLACTONE 25 MG: 25 TABLET, FILM COATED ORAL at 09:03

## 2024-03-23 RX ADMIN — BUMETANIDE 2 MG: 0.25 INJECTION INTRAMUSCULAR; INTRAVENOUS at 20:52

## 2024-03-23 RX ADMIN — LACTULOSE 20 G: 10 SOLUTION ORAL at 20:52

## 2024-03-23 RX ADMIN — VENLAFAXINE 75 MG: 37.5 TABLET ORAL at 20:52

## 2024-03-23 RX ADMIN — BUMETANIDE 2 MG: 0.25 INJECTION INTRAMUSCULAR; INTRAVENOUS at 09:02

## 2024-03-23 RX ADMIN — SODIUM CHLORIDE, PRESERVATIVE FREE 40 MG: 5 INJECTION INTRAVENOUS at 09:02

## 2024-03-23 ASSESSMENT — PAIN SCALES - GENERAL: PAINLEVEL_OUTOF10: 0

## 2024-03-23 ASSESSMENT — PAIN SCALES - WONG BAKER: WONGBAKER_NUMERICALRESPONSE: NO HURT

## 2024-03-23 NOTE — PLAN OF CARE
Problem: Safety - Adult  Goal: Free from fall injury  Outcome: Progressing     Problem: Skin/Tissue Integrity  Goal: Absence of new skin breakdown  Description: 1.  Monitor for areas of redness and/or skin breakdown  2.  Assess vascular access sites hourly  3.  Every 4-6 hours minimum:  Change oxygen saturation probe site  4.  Every 4-6 hours:  If on nasal continuous positive airway pressure, respiratory therapy assess nares and determine need for appliance change or resting period.  Outcome: Progressing     Problem: ABCDS Injury Assessment  Goal: Absence of physical injury  Outcome: Progressing     Problem: Respiratory - Adult  Goal: Achieves optimal ventilation and oxygenation  Outcome: Progressing  Flowsheets (Taken 3/22/2024 2000)  Achieves optimal ventilation and oxygenation:   Assess for changes in respiratory status   Assess for changes in mentation and behavior   Position to facilitate oxygenation and minimize respiratory effort     Problem: Skin/Tissue Integrity - Adult  Goal: Skin integrity remains intact  Outcome: Progressing     Problem: Skin/Tissue Integrity - Adult  Goal: Incisions, wounds, or drain sites healing without S/S of infection  Outcome: Progressing     Problem: Skin/Tissue Integrity - Adult  Goal: Oral mucous membranes remain intact  Outcome: Progressing     Problem: Hematologic - Adult  Goal: Maintains hematologic stability  Outcome: Progressing     Problem: Pain  Goal: Verbalizes/displays adequate comfort level or baseline comfort level  Outcome: Progressing

## 2024-03-24 PROBLEM — B96.89 URINARY TRACT INFECTION DUE TO ESBL KLEBSIELLA: Status: ACTIVE | Noted: 2024-03-24

## 2024-03-24 PROBLEM — N39.0 URINARY TRACT INFECTION DUE TO ESBL KLEBSIELLA: Status: ACTIVE | Noted: 2024-03-24

## 2024-03-24 PROBLEM — J90 PLEURAL EFFUSION, BILATERAL: Status: ACTIVE | Noted: 2024-03-24

## 2024-03-24 LAB
ALBUMIN SERPL-MCNC: 2.6 G/DL (ref 3.5–5.2)
ALBUMIN/GLOB SERPL: 0.5 {RATIO} (ref 1–2.5)
ALP SERPL-CCNC: 152 U/L (ref 35–104)
ALT SERPL-CCNC: 8 U/L (ref 5–33)
ANION GAP SERPL CALCULATED.3IONS-SCNC: 10 MMOL/L (ref 9–17)
AST SERPL-CCNC: 27 U/L
BASOPHILS # BLD: 0 K/UL (ref 0–0.2)
BASOPHILS NFR BLD: 0 % (ref 0–2)
BILIRUB SERPL-MCNC: 0.4 MG/DL (ref 0.3–1.2)
BNP SERPL-MCNC: 9996 PG/ML
BUN SERPL-MCNC: 58 MG/DL (ref 8–23)
CALCIUM SERPL-MCNC: 8.5 MG/DL (ref 8.6–10.4)
CHLORIDE SERPL-SCNC: 95 MMOL/L (ref 98–107)
CO2 SERPL-SCNC: 28 MMOL/L (ref 20–31)
CREAT SERPL-MCNC: 1.8 MG/DL (ref 0.5–0.9)
EOSINOPHIL # BLD: 0.5 K/UL (ref 0–0.4)
EOSINOPHILS RELATIVE PERCENT: 8 % (ref 1–4)
ERYTHROCYTE [DISTWIDTH] IN BLOOD BY AUTOMATED COUNT: 16.1 % (ref 12.5–15.4)
GFR SERPL CREATININE-BSD FRML MDRD: 31 ML/MIN/1.73M2
GLUCOSE BLD-MCNC: 100 MG/DL (ref 65–105)
GLUCOSE BLD-MCNC: 54 MG/DL (ref 65–105)
GLUCOSE BLD-MCNC: 72 MG/DL (ref 65–105)
GLUCOSE BLD-MCNC: 76 MG/DL (ref 65–105)
GLUCOSE BLD-MCNC: 89 MG/DL (ref 65–105)
GLUCOSE SERPL-MCNC: 70 MG/DL (ref 70–99)
HCT VFR BLD AUTO: 24 % (ref 36–46)
HGB BLD-MCNC: 8 G/DL (ref 12–16)
LYMPHOCYTES NFR BLD: 0.37 K/UL (ref 1–4.8)
LYMPHOCYTES RELATIVE PERCENT: 6 % (ref 24–44)
MCH RBC QN AUTO: 31.4 PG (ref 26–34)
MCHC RBC AUTO-ENTMCNC: 33.4 G/DL (ref 31–37)
MCV RBC AUTO: 94.1 FL (ref 80–100)
MICROORGANISM SPEC CULT: ABNORMAL
MONOCYTES NFR BLD: 0.37 K/UL (ref 0.1–0.8)
MONOCYTES NFR BLD: 6 % (ref 1–7)
MORPHOLOGY: NORMAL
NEUTROPHILS NFR BLD: 80 % (ref 36–66)
NEUTS SEG NFR BLD: 4.96 K/UL (ref 1.8–7.7)
PLATELET # BLD AUTO: 392 K/UL (ref 140–450)
PMV BLD AUTO: 7 FL (ref 6–12)
POTASSIUM SERPL-SCNC: 4.4 MMOL/L (ref 3.7–5.3)
PROT SERPL-MCNC: 7.9 G/DL (ref 6.4–8.3)
RBC # BLD AUTO: 2.55 M/UL (ref 4–5.2)
SODIUM SERPL-SCNC: 133 MMOL/L (ref 135–144)
SPECIMEN DESCRIPTION: ABNORMAL
WBC OTHER # BLD: 6.2 K/UL (ref 3.5–11)

## 2024-03-24 PROCEDURE — C9113 INJ PANTOPRAZOLE SODIUM, VIA: HCPCS | Performed by: STUDENT IN AN ORGANIZED HEALTH CARE EDUCATION/TRAINING PROGRAM

## 2024-03-24 PROCEDURE — 6370000000 HC RX 637 (ALT 250 FOR IP): Performed by: INTERNAL MEDICINE

## 2024-03-24 PROCEDURE — 2580000003 HC RX 258

## 2024-03-24 PROCEDURE — 36415 COLL VENOUS BLD VENIPUNCTURE: CPT

## 2024-03-24 PROCEDURE — 94760 N-INVAS EAR/PLS OXIMETRY 1: CPT

## 2024-03-24 PROCEDURE — 6370000000 HC RX 637 (ALT 250 FOR IP)

## 2024-03-24 PROCEDURE — 99233 SBSQ HOSP IP/OBS HIGH 50: CPT | Performed by: INTERNAL MEDICINE

## 2024-03-24 PROCEDURE — 6360000002 HC RX W HCPCS: Performed by: INTERNAL MEDICINE

## 2024-03-24 PROCEDURE — 83880 ASSAY OF NATRIURETIC PEPTIDE: CPT

## 2024-03-24 PROCEDURE — A4216 STERILE WATER/SALINE, 10 ML: HCPCS | Performed by: STUDENT IN AN ORGANIZED HEALTH CARE EDUCATION/TRAINING PROGRAM

## 2024-03-24 PROCEDURE — 85025 COMPLETE CBC W/AUTO DIFF WBC: CPT

## 2024-03-24 PROCEDURE — 6370000000 HC RX 637 (ALT 250 FOR IP): Performed by: NURSE PRACTITIONER

## 2024-03-24 PROCEDURE — 99232 SBSQ HOSP IP/OBS MODERATE 35: CPT | Performed by: INTERNAL MEDICINE

## 2024-03-24 PROCEDURE — 6370000000 HC RX 637 (ALT 250 FOR IP): Performed by: FAMILY MEDICINE

## 2024-03-24 PROCEDURE — 6360000002 HC RX W HCPCS: Performed by: STUDENT IN AN ORGANIZED HEALTH CARE EDUCATION/TRAINING PROGRAM

## 2024-03-24 PROCEDURE — 99231 SBSQ HOSP IP/OBS SF/LOW 25: CPT | Performed by: NURSE PRACTITIONER

## 2024-03-24 PROCEDURE — 94640 AIRWAY INHALATION TREATMENT: CPT

## 2024-03-24 PROCEDURE — 2580000003 HC RX 258: Performed by: STUDENT IN AN ORGANIZED HEALTH CARE EDUCATION/TRAINING PROGRAM

## 2024-03-24 PROCEDURE — 99232 SBSQ HOSP IP/OBS MODERATE 35: CPT | Performed by: FAMILY MEDICINE

## 2024-03-24 PROCEDURE — 2060000000 HC ICU INTERMEDIATE R&B

## 2024-03-24 PROCEDURE — 82947 ASSAY GLUCOSE BLOOD QUANT: CPT

## 2024-03-24 PROCEDURE — 6360000002 HC RX W HCPCS

## 2024-03-24 PROCEDURE — 80053 COMPREHEN METABOLIC PANEL: CPT

## 2024-03-24 RX ORDER — LEVOFLOXACIN 750 MG/1
750 TABLET, FILM COATED ORAL EVERY OTHER DAY
Status: DISCONTINUED | OUTPATIENT
Start: 2024-03-26 | End: 2024-03-25 | Stop reason: HOSPADM

## 2024-03-24 RX ORDER — LEVOFLOXACIN 5 MG/ML
750 INJECTION, SOLUTION INTRAVENOUS
Status: DISCONTINUED | OUTPATIENT
Start: 2024-03-24 | End: 2024-03-24

## 2024-03-24 RX ORDER — LACOSAMIDE 100 MG/1
100 TABLET ORAL ONCE
Status: COMPLETED | OUTPATIENT
Start: 2024-03-24 | End: 2024-03-24

## 2024-03-24 RX ORDER — LEVOFLOXACIN 750 MG/1
750 TABLET, FILM COATED ORAL EVERY OTHER DAY
Status: DISCONTINUED | OUTPATIENT
Start: 2024-03-26 | End: 2024-03-24

## 2024-03-24 RX ADMIN — LACTULOSE 20 G: 10 SOLUTION ORAL at 15:25

## 2024-03-24 RX ADMIN — RIFAXIMIN 550 MG: 550 TABLET ORAL at 08:19

## 2024-03-24 RX ADMIN — LORAZEPAM 0.5 MG: 0.5 TABLET ORAL at 08:18

## 2024-03-24 RX ADMIN — LEVOFLOXACIN 750 MG: 750 INJECTION, SOLUTION INTRAVENOUS at 00:28

## 2024-03-24 RX ADMIN — MINERAL SUPPLEMENT IRON 300 MG / 5 ML STRENGTH LIQUID 100 PER BOX UNFLAVORED 325 MG: at 08:18

## 2024-03-24 RX ADMIN — VENLAFAXINE 75 MG: 37.5 TABLET ORAL at 08:18

## 2024-03-24 RX ADMIN — LACTULOSE 20 G: 10 SOLUTION ORAL at 08:18

## 2024-03-24 RX ADMIN — DEXTROSE MONOHYDRATE 125 ML: 100 INJECTION, SOLUTION INTRAVENOUS at 05:58

## 2024-03-24 RX ADMIN — SODIUM CHLORIDE, PRESERVATIVE FREE 40 MG: 5 INJECTION INTRAVENOUS at 08:18

## 2024-03-24 RX ADMIN — BUMETANIDE 2 MG: 0.25 INJECTION INTRAMUSCULAR; INTRAVENOUS at 21:51

## 2024-03-24 RX ADMIN — LACOSAMIDE 100 MG: 100 TABLET, FILM COATED ORAL at 21:51

## 2024-03-24 RX ADMIN — LEVOTHYROXINE SODIUM 150 MCG: 75 TABLET ORAL at 05:49

## 2024-03-24 RX ADMIN — SODIUM CHLORIDE, PRESERVATIVE FREE 10 ML: 5 INJECTION INTRAVENOUS at 08:19

## 2024-03-24 RX ADMIN — Medication 100 MG: at 08:18

## 2024-03-24 RX ADMIN — RIFAXIMIN 550 MG: 550 TABLET ORAL at 21:51

## 2024-03-24 RX ADMIN — BUMETANIDE 2 MG: 0.25 INJECTION INTRAMUSCULAR; INTRAVENOUS at 08:18

## 2024-03-24 RX ADMIN — LORAZEPAM 0.5 MG: 0.5 TABLET ORAL at 22:41

## 2024-03-24 RX ADMIN — SPIRONOLACTONE 25 MG: 25 TABLET, FILM COATED ORAL at 18:13

## 2024-03-24 RX ADMIN — SODIUM CHLORIDE, PRESERVATIVE FREE 10 ML: 5 INJECTION INTRAVENOUS at 21:51

## 2024-03-24 RX ADMIN — FOLIC ACID 1 MG: 1 TABLET ORAL at 08:18

## 2024-03-24 RX ADMIN — LACTULOSE 20 G: 10 SOLUTION ORAL at 21:51

## 2024-03-24 RX ADMIN — NORTRIPTYLINE HYDROCHLORIDE 10 MG: 10 CAPSULE ORAL at 21:51

## 2024-03-24 RX ADMIN — SPIRONOLACTONE 25 MG: 25 TABLET, FILM COATED ORAL at 08:19

## 2024-03-24 RX ADMIN — Medication 1 BOTTLE: at 08:19

## 2024-03-24 RX ADMIN — VENLAFAXINE 75 MG: 37.5 TABLET ORAL at 21:51

## 2024-03-24 RX ADMIN — HYDROXYZINE HYDROCHLORIDE 25 MG: 25 TABLET, FILM COATED ORAL at 01:07

## 2024-03-24 RX ADMIN — IPRATROPIUM BROMIDE AND ALBUTEROL SULFATE 1 DOSE: 2.5; .5 SOLUTION RESPIRATORY (INHALATION) at 05:42

## 2024-03-24 ASSESSMENT — PAIN SCALES - GENERAL: PAINLEVEL_OUTOF10: 0

## 2024-03-24 NOTE — PLAN OF CARE
Problem: Discharge Planning  Goal: Discharge to home or other facility with appropriate resources  Outcome: Progressing     Problem: Safety - Adult  Goal: Free from fall injury  Outcome: Progressing     Problem: Skin/Tissue Integrity  Goal: Absence of new skin breakdown  Description: 1.  Monitor for areas of redness and/or skin breakdown  2.  Assess vascular access sites hourly  3.  Every 4-6 hours minimum:  Change oxygen saturation probe site  4.  Every 4-6 hours:  If on nasal continuous positive airway pressure, respiratory therapy assess nares and determine need for appliance change or resting period.  Outcome: Progressing     Problem: ABCDS Injury Assessment  Goal: Absence of physical injury  Outcome: Progressing     Problem: Respiratory - Adult  Goal: Achieves optimal ventilation and oxygenation  Outcome: Progressing  Flowsheets (Taken 3/23/2024 2310 by Kathi Bishop RCP)  Achieves optimal ventilation and oxygenation:   Assess for changes in respiratory status   Assess for changes in mentation and behavior   Position to facilitate oxygenation and minimize respiratory effort   Oxygen supplementation based on oxygen saturation or arterial blood gases     Problem: Skin/Tissue Integrity - Adult  Goal: Skin integrity remains intact  Outcome: Progressing     Problem: Hematologic - Adult  Goal: Maintains hematologic stability  Outcome: Progressing     Problem: Nutrition Deficit:  Goal: Optimize nutritional status  Outcome: Progressing     Problem: Pain  Goal: Verbalizes/displays adequate comfort level or baseline comfort level  Outcome: Progressing

## 2024-03-24 NOTE — PLAN OF CARE
Problem: Discharge Planning  Goal: Discharge to home or other facility with appropriate resources  Outcome: Progressing     Problem: Safety - Adult  Goal: Free from fall injury  Outcome: Progressing     Problem: Skin/Tissue Integrity  Goal: Absence of new skin breakdown  Description: 1.  Monitor for areas of redness and/or skin breakdown  2.  Assess vascular access sites hourly  3.  Every 4-6 hours minimum:  Change oxygen saturation probe site  4.  Every 4-6 hours:  If on nasal continuous positive airway pressure, respiratory therapy assess nares and determine need for appliance change or resting period.  Outcome: Progressing     Problem: ABCDS Injury Assessment  Goal: Absence of physical injury  Outcome: Progressing     Problem: Respiratory - Adult  Goal: Achieves optimal ventilation and oxygenation  Outcome: Progressing     Problem: Skin/Tissue Integrity - Adult  Goal: Skin integrity remains intact  Outcome: Progressing  Goal: Incisions, wounds, or drain sites healing without S/S of infection  Outcome: Progressing  Goal: Oral mucous membranes remain intact  Outcome: Progressing     Problem: Hematologic - Adult  Goal: Maintains hematologic stability  Outcome: Progressing     Problem: Nutrition Deficit:  Goal: Optimize nutritional status  Outcome: Progressing     Problem: Pain  Goal: Verbalizes/displays adequate comfort level or baseline comfort level  Outcome: Progressing

## 2024-03-25 VITALS
TEMPERATURE: 97.6 F | SYSTOLIC BLOOD PRESSURE: 138 MMHG | HEIGHT: 63 IN | RESPIRATION RATE: 16 BRPM | BODY MASS INDEX: 32.19 KG/M2 | OXYGEN SATURATION: 96 % | HEART RATE: 82 BPM | WEIGHT: 181.66 LBS | DIASTOLIC BLOOD PRESSURE: 76 MMHG

## 2024-03-25 PROBLEM — N17.0 ACUTE RENAL FAILURE WITH TUBULAR NECROSIS (HCC): Status: ACTIVE | Noted: 2024-03-25

## 2024-03-25 LAB
ANA SER QL IA: NEGATIVE
ANCA MYELOPEROXIDASE: 0.3 AU/ML (ref 0–3.5)
ANCA PROTEINASE 3: <0.7 AU/ML (ref 0–2)
ANION GAP SERPL CALCULATED.3IONS-SCNC: 7 MMOL/L (ref 9–17)
BASOPHILS # BLD: 0 K/UL (ref 0–0.2)
BASOPHILS NFR BLD: 0 % (ref 0–2)
BUN SERPL-MCNC: 55 MG/DL (ref 8–23)
CALCIUM SERPL-MCNC: 8.4 MG/DL (ref 8.6–10.4)
CHLORIDE SERPL-SCNC: 97 MMOL/L (ref 98–107)
CO2 SERPL-SCNC: 30 MMOL/L (ref 20–31)
CREAT SERPL-MCNC: 1.8 MG/DL (ref 0.5–0.9)
DSDNA IGG SER QL IA: 4 IU/ML
EOSINOPHIL # BLD: 0.36 K/UL (ref 0–0.4)
EOSINOPHILS RELATIVE PERCENT: 5 % (ref 1–4)
ERYTHROCYTE [DISTWIDTH] IN BLOOD BY AUTOMATED COUNT: 16.8 % (ref 12.5–15.4)
GFR SERPL CREATININE-BSD FRML MDRD: 31 ML/MIN/1.73M2
GLUCOSE BLD-MCNC: 101 MG/DL (ref 65–105)
GLUCOSE BLD-MCNC: 85 MG/DL (ref 65–105)
GLUCOSE BLD-MCNC: 93 MG/DL (ref 65–105)
GLUCOSE BLD-MCNC: 93 MG/DL (ref 65–105)
GLUCOSE SERPL-MCNC: 96 MG/DL (ref 70–99)
HCT VFR BLD AUTO: 23.8 % (ref 36–46)
HGB BLD-MCNC: 7.9 G/DL (ref 12–16)
LYMPHOCYTES NFR BLD: 0.64 K/UL (ref 1–4.8)
LYMPHOCYTES RELATIVE PERCENT: 9 % (ref 24–44)
MCH RBC QN AUTO: 31.6 PG (ref 26–34)
MCHC RBC AUTO-ENTMCNC: 33.4 G/DL (ref 31–37)
MCV RBC AUTO: 94.4 FL (ref 80–100)
MONOCYTES NFR BLD: 0.57 K/UL (ref 0.1–0.8)
MONOCYTES NFR BLD: 8 % (ref 1–7)
MORPHOLOGY: NORMAL
NEUTROPHILS NFR BLD: 78 % (ref 36–66)
NEUTS SEG NFR BLD: 5.53 K/UL (ref 1.8–7.7)
NUCLEAR IGG SER IA-RTO: 0.2 U/ML
PLATELET # BLD AUTO: 365 K/UL (ref 140–450)
PMV BLD AUTO: 6.9 FL (ref 6–12)
POTASSIUM SERPL-SCNC: 4.6 MMOL/L (ref 3.7–5.3)
RBC # BLD AUTO: 2.52 M/UL (ref 4–5.2)
SODIUM SERPL-SCNC: 134 MMOL/L (ref 135–144)
WBC OTHER # BLD: 7.1 K/UL (ref 3.5–11)

## 2024-03-25 PROCEDURE — 97535 SELF CARE MNGMENT TRAINING: CPT

## 2024-03-25 PROCEDURE — 6370000000 HC RX 637 (ALT 250 FOR IP)

## 2024-03-25 PROCEDURE — 99232 SBSQ HOSP IP/OBS MODERATE 35: CPT | Performed by: INTERNAL MEDICINE

## 2024-03-25 PROCEDURE — 6360000002 HC RX W HCPCS: Performed by: STUDENT IN AN ORGANIZED HEALTH CARE EDUCATION/TRAINING PROGRAM

## 2024-03-25 PROCEDURE — 82947 ASSAY GLUCOSE BLOOD QUANT: CPT

## 2024-03-25 PROCEDURE — 2580000003 HC RX 258: Performed by: STUDENT IN AN ORGANIZED HEALTH CARE EDUCATION/TRAINING PROGRAM

## 2024-03-25 PROCEDURE — 6360000002 HC RX W HCPCS: Performed by: INTERNAL MEDICINE

## 2024-03-25 PROCEDURE — C9113 INJ PANTOPRAZOLE SODIUM, VIA: HCPCS | Performed by: STUDENT IN AN ORGANIZED HEALTH CARE EDUCATION/TRAINING PROGRAM

## 2024-03-25 PROCEDURE — 99238 HOSP IP/OBS DSCHRG MGMT 30/<: CPT | Performed by: FAMILY MEDICINE

## 2024-03-25 PROCEDURE — 6370000000 HC RX 637 (ALT 250 FOR IP): Performed by: NURSE PRACTITIONER

## 2024-03-25 PROCEDURE — 2580000003 HC RX 258

## 2024-03-25 PROCEDURE — 85025 COMPLETE CBC W/AUTO DIFF WBC: CPT

## 2024-03-25 PROCEDURE — 80048 BASIC METABOLIC PNL TOTAL CA: CPT

## 2024-03-25 PROCEDURE — 36415 COLL VENOUS BLD VENIPUNCTURE: CPT

## 2024-03-25 RX ORDER — DULOXETIN HYDROCHLORIDE 20 MG/1
20 CAPSULE, DELAYED RELEASE ORAL DAILY
Qty: 30 CAPSULE | Refills: 0 | Status: ON HOLD | OUTPATIENT
Start: 2024-03-25

## 2024-03-25 RX ORDER — BUMETANIDE 2 MG/1
2 TABLET ORAL 2 TIMES DAILY
Qty: 30 TABLET | Refills: 0 | Status: CANCELLED | OUTPATIENT
Start: 2024-03-25 | End: 2024-04-24

## 2024-03-25 RX ORDER — LACOSAMIDE 10 MG/ML
100 SOLUTION ORAL 2 TIMES DAILY
Qty: 600 ML | Refills: 0 | Status: ON HOLD | OUTPATIENT
Start: 2024-03-25 | End: 2024-04-24

## 2024-03-25 RX ORDER — FENTANYL 12.5 UG/1
1 PATCH TRANSDERMAL
Qty: 3 PATCH | Refills: 0 | Status: ON HOLD | OUTPATIENT
Start: 2024-03-25 | End: 2024-04-03

## 2024-03-25 RX ORDER — FENTANYL 12.5 UG/1
1 PATCH TRANSDERMAL
Status: DISCONTINUED | OUTPATIENT
Start: 2024-03-25 | End: 2024-03-25 | Stop reason: HOSPADM

## 2024-03-25 RX ORDER — TORSEMIDE 20 MG/1
40 TABLET ORAL DAILY
Status: DISCONTINUED | OUTPATIENT
Start: 2024-03-26 | End: 2024-03-25 | Stop reason: HOSPADM

## 2024-03-25 RX ORDER — LEVOFLOXACIN 750 MG/1
750 TABLET, FILM COATED ORAL EVERY OTHER DAY
Qty: 2 TABLET | Refills: 0 | Status: SHIPPED | OUTPATIENT
Start: 2024-03-26 | End: 2024-03-29

## 2024-03-25 RX ADMIN — Medication 100 MG: at 09:38

## 2024-03-25 RX ADMIN — SPIRONOLACTONE 25 MG: 25 TABLET, FILM COATED ORAL at 18:12

## 2024-03-25 RX ADMIN — Medication 100 MG: at 12:37

## 2024-03-25 RX ADMIN — MINERAL SUPPLEMENT IRON 300 MG / 5 ML STRENGTH LIQUID 100 PER BOX UNFLAVORED 325 MG: at 09:37

## 2024-03-25 RX ADMIN — GABAPENTIN 100 MG: 100 CAPSULE ORAL at 04:48

## 2024-03-25 RX ADMIN — LORAZEPAM 0.5 MG: 0.5 TABLET ORAL at 04:48

## 2024-03-25 RX ADMIN — LACTULOSE 20 G: 10 SOLUTION ORAL at 12:40

## 2024-03-25 RX ADMIN — SODIUM CHLORIDE, PRESERVATIVE FREE 40 MG: 5 INJECTION INTRAVENOUS at 09:35

## 2024-03-25 RX ADMIN — SPIRONOLACTONE 25 MG: 25 TABLET, FILM COATED ORAL at 09:38

## 2024-03-25 RX ADMIN — LEVOTHYROXINE SODIUM 150 MCG: 75 TABLET ORAL at 06:02

## 2024-03-25 RX ADMIN — SODIUM CHLORIDE, PRESERVATIVE FREE 10 ML: 5 INJECTION INTRAVENOUS at 09:44

## 2024-03-25 RX ADMIN — RIFAXIMIN 550 MG: 550 TABLET ORAL at 09:38

## 2024-03-25 RX ADMIN — VENLAFAXINE 75 MG: 37.5 TABLET ORAL at 09:38

## 2024-03-25 RX ADMIN — FOLIC ACID 1 MG: 1 TABLET ORAL at 09:38

## 2024-03-25 RX ADMIN — BUMETANIDE 2 MG: 0.25 INJECTION INTRAMUSCULAR; INTRAVENOUS at 09:40

## 2024-03-25 ASSESSMENT — ENCOUNTER SYMPTOMS
ABDOMINAL PAIN: 0
VOICE CHANGE: 0
CHEST TIGHTNESS: 0
VOMITING: 0
COUGH: 0
DIARRHEA: 0
SHORTNESS OF BREATH: 1

## 2024-03-25 ASSESSMENT — PAIN SCALES - GENERAL: PAINLEVEL_OUTOF10: 0

## 2024-03-25 NOTE — DISCHARGE INSTR - COC
Dressing/Treatment Betadine swabs/povidone iodine;ABD 02/23/24 0400   Offloading for Diabetic Foot Ulcers Offloading boot 02/23/24 0853   Dressing Change Due 02/23/24 02/23/24 0853   Wound Assessment Dry;Eschar dry 02/23/24 0400   Drainage Amount None (dry) 02/23/24 0400   Drainage Description Serous 02/21/24 0800   Odor None 02/23/24 0400   Estelita-wound Assessment Edematous;Dry/flaky 02/23/24 0853   Number of days: 42       Wound 02/12/24 Pretibial Left;Lower;Medial (Active)   Wound Image   02/14/24 0818   Wound Etiology Other 02/23/24 0400   Dressing Status Clean;Dry;Intact 02/23/24 0853   Wound Cleansed Betadine/povidone iodine 02/23/24 0400   Dressing/Treatment Silicone border;Foam 02/23/24 0400   Dressing Change Due 02/22/24 02/21/24 0800   Wound Length (cm) 5 cm 02/14/24 0818   Wound Width (cm) 3 cm 02/14/24 0818   Wound Surface Area (cm^2) 15 cm^2 02/14/24 0818   Wound Assessment Dry;Eschar dry 02/23/24 0400   Drainage Amount None (dry) 02/23/24 0400   Drainage Description Purulent 02/23/24 0400   Odor None 02/23/24 0400   Estelita-wound Assessment Edematous;Blanchable erythema 02/23/24 0853   Number of days: 42       Wound 02/12/24 Heel Left;Lateral;Medial 2 areas of injury (Active)   Wound Image    02/14/24 0818   Wound Etiology Pressure Unstageable 02/23/24 0853   Dressing Status Clean;Dry;Intact 02/23/24 0853   Wound Cleansed Betadine/povidone iodine 02/23/24 0400   Dressing/Treatment Betadine swabs/povidone iodine;ABD 02/23/24 0400   Offloading for Diabetic Foot Ulcers Offloading boot 02/23/24 0853   Dressing Change Due 02/23/24 02/23/24 0853   Wound Assessment Eschar dry 02/23/24 0400   Drainage Amount None (dry) 02/23/24 0400   Odor None 02/23/24 0400   Estelita-wound Assessment Dry/flaky;Edematous 02/23/24 0853   Number of days: 42        Elimination:  Continence:   Bowel: No  Bladder: No  Urinary Catheter: Last Change Date 3/23/24    Colostomy/Ileostomy/Ileal Conduit: No       Date of Last BM:

## 2024-03-25 NOTE — DISCHARGE INSTRUCTIONS
Date of admission: 3/21/2024  Date of discharge: 03/25/24    Your care was provided by the attending and resident physicians of the [facility]. The primary encounter diagnosis was Acute respiratory failure with hypoxia (HCC). Diagnoses of Acute on chronic congestive heart failure, unspecified heart failure type (HCC), Anemia, unspecified type, Ascites due to alcoholic cirrhosis (HCC), and Urinary tract infection with hematuria, site unspecified were also pertinent to this visit.    FOLLOW-UP  - Follow up with your primary care physician Dr. Milana Whalen in 3 days.  - Follow up with your gastroenterology, Dr. Chisholm for paracentesis when needed.  -Follow up with nephrology Dr. Hernandez in 3-4 days.     MEDICATIONS  -  your antibiotic (levaquin) from the pharmacy and take as directed.  - Continue taking your other home medications as directed.    ADDITIONAL INSTRUCTIONS  - Follow up with gastroenterology for as needed paracentesis.   - Please return immediately to the [facility] Emergency Department if high fevers, malaise, vomiting, changing in bowel movements (diarrhea or constipation), increase altered mental status or other concerning symptoms.

## 2024-03-25 NOTE — DISCHARGE SUMMARY
[facility]  Inpatient Service    Discharge Summary     Patient ID: Clemente Garcia  :  1960   MRN: [id number]     ACCOUNT:  [id number]   Patient's PCP: Milana Whalen MD  Admit Date: 3/21/2024   Discharge Date: 3/25/2024  Length of Stay: 4  Code Status:  DNR-CCA  Admitting Physician: Roscoe Marie MD  Discharge Physician: Roscoe Marie MD    Active Discharge Diagnoses:     Hospital Problem Lists:  Principal Problem:    Urinary tract infection due to ESBL Klebsiella  Active Problems:    Anemia    Chronic indwelling Loaiza catheter    Acute respiratory failure (HCC)    Acute cystitis with hematuria    Alcohol abuse    Hepatic encephalopathy (HCC)    Hypoalbuminemia    Alcoholic cirrhosis (HCC)    Urinary retention    Severe protein-calorie malnutrition (HCC)    Elevated troponin    Cardiorenal syndrome    Ascites due to alcoholic cirrhosis (HCC)    Intertrigo    Pleural effusion, bilateral    Acute renal failure with tubular necrosis (HCC)  Resolved Problems:    * No resolved hospital problems. *    Admission Condition:  poor     Discharged Condition: fair, stable    Hospital Stay:     Hospital Course:  Clemente Garcia is a 63 y.o. female with significant past medical history of depression, fibromyalgia, hypertension, dyslipidemia, alcoholic cirrhosis, nephrolithiasis, neuropathy and osteoarthritis, history of anemia, degenerative disc disease who presented with acute respiratory failure from [facility].  Patient is a poor historian as patient has altered mental status at baseline due to metabolic encephalopathy.  Per sister at bedside, at facility patient was very anxious and was starting having difficulty with breathing.  They were changing patient's brief and noticed blood in the Loaiza catheter that was dislodged prior to leaving [facility]CF.  Patient had O2 sat to drop to the upper 70s was placed on 5 L nasal cannula and that increased to the high 80s to low 90s

## 2024-03-25 NOTE — CARE COORDINATION
spoke to Betty from [facility]. Patient can return to facility today. WOuld like patient set up for 1500 for return. Transport request sent to [facility] for 1500 transport. Dr. Marie notified of plan and will complete discharge. Nurse to call report to [phone]. [phone]-Received call back from [employer]. Transport not available until 1800. Facility notified of change I time.

## 2024-03-26 LAB
ALBUMIN PERCENT: 35 % (ref 45–65)
ALBUMIN SERPL-MCNC: 2.8 G/DL (ref 3.2–5.2)
ALPHA 2 PERCENT: 8 % (ref 6–13)
ALPHA1 GLOB SERPL ELPH-MCNC: 0.3 G/DL (ref 0.1–0.4)
ALPHA1 GLOB SERPL ELPH-MCNC: 4 % (ref 3–6)
ALPHA2 GLOB SERPL ELPH-MCNC: 0.7 G/DL (ref 0.5–0.9)
B-GLOBULIN SERPL ELPH-MCNC: 1 G/DL (ref 0.5–1.1)
B-GLOBULIN SERPL ELPH-MCNC: 13 % (ref 11–19)
GAMMA GLOB SERPL ELPH-MCNC: 3.2 G/DL (ref 0.5–1.5)
GAMMA GLOBULIN %: 40 % (ref 9–20)
INTERPRETATION SERPL IFE-IMP: NORMAL
MICROORGANISM SPEC CULT: NORMAL
MICROORGANISM SPEC CULT: NORMAL
PATH REV: NORMAL
PATHOLOGIST: ABNORMAL
PROT PATTERN SERPL ELPH-IMP: ABNORMAL
PROT SERPL-MCNC: 8 G/DL (ref 6.6–8.7)
SERVICE CMNT-IMP: NORMAL
SERVICE CMNT-IMP: NORMAL
SPECIMEN DESCRIPTION: NORMAL
SPECIMEN DESCRIPTION: NORMAL
TOTAL PROT. SUM,%: 100 % (ref 98–102)
TOTAL PROT. SUM: 8 G/DL (ref 6.3–8.2)

## 2024-03-26 NOTE — PROGRESS NOTES
[facility]  Inpatient Service     Progress Note  3/24/2024    7:04 AM    Name:   Clemente Garcia  MRN:     [id number]     Acct:      [id number]                                                                                                                                       Room:   334/334-01   Day:  3  Admit Date:  3/21/2024  3:16 PM    PCP:   Milana Whalen MD  Code Status:  DNR-CCA                                                                                                                                                                                                                                                  Subjective:     Overnight events: none    This morning the patient noted that she felt like she was breathing okay and denied chest pain, lightheadedness, dizziness, abdominal pain, nausea or vomiting.  She was not quite aware of where she was but she did know who the president was as well as the year and self.  No acute concerns at this time.  Medications:     Allergies:  No Known Allergies    Current Meds:   Scheduled Meds:    levofloxacin  750 mg IntraVENous Q48H    lactulose  20 g PEG Tube TID    [Held by provider] DULoxetine  20 mg Oral Every Other Day    bumetanide  2 mg IntraVENous BID    rifAXIMin  550 mg Oral BID    lacosamide  100 mg Per G Tube BID    pantoprazole (PROTONIX) 40 mg in sodium chloride (PF) 0.9 % 10 mL injection  40 mg IntraVENous Daily    sodium chloride flush  5-40 mL IntraVENous 2 times per day    ammonium lactate  1 Bottle Topical Daily    
                                                                                                                                                                                                                                                                                                                                                                                           [facility] Res[facility]  Inpatient Service     Progress Note  3/23/2024    4:13 PM    Name:   Clemente Garcia  MRN:     [id number]     Acct:      [id number]                                                                                                                                       Room:   334334-01   Day:  2  Admit Date:  3/21/2024  3:16 PM    PCP:   Milana Whalen MD  Code Status:  DNR-CCA                                                                                                                                                                                                                                                  Subjective:     Overnight events: none    Pt was examined at bedside and complains of some chest heaviness but not chest pain, palpitations, shortness of breath, abdominal pain, nausea or vomiting. In regards to mentation, she is more alert, awake and oriented to person and place but not to date.     Medications:     Allergies:  No Known Allergies    Current Meds:   Scheduled Meds:    lactulose  20 g PEG Tube TID    [Held by provider] DULoxetine  20 mg Oral Every Other Day    bumetanide  2 mg IntraVENous BID    rifAXIMin  550 mg Oral BID    lacosamide  100 mg Per G Tube BID    pantoprazole (PROTONIX) 40 mg in sodium chloride (PF) 0.9 % 10 mL injection  40 mg IntraVENous Daily    sodium chloride flush  5-40 mL IntraVENous 2 times per day    ammonium lactate  1 Bottle Topical Daily    ferrous Sulfate  325 mg Per G Tube Daily    folic acid  1 mg PEG Tube Daily    levothyroxine 
      SUBJECTIVE    Patient was seen and examined.    Appears to be lethargic.  Evidently did not sleep well last night.  Tube feeds continue, Osmolite at 50 mL an hour.  Shortness of breath better.  Antibiotics have been switched to oral.  Patient has a chronic Loaiza.  Urine output good.  IV diuretics continue.  Lower extremity edema stable.  Abdominal distention stable.  Creatinine now in the 1.8 range.  Patient was more alert and awake as compared to yesterday.  Tube feed was running, Osmolite at 50 mill an hour.  Her urine output good, putting out close to 50-60 mill an hour Loaiza catheter in place.  IV Bumex continues.  Shortness of breath better.  Oxygen requirements have come down.   Urine culture positive for Klebsiella, Levaquin continues.  Labs today showed a sodium of 134 potassium 4.6 chloride 97 bicarb 30 BUN 55 creatinine 1.8 calcium 8.4, hemoglobin 7.9, white count 7.1 platelets 365  Chest x-ray shows bilateral pleural effusions, more on the left side with left perihilar infiltrate.      History reviewed  Known history of alcoholic liver cirrhosis and nonalcoholic steatohepatitis with portal hypertension and recurrent ascites.  She is an ECF resident.  Also has known history of urinary retention has a chronic indwelling Loaiza.  She has chronic lower extremity edema. Is on Bumex and Aldactone.  Comes into the hospital with worsening confusion and decreased responsiveness along with shortness of breath and hypoxia.  Oxygen saturation was down to 70%.  She had recent admission to the hospital under similar circumstances.  Nephrology had seen her for hyponatremia at that time.  She does get paracentesis on ongoing basis.  Chest x-ray on presentation showed pleural effusion and left perihilar infiltrates.  She did have lower extremity edema.  Also was diagnosed with Klebsiella UTI.  Ammonia level was 18.  Creatinine from baseline of 1.0 had gone up to 1.9 nephrology was consulted.  Working diagnosis of 
      SUBJECTIVE    Patient was seen and examined.  Patient was more alert and awake as compared to yesterday.  Tube feed was running, Osmolite at 50 mill an hour.  Her urine output good, putting out close to 50-60 mill an hour Loaiza catheter in place.  IV Bumex continues.  Shortness of breath better.  Oxygen requirements have come down.   Renal function improving creatinine is down to 1.8 today.  Urine culture positive for Klebsiella, Levaquin continues.  Labs today showed a sodium of 134 potassium 4.4 chloride 95 bicarb 28 BUN 58 creatinine 1.8 calcium 8.5 albumin 2.6  Chest x-ray shows bilateral pleural effusions, more on the left side with left perihilar infiltrate.      History reviewed  Known history of alcoholic liver cirrhosis and nonalcoholic steatohepatitis with portal hypertension and recurrent ascites.  She is an ECF resident.  Also has known history of urinary retention has a chronic indwelling Loaiza.  She has chronic lower extremity edema. Is on Bumex and Aldactone.  Comes into the hospital with worsening confusion and decreased responsiveness along with shortness of breath and hypoxia.  Oxygen saturation was down to 70%.  She had recent admission to the hospital under similar circumstances.  Nephrology had seen her for hyponatremia at that time.  She does get paracentesis on ongoing basis.  Chest x-ray on presentation showed pleural effusion and left perihilar infiltrates.  She did have lower extremity edema.  Also was diagnosed with Klebsiella UTI.  Ammonia level was 18.  Creatinine from baseline of 1.0 had gone up to 1.9 nephrology was consulted.  Working diagnosis of acute kidney injury from ischemic ATN was made.  OBJECTIVE      CURRENT TEMPERATURE:  Temp: 97.8 °F (36.6 °C)  MAXIMUM TEMPERATURE OVER 24HRS:  Temp (24hrs), Av.9 °F (36.6 °C), Min:97.5 °F (36.4 °C), Max:98.4 °F (36.9 °C)    CURRENT RESPIRATORY RATE:  Respirations: 20  CURRENT PULSE:  Pulse: 74  CURRENT BLOOD PRESSURE:  BP: 
      SUBJECTIVE    Patient was seen and examined.  Patient was more alert and awake as compared to yesterday.  Tube feed was running.  Her urine output was still suboptimal.  She is on IV Bumex.  In the last 24 hours she put out 900 mL.  Patient was not short of breath or tachypneic  Her last venous pH was 7.4  Her creatinine remains elevated and 1.9.  Urine studies are not suggestive of hepatorenal syndrome.  Chest x-ray shows bilateral pleural effusions.  And pulmonary on board  OBJECTIVE      CURRENT TEMPERATURE:  Temp: 97.8 °F (36.6 °C)  MAXIMUM TEMPERATURE OVER 24HRS:  Temp (24hrs), Av °F (36.7 °C), Min:97.8 °F (36.6 °C), Max:98.1 °F (36.7 °C)    CURRENT RESPIRATORY RATE:  Respirations: 18  CURRENT PULSE:  Pulse: 75  CURRENT BLOOD PRESSURE:  BP: 121/84  24HR BLOOD PRESSURE RANGE:  Systolic (24hrs), Av , Min:120 , Max:153   ; Diastolic (24hrs), Av, Min:58, Max:84    24HR INTAKE/OUTPUT:    Intake/Output Summary (Last 24 hours) at 3/23/2024 0754  Last data filed at 3/23/2024 0515  Gross per 24 hour   Intake 375 ml   Output 900 ml   Net -525 ml     WEIGHT :Patient Vitals for the past 96 hrs (Last 3 readings):   Weight   24 2115 82.4 kg (181 lb 10.5 oz)     PHYSICAL EXAM      GENERAL APPEARANCE:Awake, alert, in no acute distress  SKIN: warm and dry, no rash or erythema  EYES: conjunctivae normal and sclera anicteric  ENT: no thrush no pharyngeal congestion   NECK:   No JVD. No carotid bruits and no carotid lymphadenopathy .  PULMONARY: lungs are clear to auscultation. No  Wheezing, no ronchi .normal  Breath sounds.   CADRDIOVASCULAR: S1 and S2 normally heard NO S3 and NO S4 . No rubs and no murmur.   ABDOMEN: soft nontender, bowel sounds present, no organomegaly,  no ascites EXTREMITIES: no cyanosis, clubbing or edema     CURRENT MEDICATIONS      glucose chewable tablet 16 g, PRN  dextrose bolus 10% 125 mL, PRN   Or  dextrose bolus 10% 250 mL, PRN  glucagon injection 1 mg, PRN  dextrose 10 % 
    [facility]    Gastroenterology Daily Progress Note      Patient:   Clemente Garcia   :    1960   Facility:   [facility]  Date:     3/24/2024  Consultant:   NAZIA Tubbs - CNP, CNP      SUBJECTIVE  63 y.o. female admitted 3/21/2024 with Acute respiratory failure (HCC) [J96.00]  Acute respiratory failure with hypoxia (HCC) [J96.01]  Urinary tract infection with hematuria, site unspecified [N39.0, R31.9]  Ascites due to alcoholic cirrhosis (HCC) [K70.31]  Anemia, unspecified type [D64.9]  Acute on chronic congestive heart failure, unspecified heart failure type (HCC) [I50.9] and seen for cirrhosis with ascites. The pt was seen and examined.  The pt is currently sleeping, per the rn no BM today, she is tolerating her tube feeding and has not c/o abdominal pain or had emesis. Creat at 1.8.        OBJECTIVE  Scheduled Meds:   [START ON 3/26/2024] levoFLOXacin  750 mg PEG Tube Every Other Day    lactulose  20 g PEG Tube TID    [Held by provider] DULoxetine  20 mg Oral Every Other Day    bumetanide  2 mg IntraVENous BID    rifAXIMin  550 mg Oral BID    lacosamide  100 mg Per G Tube BID    pantoprazole (PROTONIX) 40 mg in sodium chloride (PF) 0.9 % 10 mL injection  40 mg IntraVENous Daily    sodium chloride flush  5-40 mL IntraVENous 2 times per day    ammonium lactate  1 Bottle Topical Daily    ferrous Sulfate  325 mg Per G Tube Daily    folic acid  1 mg PEG Tube Daily    levothyroxine  150 mcg PEG Tube Daily    [Held by provider] midodrine  5 mg PEG Tube TID    nortriptyline  10 mg PEG Tube Nightly    spironolactone  25 mg PEG Tube BID    vitamin B-1  100 mg PEG Tube Daily    venlafaxine  75 mg PEG Tube BID    [Held by provider] bumetanide  2 mg Oral TID       Vital Signs:  /63   Pulse 79   Temp 97.6 °F (36.4 °C) (Axillary)   Resp 22   Ht 1.6 m (5' 3\")   Wt 82.4 kg (181 lb 10.5 oz)   SpO2 92%   BMI 32.18 kg/m²    ROS not completed, hx ams pt currently sleeping  Physical 
    [facility]    Gastroenterology Daily Progress Note      Patient:   Clemente Garcia   :    1960   [facility]  Date:     3/23/2024  Consultant:   NAZIA Tubbs CNP, CNP      SUBJECTIVE  63 y.o. female admitted 3/21/2024 with Acute respiratory failure (HCC) [J96.00]  Acute respiratory failure with hypoxia (HCC) [J96.01]  Urinary tract infection with hematuria, site unspecified [N39.0, R31.9]  Ascites due to alcoholic cirrhosis (HCC) [K70.31]  Anemia, unspecified type [D64.9]  Acute on chronic congestive heart failure, unspecified heart failure type (HCC) [I50.9] and seen for ascites with cirrhosis. The pt was seen and examined. .pt is alert oriented to president, recognizes family members, family does state that the pt 'see's animals in the room\". Pt is tolerating tube feeding, no c/o abdominal pain or nausea. Had non bloody stool this am per rn.         OBJECTIVE  Scheduled Meds:   [Held by provider] DULoxetine  20 mg Oral Every Other Day    bumetanide  2 mg IntraVENous BID    rifAXIMin  550 mg Oral BID    lacosamide  100 mg Per G Tube BID    pantoprazole (PROTONIX) 40 mg in sodium chloride (PF) 0.9 % 10 mL injection  40 mg IntraVENous Daily    sodium chloride flush  5-40 mL IntraVENous 2 times per day    ammonium lactate  1 Bottle Topical Daily    ferrous Sulfate  325 mg Per G Tube Daily    folic acid  1 mg PEG Tube Daily    levothyroxine  150 mcg PEG Tube Daily    [Held by provider] midodrine  5 mg PEG Tube TID    nortriptyline  10 mg PEG Tube Nightly    spironolactone  25 mg PEG Tube BID    vitamin B-1  100 mg PEG Tube Daily    venlafaxine  75 mg PEG Tube BID    [Held by provider] bumetanide  2 mg Oral TID    cefepime  1,000 mg IntraVENous Q24H       Vital Signs:  BP (!) 142/95   Pulse 77   Temp 98.1 °F (36.7 °C)   Resp 18   Ht 1.6 m (5' 3\")   Wt 82.4 kg (181 lb 10.5 oz)   SpO2 97%   BMI 32.18 kg/m²    ROS not completed due to AMS    Physical Exam: 
   03/21/24 2016   RT Protocol   History Pulmonary Disease 0   Respiratory pattern 2   Breath sounds 2   Cough 3   Bronchodilator Assessment Score 7       
  Physician Progress Note      PATIENT:               BILL DAVIDSON  [facility] #:                  [id number]  :                       1960  ADMIT DATE:       3/21/2024 3:16 PM  DISCH DATE:  RESPONDING  PROVIDER #:        IVELISSE WEBSTER          QUERY TEXT:    Pt admitted with UTI.  Pt noted to have chronic indwelling montejo catheter. If   possible, please document in the progress notes and discharge summary if you   are evaluating and/or treating any of the following:    The medical record reflects the following:  Risk Factors: hepatic encephalopathy, respiratory failure  Clinical Indicators:  presents with acute respiratory failure from [facility].AMS at baseline due to metabolic encephalopathy.  Chronic Montejo   catheter which was found dislodged at [facility] prior to admission. Found to have   UTI.  Sed rate 46, procal .64, UA with large amt of blood, many bacteria/cloudy.  Treatment: IV ABX, Bumex, Bipap with ICU care, Nephrology consult    Thank you,  [email or web address]  office hours  m-f 7-3  Options provided:  -- UTI due to chronic indwelling urinary catheter  -- UTI not due to indwelling urinary catheter  -- Other - I will add my own diagnosis  -- Disagree - Not applicable / Not valid  -- Disagree - Clinically unable to determine / Unknown  -- Refer to Clinical Documentation Reviewer    PROVIDER RESPONSE TEXT:    UTI is due to the chronic indwelling urinary catheter.    Query created by: Evon Antoine on 3/22/2024 1:16 PM      Electronically signed by:  IVELISSE WEBSTER 3/22/2024 1:26 PM          
  Physician Progress Note      PATIENT:               BILL DAVIDSON  [facility] #:                  [id number]  :                       1960  ADMIT DATE:       3/21/2024 3:16 PM  DISCH DATE:        3/25/2024 6:40 PM  RESPONDING  PROVIDER #:        Roscoe Marie MD          QUERY TEXT:    Pt admitted with UTI/Acute Hypoxic respiratory failure/Liver Cirrhosis and has   'Acute on Chronic CHF' documented. If possible, please document in progress   notes and discharge summary further specificity regarding the type of CHF:        The medical record reflects the following:  Risk Factors: Cirrhosis, HTN,Encephalopathy  Clinical Indicators:To ED with SOB- Respiratory failure with UTI/Liver   cirrhosis with ascites.  Per Progress notes ' Acute on chronic congestive   heart failure, unspecified heart failure type- and seen for cirrhosis with   ascites.'  Last echo on 24  showed EF of 60 to 65%, normal RVSP. BNP this   admit- [id number]. Home med Bumex BID- continued in IV form during   admission  Treatment: IV Bumex, daily lab monitoring in ICU, supplemental oxygen.    Thank you,  Sacha [email or web address]  [email or web address]  office hours  m-f 7-3  Options provided:  -- Acute on Chronic Systolic CHF/HFrEF  -- Acute on Chronic Diastolic CHF/HFpEF  -- Acute on Chronic Systolic and Diastolic CHF  -- Other - I will add my own diagnosis  -- Disagree - Not applicable / Not valid  -- Disagree - Clinically unable to determine / Unknown  -- Refer to Clinical Documentation Reviewer    PROVIDER RESPONSE TEXT:    This patient is in acute on chronic diastolic CHF/HFpEF.    Query created by: Evon Antoine on 3/25/2024 6:57 AM      Electronically signed by:  Roscoe Marie MD 3/25/2024 8:18 PM          
Adult Non-Invasive Positive Pressure Ventilation for Acute Respiratory Distress  Patient & Family Education Note     Patient: Clemente Garcia  Age: 63 y.o.     The patient and/or family has been educated on the following items and have verbalized understanding and agreement:    [x]Patient and/or family have been educated on the purpose and advantages of NIV and have verbalized understanding and agreement.  [x]Patient and/or family is in agreement that the patient will be NPO (Nothing by Mouth) for the duration of NIV use.  [x]Patient and/or  family is in agreement that NIV will not be routinely disrupted except to complete oral care.  [x]Patient and/or family have been educated on the level of care, vital signs frequency and monitoring requirements for NIV and are in agreement.  [x]Patient and/or family have been educated on reporting any nausea, chest discomfort, sudden increase in shortness of breath, or a severe headache to the staff immediately.      
Blood glucose 57 Did not load into results.. D50 12.5g ordered.   
Blood sugar recheck 81 after 2nd bolus of dextrose given. Results not loading into computer  
Blood sugar recheck at 1645 was 75. Meter not transferring to computer.  
Case d/w dr durán gi signing off pt to f/u with dr langford as outpatient will need prn paracentesis Mellissa Wnag CNP APRN  
IV removed without complication. Report given to Taty at [facility]. Report given to [facility]. Patient discharged to the care of [facility] for transport to [facility].   
Nutrition Assessment     Type and Reason for Visit: Reassess    Nutrition Recommendations/Plan:   Provide tube feed as ordered   Provide water flush as ordered   Monitor labs as they become available   Monitor skin integrity/weights     Malnutrition Assessment:  Malnutrition Status: No malnutrition    Nutrition Assessment:  Patient will be returning to [facility] today at 3pm. Continues with osmolite 1.2, is tolerating tube feed well. no N/V/D/C. Monitor tube feed tolerance, weights, labs, skin integrity.    Estimated Daily Nutrient Needs:  Energy (kcal):  9555-3823 kcals per day using 16-18 g/kg of actual body weight Weight Used for Energy Requirements: Current     Protein (g):  63-68 grams per day using 1.2-1.3 g/kg of ideal body weight Weight Used for Protein Requirements: Ideal        Fluid (ml/day):  6370-9343 ml per day using 1ml/kcal Method Used for Fluid Requirements: 1 ml/kcal    Nutrition Related Findings:   Edema to BE +1, BLE +3. active sounds, labs/meds reviewed, leaving today back to [facility] Wound Type: None    Current Nutrition Therapies:    Diet NPO  ADULT TUBE FEEDING; PEG; Standard without Fiber; Continuous; 10; Yes; 10; Q 4 hours; 50; 75; Q 4 hours    Anthropometric Measures:  Height: 160 cm (5' 3\")  Current Body Wt: 82.1 kg (181 lb)   BMI: 32.1    Nutrition Diagnosis:   Altered nutrition-related lab values related to impaired respiratory function as evidenced by lab values    Nutrition Interventions:   Food and/or Nutrient Delivery: Continue Current Tube Feeding  Nutrition Education/Counseling: Education not indicated  Coordination of Nutrition Care: Continue to monitor while inpatient       Goals:  Previous Goal Met: No Progress toward Goal(s)  Goals: Tolerate nutrition support at goal rate       Nutrition Monitoring and Evaluation:   Behavioral-Environmental Outcomes: None Identified  Food/Nutrient Intake Outcomes: Enteral Nutrition Intake/Tolerance  Physical Signs/Symptoms Outcomes: 
Occupational Therapy  Facility/Department: [address]  Occupational Therapy Daily Treatment Note    Name: Clemente Garcia  : 1960  MRN: [id number]  Date of Service: 3/25/2024    Discharge Recommendations:  Patient would benefit from continued therapy after discharge  OT Equipment Recommendations  Other: TBD       Patient Diagnosis(es): The primary encounter diagnosis was Acute respiratory failure with hypoxia (HCC). Diagnoses of Acute on chronic congestive heart failure, unspecified heart failure type (HCC), Anemia, unspecified type, Ascites due to alcoholic cirrhosis (HCC), and Urinary tract infection with hematuria, site unspecified were also pertinent to this visit.  Past Medical History:  has no past medical history on file.  Past Surgical History:  has a past surgical history that includes Hysterectomy; Upper gastrointestinal endoscopy (N/A, 2024); and Abdomen surgery.           Assessment   Performance deficits / Impairments: Decreased safe awareness;Decreased cognition;Decreased balance;Decreased high-level IADLs;Decreased ADL status;Decreased functional mobility ;Decreased strength;Decreased endurance;Decreased ROM  Assessment: Pt slowly progressing towards STGs due to cognition. Pt continues to demonstrate above deficits. Pt to benefit from continued therapy services while hospitalized to maximize pt's safety and independence in performing functional tasks. At this time, pt has not demonstrated the functional ability to safely return to prior living arrangements, continued skilled OT intervention recommended prior to an eventual return to [facility].  Prognosis: Good;Fair  REQUIRES OT FOLLOW-UP: Yes  Activity Tolerance  Activity Tolerance: Treatment limited secondary to decreased cognition  Activity Tolerance Comments: fair tolerance, eyes closed throughout session, dep to wash face/hands and reposition for pressure relief        Plan   Occupational Therapy Plan  Times Per Week: 3-5x/wk  Current Treatment 
Occupational Therapy  Facility/Department: [address]  Occupational Therapy In Patient Eval      Name: Clemente Garcia  : 1960  MRN: [id number]  Date of Service: 3/22/2024  Chief Complaint   Patient presents with    Respiratory Distress     Pt noted to have O2 sats drop to upper 70s on 5lpm O2 per nc, increased to high 80s low 90s on 6 lpm    Hematuria     Pt had motnejo catheter noted with have blood in it, got dislodged prior to leaving ecf. Hgb 7.1         Discharge Recommendations:  Patient would benefit from continued therapy after discharge  OT Equipment Recommendations  Other: TBD       Patient Diagnosis(es): The primary encounter diagnosis was Acute respiratory failure with hypoxia (HCC). Diagnoses of Acute on chronic congestive heart failure, unspecified heart failure type (HCC), Anemia, unspecified type, Ascites due to alcoholic cirrhosis (HCC), and Urinary tract infection with hematuria, site unspecified were also pertinent to this visit.  Past Medical History:  has no past medical history on file.  Past Surgical History:  has a past surgical history that includes Hysterectomy; Upper gastrointestinal endoscopy (N/A, 2024); and Abdomen surgery.           Assessment   Performance deficits / Impairments: Decreased safe awareness;Decreased cognition;Decreased balance;Decreased high-level IADLs;Decreased ADL status;Decreased functional mobility ;Decreased strength;Decreased endurance;Decreased ROM  Assessment: Pt presents with decreased ADL status secondary to above noted deficits. Pt to benefit from continued therapy services while hospitalized to maximize pt's safety and independence in performing functional tasks. At this time, pt has not demonstrated the functional ability to safely return to prior living arrangements, continued skilled OT intervention recommended prior to an eventual return to ECF.  Prognosis: Good  Decision Making: Medium Complexity  REQUIRES OT FOLLOW-UP: No  Activity 
PULMONARY & CRITICAL CARE MEDICINE PROGRESS  NOTE     Patient:  Clemente Garcia  MRN: [id number]  Admit date: 3/21/2024  Primary Care Physician: Milana Whalen MD  CODE Status: DNR-CCA  LOS: 4    SUBJECTIVE     CHIEF COMPLAINT/REASON FOR CONSULT: Respiratory Distress (Pt noted to have O2 sats drop to upper 70s on 5lpm O2 per nc, increased to high 80s low 90s on 6 lpm) and Hematuria (Pt had montejo catheter noted with have blood in it, got dislodged prior to leaving ecf. Hgb 7.1)    HISTORY OF PRESENT ILLNESS:  The patient is a 63 y.o. female with multiple comorbidities including hypertension, hyperlipidemia, alcohol abuse, recently diagnosed cirrhosis and recent hospitalization with UTI/metabolic encephalopathy.  She has a multiple paracentesis and also has a PEG tube in place.  Last paracentesis was on 3/1 and 1.15 L of clear yellow fluid was removed.    Patient presents emergency room with altered mental status.  During evaluation in the ED she was hypoxemic and was started on BiPAP.  Lab workup shows potassium 5.1, creatinine 1.7, BUN 64, proBNP 15,376, troponin 130, CRP 61, ESR 46 albumin 3, hemoglobin 7.8, platelet 460.  Urine culture is growing gram-negative rods.  Chest x-ray done yesterday reports moderate left pleural effusion with compressive atelectasis/infiltrate, mild right pleural effusion and mild cardiomegaly.  Echo (2/12/2024) showed EF of 60 to 65%, normal RVSP    She is receiving cefepime and is on rifaximin.  She has been seen by nephrology.  She is on Bumex 2 mg twice daily and 25 mg twice daily of Aldactone.    Patient is lethargic, unable to provide any history.  Mostly history obtained from chart and discussion with son and other family members at bedside.    INTERVAL HISTORY:    3/25/2024    I saw the patient, chart reviewed labs and previous culture data seen.  Patient has been lethargic intermittently arousable at times more awake than the other time but history.  When I saw her she was lethargic but 
Patient given D10 bolus of 125ml for a blood sugar of 63. Recheck is 104. Meter not transferring to computer  
SPIRITUAL CARE DEPARTMENT - [facility]  PROGRESS NOTE    Room # 334/334-01   Name: Clemente Garcia             Reason for visit: Follow up    I visited the family.    Admit Date & Time: 3/21/2024  3:16 PM    Assessment:  Clemente Garcia is a 63 y.o. female in the hospital because \"UTI\". Upon entering the room pt was lying in bed, with eyes closed. Pt's spouse and sister-in-law were seated bedside. Pt's spouse and family did not express any needs or concerns to writer at this time.       Intervention:  I introduced myself and my title as  I offered space for family  to express feelings, needs, and concerns and provided a ministry presence. Writer actively listened to pt's spouse.     Outcome:  Pt's spouse expressed gratitude for visit. Pt's family appeared to be coping.     Plan:  Chaplains will remain available to offer spiritual and emotional support as needed.    Electronically signed by Chaplain Hannah, on 3/25/2024 at 12:45 PM.  Spiritual Care Department  [facility] -        03/25/24 1244   Encounter Summary   Encounter Overview/Reason  Palliative Care   Service Provided For: Family   Referral/Consult From: Palliative Care   Support System Spouse;Family members   Last Encounter  03/25/24   Complexity of Encounter Low   Begin Time 1125   End Time  1130   Total Time Calculated 5 min   Spiritual/Emotional needs   Type Spiritual Support   Assessment/Intervention/Outcome   Assessment Impaired social interaction;Impaired resilience;Calm   Intervention Active listening;Sustaining Presence/Ministry of presence   Outcome Engaged in conversation;Expressed Gratitude;Coping   Plan and Referrals   Plan/Referrals Continue Support (comment)       
SPIRITUAL CARE DEPARTMENT - [facility]  PROGRESS NOTE    Room # 334/334-01   Name: Clemente Garcia               Reason for visit: Routine    I visited the patient and family.    Admit Date & Time: 3/21/2024  3:16 PM    Assessment:  Clemente Garcia is a 63 y.o. female in the hospital because \"respiratory failure\". Upon entering the room pt was lying in bed, with eyes closed. Pt's sister and son were seated bedside. Pt's son shared about pt's current medical challenges, and the circumstances that led to this current hospitalization. Pt's son and sister also shared about family. Pt's family appears to be very supportive of pt and one another. Family also appears to be calm and coping at this time.       Intervention:  I introduced myself and my title as  I offered space for patient  to express feelings, needs, and concerns and provided a ministry presence. Writer actively listened to pt's family. Writer assured patient's family of his prayers for pt.     Outcome:  Pt's family expressed gratitude to writer for visit     Plan:  Chaplains will remain available to offer spiritual and emotional support as needed.    Electronically signed by Chaplain Hannah, on 3/22/2024 at 2:33 PM.  Spiritual Care Department  [facility]        03/22/24 1431   Encounter Summary   Encounter Overview/Reason  Palliative Care   Service Provided For: Patient and family together   Referral/Consult From: [facility]   Support System Spouse;Children;Family members   Last Encounter  03/22/24   Complexity of Encounter Low   Begin Time 1410   End Time  1415   Total Time Calculated 5 min   Spiritual/Emotional needs   Type Spiritual Support   Assessment/Intervention/Outcome   Assessment Impaired resilience;Impaired social interaction;Calm;Coping   Intervention Active listening;Sustaining Presence/Ministry of presence;Explored/Affirmed feelings, thoughts, concerns   Outcome Engaged in conversation;Coping;Expressed Gratitude   Plan and 
Resp 24   Ht 1.6 m (5' 3\")   Wt 82.4 kg (181 lb 10.5 oz)   SpO2 99%   BMI 32.18 kg/m²   Temp (24hrs), Av.9 °F (36.6 °C), Min:97.1 °F (36.2 °C), Max:98.2 °F (36.8 °C)    Recent Labs     24  0539 24  0616 24  0708 24  0802   POCGLU 57* 75 79 71       I/O (24Hr):    Intake/Output Summary (Last 24 hours) at 3/22/2024 0848  Last data filed at 3/22/2024 0637  Gross per 24 hour   Intake 610 ml   Output 850 ml   Net -240 ml       Labs:  Hematology:  Recent Labs     24  15324  0147   WBC 7.9  --  8.2   RBC 2.08*  --  2.48*   HGB 6.9* 7.8* 7.9*   HCT 19.6* 22.4* 23.2*   MCV 94.5  --  93.3   MCH 33.0  --  31.9   MCHC 34.9  --  34.2   RDW 16.4*  --  17.0*   *  --  430   MPV 6.8  --  6.8   SEDRATE  --  46*  --    CRP 61.5*  --   --    INR  --   --  1.1     Chemistry:  Recent Labs     24  1536 24  1743 24  0147     --   --   --  137   K 5.1  --   --   --  4.7   CL 96*  --   --   --  97*   CO2 30  --   --   --  31   GLUCOSE 93  --   --   --  64*   BUN 64*  --   --   --  64*   CREATININE 1.7*  --   --   --  1.8*   ANIONGAP 10  --   --   --  9   LABGLOM 34*  --   --   --  31*   CALCIUM 8.7  --   --   --  8.7   PROBNP  --   --  15,376*  --  14,944*   TROPHS  --  125* 131* 130*  --      Recent Labs     24  1536 24  0147 24  0251 24  0321 24  0518 24  0539 24  0616 24  0708 24  0802   PROT 8.6* 8.2  --   --   --   --   --   --   --    LABALBU 3.0* 2.7*  --   --   --   --   --   --   --    AST 29 31  --   --   --   --   --   --   --    ALT 13 14  --   --   --   --   --   --   --    ALKPHOS 172* 142*  --   --   --   --   --   --   --    BILITOT 0.4 0.5  --   --   --   --   --   --   --    AMMONIA  --  18  --   --   --   --   --   --   --    POCGLU  --   --    < > 83 51* 57* 75 79 71    < > = values in this interval not displayed.     ABG:  Lab Results   Component 
start osmolite 1.2 at 10 ml/hour and increase by 10 ml every 4 hours until a goal rate of 50 ml/hour is acheived.  Additives/Modulars:    Water Flushes: 75 ml every 4 hours total flush is 450 ml  Current TF & Flush Orders Provides: at 10 ml/hour osmolite provides 288 kcals, 13 grams of protein and 197 ml of water from formula.  Goal TF & Flush Orders Provides: at 50 ml/hour osmolite 1.2 provides 1440 kcals, 67 grams of protein and 984 ml of water from formula.    Anthropometric Measures:  Height: 160 cm (5' 3\")  Ideal Body Weight (IBW): 115 lbs (52 kg)       Current Body Weight: 82.1 kg (181 lb), 157.4 % IBW. Weight Source: Bed Scale  Current BMI (kg/m2): 32.1        Weight Adjustment For: No Adjustment                 BMI Categories: Obese Class 1 (BMI 30.0-34.9)    Estimated Daily Nutrient Needs:  Energy Requirements Based On: Kcal/kg  Weight Used for Energy Requirements: Current  Energy (kcal/day): 1102-9517 kcals per day using 16-18 g/kg of actual body weight  Weight Used for Protein Requirements: Ideal  Protein (g/day): 63-68 grams per day using 1.2-1.3 g/kg of ideal body weight  Method Used for Fluid Requirements: 1 ml/kcal  Fluid (ml/day): 4677-4174 ml per day using 1ml/kcal    Nutrition Diagnosis:   Altered nutrition-related lab values related to impaired respiratory function as evidenced by lab values    Nutrition Interventions:   Food and/or Nutrient Delivery: Start Tube Feeding  Nutrition Education/Counseling: Education not indicated  Coordination of Nutrition Care: Continue to monitor while inpatient       Goals:  Previous Goal Met: No Progress toward Goal(s)  Goals: Tolerate nutrition support at goal rate       Nutrition Monitoring and Evaluation:   Behavioral-Environmental Outcomes: None Identified  Food/Nutrient Intake Outcomes: Enteral Nutrition Intake/Tolerance  Physical Signs/Symptoms Outcomes: GI Status, Hemodynamic Status, Fluid Status or Edema, Skin, Weight, Biochemical Data    Discharge 
03/21/24  1738 03/21/24  1743   SPECDESC .CATHETER WOODRUFF SPECIMEN .BLOOD .BLOOD   SPECIAL  --  10ML RIGHT ARM 10ML LEFT ARM   CULTURE KLEBSIELLA PNEUMONIAE >100,000 CFU/ML This organism is an Extended Spectrum Beta Lactamase (ESBL)  and resistance to therapy with Penicillins, Cephalosporins and Aztreonam is expected. These organisms generally remain susceptible to Carbapenems. Consider ID Consultation. CONTACT PRECAUTIONS INDICATED.  * NO GROWTH 2 DAYS NO GROWTH 2 DAYS          PATHOLOGY RESULTS:    RADIOLOGY REPORTS:  XR CHEST PORTABLE   Final Result   1. Moderate left pleural effusion with associated pulmonary   atelectasis/infiltrate.  This is more prominent on prior examination.   2. Mild right pleural effusion.   3. Mild cardiomegaly.         XR ABDOMEN (KUB) (SINGLE AP VIEW)   Final Result   1. Nonobstructive bowel gas pattern.   2. Suggestion of ascites.   3. Moderate left pleural effusion.              ECHOCARDIOGRAM:   Results for orders placed during the hospital encounter of 02/12/24    Echo (TTE) complete (PRN contrast/bubble/strain/3D)    Interpretation Summary    Left Ventricle: Normal left ventricular systolic function with a visually estimated EF of 60 - 65%. Left ventricle size is normal. Normal wall thickness. Normal wall motion.    Aortic Valve: Mild sclerosis of the aortic valve cusp.    Mitral Valve: Mildly thickened leaflet. Mild regurgitation.    Tricuspid Valve: Moderate regurgitation. Normal RVSP. The estimated RVSP is 36 mmHg.    Left Atrium: Left atrium is mildly dilated. Left atrial volume index is normal (16-34 mL/m2). LA Vol Index is  33 ml/m2.    Interatrial Septum: Agitated saline study was negative with and without provocation.    Right Atrium: Right atrium is mildly dilated.    Image quality is adequate. Procedure performed with the patient in a supine position.       ASSESSMENT AND PLAN     PROBLEM LIST:    Patient Active Problem List   Diagnosis    Gait instability    
[place].  Patient is a poor historian as patient has altered mental status at baseline due to metabolic encephalopathy.  Per sister at bedside, at facility patient was very anxious and was starting having difficulty with breathing.  They were changing patient's brief and noticed blood in the Loaiza catheter that was dislodged prior to leaving ECF.  Patient had O2 sat to drop to the upper 70s was placed on 5 L nasal cannula and that increased to the high 80s to low 90s and was increased then to 6 L per nasal cannula.  Per sister patient does require home O2 but is unaware of how much she requires at home.      Patient was recently admitted in February 12, 2024 for altered mental status and confusion and was diagnosed with acute metabolic encephalopathy.  Patient has a PEG tube and has required multiple paracentesis procedures, 1 recently at facility per sister but she does not know when.  Patient has history of alcohol abuse.  Per sister 2 days ago patient was having paranoia thinking that someone was going to kill her during her tube feeding.      Patient presented to the ED afebrile, mildly hypertensive (145/70) on 6 L nasal cannula.  Patient was started on BiPAP with FiO2 of 60.  Significant labs reported anemia with hemoglobin of 6.9, no leukocytosis, elevated platelets of 460, creatinine of 1.7 (baseline around 1.0), estimated GFR 34, total protein of 8.6, albumin of 3.0, and increased alkaline phosphatase of 172.  UA reported slightly cloudy urine with large hemoglobin, 2+ protein, many bacteria, 10-20 white blood cells, too numerous to count RBCs.  Chest x-ray reported moderate left pleural effusion with associated pulmonary atelectasis/infiltrate.  This is more prominent on prior examination.  Mild right pleural effusion and mild cardiomegaly.  Abdominal x-ray reported nonobstructive bowel gas pattern, suggestion of ascites, and a moderate left pleural effusion. Troponin 125 repeat pending.  EKG is pending.  1 
Patient  Education Provided: Role of Therapy;Plan of Care;Transfer Training  Education Provided Comments: bed mobility and sitting balance training with LE AAROM exercise  Education Method: Demonstration;Verbal;Teach Back  Barriers to Learning: Other (Comment) (hypertonicity sindhu LEs)  Education Outcome: Verbalized understanding;Continued education needed      Therapy Time   Individual Concurrent Group Co-treatment   Time In 0833         Time Out 0914         Minutes 41         Timed Code Treatment Minutes: 38 Minutes  Co- treatment with OT warranted secondary to decreased patient safety and independence with functional mobility requiring skilled physical assistance of two professionals to simultaneously address individualized discipline goals. PT is addressing bed mobility, sitting balance, neuromuscular facilitation trunk/LE's, LE therapeutic exercise , while OT is addressing their individualized functional mobility/self-care task.           Allie Kulkarni, PT         
gastrostomy (PEG) tube placement (HCC)    Alcoholic polyneuropathy (HCC)    Gastroparesis    Neurogenic bladder    Seizure-like activity (HCC)    Epileptic automatism (HCC)    Hypernatremia    Anasarca    Hypoalbuminemia    Non-smoker    Tardive dyskinesia    Acute encephalopathy    Wernicke encephalopathy syndrome    Left arm weakness    Cognitive impairment    Acquired hypothyroidism    JASPER (acute kidney injury) (HCC)    Alcoholic cirrhosis (HCC)    Encounter for palliative care    Goals of care, counseling/discussion    Left hemiparesis (HCC)    Urinary retention    Chronic indwelling Loaiza catheter    Moderate tricuspid regurgitation    Acute respiratory failure (HCC)    Acute cystitis with hematuria    Elevated troponin    Cardiorenal syndrome    Ascites due to alcoholic cirrhosis (HCC)       ASSESSMENT:    Acute metabolic encephalopathy  Urinary tract infection, due to ESBL Klebsiella  Acute kidney injury  Alcoholic cirrhosis with ascites, s/p multiple paracentesis  Bilateral pleural effusions (left greater than right)  Protein calorie malnutrition, s/p PEG tube  History of alcohol abuse    PLAN:    I personally interviewed/examined the patient; reviewed interval history, interpreted all available radiographic and laboratory data at the time of service.    Patient is hemodynamically stable  Her mentation is improved, she is awake and alert today  Will continue conservative management for pleural effusion  Currently saturating well on supplemental oxygen with nasal cannula @ O2 Flow Rate (L/min)  Av.5 L/min  Min: 2.5 L/min  Max: 2.5 L/min  Continue supplemental oxygen to keep oxygen saturation >90%  Continue nocturnal and as needed BiPAP  On lactulose/rifaximin  Encourage incentive spirometry/Acapella  Maintain bronchopulmonary hygiene  Continue aspiration precautions  Continue bronchodilators  Antimicrobials reviewed; continue Levaquin  Continue diuretics as per nephrology  Monitor I/O, electrolytes with a

## 2024-03-29 ENCOUNTER — APPOINTMENT (OUTPATIENT)
Dept: GENERAL RADIOLOGY | Age: 64
DRG: 190 | End: 2024-03-29
Payer: MEDICAID

## 2024-03-29 ENCOUNTER — HOSPITAL ENCOUNTER (EMERGENCY)
Age: 64
Discharge: ANOTHER HOSPITAL | DRG: 190 | End: 2024-03-30
Attending: EMERGENCY MEDICINE
Payer: MEDICAID

## 2024-03-29 ENCOUNTER — APPOINTMENT (OUTPATIENT)
Dept: ULTRASOUND IMAGING | Age: 64
DRG: 190 | End: 2024-03-29
Payer: MEDICAID

## 2024-03-29 DIAGNOSIS — J90 PLEURAL EFFUSION ON LEFT: ICD-10-CM

## 2024-03-29 DIAGNOSIS — R18.8 OTHER ASCITES: Primary | ICD-10-CM

## 2024-03-29 DIAGNOSIS — E87.5 HYPERKALEMIA: ICD-10-CM

## 2024-03-29 DIAGNOSIS — R09.02 HYPOXEMIA: ICD-10-CM

## 2024-03-29 LAB
ALBUMIN SERPL-MCNC: 2.8 G/DL (ref 3.5–5.2)
ALBUMIN/GLOB SERPL: 0.5 {RATIO} (ref 1–2.5)
ALLEN TEST: POSITIVE
ALP SERPL-CCNC: 180 U/L (ref 35–104)
ALT SERPL-CCNC: 13 U/L (ref 5–33)
AMMONIA PLAS-SCNC: 18 UMOL/L (ref 11–51)
ANION GAP SERPL CALCULATED.3IONS-SCNC: 5 MMOL/L (ref 9–17)
AST SERPL-CCNC: 26 U/L
BACTERIA URNS QL MICRO: ABNORMAL
BASOPHILS # BLD: 0 K/UL (ref 0–0.2)
BASOPHILS NFR BLD: 0 % (ref 0–2)
BILIRUB DIRECT SERPL-MCNC: 0.1 MG/DL
BILIRUB INDIRECT SERPL-MCNC: 0.2 MG/DL (ref 0–1)
BILIRUB SERPL-MCNC: 0.3 MG/DL (ref 0.3–1.2)
BILIRUB UR QL STRIP: NEGATIVE
BNP SERPL-MCNC: ABNORMAL PG/ML
BUN SERPL-MCNC: 60 MG/DL (ref 8–23)
CALCIUM SERPL-MCNC: 8.4 MG/DL (ref 8.6–10.4)
CHLORIDE SERPL-SCNC: 97 MMOL/L (ref 98–107)
CLARITY UR: ABNORMAL
CO2 SERPL-SCNC: 31 MMOL/L (ref 20–31)
COLOR UR: YELLOW
COMMENT: ABNORMAL
CREAT SERPL-MCNC: 1.8 MG/DL (ref 0.5–0.9)
CRITICAL ACTION: NORMAL
CRITICAL NOTIFICATION DATE/TIME: NORMAL
CRITICAL NOTIFICATION: NORMAL
CRITICAL VALUE READ BACK: YES
EOSINOPHIL # BLD: 0.13 K/UL (ref 0–0.4)
EOSINOPHILS RELATIVE PERCENT: 2 % (ref 1–4)
EPI CELLS #/AREA URNS HPF: ABNORMAL /HPF (ref 0–5)
ERYTHROCYTE [DISTWIDTH] IN BLOOD BY AUTOMATED COUNT: 16.4 % (ref 12.5–15.4)
FIO2: 40
FIO2: 40
GFR SERPL CREATININE-BSD FRML MDRD: 31 ML/MIN/1.73M2
GLUCOSE BLD-MCNC: 78 MG/DL (ref 65–105)
GLUCOSE BLD-MCNC: 79 MG/DL (ref 65–105)
GLUCOSE SERPL-MCNC: 85 MG/DL (ref 70–99)
GLUCOSE UR STRIP-MCNC: NEGATIVE MG/DL
HCO3 VENOUS: 35.1 MMOL/L (ref 22–29)
HCT VFR BLD AUTO: 21.7 % (ref 36–46)
HGB BLD-MCNC: 7.3 G/DL (ref 12–16)
HGB UR QL STRIP.AUTO: ABNORMAL
KETONES UR STRIP-MCNC: NEGATIVE MG/DL
LACTATE BLDV-SCNC: 0.7 MMOL/L (ref 0.5–2.2)
LACTATE BLDV-SCNC: 0.9 MMOL/L (ref 0.5–1.9)
LEUKOCYTE ESTERASE UR QL STRIP: ABNORMAL
LYMPHOCYTES NFR BLD: 0.69 K/UL (ref 1–4.8)
LYMPHOCYTES RELATIVE PERCENT: 11 % (ref 24–44)
MCH RBC QN AUTO: 32.1 PG (ref 26–34)
MCHC RBC AUTO-ENTMCNC: 33.5 G/DL (ref 31–37)
MCV RBC AUTO: 95.9 FL (ref 80–100)
MODE: ABNORMAL
MONOCYTES NFR BLD: 0.5 K/UL (ref 0.1–0.8)
MONOCYTES NFR BLD: 8 % (ref 1–7)
MORPHOLOGY: NORMAL
NEUTROPHILS NFR BLD: 79 % (ref 36–66)
NEUTS SEG NFR BLD: 4.98 K/UL (ref 1.8–7.7)
NITRITE UR QL STRIP: NEGATIVE
O2 DELIVERY DEVICE: ABNORMAL
O2 SAT, VEN: 74.3 % (ref 60–85)
PCO2, VEN: 72.9 MM HG (ref 41–51)
PH UR STRIP: 6 [PH] (ref 5–8)
PH VENOUS: 7.29 (ref 7.32–7.43)
PLATELET # BLD AUTO: 246 K/UL (ref 140–450)
PMV BLD AUTO: 7.3 FL (ref 6–12)
PO2, VEN: 46 MM HG (ref 30–50)
POC HCO3: 33.6 MMOL/L (ref 21–28)
POC O2 SATURATION: 92.8 % (ref 94–98)
POC PCO2: 69.7 MM HG (ref 35–48)
POC PH: 7.29 (ref 7.35–7.45)
POC PO2: 76.8 MM HG (ref 83–108)
POSITIVE BASE EXCESS, ART: 6 MMOL/L (ref 0–3)
POSITIVE BASE EXCESS, VEN: 7.1 MMOL/L (ref 0–3)
POTASSIUM SERPL-SCNC: 5.5 MMOL/L (ref 3.7–5.3)
POTASSIUM SERPL-SCNC: 5.5 MMOL/L (ref 3.7–5.3)
POTASSIUM SERPL-SCNC: 5.7 MMOL/L (ref 3.7–5.3)
PROT SERPL-MCNC: 8.2 G/DL (ref 6.4–8.3)
PROT UR STRIP-MCNC: ABNORMAL MG/DL
RBC # BLD AUTO: 2.26 M/UL (ref 4–5.2)
RBC #/AREA URNS HPF: ABNORMAL /HPF (ref 0–2)
SAMPLE SITE: ABNORMAL
SODIUM SERPL-SCNC: 133 MMOL/L (ref 135–144)
SP GR UR STRIP: 1.02 (ref 1–1.03)
TROPONIN I SERPL HS-MCNC: 102 NG/L (ref 0–14)
TROPONIN I SERPL HS-MCNC: 117 NG/L (ref 0–14)
UROBILINOGEN UR STRIP-ACNC: NORMAL EU/DL (ref 0–1)
WBC #/AREA URNS HPF: ABNORMAL /HPF (ref 0–5)
WBC OTHER # BLD: 6.3 K/UL (ref 3.5–11)
YEAST URNS QL MICRO: ABNORMAL

## 2024-03-29 PROCEDURE — 80048 BASIC METABOLIC PNL TOTAL CA: CPT

## 2024-03-29 PROCEDURE — 96374 THER/PROPH/DIAG INJ IV PUSH: CPT | Performed by: EMERGENCY MEDICINE

## 2024-03-29 PROCEDURE — 36415 COLL VENOUS BLD VENIPUNCTURE: CPT

## 2024-03-29 PROCEDURE — 6360000002 HC RX W HCPCS: Performed by: EMERGENCY MEDICINE

## 2024-03-29 PROCEDURE — 71045 X-RAY EXAM CHEST 1 VIEW: CPT

## 2024-03-29 PROCEDURE — 84484 ASSAY OF TROPONIN QUANT: CPT

## 2024-03-29 PROCEDURE — 82803 BLOOD GASES ANY COMBINATION: CPT

## 2024-03-29 PROCEDURE — 83605 ASSAY OF LACTIC ACID: CPT

## 2024-03-29 PROCEDURE — 94660 CPAP INITIATION&MGMT: CPT

## 2024-03-29 PROCEDURE — 6360000002 HC RX W HCPCS: Performed by: PHYSICIAN ASSISTANT

## 2024-03-29 PROCEDURE — 82140 ASSAY OF AMMONIA: CPT

## 2024-03-29 PROCEDURE — 80076 HEPATIC FUNCTION PANEL: CPT

## 2024-03-29 PROCEDURE — 76705 ECHO EXAM OF ABDOMEN: CPT

## 2024-03-29 PROCEDURE — 94761 N-INVAS EAR/PLS OXIMETRY MLT: CPT

## 2024-03-29 PROCEDURE — 84132 ASSAY OF SERUM POTASSIUM: CPT

## 2024-03-29 PROCEDURE — 6370000000 HC RX 637 (ALT 250 FOR IP): Performed by: PHYSICIAN ASSISTANT

## 2024-03-29 PROCEDURE — 99285 EMERGENCY DEPT VISIT HI MDM: CPT | Performed by: EMERGENCY MEDICINE

## 2024-03-29 PROCEDURE — 82947 ASSAY GLUCOSE BLOOD QUANT: CPT

## 2024-03-29 PROCEDURE — 83880 ASSAY OF NATRIURETIC PEPTIDE: CPT

## 2024-03-29 PROCEDURE — 93005 ELECTROCARDIOGRAM TRACING: CPT

## 2024-03-29 PROCEDURE — 2700000000 HC OXYGEN THERAPY PER DAY

## 2024-03-29 PROCEDURE — 36600 WITHDRAWAL OF ARTERIAL BLOOD: CPT

## 2024-03-29 PROCEDURE — 81001 URINALYSIS AUTO W/SCOPE: CPT

## 2024-03-29 PROCEDURE — 96375 TX/PRO/DX INJ NEW DRUG ADDON: CPT | Performed by: EMERGENCY MEDICINE

## 2024-03-29 PROCEDURE — 87086 URINE CULTURE/COLONY COUNT: CPT

## 2024-03-29 PROCEDURE — 85025 COMPLETE CBC W/AUTO DIFF WBC: CPT

## 2024-03-29 RX ORDER — ALBUTEROL SULFATE 2.5 MG/3ML
10 SOLUTION RESPIRATORY (INHALATION) ONCE
Status: DISCONTINUED | OUTPATIENT
Start: 2024-03-29 | End: 2024-03-29

## 2024-03-29 RX ORDER — LORAZEPAM 2 MG/ML
1 INJECTION INTRAMUSCULAR ONCE
Status: COMPLETED | OUTPATIENT
Start: 2024-03-29 | End: 2024-03-29

## 2024-03-29 RX ORDER — FUROSEMIDE 10 MG/ML
20 INJECTION INTRAMUSCULAR; INTRAVENOUS ONCE
Status: COMPLETED | OUTPATIENT
Start: 2024-03-29 | End: 2024-03-29

## 2024-03-29 RX ADMIN — FUROSEMIDE 20 MG: 10 INJECTION, SOLUTION INTRAMUSCULAR; INTRAVENOUS at 17:03

## 2024-03-29 RX ADMIN — LORAZEPAM 1 MG: 2 INJECTION INTRAMUSCULAR; INTRAVENOUS at 22:34

## 2024-03-29 RX ADMIN — SODIUM ZIRCONIUM CYCLOSILICATE 10 G: 10 POWDER, FOR SUSPENSION ORAL at 18:08

## 2024-03-29 NOTE — PROGRESS NOTES
Adult Non-Invasive Positive Pressure Ventilation for Acute Respiratory Distress  Patient & Family Education Note     Patient: Clemente Garcia  Age: 63 y.o.     The patient and/or family has been educated on the following items and have verbalized understanding and agreement:    [x]Patient and/or family have been educated on the purpose and advantages of NIV and have verbalized understanding and agreement.  [x]Patient and/or family is in agreement that the patient will be NPO (Nothing by Mouth) for the duration of NIV use.  [x]Patient and/or  family is in agreement that NIV will not be routinely disrupted except to complete oral care.  [x]Patient and/or family have been educated on the level of care, vital signs frequency and monitoring requirements for NIV and are in agreement.  [x]Patient and/or family have been educated on reporting any nausea, chest discomfort, sudden increase in shortness of breath, or a severe headache to the staff immediately.

## 2024-03-29 NOTE — ED PROVIDER NOTES
EMERGENCY DEPARTMENT ENCOUNTER    Pt Name: Clemente Garcia  MRN: [id number]  Birthdate 1960  Date of evaluation: 3/29/24  CHIEF COMPLAINT       Chief Complaint   Patient presents with    Altered Mental Status    Shortness of Breath     HISTORY OF PRESENT ILLNESS   Patient presents via EMS after a report from the nursing facility where they pulled her over to change her and she went \"unresponsive\".  Patient states that she feels at her usual baseline.  She denies any complaints or shortness of breath.  No chest pain.  She does report some abdominal pain.                 REVIEW OF SYSTEMS     Review of Systems   Unable to perform ROS: Other     PASTMEDICAL HISTORY   History reviewed. No pertinent past medical history.  Past Problem List  Patient Active Problem List   Diagnosis Code    Gait instability R26.81    Inability to walk R26.2    Other specified anemias D64.89    Anemia D64.9    Encephalopathy G93.40    Other cirrhosis of liver (HCC) K74.69    Elevated LFTs R79.89    Alcohol abuse F10.10    Hepatic encephalopathy (HCC) K76.82    Severe protein-calorie malnutrition (HCC) E43    S/P percutaneous endoscopic gastrostomy (PEG) tube placement (HCC) Z93.1    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    Gastroparesis K31.84    Neurogenic bladder N31.9    Seizure-like activity (HCC) R56.9    Epileptic automatism (HCC) G40.209    Hypernatremia E87.0    Anasarca R60.1    Hypoalbuminemia E88.09    Non-smoker Z78.9    Tardive dyskinesia G24.01    Acute encephalopathy G93.40    Wernicke encephalopathy syndrome E51.2    Left arm weakness R29.898    Cognitive impairment R41.89    Acquired hypothyroidism E03.9    JASPER (acute kidney injury) (HCC) N17.9    Alcoholic cirrhosis (HCC) K70.30    Encounter for palliative care Z51.5    Goals of care, counseling/discussion Z71.89    Left hemiparesis (HCC) G81.94    Urinary retention R33.9    Chronic indwelling Loaiza catheter Z97.8    Moderate tricuspid regurgitation I07.1    Acute respiratory  not diaphoretic.   HENT:      Head: Normocephalic and atraumatic.      Right Ear: External ear normal.      Left Ear: External ear normal.   Eyes:      General: No scleral icterus.        Left eye: No discharge.   Neck:      Trachea: No tracheal deviation.   Cardiovascular:      Rate and Rhythm: Normal rate and regular rhythm.      Heart sounds: Normal heart sounds. No murmur heard.     No gallop.   Pulmonary:      Effort: Pulmonary effort is normal. No respiratory distress.      Breath sounds: Normal breath sounds. No stridor.   Abdominal:      General: Abdomen is protuberant.      Palpations: There is fluid wave.      Tenderness: There is no abdominal tenderness. There is no guarding or rebound.   Musculoskeletal:         General: Normal range of motion.      Cervical back: Normal range of motion.   Skin:     General: Skin is warm and dry.      Coloration: Skin is not pale.      Findings: No rash (on exposed surfaces).   Neurological:      Mental Status: She is alert and oriented to person, place, and time.      Coordination: Coordination normal.   Psychiatric:         Behavior: Behavior normal.       MEDICAL DECISION MAKING / ED COURSE:   Summary of Patient Presentation:    -year-old female who presents via EMS after the nursing home staff were rolling her over to change her briefs and she went \"unconscious\".  Patient has a known history of ascites and SBO.  Her abdomen is more distended than usual and there is concern that she may need paracentesis tomorrow and it is only available on Thursdays at our facility.  The patient is currently on BiPAP as she was hypoxemic.  She usually is on 2 to 5 L of nasal cannula.  Patient is oriented to person only which according to family this is usual for her.    , ICU admit excepted.  Will go under [facility] do not have to wait for bed assignment, MICU transport    Code Status Discussion: Patient is DNR Comfort Care arrest with no intubation per paperwork    \"ED Course\" Notes

## 2024-03-29 NOTE — PROGRESS NOTES
SPIRITUAL CARE DEPARTMENT [facility]  PROGRESS NOTE    Room # 2TRAUMA/2TRAUMA   Name: Clemente Garcia            Taoist: Unknown     Reason for visit:  Emergency Department    I visited the  Patient and Family .    Admit Date & Time: 3/29/2024  2:49 PM    Assessment:  Clemente Garcia is a 63 y.o. female in the hospital. Upon entering the room Writer observed Pt was wearing a BiPap mask. Pt's eyes were closed. Pt's sister, Clarita, was at bedside.     Intervention:  I introduced myself and my title as   Writer inquired how Sister was doing.  Writer asked about Pt's situation. Writer brought Sister coffee. Writer told Pt and Sister she is available for support. Writer offered words of support and encouragement to Pt and Sister.    Outcome:  Pt's ability to communicate was limited. Pt acknowledged writer's words of support with eye contact and thank you. Sister thanked writer for the coffee and support.    Plan:  Chaplains will remain available to offer spiritual and emotional support as needed.     03/29/24 1907   Encounter Summary   Service Provided For: Patient and family together   Referral/Consult From: [facility]   [facility] Family members   Last Encounter  03/29/24   Complexity of Encounter Low   Begin Time 1700   End Time  1710   Total Time Calculated 10 min   Spiritual/Emotional needs   Type Spiritual Support   Assessment/Intervention/Outcome   Assessment Calm;Coping;Impaired resilience;Passive   Intervention Active listening;Discussed illness injury and it’s impact;Discussed meaning/purpose;Explored/Affirmed feelings, thoughts, concerns;Explored Coping Skills/Resources;Sustaining Presence/Ministry of presence   Outcome Coping;Expressed Gratitude   Plan and Referrals   Plan/Referrals Continue Support (comment)       Electronically signed by Chaplain Slim, on 3/29/2024 at 7:09 PM.  Spiritual Care Department  [facility]  [phone]

## 2024-03-29 NOTE — ED PROVIDER NOTES
[facility] Emergency Department      Pt Name: Clemente Garcia  MRN: [id number]  Birthdate 1960  Date of evaluation: 3/29/2024    EMERGENCY DEPARTMENT ENCOUNTER      PERTINENT ATTENDING PHYSICIAN COMMENTS:      Faculty Attestation    I performed a history and physical examination of the patient and discussed management with the mid level provideer. I reviewed the mid level provider's note and agree with the documented findings and plan of care.Any areas of disagreement are noted on the chart. I was personally present for the key portions of any procedures. I have documented in the chart those procedures where I was not present during the key portions. I have reviewed the emergency nurses triage note. I agree with the chief complaint, past medical history, past surgical history, allergies, medications, social and family history as documented unless otherwise noted below. Documentation of the HPI, Physical Exam and Medical Decision Making performed by medical students or scribes is based on my personal performance of the HPI, PE and MDM. For Residents/Physician Assistant/ Nurse Practitioner cases/documentation I have personally evaluated this patient and have completed at least one if not all key elements of the E/M (history, physical exam, and MDM). Additional findings are as noted.    CHIEF COMPLAINT       Chief Complaint   Patient presents with    Altered Mental Status    Shortness of Breath       HISTORY OF PRESENT ILLNESS    Clemente Garcia is a 63 y.o. female who presents to the emergency department from [facility]-care facility with a complaint of mental status changes.  Patient had a syncopal episode.  She is dyspneic and has anasarca.  PAST MEDICAL HISTORY    has no past medical history on file.    SURGICAL HISTORY      has a past surgical history that includes Hysterectomy; Upper gastrointestinal endoscopy (N/A, 02/13/2024); and Abdomen surgery.    CURRENT MEDICATIONS       Previous Medications     Report if indicated         DISPOSITION Decision To Transfer 03/29/2024 06:26:38 PM      (Please note that portions of this note were completed with a voice recognition program.  Efforts were made to edit the dictations but occasionally words are mis-transcribed.  Additionally, portions of this note may also include information that was incorporated after care transfer to another provider that were not available at the time of my evaluation.  Some of this information could likely include laboratory values, vital sign updates, medications etc.)    Paola Fiore DO   Attending Emergency Physician       Paola Fiore DO  03/30/24 0101       Paola Fiore DO  03/30/24 0123

## 2024-03-30 ENCOUNTER — APPOINTMENT (OUTPATIENT)
Dept: CT IMAGING | Age: 64
DRG: 190 | End: 2024-03-30
Attending: INTERNAL MEDICINE
Payer: MEDICAID

## 2024-03-30 ENCOUNTER — HOSPITAL ENCOUNTER (INPATIENT)
Age: 64
LOS: 11 days | Discharge: HOSPICE | DRG: 190 | End: 2024-04-10
Attending: INTERNAL MEDICINE | Admitting: INTERNAL MEDICINE
Payer: MEDICAID

## 2024-03-30 ENCOUNTER — APPOINTMENT (OUTPATIENT)
Dept: GENERAL RADIOLOGY | Age: 64
DRG: 190 | End: 2024-03-30
Attending: INTERNAL MEDICINE
Payer: MEDICAID

## 2024-03-30 VITALS
SYSTOLIC BLOOD PRESSURE: 103 MMHG | BODY MASS INDEX: 32.25 KG/M2 | TEMPERATURE: 97.5 F | OXYGEN SATURATION: 97 % | HEART RATE: 78 BPM | WEIGHT: 182 LBS | HEIGHT: 63 IN | RESPIRATION RATE: 16 BRPM | DIASTOLIC BLOOD PRESSURE: 74 MMHG

## 2024-03-30 DIAGNOSIS — M79.605 LEFT LEG PAIN: ICD-10-CM

## 2024-03-30 DIAGNOSIS — J90 PLEURAL EFFUSION, LEFT: Primary | ICD-10-CM

## 2024-03-30 PROBLEM — J96.01 ACUTE HYPOXIC RESPIRATORY FAILURE (HCC): Status: ACTIVE | Noted: 2024-03-30

## 2024-03-30 PROBLEM — I95.9 ARTERIAL HYPOTENSION: Status: ACTIVE | Noted: 2024-03-30

## 2024-03-30 LAB
ALLEN TEST: POSITIVE
ANION GAP SERPL CALCULATED.3IONS-SCNC: 11 MMOL/L (ref 9–16)
BACTERIA URNS QL MICRO: ABNORMAL
BASOPHILS # BLD: 0 K/UL (ref 0–0.2)
BASOPHILS NFR BLD: 0 % (ref 0–2)
BILIRUB UR QL STRIP: NEGATIVE
BUN SERPL-MCNC: 60 MG/DL (ref 8–23)
C3 SERPL-MCNC: 95 MG/DL (ref 90–180)
C4 SERPL-MCNC: 14 MG/DL (ref 10–40)
CALCIUM SERPL-MCNC: 8.7 MG/DL (ref 8.6–10.4)
CASTS #/AREA URNS LPF: ABNORMAL /LPF (ref 0–8)
CHLORIDE SERPL-SCNC: 98 MMOL/L (ref 98–107)
CHLORIDE UR-SCNC: 27 MMOL/L
CLARITY UR: CLEAR
CO2 SERPL-SCNC: 25 MMOL/L (ref 20–31)
COLOR UR: YELLOW
CREAT SERPL-MCNC: 2 MG/DL (ref 0.5–0.9)
CREAT UR-MCNC: 45.5 MG/DL (ref 28–217)
EOSINOPHIL # BLD: 0.14 K/UL (ref 0–0.44)
EOSINOPHIL,URINE: NORMAL
EOSINOPHILS RELATIVE PERCENT: 2 % (ref 1–4)
EPI CELLS #/AREA URNS HPF: ABNORMAL /HPF (ref 0–5)
ERYTHROCYTE [DISTWIDTH] IN BLOOD BY AUTOMATED COUNT: 15.9 % (ref 11.8–14.4)
FIO2: 3
GFR SERPL CREATININE-BSD FRML MDRD: 28 ML/MIN/1.73M2
GLUCOSE BLD-MCNC: 43 MG/DL (ref 65–105)
GLUCOSE BLD-MCNC: 73 MG/DL (ref 65–105)
GLUCOSE BLD-MCNC: 82 MG/DL (ref 65–105)
GLUCOSE BLD-MCNC: 84 MG/DL (ref 65–105)
GLUCOSE BLD-MCNC: 91 MG/DL (ref 65–105)
GLUCOSE FLD-MCNC: 59 MG/DL
GLUCOSE SERPL-MCNC: 61 MG/DL (ref 74–99)
GLUCOSE UR STRIP-MCNC: NEGATIVE MG/DL
HCT VFR BLD AUTO: 24.4 % (ref 36.3–47.1)
HGB BLD-MCNC: 7.6 G/DL (ref 11.9–15.1)
HGB UR QL STRIP.AUTO: ABNORMAL
IMM GRANULOCYTES # BLD AUTO: 0.07 K/UL (ref 0–0.3)
IMM GRANULOCYTES NFR BLD: 1 %
INR PPP: 1.2
KETONES UR STRIP-MCNC: NEGATIVE MG/DL
LDH FLD L TO P-CCNC: 107 U/L
LEUKOCYTE ESTERASE UR QL STRIP: ABNORMAL
LYMPHOCYTES NFR BLD: 0.5 K/UL (ref 1.1–3.7)
LYMPHOCYTES RELATIVE PERCENT: 7 % (ref 24–43)
MCH RBC QN AUTO: 31.8 PG (ref 25.2–33.5)
MCHC RBC AUTO-ENTMCNC: 31.1 G/DL (ref 28.4–34.8)
MCV RBC AUTO: 102.1 FL (ref 82.6–102.9)
MICROORGANISM SPEC CULT: NO GROWTH
MONOCYTES NFR BLD: 0.58 K/UL (ref 0.1–1.2)
MONOCYTES NFR BLD: 8 % (ref 3–12)
MORPHOLOGY: ABNORMAL
NEUTROPHILS NFR BLD: 82 % (ref 36–65)
NEUTS SEG NFR BLD: 5.91 K/UL (ref 1.5–8.1)
NITRITE UR QL STRIP: NEGATIVE
NRBC BLD-RTO: 0 PER 100 WBC
PH FLUID: 7
PH UR STRIP: 5.5 [PH] (ref 5–8)
PLATELET # BLD AUTO: 277 K/UL (ref 138–453)
PMV BLD AUTO: 9.5 FL (ref 8.1–13.5)
POC HCO3: 29 MMOL/L (ref 21–28)
POC O2 SATURATION: 93.5 % (ref 94–98)
POC PCO2: 48.7 MM HG (ref 35–48)
POC PH: 7.38 (ref 7.35–7.45)
POC PO2: 71 MM HG (ref 83–108)
POSITIVE BASE EXCESS, ART: 3.5 MMOL/L (ref 0–3)
POTASSIUM SERPL-SCNC: 4.7 MMOL/L (ref 3.7–5.3)
POTASSIUM SERPL-SCNC: 4.9 MMOL/L (ref 3.7–5.3)
PROT FLD-MCNC: 4.4 G/DL
PROT UR STRIP-MCNC: ABNORMAL MG/DL
PROTHROMBIN TIME: 15.5 SEC (ref 11.7–14.9)
RBC # BLD AUTO: 2.39 M/UL (ref 3.95–5.11)
RBC #/AREA URNS HPF: ABNORMAL /HPF (ref 0–4)
SAMPLE SITE: ABNORMAL
SODIUM SERPL-SCNC: 134 MMOL/L (ref 136–145)
SODIUM UR-SCNC: 30 MMOL/L
SP GR UR STRIP: 1.02 (ref 1–1.03)
SPECIMEN DESCRIPTION: NORMAL
SPECIMEN TYPE: NORMAL
T4 FREE SERPL-MCNC: 0.9 NG/DL (ref 0.92–1.68)
TOTAL PROTEIN, URINE: 125 MG/DL
TROPONIN I SERPL HS-MCNC: 169 NG/L (ref 0–14)
TSH SERPL DL<=0.05 MIU/L-ACNC: 14.7 UIU/ML (ref 0.27–4.2)
UROBILINOGEN UR STRIP-ACNC: NORMAL EU/DL (ref 0–1)
WBC #/AREA URNS HPF: ABNORMAL /HPF (ref 0–5)
WBC OTHER # BLD: 7.2 K/UL (ref 3.5–11.3)

## 2024-03-30 PROCEDURE — 74177 CT ABD & PELVIS W/CONTRAST: CPT

## 2024-03-30 PROCEDURE — 2580000003 HC RX 258: Performed by: NURSE PRACTITIONER

## 2024-03-30 PROCEDURE — 99291 CRITICAL CARE FIRST HOUR: CPT | Performed by: INTERNAL MEDICINE

## 2024-03-30 PROCEDURE — 87641 MR-STAPH DNA AMP PROBE: CPT

## 2024-03-30 PROCEDURE — 6370000000 HC RX 637 (ALT 250 FOR IP): Performed by: STUDENT IN AN ORGANIZED HEALTH CARE EDUCATION/TRAINING PROGRAM

## 2024-03-30 PROCEDURE — 94660 CPAP INITIATION&MGMT: CPT

## 2024-03-30 PROCEDURE — 87070 CULTURE OTHR SPECIMN AEROBIC: CPT

## 2024-03-30 PROCEDURE — 36600 WITHDRAWAL OF ARTERIAL BLOOD: CPT

## 2024-03-30 PROCEDURE — 83986 ASSAY PH BODY FLUID NOS: CPT

## 2024-03-30 PROCEDURE — 6360000002 HC RX W HCPCS: Performed by: STUDENT IN AN ORGANIZED HEALTH CARE EDUCATION/TRAINING PROGRAM

## 2024-03-30 PROCEDURE — 32555 ASPIRATE PLEURA W/ IMAGING: CPT | Performed by: INTERNAL MEDICINE

## 2024-03-30 PROCEDURE — 80048 BASIC METABOLIC PNL TOTAL CA: CPT

## 2024-03-30 PROCEDURE — 87075 CULTR BACTERIA EXCEPT BLOOD: CPT

## 2024-03-30 PROCEDURE — 0W9B3ZZ DRAINAGE OF LEFT PLEURAL CAVITY, PERCUTANEOUS APPROACH: ICD-10-PCS | Performed by: INTERNAL MEDICINE

## 2024-03-30 PROCEDURE — 87205 SMEAR GRAM STAIN: CPT

## 2024-03-30 PROCEDURE — 87040 BLOOD CULTURE FOR BACTERIA: CPT

## 2024-03-30 PROCEDURE — 84156 ASSAY OF PROTEIN URINE: CPT

## 2024-03-30 PROCEDURE — 84443 ASSAY THYROID STIM HORMONE: CPT

## 2024-03-30 PROCEDURE — C9113 INJ PANTOPRAZOLE SODIUM, VIA: HCPCS | Performed by: STUDENT IN AN ORGANIZED HEALTH CARE EDUCATION/TRAINING PROGRAM

## 2024-03-30 PROCEDURE — 99223 1ST HOSP IP/OBS HIGH 75: CPT | Performed by: INTERNAL MEDICINE

## 2024-03-30 PROCEDURE — 70450 CT HEAD/BRAIN W/O DYE: CPT

## 2024-03-30 PROCEDURE — 94761 N-INVAS EAR/PLS OXIMETRY MLT: CPT

## 2024-03-30 PROCEDURE — 84132 ASSAY OF SERUM POTASSIUM: CPT

## 2024-03-30 PROCEDURE — 6360000004 HC RX CONTRAST MEDICATION: Performed by: STUDENT IN AN ORGANIZED HEALTH CARE EDUCATION/TRAINING PROGRAM

## 2024-03-30 PROCEDURE — 89051 BODY FLUID CELL COUNT: CPT

## 2024-03-30 PROCEDURE — 2700000000 HC OXYGEN THERAPY PER DAY

## 2024-03-30 PROCEDURE — 2580000003 HC RX 258: Performed by: STUDENT IN AN ORGANIZED HEALTH CARE EDUCATION/TRAINING PROGRAM

## 2024-03-30 PROCEDURE — 85610 PROTHROMBIN TIME: CPT

## 2024-03-30 PROCEDURE — 95700 EEG CONT REC W/VID EEG TECH: CPT

## 2024-03-30 PROCEDURE — 2500000003 HC RX 250 WO HCPCS

## 2024-03-30 PROCEDURE — 99223 1ST HOSP IP/OBS HIGH 75: CPT | Performed by: PSYCHIATRY & NEUROLOGY

## 2024-03-30 PROCEDURE — 84157 ASSAY OF PROTEIN OTHER: CPT

## 2024-03-30 PROCEDURE — 88112 CYTOPATH CELL ENHANCE TECH: CPT

## 2024-03-30 PROCEDURE — 86160 COMPLEMENT ANTIGEN: CPT

## 2024-03-30 PROCEDURE — 99285 EMERGENCY DEPT VISIT HI MDM: CPT

## 2024-03-30 PROCEDURE — 71260 CT THORAX DX C+: CPT

## 2024-03-30 PROCEDURE — 82803 BLOOD GASES ANY COMBINATION: CPT

## 2024-03-30 PROCEDURE — 84300 ASSAY OF URINE SODIUM: CPT

## 2024-03-30 PROCEDURE — 4A10X4Z MONITORING OF CENTRAL NERVOUS ELECTRICAL ACTIVITY, EXTERNAL APPROACH: ICD-10-PCS | Performed by: PSYCHIATRY & NEUROLOGY

## 2024-03-30 PROCEDURE — 82947 ASSAY GLUCOSE BLOOD QUANT: CPT

## 2024-03-30 PROCEDURE — 95711 VEEG 2-12 HR UNMONITORED: CPT

## 2024-03-30 PROCEDURE — 36415 COLL VENOUS BLD VENIPUNCTURE: CPT

## 2024-03-30 PROCEDURE — 82570 ASSAY OF URINE CREATININE: CPT

## 2024-03-30 PROCEDURE — 82945 GLUCOSE OTHER FLUID: CPT

## 2024-03-30 PROCEDURE — 84484 ASSAY OF TROPONIN QUANT: CPT

## 2024-03-30 PROCEDURE — 2000000000 HC ICU R&B

## 2024-03-30 PROCEDURE — 93005 ELECTROCARDIOGRAM TRACING: CPT | Performed by: INTERNAL MEDICINE

## 2024-03-30 PROCEDURE — 51702 INSERT TEMP BLADDER CATH: CPT

## 2024-03-30 PROCEDURE — 88305 TISSUE EXAM BY PATHOLOGIST: CPT

## 2024-03-30 PROCEDURE — 71045 X-RAY EXAM CHEST 1 VIEW: CPT

## 2024-03-30 PROCEDURE — 87086 URINE CULTURE/COLONY COUNT: CPT

## 2024-03-30 PROCEDURE — 84439 ASSAY OF FREE THYROXINE: CPT

## 2024-03-30 PROCEDURE — 85025 COMPLETE CBC W/AUTO DIFF WBC: CPT

## 2024-03-30 PROCEDURE — 95720 EEG PHY/QHP EA INCR W/VEEG: CPT | Performed by: PSYCHIATRY & NEUROLOGY

## 2024-03-30 PROCEDURE — 82436 ASSAY OF URINE CHLORIDE: CPT

## 2024-03-30 PROCEDURE — 81001 URINALYSIS AUTO W/SCOPE: CPT

## 2024-03-30 PROCEDURE — 83615 LACTATE (LD) (LDH) ENZYME: CPT

## 2024-03-30 RX ORDER — FENTANYL 12.5 UG/1
1 PATCH TRANSDERMAL
Status: DISCONTINUED | OUTPATIENT
Start: 2024-03-30 | End: 2024-03-31

## 2024-03-30 RX ORDER — MAGNESIUM SULFATE IN WATER 40 MG/ML
2000 INJECTION, SOLUTION INTRAVENOUS PRN
Status: DISCONTINUED | OUTPATIENT
Start: 2024-03-30 | End: 2024-04-09

## 2024-03-30 RX ORDER — GABAPENTIN 100 MG/1
100 CAPSULE ORAL EVERY 8 HOURS PRN
Status: DISCONTINUED | OUTPATIENT
Start: 2024-03-30 | End: 2024-04-09

## 2024-03-30 RX ORDER — ASPIRIN 81 MG/1
81 TABLET ORAL DAILY
Status: DISCONTINUED | OUTPATIENT
Start: 2024-03-30 | End: 2024-04-10 | Stop reason: HOSPADM

## 2024-03-30 RX ORDER — SODIUM CHLORIDE 0.9 % (FLUSH) 0.9 %
5-40 SYRINGE (ML) INJECTION PRN
Status: DISCONTINUED | OUTPATIENT
Start: 2024-03-30 | End: 2024-04-09

## 2024-03-30 RX ORDER — SPIRONOLACTONE 25 MG/1
25 TABLET ORAL 2 TIMES DAILY
Status: DISCONTINUED | OUTPATIENT
Start: 2024-03-30 | End: 2024-04-10 | Stop reason: HOSPADM

## 2024-03-30 RX ORDER — MIDODRINE HYDROCHLORIDE 5 MG/1
5 TABLET ORAL 3 TIMES DAILY
Status: DISCONTINUED | OUTPATIENT
Start: 2024-03-30 | End: 2024-03-31

## 2024-03-30 RX ORDER — FENTANYL 12.5 UG/1
1 PATCH TRANSDERMAL
Status: DISCONTINUED | OUTPATIENT
Start: 2024-03-30 | End: 2024-03-30

## 2024-03-30 RX ORDER — GLUCAGON 1 MG/ML
KIT INJECTION
Status: DISPENSED
Start: 2024-03-30 | End: 2024-03-31

## 2024-03-30 RX ORDER — DEXTROSE MONOHYDRATE 50 MG/ML
INJECTION, SOLUTION INTRAVENOUS CONTINUOUS
Status: DISCONTINUED | OUTPATIENT
Start: 2024-03-30 | End: 2024-04-01

## 2024-03-30 RX ORDER — POTASSIUM CHLORIDE 29.8 MG/ML
20 INJECTION INTRAVENOUS PRN
Status: DISCONTINUED | OUTPATIENT
Start: 2024-03-30 | End: 2024-04-09

## 2024-03-30 RX ORDER — FERROUS SULFATE 300 MG/5ML
300 LIQUID (ML) ORAL DAILY
Status: DISCONTINUED | OUTPATIENT
Start: 2024-03-30 | End: 2024-04-09

## 2024-03-30 RX ORDER — TORSEMIDE 20 MG/1
40 TABLET ORAL DAILY
Status: DISCONTINUED | OUTPATIENT
Start: 2024-03-30 | End: 2024-04-05

## 2024-03-30 RX ORDER — VENLAFAXINE 75 MG/1
75 TABLET ORAL 2 TIMES DAILY
Status: DISCONTINUED | OUTPATIENT
Start: 2024-03-30 | End: 2024-04-10

## 2024-03-30 RX ORDER — DEXTROSE MONOHYDRATE 100 MG/ML
INJECTION, SOLUTION INTRAVENOUS CONTINUOUS PRN
Status: DISCONTINUED | OUTPATIENT
Start: 2024-03-30 | End: 2024-04-10 | Stop reason: HOSPADM

## 2024-03-30 RX ORDER — LORAZEPAM 0.5 MG/1
0.5 TABLET ORAL NIGHTLY PRN
Status: DISCONTINUED | OUTPATIENT
Start: 2024-03-30 | End: 2024-03-31

## 2024-03-30 RX ORDER — AMMONIUM LACTATE 12 G/100G
1 LOTION TOPICAL PRN
Status: DISCONTINUED | OUTPATIENT
Start: 2024-03-30 | End: 2024-04-10 | Stop reason: HOSPADM

## 2024-03-30 RX ORDER — POTASSIUM CHLORIDE 7.45 MG/ML
10 INJECTION INTRAVENOUS PRN
Status: DISCONTINUED | OUTPATIENT
Start: 2024-03-30 | End: 2024-04-09

## 2024-03-30 RX ORDER — SODIUM CHLORIDE 9 MG/ML
INJECTION, SOLUTION INTRAVENOUS PRN
Status: DISCONTINUED | OUTPATIENT
Start: 2024-03-30 | End: 2024-04-10 | Stop reason: HOSPADM

## 2024-03-30 RX ORDER — ACETAMINOPHEN 325 MG/1
650 TABLET ORAL EVERY 6 HOURS PRN
Status: DISCONTINUED | OUTPATIENT
Start: 2024-03-30 | End: 2024-04-10 | Stop reason: HOSPADM

## 2024-03-30 RX ORDER — ONDANSETRON 4 MG/1
4 TABLET, ORALLY DISINTEGRATING ORAL EVERY 8 HOURS PRN
Status: DISCONTINUED | OUTPATIENT
Start: 2024-03-30 | End: 2024-04-10 | Stop reason: HOSPADM

## 2024-03-30 RX ORDER — MAGNESIUM HYDROXIDE 1200 MG/15ML
1000 LIQUID ORAL ONCE
Status: DISCONTINUED | OUTPATIENT
Start: 2024-03-30 | End: 2024-03-31

## 2024-03-30 RX ORDER — FOLIC ACID 1 MG/1
1 TABLET ORAL DAILY
Status: DISCONTINUED | OUTPATIENT
Start: 2024-03-30 | End: 2024-04-09

## 2024-03-30 RX ORDER — LACOSAMIDE 10 MG/ML
100 SOLUTION ORAL 2 TIMES DAILY
Status: DISCONTINUED | OUTPATIENT
Start: 2024-03-30 | End: 2024-03-31

## 2024-03-30 RX ORDER — LEVOTHYROXINE SODIUM 0.07 MG/1
150 TABLET ORAL DAILY
Status: DISCONTINUED | OUTPATIENT
Start: 2024-03-30 | End: 2024-04-09

## 2024-03-30 RX ORDER — LACTULOSE 10 G/15ML
20 SOLUTION ORAL 3 TIMES DAILY
Status: DISCONTINUED | OUTPATIENT
Start: 2024-03-30 | End: 2024-04-03

## 2024-03-30 RX ORDER — GLUCAGON 1 MG/ML
1 KIT INJECTION PRN
Status: DISCONTINUED | OUTPATIENT
Start: 2024-03-30 | End: 2024-04-10 | Stop reason: HOSPADM

## 2024-03-30 RX ORDER — ONDANSETRON 2 MG/ML
4 INJECTION INTRAMUSCULAR; INTRAVENOUS EVERY 6 HOURS PRN
Status: DISCONTINUED | OUTPATIENT
Start: 2024-03-30 | End: 2024-04-10 | Stop reason: HOSPADM

## 2024-03-30 RX ORDER — SODIUM CHLORIDE 9 MG/ML
INJECTION, SOLUTION INTRAVENOUS CONTINUOUS
Status: DISCONTINUED | OUTPATIENT
Start: 2024-03-30 | End: 2024-03-30

## 2024-03-30 RX ORDER — POLYETHYLENE GLYCOL 3350 17 G/17G
17 POWDER, FOR SOLUTION ORAL DAILY PRN
Status: DISCONTINUED | OUTPATIENT
Start: 2024-03-30 | End: 2024-04-10 | Stop reason: HOSPADM

## 2024-03-30 RX ORDER — ACETAMINOPHEN 650 MG/1
650 SUPPOSITORY RECTAL EVERY 6 HOURS PRN
Status: DISCONTINUED | OUTPATIENT
Start: 2024-03-30 | End: 2024-04-10 | Stop reason: HOSPADM

## 2024-03-30 RX ORDER — LIDOCAINE HYDROCHLORIDE 10 MG/ML
20 INJECTION, SOLUTION INFILTRATION; PERINEURAL ONCE
Status: COMPLETED | OUTPATIENT
Start: 2024-03-30 | End: 2024-03-30

## 2024-03-30 RX ORDER — DULOXETIN HYDROCHLORIDE 20 MG/1
20 CAPSULE, DELAYED RELEASE ORAL DAILY
Status: DISCONTINUED | OUTPATIENT
Start: 2024-03-30 | End: 2024-04-10

## 2024-03-30 RX ORDER — ENOXAPARIN SODIUM 100 MG/ML
40 INJECTION SUBCUTANEOUS DAILY
Status: DISCONTINUED | OUTPATIENT
Start: 2024-03-30 | End: 2024-03-30

## 2024-03-30 RX ORDER — HEPARIN SODIUM 5000 [USP'U]/ML
5000 INJECTION, SOLUTION INTRAVENOUS; SUBCUTANEOUS EVERY 8 HOURS SCHEDULED
Status: DISCONTINUED | OUTPATIENT
Start: 2024-03-31 | End: 2024-03-31

## 2024-03-30 RX ORDER — SODIUM CHLORIDE 0.9 % (FLUSH) 0.9 %
5-40 SYRINGE (ML) INJECTION EVERY 12 HOURS SCHEDULED
Status: DISCONTINUED | OUTPATIENT
Start: 2024-03-30 | End: 2024-04-10 | Stop reason: HOSPADM

## 2024-03-30 RX ORDER — PANTOPRAZOLE SODIUM 40 MG/1
40 TABLET, DELAYED RELEASE ORAL DAILY
Status: DISCONTINUED | OUTPATIENT
Start: 2024-03-30 | End: 2024-03-30

## 2024-03-30 RX ORDER — THIAMINE MONONITRATE (VIT B1) 100 MG
100 TABLET ORAL DAILY
Status: DISCONTINUED | OUTPATIENT
Start: 2024-03-30 | End: 2024-03-30 | Stop reason: CLARIF

## 2024-03-30 RX ORDER — LANOLIN ALCOHOL/MO/W.PET/CERES
100 CREAM (GRAM) TOPICAL DAILY
Status: DISCONTINUED | OUTPATIENT
Start: 2024-03-30 | End: 2024-04-03

## 2024-03-30 RX ADMIN — MEROPENEM 1000 MG: 1 INJECTION, POWDER, FOR SOLUTION INTRAVENOUS at 14:46

## 2024-03-30 RX ADMIN — IOPAMIDOL 75 ML: 755 INJECTION, SOLUTION INTRAVENOUS at 07:57

## 2024-03-30 RX ADMIN — LACOSAMIDE 100 MG: 10 SOLUTION ORAL at 22:32

## 2024-03-30 RX ADMIN — ENOXAPARIN SODIUM 40 MG: 100 INJECTION SUBCUTANEOUS at 10:18

## 2024-03-30 RX ADMIN — LACTULOSE 20 G: 20 SOLUTION ORAL at 14:47

## 2024-03-30 RX ADMIN — LIDOCAINE HYDROCHLORIDE 20 ML: 10 INJECTION, SOLUTION INFILTRATION; PERINEURAL at 16:01

## 2024-03-30 RX ADMIN — LEVOTHYROXINE SODIUM 150 MCG: 75 TABLET ORAL at 14:47

## 2024-03-30 RX ADMIN — DEXTROSE MONOHYDRATE 125 ML: 100 INJECTION, SOLUTION INTRAVENOUS at 19:43

## 2024-03-30 RX ADMIN — DEXTROSE MONOHYDRATE: 50 INJECTION, SOLUTION INTRAVENOUS at 22:26

## 2024-03-30 RX ADMIN — SODIUM CHLORIDE: 9 INJECTION, SOLUTION INTRAVENOUS at 14:43

## 2024-03-30 RX ADMIN — LACTULOSE 20 G: 20 SOLUTION ORAL at 10:19

## 2024-03-30 RX ADMIN — Medication 100 MG: at 10:19

## 2024-03-30 RX ADMIN — VENLAFAXINE 75 MG: 75 TABLET ORAL at 22:34

## 2024-03-30 RX ADMIN — ONDANSETRON 4 MG: 2 INJECTION INTRAMUSCULAR; INTRAVENOUS at 20:59

## 2024-03-30 RX ADMIN — FOLIC ACID 1 MG: 1 TABLET ORAL at 10:19

## 2024-03-30 RX ADMIN — LACOSAMIDE 100 MG: 10 SOLUTION ORAL at 10:22

## 2024-03-30 RX ADMIN — SODIUM CHLORIDE, PRESERVATIVE FREE 40 MG: 5 INJECTION INTRAVENOUS at 15:22

## 2024-03-30 RX ADMIN — SODIUM CHLORIDE: 9 INJECTION, SOLUTION INTRAVENOUS at 18:52

## 2024-03-30 RX ADMIN — SPIRONOLACTONE 25 MG: 25 TABLET, FILM COATED ORAL at 10:19

## 2024-03-30 RX ADMIN — TORSEMIDE 40 MG: 20 TABLET ORAL at 10:19

## 2024-03-30 RX ADMIN — DULOXETINE HYDROCHLORIDE 20 MG: 20 CAPSULE, DELAYED RELEASE ORAL at 10:19

## 2024-03-30 RX ADMIN — ACETAMINOPHEN 325MG 650 MG: 325 TABLET ORAL at 22:31

## 2024-03-30 RX ADMIN — SODIUM CHLORIDE, PRESERVATIVE FREE 10 ML: 5 INJECTION INTRAVENOUS at 22:58

## 2024-03-30 RX ADMIN — SODIUM CHLORIDE, PRESERVATIVE FREE 10 ML: 5 INJECTION INTRAVENOUS at 10:19

## 2024-03-30 RX ADMIN — LACTULOSE 20 G: 20 SOLUTION ORAL at 20:58

## 2024-03-30 RX ADMIN — VENLAFAXINE 75 MG: 75 TABLET ORAL at 10:19

## 2024-03-30 ASSESSMENT — PAIN SCALES - GENERAL
PAINLEVEL_OUTOF10: 0
PAINLEVEL_OUTOF10: 6
PAINLEVEL_OUTOF10: 0
PAINLEVEL_OUTOF10: 8
PAINLEVEL_OUTOF10: 0
PAINLEVEL_OUTOF10: 0

## 2024-03-30 ASSESSMENT — PAIN DESCRIPTION - DESCRIPTORS: DESCRIPTORS: ACHING

## 2024-03-30 ASSESSMENT — PAIN - FUNCTIONAL ASSESSMENT: PAIN_FUNCTIONAL_ASSESSMENT: 0-10

## 2024-03-30 ASSESSMENT — PAIN DESCRIPTION - LOCATION: LOCATION: ABDOMEN

## 2024-03-30 NOTE — CARE COORDINATION
Case Management Assessment  Initial Evaluation    Date/Time of Evaluation: 3/30/2024 3:12 PM  Assessment Completed by: Melony Chapa RN    If patient is discharged prior to next notation, then this note serves as note for discharge by case management.    Patient Name: Clemente Garcia                   YOB: 1960  Diagnosis: Acute hypoxic respiratory failure (HCC) [J96.01]                   Date / Time: 3/30/2024  4:41 AM    Patient Admission Status: Inpatient   Readmission Risk (Low < 19, Mod (19-27), High > 27): Readmission Risk Score: 28.6    Current PCP: Milana Whalen MD  PCP verified by CM? (P) Yes    Chart Reviewed: Yes      History Provided by: (P) Child/Family  Patient Orientation: (P) Alert and Oriented, Person, Place    Patient Cognition: (P) Alert    Hospitalization in the last 30 days (Readmission):  Yes    If yes, Readmission Assessment in  Navigator will be completed.    Advance Directives:      Code Status: DNR-CCA   Patient's Primary Decision Maker is: (P) Named in Scanned ACP Document    Primary Decision Maker: Marek Garcia - Child - [phone]    Discharge Planning:    Patient lives with: (P) Other (Comment) Type of Home: (P) Skilled Nursing Facility  Primary Care Giver: (P) Other (Comment) (SNF)  Patient Support Systems include: (P) Spouse/Significant Other, Family Members, Children   Current Financial resources: (P) Medicaid  Current community resources:    Current services prior to admission: (P) Skilled Nursing Facility            Current DME:              Type of Home Care services:  (P) None    ADLS  Prior functional level: (P) Assistance with the following:  Current functional level: (P) Assistance with the following:    PT AM-PAC:   /24  OT AM-PAC:   /24    Family can provide assistance at DC: (P) No  Would you like Case Management to discuss the discharge plan with any other family members/significant others, and if so, who? (P) Yes (Marek POA)  Plans to Return to Present

## 2024-03-30 NOTE — CARE COORDINATION
03/30/24 1526   Readmission Assessment   Number of Days since last admission? 1-7 days   Previous Disposition SNF   Who is being Interviewed Caregiver   What was the patient's/caregiver's perception as to why they think they needed to return back to the hospital? Other (Comment)  (SOB, AMS)   Did you visit your Primary Care Physician after you left the hospital, before you returned this time? No   Why weren't you able to visit your PCP? Other (Comment)  (went to SNF)   Did you see a specialist, such as Cardiac, Pulmonary, Orthopedic Physician, etc. after you left the hospital? No   Who advised the patient to return to the hospital? Skilled Unit   Does the patient report anything that got in the way of taking their medications? No   In our efforts to provide the best possible care to you and others like you, can you think of anything that we could have done to help you after you left the hospital the first time, so that you might not have needed to return so soon? Other (Comment)  (nothing)

## 2024-03-31 PROBLEM — F10.11 HISTORY OF ETOH ABUSE: Status: ACTIVE | Noted: 2024-03-31

## 2024-03-31 PROBLEM — R25.9 ABNORMAL INVOLUNTARY MOVEMENTS: Status: ACTIVE | Noted: 2024-03-31

## 2024-03-31 PROBLEM — R55 SYNCOPE: Status: ACTIVE | Noted: 2024-03-31

## 2024-03-31 LAB
ALLEN TEST: POSITIVE
ALLEN TEST: POSITIVE
AMMONIA PLAS-SCNC: 29 UMOL/L (ref 11–51)
ANION GAP SERPL CALCULATED.3IONS-SCNC: 9 MMOL/L (ref 9–16)
BASOPHILS # BLD: 0.08 K/UL (ref 0–0.2)
BASOPHILS NFR BLD: 1 % (ref 0–2)
BUN SERPL-MCNC: 62 MG/DL (ref 8–23)
CALCIUM SERPL-MCNC: 8.5 MG/DL (ref 8.6–10.4)
CHLORIDE SERPL-SCNC: 98 MMOL/L (ref 98–107)
CO2 SERPL-SCNC: 27 MMOL/L (ref 20–31)
CREAT SERPL-MCNC: 2.1 MG/DL (ref 0.5–0.9)
EKG ATRIAL RATE: 76 BPM
EKG ATRIAL RATE: 84 BPM
EKG P AXIS: 36 DEGREES
EKG P AXIS: 45 DEGREES
EKG P-R INTERVAL: 140 MS
EKG P-R INTERVAL: 196 MS
EKG Q-T INTERVAL: 372 MS
EKG Q-T INTERVAL: 424 MS
EKG QRS DURATION: 84 MS
EKG QRS DURATION: 92 MS
EKG QTC CALCULATION (BAZETT): 439 MS
EKG QTC CALCULATION (BAZETT): 477 MS
EKG R AXIS: 20 DEGREES
EKG R AXIS: 35 DEGREES
EKG T AXIS: 12 DEGREES
EKG T AXIS: 34 DEGREES
EKG VENTRICULAR RATE: 76 BPM
EKG VENTRICULAR RATE: 84 BPM
EOSINOPHIL # BLD: 0.08 K/UL (ref 0–0.4)
EOSINOPHILS RELATIVE PERCENT: 1 % (ref 1–4)
ERYTHROCYTE [DISTWIDTH] IN BLOOD BY AUTOMATED COUNT: 15.9 % (ref 11.8–14.4)
FIO2: 3
FIO2: 30
GFR SERPL CREATININE-BSD FRML MDRD: 27 ML/MIN/1.73M2
GLUCOSE BLD-MCNC: 104 MG/DL (ref 74–100)
GLUCOSE BLD-MCNC: 122 MG/DL (ref 65–105)
GLUCOSE BLD-MCNC: 71 MG/DL (ref 65–105)
GLUCOSE BLD-MCNC: 76 MG/DL (ref 65–105)
GLUCOSE BLD-MCNC: 80 MG/DL (ref 65–105)
GLUCOSE BLD-MCNC: 84 MG/DL (ref 65–105)
GLUCOSE BLD-MCNC: 90 MG/DL (ref 65–105)
GLUCOSE BLD-MCNC: 94 MG/DL (ref 65–105)
GLUCOSE SERPL-MCNC: 85 MG/DL (ref 74–99)
HCT VFR BLD AUTO: 22.3 % (ref 36.3–47.1)
HCT VFR BLD AUTO: 24.2 % (ref 36.3–47.1)
HGB BLD-MCNC: 6.8 G/DL (ref 11.9–15.1)
HGB BLD-MCNC: 7.3 G/DL (ref 11.9–15.1)
IMM GRANULOCYTES # BLD AUTO: 0 K/UL (ref 0–0.3)
IMM GRANULOCYTES NFR BLD: 0 %
LDH SERPL-CCNC: 191 U/L (ref 135–214)
LYMPHOCYTES NFR BLD: 0.47 K/UL (ref 1–4.8)
LYMPHOCYTES RELATIVE PERCENT: 6 % (ref 24–44)
MCH RBC QN AUTO: 31.6 PG (ref 25.2–33.5)
MCHC RBC AUTO-ENTMCNC: 30.5 G/DL (ref 28.4–34.8)
MCV RBC AUTO: 103.7 FL (ref 82.6–102.9)
MICROORGANISM SPEC CULT: NO GROWTH
MODE: ABNORMAL
MONOCYTES NFR BLD: 0.55 K/UL (ref 0.1–0.8)
MONOCYTES NFR BLD: 7 % (ref 1–7)
MORPHOLOGY: ABNORMAL
MORPHOLOGY: ABNORMAL
MRSA, DNA, NASAL: NEGATIVE
NEUTROPHILS NFR BLD: 85 % (ref 36–66)
NEUTS SEG NFR BLD: 6.72 K/UL (ref 1.8–7.7)
NRBC BLD-RTO: 0 PER 100 WBC
O2 DELIVERY DEVICE: ABNORMAL
PLATELET # BLD AUTO: 224 K/UL (ref 138–453)
PMV BLD AUTO: 9.6 FL (ref 8.1–13.5)
POC HCO3: 28.4 MMOL/L (ref 21–28)
POC HCO3: 28.5 MMOL/L (ref 21–28)
POC O2 SATURATION: 94.7 % (ref 94–98)
POC O2 SATURATION: 95.8 % (ref 94–98)
POC PCO2: 51.5 MM HG (ref 35–48)
POC PCO2: 54 MM HG (ref 35–48)
POC PH: 7.33 (ref 7.35–7.45)
POC PH: 7.35 (ref 7.35–7.45)
POC PO2: 81.3 MM HG (ref 83–108)
POC PO2: 85.8 MM HG (ref 83–108)
POSITIVE BASE EXCESS, ART: 2 MMOL/L (ref 0–3)
POSITIVE BASE EXCESS, ART: 2.5 MMOL/L (ref 0–3)
POTASSIUM SERPL-SCNC: 3.9 MMOL/L (ref 3.7–5.3)
PROT SERPL-MCNC: 7.7 G/DL (ref 6.6–8.7)
RBC # BLD AUTO: 2.15 M/UL (ref 3.95–5.11)
SAMPLE SITE: ABNORMAL
SAMPLE SITE: ABNORMAL
SODIUM SERPL-SCNC: 134 MMOL/L (ref 136–145)
SPECIMEN DESCRIPTION: NORMAL
SPECIMEN DESCRIPTION: NORMAL
WBC OTHER # BLD: 7.9 K/UL (ref 3.5–11.3)

## 2024-03-31 PROCEDURE — 2700000000 HC OXYGEN THERAPY PER DAY

## 2024-03-31 PROCEDURE — 82803 BLOOD GASES ANY COMBINATION: CPT

## 2024-03-31 PROCEDURE — 83615 LACTATE (LD) (LDH) ENZYME: CPT

## 2024-03-31 PROCEDURE — 36600 WITHDRAWAL OF ARTERIAL BLOOD: CPT

## 2024-03-31 PROCEDURE — 94761 N-INVAS EAR/PLS OXIMETRY MLT: CPT

## 2024-03-31 PROCEDURE — 6370000000 HC RX 637 (ALT 250 FOR IP): Performed by: INTERNAL MEDICINE

## 2024-03-31 PROCEDURE — 85018 HEMOGLOBIN: CPT

## 2024-03-31 PROCEDURE — 6360000002 HC RX W HCPCS: Performed by: STUDENT IN AN ORGANIZED HEALTH CARE EDUCATION/TRAINING PROGRAM

## 2024-03-31 PROCEDURE — 99291 CRITICAL CARE FIRST HOUR: CPT | Performed by: INTERNAL MEDICINE

## 2024-03-31 PROCEDURE — C9113 INJ PANTOPRAZOLE SODIUM, VIA: HCPCS | Performed by: STUDENT IN AN ORGANIZED HEALTH CARE EDUCATION/TRAINING PROGRAM

## 2024-03-31 PROCEDURE — 2580000003 HC RX 258: Performed by: STUDENT IN AN ORGANIZED HEALTH CARE EDUCATION/TRAINING PROGRAM

## 2024-03-31 PROCEDURE — 99233 SBSQ HOSP IP/OBS HIGH 50: CPT | Performed by: PSYCHIATRY & NEUROLOGY

## 2024-03-31 PROCEDURE — 99232 SBSQ HOSP IP/OBS MODERATE 35: CPT | Performed by: INTERNAL MEDICINE

## 2024-03-31 PROCEDURE — 94660 CPAP INITIATION&MGMT: CPT

## 2024-03-31 PROCEDURE — C9254 INJECTION, LACOSAMIDE: HCPCS

## 2024-03-31 PROCEDURE — 82947 ASSAY GLUCOSE BLOOD QUANT: CPT

## 2024-03-31 PROCEDURE — 86920 COMPATIBILITY TEST SPIN: CPT

## 2024-03-31 PROCEDURE — 6370000000 HC RX 637 (ALT 250 FOR IP): Performed by: STUDENT IN AN ORGANIZED HEALTH CARE EDUCATION/TRAINING PROGRAM

## 2024-03-31 PROCEDURE — 84155 ASSAY OF PROTEIN SERUM: CPT

## 2024-03-31 PROCEDURE — 85025 COMPLETE CBC W/AUTO DIFF WBC: CPT

## 2024-03-31 PROCEDURE — 2580000003 HC RX 258

## 2024-03-31 PROCEDURE — 95708 EEG WO VID EA 12-26HR UNMNTR: CPT

## 2024-03-31 PROCEDURE — 86901 BLOOD TYPING SEROLOGIC RH(D): CPT

## 2024-03-31 PROCEDURE — 80235 DRUG ASSAY LACOSAMIDE: CPT

## 2024-03-31 PROCEDURE — 82140 ASSAY OF AMMONIA: CPT

## 2024-03-31 PROCEDURE — 86900 BLOOD TYPING SEROLOGIC ABO: CPT

## 2024-03-31 PROCEDURE — A4216 STERILE WATER/SALINE, 10 ML: HCPCS | Performed by: STUDENT IN AN ORGANIZED HEALTH CARE EDUCATION/TRAINING PROGRAM

## 2024-03-31 PROCEDURE — 36415 COLL VENOUS BLD VENIPUNCTURE: CPT

## 2024-03-31 PROCEDURE — 2000000000 HC ICU R&B

## 2024-03-31 PROCEDURE — 86850 RBC ANTIBODY SCREEN: CPT

## 2024-03-31 PROCEDURE — 85014 HEMATOCRIT: CPT

## 2024-03-31 PROCEDURE — 6360000002 HC RX W HCPCS

## 2024-03-31 PROCEDURE — 80048 BASIC METABOLIC PNL TOTAL CA: CPT

## 2024-03-31 RX ORDER — FENTANYL 12.5 UG/1
1 PATCH TRANSDERMAL
Status: DISCONTINUED | OUTPATIENT
Start: 2024-04-02 | End: 2024-03-31

## 2024-03-31 RX ORDER — MIDODRINE HYDROCHLORIDE 5 MG/1
10 TABLET ORAL 3 TIMES DAILY
Status: DISCONTINUED | OUTPATIENT
Start: 2024-03-31 | End: 2024-04-09

## 2024-03-31 RX ORDER — FENTANYL 12.5 UG/1
1 PATCH TRANSDERMAL
Status: DISCONTINUED | OUTPATIENT
Start: 2024-03-31 | End: 2024-04-09

## 2024-03-31 RX ORDER — HEPARIN SODIUM 5000 [USP'U]/ML
5000 INJECTION, SOLUTION INTRAVENOUS; SUBCUTANEOUS EVERY 8 HOURS SCHEDULED
Status: COMPLETED | OUTPATIENT
Start: 2024-03-31 | End: 2024-03-31

## 2024-03-31 RX ORDER — SODIUM CHLORIDE 9 MG/ML
INJECTION, SOLUTION INTRAVENOUS PRN
Status: DISCONTINUED | OUTPATIENT
Start: 2024-03-31 | End: 2024-03-31

## 2024-03-31 RX ORDER — LORAZEPAM 0.5 MG/1
0.5 TABLET ORAL 4 TIMES DAILY PRN
Status: DISCONTINUED | OUTPATIENT
Start: 2024-03-31 | End: 2024-04-03

## 2024-03-31 RX ADMIN — LACOSAMIDE 100 MG: 10 SOLUTION ORAL at 07:51

## 2024-03-31 RX ADMIN — SODIUM CHLORIDE, PRESERVATIVE FREE 40 MG: 5 INJECTION INTRAVENOUS at 07:50

## 2024-03-31 RX ADMIN — DULOXETINE HYDROCHLORIDE 20 MG: 20 CAPSULE, DELAYED RELEASE ORAL at 07:51

## 2024-03-31 RX ADMIN — VENLAFAXINE 75 MG: 75 TABLET ORAL at 23:33

## 2024-03-31 RX ADMIN — HEPARIN SODIUM 5000 UNITS: 5000 INJECTION INTRAVENOUS; SUBCUTANEOUS at 23:32

## 2024-03-31 RX ADMIN — MIDODRINE HYDROCHLORIDE 10 MG: 5 TABLET ORAL at 20:21

## 2024-03-31 RX ADMIN — FOLIC ACID 1 MG: 1 TABLET ORAL at 07:50

## 2024-03-31 RX ADMIN — SODIUM CHLORIDE, PRESERVATIVE FREE 10 ML: 5 INJECTION INTRAVENOUS at 20:21

## 2024-03-31 RX ADMIN — LACTULOSE 20 G: 20 SOLUTION ORAL at 20:22

## 2024-03-31 RX ADMIN — DEXTROSE MONOHYDRATE: 50 INJECTION, SOLUTION INTRAVENOUS at 10:20

## 2024-03-31 RX ADMIN — MIDODRINE HYDROCHLORIDE 10 MG: 5 TABLET ORAL at 15:09

## 2024-03-31 RX ADMIN — LACTULOSE 20 G: 20 SOLUTION ORAL at 15:09

## 2024-03-31 RX ADMIN — LORAZEPAM 0.5 MG: 0.5 TABLET ORAL at 23:47

## 2024-03-31 RX ADMIN — MEROPENEM 1000 MG: 1 INJECTION, POWDER, FOR SOLUTION INTRAVENOUS at 15:10

## 2024-03-31 RX ADMIN — MIDODRINE HYDROCHLORIDE 5 MG: 5 TABLET ORAL at 07:50

## 2024-03-31 RX ADMIN — VENLAFAXINE 75 MG: 75 TABLET ORAL at 07:51

## 2024-03-31 RX ADMIN — LACTULOSE 20 G: 20 SOLUTION ORAL at 07:50

## 2024-03-31 RX ADMIN — SODIUM CHLORIDE, PRESERVATIVE FREE 10 ML: 5 INJECTION INTRAVENOUS at 07:52

## 2024-03-31 RX ADMIN — LACOSAMIDE 100 MG: 10 INJECTION INTRAVENOUS at 23:32

## 2024-03-31 RX ADMIN — LEVOTHYROXINE SODIUM 150 MCG: 75 TABLET ORAL at 07:50

## 2024-03-31 RX ADMIN — MEROPENEM 1000 MG: 1 INJECTION, POWDER, FOR SOLUTION INTRAVENOUS at 02:30

## 2024-03-31 RX ADMIN — Medication 100 MG: at 07:50

## 2024-03-31 ASSESSMENT — PAIN SCALES - PAIN ASSESSMENT IN ADVANCED DEMENTIA (PAINAD)
FACIALEXPRESSION: SMILING OR INEXPRESSIVE
BODYLANGUAGE: RELAXED
FACIALEXPRESSION: SMILING OR INEXPRESSIVE
CONSOLABILITY: NO NEED TO CONSOLE
BODYLANGUAGE: RELAXED
BREATHING: NORMAL
CONSOLABILITY: NO NEED TO CONSOLE
CONSOLABILITY: DISTRACTED OR REASSURED BY VOICE/TOUCH
TOTALSCORE: 4
BREATHING: NORMAL
TOTALSCORE: 0
TOTALSCORE: 0
FACIALEXPRESSION: SMILING OR INEXPRESSIVE
TOTALSCORE: 0
FACIALEXPRESSION: SMILING OR INEXPRESSIVE
TOTALSCORE: 0
BODYLANGUAGE: RELAXED
BODYLANGUAGE: RELAXED
CONSOLABILITY: NO NEED TO CONSOLE
BREATHING: NORMAL
BREATHING: NORMAL
FACIALEXPRESSION: SMILING OR INEXPRESSIVE
CONSOLABILITY: DISTRACTED OR REASSURED BY VOICE/TOUCH
NEGVOCALIZATION: OCCASIONAL MOAN/GROAN, LOW SPEECH, NEGATIVE/DISAPPROVING QUALITY
FACIALEXPRESSION: SMILING OR INEXPRESSIVE
BODYLANGUAGE: RELAXED
FACIALEXPRESSION: SMILING OR INEXPRESSIVE
FACIALEXPRESSION: SMILING OR INEXPRESSIVE
BODYLANGUAGE: TENSE, DISTRESSED PACING, FIDGETING
BREATHING: NORMAL
CONSOLABILITY: NO NEED TO CONSOLE
BODYLANGUAGE: TENSE, DISTRESSED PACING, FIDGETING
FACIALEXPRESSION: SMILING OR INEXPRESSIVE
NEGVOCALIZATION: OCCASIONAL MOAN/GROAN, LOW SPEECH, NEGATIVE/DISAPPROVING QUALITY
CONSOLABILITY: NO NEED TO CONSOLE
TOTALSCORE: 0
BREATHING: OCCASIONAL LABORED BREATHING, SHORT PERIOD OF HYPERVENTILATION
BODYLANGUAGE: RELAXED
TOTALSCORE: 4
BREATHING: NORMAL
CONSOLABILITY: NO NEED TO CONSOLE
BODYLANGUAGE: RELAXED
CONSOLABILITY: NO NEED TO CONSOLE
BREATHING: NORMAL
TOTALSCORE: 0
BREATHING: OCCASIONAL LABORED BREATHING, SHORT PERIOD OF HYPERVENTILATION
TOTALSCORE: 0

## 2024-03-31 ASSESSMENT — PAIN SCALES - GENERAL
PAINLEVEL_OUTOF10: 4
PAINLEVEL_OUTOF10: 0

## 2024-03-31 ASSESSMENT — PAIN SCALES - WONG BAKER: WONGBAKER_NUMERICALRESPONSE: NO HURT

## 2024-03-31 NOTE — CONSENT
Informed Consent for Blood Component Transfusion Note    I have discussed with the son the rationale for blood component transfusion; its benefits in treating or preventing fatigue, organ damage, or death; and its risk which includes mild transfusion reactions, rare risk of blood borne infection, or more serious but rare reactions. I have discussed the alternatives to transfusion, including the risk and consequences of not receiving transfusion. The son had an opportunity to ask questions and had agreed to proceed with transfusion of blood components.    Electronically signed by Sarai Garcia MD on 3/31/24 at 8:28 AM EDT

## 2024-04-01 ENCOUNTER — APPOINTMENT (OUTPATIENT)
Dept: ULTRASOUND IMAGING | Age: 64
DRG: 190 | End: 2024-04-01
Attending: INTERNAL MEDICINE
Payer: MEDICAID

## 2024-04-01 PROBLEM — K74.60 CIRRHOSIS OF LIVER WITH ASCITES (HCC): Status: ACTIVE | Noted: 2024-04-01

## 2024-04-01 PROBLEM — R18.8 CIRRHOSIS OF LIVER WITH ASCITES (HCC): Status: ACTIVE | Noted: 2024-04-01

## 2024-04-01 LAB
ALBUMIN FLD-MCNC: 1.3 G/DL
AMYLASE FLD-CCNC: 7 U/L
ANION GAP SERPL CALCULATED.3IONS-SCNC: 12 MMOL/L (ref 9–16)
BASOPHILS # BLD: 0.08 K/UL (ref 0–0.2)
BASOPHILS NFR BLD: 1 % (ref 0–2)
BODY FLD TYPE: NORMAL
BUN SERPL-MCNC: 60 MG/DL (ref 8–23)
CALCIUM SERPL-MCNC: 8.3 MG/DL (ref 8.6–10.4)
CASE NUMBER:: NORMAL
CHLORIDE SERPL-SCNC: 98 MMOL/L (ref 98–107)
CO2 SERPL-SCNC: 23 MMOL/L (ref 20–31)
CREAT SERPL-MCNC: 2.2 MG/DL (ref 0.5–0.9)
EOSINOPHIL # BLD: 0.34 K/UL (ref 0–0.44)
EOSINOPHILS RELATIVE PERCENT: 4 % (ref 1–4)
ERYTHROCYTE [DISTWIDTH] IN BLOOD BY AUTOMATED COUNT: 16 % (ref 11.8–14.4)
GFR SERPL CREATININE-BSD FRML MDRD: 25 ML/MIN/1.73M2
GLUCOSE BLD-MCNC: 103 MG/DL (ref 65–105)
GLUCOSE FLD-MCNC: 106 MG/DL
GLUCOSE SERPL-MCNC: 97 MG/DL (ref 74–99)
HCT VFR BLD AUTO: 25.1 % (ref 36.3–47.1)
HGB BLD-MCNC: 7.5 G/DL (ref 11.9–15.1)
IMM GRANULOCYTES # BLD AUTO: 0.08 K/UL (ref 0–0.3)
IMM GRANULOCYTES NFR BLD: 1 %
LDH FLD L TO P-CCNC: 84 U/L
LYMPHOCYTES NFR BLD: 0.42 K/UL (ref 1.1–3.7)
LYMPHOCYTES NFR FLD: 44 %
LYMPHOCYTES RELATIVE PERCENT: 5 % (ref 24–43)
MAGNESIUM SERPL-MCNC: 2.5 MG/DL (ref 1.6–2.4)
MCH RBC QN AUTO: 31.9 PG (ref 25.2–33.5)
MCHC RBC AUTO-ENTMCNC: 29.9 G/DL (ref 28.4–34.8)
MCV RBC AUTO: 106.8 FL (ref 82.6–102.9)
MONOCYTES NFR BLD: 0.92 K/UL (ref 0.1–1.2)
MONOCYTES NFR BLD: 11 % (ref 3–12)
MORPHOLOGY: ABNORMAL
MORPHOLOGY: ABNORMAL
NEUTROPHILS NFR BLD: 78 % (ref 36–65)
NEUTROPHILS NFR FLD: 1 %
NEUTS SEG NFR BLD: 6.56 K/UL (ref 1.5–8.1)
NRBC BLD-RTO: 0 PER 100 WBC
PLATELET # BLD AUTO: 251 K/UL (ref 138–453)
PMV BLD AUTO: 9.6 FL (ref 8.1–13.5)
POTASSIUM SERPL-SCNC: 3.4 MMOL/L (ref 3.7–5.3)
PROT FLD-MCNC: 3.5 G/DL
RBC # BLD AUTO: 2.35 M/UL (ref 3.95–5.11)
RBC # FLD: 3000 CELLS/UL
SODIUM SERPL-SCNC: 133 MMOL/L (ref 136–145)
SPECIMEN DESCRIPTION: NORMAL
SPECIMEN TYPE: NORMAL
UNIDENT CELLS NFR FLD: NORMAL %
WBC # FLD: 722 CELLS/UL
WBC OTHER # BLD: 8.4 K/UL (ref 3.5–11.3)

## 2024-04-01 PROCEDURE — 85025 COMPLETE CBC W/AUTO DIFF WBC: CPT

## 2024-04-01 PROCEDURE — 99232 SBSQ HOSP IP/OBS MODERATE 35: CPT | Performed by: INTERNAL MEDICINE

## 2024-04-01 PROCEDURE — 2580000003 HC RX 258

## 2024-04-01 PROCEDURE — 2580000003 HC RX 258: Performed by: STUDENT IN AN ORGANIZED HEALTH CARE EDUCATION/TRAINING PROGRAM

## 2024-04-01 PROCEDURE — 83735 ASSAY OF MAGNESIUM: CPT

## 2024-04-01 PROCEDURE — 6370000000 HC RX 637 (ALT 250 FOR IP): Performed by: STUDENT IN AN ORGANIZED HEALTH CARE EDUCATION/TRAINING PROGRAM

## 2024-04-01 PROCEDURE — 87070 CULTURE OTHR SPECIMN AEROBIC: CPT

## 2024-04-01 PROCEDURE — 2580000003 HC RX 258: Performed by: INTERNAL MEDICINE

## 2024-04-01 PROCEDURE — 0W9G3ZZ DRAINAGE OF PERITONEAL CAVITY, PERCUTANEOUS APPROACH: ICD-10-PCS | Performed by: RADIOLOGY

## 2024-04-01 PROCEDURE — 6370000000 HC RX 637 (ALT 250 FOR IP): Performed by: INTERNAL MEDICINE

## 2024-04-01 PROCEDURE — 94761 N-INVAS EAR/PLS OXIMETRY MLT: CPT

## 2024-04-01 PROCEDURE — 2000000000 HC ICU R&B

## 2024-04-01 PROCEDURE — 95714 VEEG EA 12-26 HR UNMNTR: CPT

## 2024-04-01 PROCEDURE — 80048 BASIC METABOLIC PNL TOTAL CA: CPT

## 2024-04-01 PROCEDURE — 36415 COLL VENOUS BLD VENIPUNCTURE: CPT

## 2024-04-01 PROCEDURE — 88112 CYTOPATH CELL ENHANCE TECH: CPT

## 2024-04-01 PROCEDURE — A4216 STERILE WATER/SALINE, 10 ML: HCPCS | Performed by: STUDENT IN AN ORGANIZED HEALTH CARE EDUCATION/TRAINING PROGRAM

## 2024-04-01 PROCEDURE — 82945 GLUCOSE OTHER FLUID: CPT

## 2024-04-01 PROCEDURE — 6360000002 HC RX W HCPCS

## 2024-04-01 PROCEDURE — 2709999900 US GUIDED PARACENTESIS

## 2024-04-01 PROCEDURE — 84157 ASSAY OF PROTEIN OTHER: CPT

## 2024-04-01 PROCEDURE — 94660 CPAP INITIATION&MGMT: CPT

## 2024-04-01 PROCEDURE — 82947 ASSAY GLUCOSE BLOOD QUANT: CPT

## 2024-04-01 PROCEDURE — 99232 SBSQ HOSP IP/OBS MODERATE 35: CPT | Performed by: PSYCHIATRY & NEUROLOGY

## 2024-04-01 PROCEDURE — C9113 INJ PANTOPRAZOLE SODIUM, VIA: HCPCS | Performed by: STUDENT IN AN ORGANIZED HEALTH CARE EDUCATION/TRAINING PROGRAM

## 2024-04-01 PROCEDURE — 99222 1ST HOSP IP/OBS MODERATE 55: CPT

## 2024-04-01 PROCEDURE — 89051 BODY FLUID CELL COUNT: CPT

## 2024-04-01 PROCEDURE — 6360000002 HC RX W HCPCS: Performed by: STUDENT IN AN ORGANIZED HEALTH CARE EDUCATION/TRAINING PROGRAM

## 2024-04-01 PROCEDURE — C9254 INJECTION, LACOSAMIDE: HCPCS

## 2024-04-01 PROCEDURE — 87075 CULTR BACTERIA EXCEPT BLOOD: CPT

## 2024-04-01 PROCEDURE — P9047 ALBUMIN (HUMAN), 25%, 50ML: HCPCS | Performed by: INTERNAL MEDICINE

## 2024-04-01 PROCEDURE — 2700000000 HC OXYGEN THERAPY PER DAY

## 2024-04-01 PROCEDURE — 95718 EEG PHYS/QHP 2-12 HR W/VEEG: CPT | Performed by: PSYCHIATRY & NEUROLOGY

## 2024-04-01 PROCEDURE — 99291 CRITICAL CARE FIRST HOUR: CPT | Performed by: INTERNAL MEDICINE

## 2024-04-01 PROCEDURE — 6360000002 HC RX W HCPCS: Performed by: INTERNAL MEDICINE

## 2024-04-01 PROCEDURE — 88305 TISSUE EXAM BY PATHOLOGIST: CPT

## 2024-04-01 PROCEDURE — 82150 ASSAY OF AMYLASE: CPT

## 2024-04-01 PROCEDURE — 82042 OTHER SOURCE ALBUMIN QUAN EA: CPT

## 2024-04-01 PROCEDURE — 95720 EEG PHY/QHP EA INCR W/VEEG: CPT | Performed by: PSYCHIATRY & NEUROLOGY

## 2024-04-01 PROCEDURE — 83615 LACTATE (LD) (LDH) ENZYME: CPT

## 2024-04-01 PROCEDURE — 87205 SMEAR GRAM STAIN: CPT

## 2024-04-01 RX ORDER — FUROSEMIDE 10 MG/ML
20 INJECTION INTRAMUSCULAR; INTRAVENOUS ONCE
Status: COMPLETED | OUTPATIENT
Start: 2024-04-01 | End: 2024-04-01

## 2024-04-01 RX ORDER — ALBUMIN (HUMAN) 12.5 G/50ML
25 SOLUTION INTRAVENOUS EVERY 8 HOURS
Status: COMPLETED | OUTPATIENT
Start: 2024-04-01 | End: 2024-04-02

## 2024-04-01 RX ORDER — DEXTROSE AND SODIUM CHLORIDE 5; .9 G/100ML; G/100ML
INJECTION, SOLUTION INTRAVENOUS CONTINUOUS
Status: DISCONTINUED | OUTPATIENT
Start: 2024-04-01 | End: 2024-04-02

## 2024-04-01 RX ADMIN — DEXTROSE AND SODIUM CHLORIDE: 5; 900 INJECTION, SOLUTION INTRAVENOUS at 08:06

## 2024-04-01 RX ADMIN — SODIUM CHLORIDE: 9 INJECTION, SOLUTION INTRAVENOUS at 20:45

## 2024-04-01 RX ADMIN — RIFAXIMIN 550 MG: 550 TABLET ORAL at 20:28

## 2024-04-01 RX ADMIN — Medication 100 MG: at 08:43

## 2024-04-01 RX ADMIN — DEXTROSE AND SODIUM CHLORIDE: 5; 900 INJECTION, SOLUTION INTRAVENOUS at 21:49

## 2024-04-01 RX ADMIN — DEXTROSE MONOHYDRATE: 50 INJECTION, SOLUTION INTRAVENOUS at 03:16

## 2024-04-01 RX ADMIN — LACOSAMIDE 100 MG: 10 INJECTION INTRAVENOUS at 09:39

## 2024-04-01 RX ADMIN — POTASSIUM CHLORIDE 10 MEQ: 7.46 INJECTION, SOLUTION INTRAVENOUS at 06:38

## 2024-04-01 RX ADMIN — LACOSAMIDE 100 MG: 10 INJECTION INTRAVENOUS at 20:46

## 2024-04-01 RX ADMIN — POTASSIUM CHLORIDE 10 MEQ: 7.46 INJECTION, SOLUTION INTRAVENOUS at 08:11

## 2024-04-01 RX ADMIN — ALBUMIN (HUMAN) 25 G: 0.25 INJECTION, SOLUTION INTRAVENOUS at 23:36

## 2024-04-01 RX ADMIN — MIDODRINE HYDROCHLORIDE 10 MG: 5 TABLET ORAL at 20:28

## 2024-04-01 RX ADMIN — MEROPENEM 1000 MG: 1 INJECTION, POWDER, FOR SOLUTION INTRAVENOUS at 03:11

## 2024-04-01 RX ADMIN — POTASSIUM BICARBONATE 20 MEQ: 782 TABLET, EFFERVESCENT ORAL at 08:43

## 2024-04-01 RX ADMIN — LEVOTHYROXINE SODIUM 150 MCG: 75 TABLET ORAL at 06:38

## 2024-04-01 RX ADMIN — SODIUM CHLORIDE, PRESERVATIVE FREE 40 MG: 5 INJECTION INTRAVENOUS at 08:43

## 2024-04-01 RX ADMIN — SODIUM CHLORIDE: 9 INJECTION, SOLUTION INTRAVENOUS at 03:08

## 2024-04-01 RX ADMIN — MEROPENEM 1000 MG: 1 INJECTION, POWDER, FOR SOLUTION INTRAVENOUS at 14:41

## 2024-04-01 RX ADMIN — MIDODRINE HYDROCHLORIDE 10 MG: 5 TABLET ORAL at 14:44

## 2024-04-01 RX ADMIN — MINERAL SUPPLEMENT IRON 300 MG / 5 ML STRENGTH LIQUID 100 PER BOX UNFLAVORED 300 MG: at 08:43

## 2024-04-01 RX ADMIN — VENLAFAXINE 75 MG: 75 TABLET ORAL at 20:29

## 2024-04-01 RX ADMIN — FOLIC ACID 1 MG: 1 TABLET ORAL at 08:44

## 2024-04-01 RX ADMIN — DULOXETINE HYDROCHLORIDE 20 MG: 20 CAPSULE, DELAYED RELEASE ORAL at 08:44

## 2024-04-01 RX ADMIN — SODIUM CHLORIDE, PRESERVATIVE FREE 10 ML: 5 INJECTION INTRAVENOUS at 08:46

## 2024-04-01 RX ADMIN — FUROSEMIDE 20 MG: 10 INJECTION, SOLUTION INTRAMUSCULAR; INTRAVENOUS at 18:26

## 2024-04-01 RX ADMIN — RIFAXIMIN 550 MG: 550 TABLET ORAL at 08:44

## 2024-04-01 RX ADMIN — VENLAFAXINE 75 MG: 75 TABLET ORAL at 08:44

## 2024-04-01 RX ADMIN — SODIUM CHLORIDE, PRESERVATIVE FREE 10 ML: 5 INJECTION INTRAVENOUS at 20:28

## 2024-04-01 RX ADMIN — ALBUMIN (HUMAN) 25 G: 0.25 INJECTION, SOLUTION INTRAVENOUS at 15:24

## 2024-04-01 ASSESSMENT — PAIN SCALES - GENERAL: PAINLEVEL_OUTOF10: 0

## 2024-04-01 NOTE — BRIEF OP NOTE
Brief Postoperative Note for Paracentesis    Clemente Garcia  YOB: 1960  [id number]    Pre-operative Diagnosis:  Ascites     Post-operative Diagnosis: Same    Procedure: Ultrasound guided Paracentesis     Anesthesia: 1% Lidocaine     Surgeons/Assistants: Sonu Dhaliwal PA-C    Complications: none    EBL: Minimal    Specimens: Were obtained    Ultrasound guided paracentesis performed. 2700 ml dark yellow fluid obtained.  Dressing applied.     Electronically signed by ALIYA Dillon on 4/1/2024 at 4:35 PM

## 2024-04-01 NOTE — PROCEDURES
Referring physician: Dr. Garcia  Date:4/1/2024  Start Time: 4/1/2024 @ 0730  End Time: 4/1/2024 @  1552    Indication  Patient with encephalopathy, EEG done to rule out subclinical seizures.       Introduction  This continuous video-EEG was acquired using a DB3 Mobile workstation at 256 samples/s. Electrodes were placed according to the International 10-20 system. Automated spike and seizure detection algorithms were applied. Video was recorded during this study.    Description  The background consistent of diffuse  reactive polymorphic delta and theta slowing ~ 5 Hz. At times poorly regulated 5-6 Hz posterior dominant rhythm suspected. No consistent focal slowing or interhemispheric asymmetry was noted. Normal sleep structures were observed. There were no interictal epileptiform discharges or electrographic seizures.    Events  4/1-2/2024  No events reported.     Impression  Abnormal continuous vEEG recording, the slowing mentioned above suggests mild-moderate non specific encephalopathy.     No epileptiform discharges were identified.  Please note the absence of   such activity in this record cannot conclusively rule out an epileptic   disorder.  If such is still clinically suspected, a repeat study with   prolonged sampling may be helpful.    Enrike Isabel MD  Epilepsy Board Certified.  Neurology Board Certified.    Electronically Signed    
LONG-TERM EEG-VIDEO MONITORING   CLINICAL NEUROPHYSIOLOGY LABORATORY  DEPARTMENT OF NEUROLOGY  [facility]    Patient: Clemente Garcia  Age: 63 y.o.  MRN: [id number]    Referring Physician: Rebecca Langford PA-C  History: The patient is a 63 y.o. female who presented breakthrough seizure/encephalopathy. This long-term video-EEG monitoring study was performed to determine the nature of the patient's clinical events. The patient is on neuroactive medications.   Clemente Garcia   Current Facility-Administered Medications   Medication Dose Route Frequency Provider Last Rate Last Admin    sodium chloride flush 0.9 % injection 5-40 mL  5-40 mL IntraVENous 2 times per day Sarai Garcia MD   10 mL at 03/30/24 1019    sodium chloride flush 0.9 % injection 5-40 mL  5-40 mL IntraVENous PRN Sarai Garcia MD        0.9 % sodium chloride infusion   IntraVENous PRN Sarai Garcia MD 10 mL/hr at 03/30/24 1443 New Bag at 03/30/24 1443    potassium chloride 20 mEq/50 mL IVPB (Central Line)  20 mEq IntraVENous PRN aSrai Garcia MD        Or    potassium chloride 10 mEq/100 mL IVPB (Peripheral Line)  10 mEq IntraVENous PRN Sarai Garcia MD        magnesium sulfate 2000 mg in 50 mL IVPB premix  2,000 mg IntraVENous PRN Sarai Garcia MD        ondansetron (ZOFRAN-ODT) disintegrating tablet 4 mg  4 mg Oral Q8H PRN Sarai Garcia MD        Or    ondansetron (ZOFRAN) injection 4 mg  4 mg IntraVENous Q6H PRN Sarai Garcia MD   4 mg at 03/30/24 2059    polyethylene glycol (GLYCOLAX) packet 17 g  17 g Oral Daily PRN Sarai Garcia MD        acetaminophen (TYLENOL) tablet 650 mg  650 mg Oral Q6H PRN Sarai Garcia MD   650 mg at 03/30/24 2231    Or    acetaminophen (TYLENOL) suppository 650 mg  650 mg Rectal Q6H PRN Sarai Garcia MD        ammonium lactate (LAC-HYDRIN) 12 % lotion 1 Bottle  1 Bottle Topical PRN Sarai Garcia MD        [Held by provider] aspirin EC tablet 81 
LONG-TERM EEG-VIDEO MONITORING   CLINICAL NEUROPHYSIOLOGY LABORATORY  DEPARTMENT OF NEUROLOGY  [facility]    Patient: Clemente Garcia  Age: 63 y.o.  MRN: [id number]    Referring Physician: Rebecca Langford PA-C  History: The patient is a 63 y.o. female who presented breakthrough seizure/encephalopathy. This long-term video-EEG monitoring study was performed to determine the nature of the patient's clinical events. The patient is on neuroactive medications.   Clemente Garcia   Current Facility-Administered Medications   Medication Dose Route Frequency Provider Last Rate Last Admin    dextrose 5 % and 0.9 % sodium chloride infusion   IntraVENous Continuous GarciaSarai campos  mL/hr at 04/01/24 0806 New Bag at 04/01/24 0806    rifAXIMin (XIFAXAN) tablet 550 mg  550 mg Oral BID Sarai Garcia MD   550 mg at 04/01/24 0844    fentaNYL (DURAGESIC) 12 MCG/HR 1 patch  1 patch TransDERmal Q72H Sarai Garcia MD   1 patch at 03/31/24 0758    lacosamide (VIMPAT) 100 mg in sodium chloride 0.9 % 60 mL IVPB  100 mg IntraVENous BID Demsond Montero MD   Stopped at 04/01/24 0002    midodrine (PROAMATINE) tablet 10 mg  10 mg PEG Tube TID Sudeep Petty MD   10 mg at 03/31/24 2021    LORazepam (ATIVAN) tablet 0.5 mg  0.5 mg Oral 4x Daily PRN [place] David DO   0.5 mg at 03/31/24 2347    sodium chloride flush 0.9 % injection 5-40 mL  5-40 mL IntraVENous 2 times per day Sarai Garcia MD   10 mL at 04/01/24 0846    sodium chloride flush 0.9 % injection 5-40 mL  5-40 mL IntraVENous PRN Sarai Garcia MD        0.9 % sodium chloride infusion   IntraVENous PRN Sarai Garcia MD 10 mL/hr at 04/01/24 0805 Rate Verify at 04/01/24 0805    potassium chloride 20 mEq/50 mL IVPB (Central Line)  20 mEq IntraVENous PRN Sarai Garcia MD        Or    potassium chloride 10 mEq/100 mL IVPB (Peripheral Line)  10 mEq IntraVENous PRN Sarai Garcia  mL/hr at 04/01/24 0811 10 mEq at 
PROCEDURE NOTE  Date: 3/30/2024   Name: Clemente Garcia  YOB: 1960    Thoracentesis    Date/Time: 3/30/2024 6:15 PM    Performed by: Bryson Durham MD  Authorized by: Bryson Durham MD  Consent: Written consent obtained.  Risks and benefits: risks, benefits and alternatives were discussed  Consent given by: power of   Patient understanding: patient states understanding of the procedure being performed  Patient consent: the patient's understanding of the procedure matches consent given  Procedure consent: procedure consent matches procedure scheduled  Relevant documents: relevant documents present and verified  Test results: test results available and properly labeled  Site marked: the operative site was marked  Imaging studies: imaging studies available  Required items: required blood products, implants, devices, and special equipment available  Patient identity confirmed: arm band  Time out: Immediately prior to procedure a \"time out\" was called to verify the correct patient, procedure, equipment, support staff and site/side marked as required.  Procedure purpose: diagnostic and therapeutic  Indications: pleural effusion  Preparation: Patient was prepped and draped in the usual sterile fashion.  Local anesthesia used: yes  Anesthesia: local infiltration    Anesthesia:  Local anesthesia used: yes  Local Anesthetic: lidocaine 1% without epinephrine  Anesthetic total: 8 mL    Sedation:  Patient sedated: no    Preparation: skin prepped with alcohol  Patient position: sitting  Ultrasound guidance: yes  Location: left posterior  Puncture method: over-the-needle catheter  Needle size: 18  Catheter size: 7.  Number of attempts: 1  Drainage amount: 1200 ml  Drainage characteristics: serosanguinous  Patient tolerance: patient tolerated the procedure well with no immediate complications  Chest x-ray performed: yes  Chest x-ray interpreted by radiologist.  Chest x-ray findings: pleural effusion  Comments: 
period the patient had no events or seizures.    Summary: During this day of recording no events were recorded. The interictal EEG was abnormal due to diffuse polymorphic theta slowing suggesting mild encephalopathy. Monitoring was continued in order to record the patient's typical events. The EKG channel revealed no abnormalities.    Day 3 - 04/01/24, reviewed through 00:30 am    Interictal EEG Samples: Interictal EEG was unchanged from yesterday.    Ictal EEG Recording / Patient Events: During this period the patient had no events or seizures.    Summary: During this day of recording no events were recorded. The interictal EEG was abnormal due to diffuse polymorphic theta slowing suggesting mild encephalopathy. Monitoring was continued in order to record the patient's typical events. The EKG channel revealed no abnormalities.      MIKE PHAN MD  Diplomate, American Board of Psychiatry and Neurology  Diplomate, American Board of Clinical Neurophysiology  Diplomate, American Board of Epilepsy     Please note this is a preliminary report and updated daily. The final report will have a summary of behavior and electrographic findings with clinical correlation.

## 2024-04-01 NOTE — ACP (ADVANCE CARE PLANNING)
Advance Care Planning     Advance Care Planning (ACP) Physician/NP/PA Conversation    Date of Conversation: 3/29/2024  Conducted with:  Healthcare Decision Maker: Named in Advance Directive or Healthcare Power of  (name) Marek Garcia     Healthcare Decision Maker:    Primary Decision Maker: Marek Garcia - child - [phone]  Click here to complete Healthcare Decision Makers including selection of the Healthcare Decision Maker Relationship (ie \"Primary\").         Care Preferences:    Hospitalization:  \"If your health worsens and it becomes clear that your chance of recovery is unlikely, what would be your preference regarding hospitalization?\"  The patient would prefer hospitalization.    Ventilation:  \"If you were unable to breath on your own and your chance of recovery was unlikely, what would be your preference about the use of a ventilator (breathing machine) if it was available to you?\"  The patient would NOT desire the use of a ventilator.    Resuscitation:  \"In the event your heart stopped as a result of an underlying serious health condition, would you want attempts made to restart your heart, or would you prefer a natural death?\"  No, do NOT attempt to resuscitate.    treatment goals    Conversation Outcomes / Follow-Up Plan:  ACP in process - information provided, considering goals and options  Reviewed DNR/DNI and patient confirms current DNR status - completed forms on file (place new order if needed)    Length of Voluntary ACP Conversation in minutes:  <16 minutes (Non-Billable)    Kaid Fonseca, APRN - CNP

## 2024-04-01 NOTE — CONSULTS
Comprehensive Nutrition Assessment    Type and Reason for Visit:  Initial, Consult (Parenteral Nutrition recommendations only)    Nutrition Recommendations/Plan:   Recommend start Standard without Fiber (Osmolite 1.2) at 20 mL/hr and increase by 20 mL/hr every 4 hours to a goal rate of 60 mL/hr continuous. Goal rate provides 1728 kcal, 80 gm protein, 1180 mL free water.   Monitor TF initiation, tolerance, weight, labs, GI status, fluid status.     Malnutrition Assessment:  Malnutrition Status:  Insufficient data (03/30/24 1056)    Context:  Chronic Illness     Findings of the 6 clinical characteristics of malnutrition:  Energy Intake:  Unable to assess  Weight Loss:  Greater than 7.5% over 3 months     Body Fat Loss:  Unable to assess     Muscle Mass Loss:  Unable to assess    Fluid Accumulation:  Unable to assess     Strength:  Not Performed    Nutrition Assessment:    Adm s/p LOC. acute respiratory failure. PMH includes hypothyroidism, chronic [facility] failure[facility] Order from facility is Osmolite 1.2 at goal 60 mL/hr continuous. Noted hyperkalemia corrected. Meds: synthroid    Nutrition Related Findings:    Meds/labs reviewed. No edema Wound Type: Pressure Injury (Bilat heel PU, pretibial wound)       Current Nutrition Intake & Therapies:    Average Meal Intake: NPO  Average Supplements Intake: NPO  Diet NPO  Additional Calorie Sources:  None    Anthropometric Measures:  Height: 160 cm (5' 2.99\")  Ideal Body Weight (IBW): 115 lbs (52 kg)    Current Body Weight: 82.6 kg (182 lb 1.6 oz), 158.3 % IBW. Weight Source: Stated  Current BMI (kg/m2): 32.3  BMI Categories: Obese Class 1 (BMI 30.0-34.9)    Estimated Daily Nutrient Needs:  Energy Requirements Based On: Kcal/kg  Weight Used for Energy Requirements: Current  Energy (kcal/day): 1506-8668 kcal/day  Weight Used for Protein Requirements: Ideal  Protein (g/day):  gm/day  Method Used for Fluid Requirements: ml/Kg 
the morning, at noon, and at bedtime) 90 tablet 3    rifAXIMin (XIFAXAN) 550 MG tablet 1 tablet by PEG Tube route in the morning and at bedtime 42 tablet     lactulose (CHRONULAC) 10 GM/15ML solution 30 mLs by PEG Tube route 3 times daily Hold if more then 3 stools daily  1    spironolactone (ALDACTONE) 25 MG tablet Take 1 tablet by mouth in the morning and 1 tablet in the evening. (Patient taking differently: 1 tablet by PEG Tube route in the morning and 1 tablet in the evening.) 30 tablet 3    folic acid (FOLVITE) 1 MG tablet 1 tablet by PEG Tube route daily 30 tablet 3    bisacodyl (DULCOLAX) 10 MG suppository Place 1 suppository rectally daily as needed for Constipation      fentaNYL (DURAGESIC) 12 MCG/HR Place 1 patch onto the skin every 72 hours.      magnesium hydroxide (MILK OF MAGNESIA) 400 MG/5ML suspension 30 mLs by Per G Tube route daily as needed for Constipation      Multiple Vitamins-Minerals (CENTRUM/CERTA-NIEVES WITH MINERALS ORAL) solution 15 mLs by PEG Tube route daily      nortriptyline (PAMELOR) 10 MG capsule 1 capsule by PEG Tube route nightly      acetaminophen (TYLENOL) 325 MG tablet 2 tablets by PEG Tube route every 6 hours as needed for Pain For pain/fever      venlafaxine (EFFEXOR) 75 MG tablet 1 tablet by PEG Tube route 2 times daily       Allergies: Clemente Garcia has No Known Allergies.    History reviewed. No pertinent past medical history.    Past Surgical History:   Procedure Laterality Date    ABDOMEN SURGERY      Gastric bypass with stimulator    HYSTERECTOMY (CERVIX STATUS UNKNOWN)      UPPER GASTROINTESTINAL ENDOSCOPY N/A 02/13/2024    EGD BIOPSY performed by Taqueria Mckoy MD at [facility] OR     Social History: Clemente Garcia  reports that she has never smoked. She does not have any smokeless tobacco history on file. She reports that she does not currently use alcohol. She reports that she does not use drugs.    History reviewed. No pertinent family history.    Current Medications: 
and sclera anicteric  ENT: :no thrush no pharyngeal congestion, positive dry oral mucosa  Neck: no JVD  Pulmonary: diminished bases, on supplemental O2  Cardiovascular: Normal S1 & S2,  No S3 or  S4, no Pericardial Rub no Murmur  Abdomen: distended, +ascites, NT present bs, anasarca  Extremities:no cyanosis, clubbing ++BL 1+ edema    Labs:   CBC:  Recent Labs     03/29/24  1458 03/30/24  1148   WBC 6.3 7.2   RBC 2.26* 2.39*   HGB 7.3* 7.6*   HCT 21.7* 24.4*   MCV 95.9 102.1   MCH 32.1 31.8   MCHC 33.5 31.1   RDW 16.4* 15.9*    277   MPV 7.3 9.5      BMP:   Recent Labs     03/29/24  1458 03/29/24  1627 03/30/24  0049 03/30/24  1148   *  --   --  134*   K 5.5*  5.5* 5.7* 4.9 4.7   CL 97*  --   --  98   CO2 31  --   --  25   BUN 60*  --   --  60*   CREATININE 1.8*  --   --  2.0*   GLUCOSE 85  --   --  61*   CALCIUM 8.4*  --   --  8.7      Albumin:   Recent Labs     03/29/24  1458   LABALBU 2.8*       IRON:    Lab Results   Component Value Date/Time    IRON 28 02/12/2024 01:27 PM     TIBC:    Lab Results   Component Value Date/Time    TIBC 94 02/12/2024 01:27 PM     FERRITIN:    Lab Results   Component Value Date/Time    FERRITIN 1,205 02/12/2024 05:37 AM     SPEP:   Lab Results   Component Value Date/Time    PROT 8.2 03/29/2024 02:58 PM    ALBCAL 2.8 03/22/2024 06:49 PM    ALBPCT 35 03/22/2024 06:49 PM    LABALPH 0.3 03/22/2024 06:49 PM    LABALPH 0.7 03/22/2024 06:49 PM    A1PCT 4 03/22/2024 06:49 PM    A2PCT 8 03/22/2024 06:49 PM    BETAPCT 13 03/22/2024 06:49 PM    GAMGLOB 3.2 03/22/2024 06:49 PM    GGPCT 40 03/22/2024 06:49 PM    PATH Reviewed by pathologist:  Madalyn Santana M.D. 03/22/2024 06:49 PM     MPO ANCA:    Lab Results   Component Value Date/Time    MPO 0.3 03/22/2024 06:49 PM    .  PR3 ANCA:    Lab Results   Component Value Date/Time    PR3 <0.7 03/22/2024 06:49 PM     Urine Sodium:    Lab Results   Component Value Date/Time    FENG 35 03/22/2024 06:55 PM      Urine Creatinine:    Lab 
03/30/2024    Narrative  EXAMINATION:  CTA OF THE CHEST 3/30/2024 7:52 am    TECHNIQUE:  CTA of the chest was performed after the administration of intravenous  contrast.  Multiplanar reformatted images are provided for review.  MIP  images are provided for review. Automated exposure control, iterative  reconstruction, and/or weight based adjustment of the mA/kV was utilized to  reduce the radiation dose to as low as reasonably achievable.    COMPARISON:  None.    HISTORY:  ORDERING SYSTEM PROVIDED HISTORY: rule out PE  TECHNOLOGIST PROVIDED HISTORY:  rule out PE  Additional Contrast?->1  Reason for Exam: RESPFAILURE    FINDINGS:  Pulmonary Arteries: Pulmonary arteries are adequately opacified for  evaluation.  No evidence of intraluminal filling defect to suggest pulmonary  embolism.  Main pulmonary artery is normal in caliber.    Mediastinum: No evidence of mediastinal lymphadenopathy.  The heart and  pericardium demonstrate no acute abnormality.  There is no acute abnormality  of the thoracic aorta.    Lungs/pleura: There is a large left-sided effusion with adjacent compressive  atelectasis.  There is a small right-sided effusion with adjacent compressive  atelectasis.  There is no pneumothorax.  The tracheobronchial tree is patent.    Upper Abdomen: There is evidence of the lap band.  There is a percutaneous  gastric tube.  There is ascites.  There is a nodular hepatic border.    Soft Tissues/Bones: No acute bone or soft tissue abnormality.    Impression  Large left-sided effusion and small right-sided effusion with adjacent  compressive atelectasis.       Xray Result (most recent):  XR CHEST (SINGLE VIEW FRONTAL) 03/30/2024    Narrative  EXAMINATION:  ONE XRAY VIEW OF THE CHEST    3/30/2024 5:26 pm    COMPARISON:  None.    HISTORY:  Thoracentesis left    FINDINGS:  *Greatly decreased volume left pleural effusion compared to prior study with  improved pneumatization mid and lower lung.  *Persistent left pleural

## 2024-04-02 ENCOUNTER — APPOINTMENT (OUTPATIENT)
Dept: VASCULAR LAB | Age: 64
DRG: 190 | End: 2024-04-02
Attending: INTERNAL MEDICINE
Payer: MEDICAID

## 2024-04-02 LAB
ANION GAP SERPL CALCULATED.3IONS-SCNC: 11 MMOL/L (ref 9–16)
BASOPHILS # BLD: 0.14 K/UL (ref 0–0.2)
BASOPHILS NFR BLD: 2 % (ref 0–2)
BUN SERPL-MCNC: 60 MG/DL (ref 8–23)
CALCIUM SERPL-MCNC: 8.3 MG/DL (ref 8.6–10.4)
CASE NUMBER:: NORMAL
CHLORIDE SERPL-SCNC: 100 MMOL/L (ref 98–107)
CO2 SERPL-SCNC: 25 MMOL/L (ref 20–31)
CREAT SERPL-MCNC: 2.2 MG/DL (ref 0.5–0.9)
ECHO BSA: 1.92 M2
EOSINOPHIL # BLD: 0.5 K/UL (ref 0–0.4)
EOSINOPHILS RELATIVE PERCENT: 7 % (ref 1–4)
ERYTHROCYTE [DISTWIDTH] IN BLOOD BY AUTOMATED COUNT: 16 % (ref 11.8–14.4)
FOLATE SERPL-MCNC: >20 NG/ML (ref 4.8–24.2)
GFR SERPL CREATININE-BSD FRML MDRD: 25 ML/MIN/1.73M2
GLUCOSE BLD-MCNC: 110 MG/DL (ref 65–105)
GLUCOSE SERPL-MCNC: 127 MG/DL (ref 74–99)
HCT VFR BLD AUTO: 23.2 % (ref 36.3–47.1)
HGB BLD-MCNC: 7.1 G/DL (ref 11.9–15.1)
IMM GRANULOCYTES # BLD AUTO: 0 K/UL (ref 0–0.3)
IMM GRANULOCYTES NFR BLD: 0 %
LYMPHOCYTES NFR BLD: 0.36 K/UL (ref 1–4.8)
LYMPHOCYTES RELATIVE PERCENT: 5 % (ref 24–44)
MCH RBC QN AUTO: 32 PG (ref 25.2–33.5)
MCHC RBC AUTO-ENTMCNC: 30.6 G/DL (ref 28.4–34.8)
MCV RBC AUTO: 104.5 FL (ref 82.6–102.9)
MONOCYTES NFR BLD: 0.58 K/UL (ref 0.1–0.8)
MONOCYTES NFR BLD: 8 % (ref 1–7)
MORPHOLOGY: ABNORMAL
MORPHOLOGY: ABNORMAL
NEUTROPHILS NFR BLD: 78 % (ref 36–66)
NEUTS SEG NFR BLD: 5.62 K/UL (ref 1.8–7.7)
NRBC BLD-RTO: 0 PER 100 WBC
PLATELET # BLD AUTO: 212 K/UL (ref 138–453)
PMV BLD AUTO: 9.6 FL (ref 8.1–13.5)
POTASSIUM SERPL-SCNC: 4 MMOL/L (ref 3.7–5.3)
RBC # BLD AUTO: 2.22 M/UL (ref 3.95–5.11)
SODIUM SERPL-SCNC: 136 MMOL/L (ref 136–145)
SURGICAL PATHOLOGY REPORT: NORMAL
VIT B12 SERPL-MCNC: 609 PG/ML (ref 232–1245)
WBC OTHER # BLD: 7.2 K/UL (ref 3.5–11.3)

## 2024-04-02 PROCEDURE — 99231 SBSQ HOSP IP/OBS SF/LOW 25: CPT

## 2024-04-02 PROCEDURE — 99291 CRITICAL CARE FIRST HOUR: CPT | Performed by: INTERNAL MEDICINE

## 2024-04-02 PROCEDURE — 6370000000 HC RX 637 (ALT 250 FOR IP): Performed by: STUDENT IN AN ORGANIZED HEALTH CARE EDUCATION/TRAINING PROGRAM

## 2024-04-02 PROCEDURE — 82746 ASSAY OF FOLIC ACID SERUM: CPT

## 2024-04-02 PROCEDURE — 2000000000 HC ICU R&B

## 2024-04-02 PROCEDURE — 36415 COLL VENOUS BLD VENIPUNCTURE: CPT

## 2024-04-02 PROCEDURE — A4216 STERILE WATER/SALINE, 10 ML: HCPCS | Performed by: STUDENT IN AN ORGANIZED HEALTH CARE EDUCATION/TRAINING PROGRAM

## 2024-04-02 PROCEDURE — 82607 VITAMIN B-12: CPT

## 2024-04-02 PROCEDURE — C9113 INJ PANTOPRAZOLE SODIUM, VIA: HCPCS | Performed by: STUDENT IN AN ORGANIZED HEALTH CARE EDUCATION/TRAINING PROGRAM

## 2024-04-02 PROCEDURE — 6360000002 HC RX W HCPCS: Performed by: STUDENT IN AN ORGANIZED HEALTH CARE EDUCATION/TRAINING PROGRAM

## 2024-04-02 PROCEDURE — 2580000003 HC RX 258: Performed by: STUDENT IN AN ORGANIZED HEALTH CARE EDUCATION/TRAINING PROGRAM

## 2024-04-02 PROCEDURE — 2580000003 HC RX 258: Performed by: INTERNAL MEDICINE

## 2024-04-02 PROCEDURE — C9254 INJECTION, LACOSAMIDE: HCPCS

## 2024-04-02 PROCEDURE — 82947 ASSAY GLUCOSE BLOOD QUANT: CPT

## 2024-04-02 PROCEDURE — 80048 BASIC METABOLIC PNL TOTAL CA: CPT

## 2024-04-02 PROCEDURE — 2580000003 HC RX 258

## 2024-04-02 PROCEDURE — 99232 SBSQ HOSP IP/OBS MODERATE 35: CPT | Performed by: INTERNAL MEDICINE

## 2024-04-02 PROCEDURE — 93005 ELECTROCARDIOGRAM TRACING: CPT

## 2024-04-02 PROCEDURE — 93970 EXTREMITY STUDY: CPT | Performed by: SURGERY

## 2024-04-02 PROCEDURE — 6370000000 HC RX 637 (ALT 250 FOR IP): Performed by: INTERNAL MEDICINE

## 2024-04-02 PROCEDURE — 85025 COMPLETE CBC W/AUTO DIFF WBC: CPT

## 2024-04-02 PROCEDURE — P9047 ALBUMIN (HUMAN), 25%, 50ML: HCPCS | Performed by: INTERNAL MEDICINE

## 2024-04-02 PROCEDURE — 6360000002 HC RX W HCPCS

## 2024-04-02 PROCEDURE — 6360000002 HC RX W HCPCS: Performed by: INTERNAL MEDICINE

## 2024-04-02 PROCEDURE — 93970 EXTREMITY STUDY: CPT

## 2024-04-02 RX ORDER — HEPARIN SODIUM 5000 [USP'U]/ML
5000 INJECTION, SOLUTION INTRAVENOUS; SUBCUTANEOUS EVERY 8 HOURS SCHEDULED
Status: DISCONTINUED | OUTPATIENT
Start: 2024-04-02 | End: 2024-04-09

## 2024-04-02 RX ORDER — ALBUMIN (HUMAN) 12.5 G/50ML
25 SOLUTION INTRAVENOUS EVERY 8 HOURS
Status: COMPLETED | OUTPATIENT
Start: 2024-04-02 | End: 2024-04-03

## 2024-04-02 RX ORDER — LACOSAMIDE 10 MG/ML
100 INJECTION, SOLUTION INTRAVENOUS 2 TIMES DAILY
Status: DISCONTINUED | OUTPATIENT
Start: 2024-04-02 | End: 2024-04-06

## 2024-04-02 RX ADMIN — HEPARIN SODIUM 5000 UNITS: 5000 INJECTION INTRAVENOUS; SUBCUTANEOUS at 14:25

## 2024-04-02 RX ADMIN — MIDODRINE HYDROCHLORIDE 10 MG: 5 TABLET ORAL at 07:54

## 2024-04-02 RX ADMIN — Medication 100 MG: at 07:54

## 2024-04-02 RX ADMIN — LACOSAMIDE 100 MG: 10 INJECTION INTRAVENOUS at 11:33

## 2024-04-02 RX ADMIN — SODIUM CHLORIDE, PRESERVATIVE FREE 5 ML: 5 INJECTION INTRAVENOUS at 07:54

## 2024-04-02 RX ADMIN — SODIUM CHLORIDE, PRESERVATIVE FREE 10 ML: 5 INJECTION INTRAVENOUS at 20:19

## 2024-04-02 RX ADMIN — FUROSEMIDE 5 MG/HR: 10 INJECTION, SOLUTION INTRAMUSCULAR; INTRAVENOUS at 10:47

## 2024-04-02 RX ADMIN — ALBUMIN (HUMAN) 25 G: 0.25 INJECTION, SOLUTION INTRAVENOUS at 06:58

## 2024-04-02 RX ADMIN — SODIUM CHLORIDE, PRESERVATIVE FREE 40 MG: 5 INJECTION INTRAVENOUS at 07:54

## 2024-04-02 RX ADMIN — MINERAL SUPPLEMENT IRON 300 MG / 5 ML STRENGTH LIQUID 100 PER BOX UNFLAVORED 300 MG: at 07:54

## 2024-04-02 RX ADMIN — ALBUMIN (HUMAN) 25 G: 0.25 INJECTION, SOLUTION INTRAVENOUS at 10:41

## 2024-04-02 RX ADMIN — MIDODRINE HYDROCHLORIDE 10 MG: 5 TABLET ORAL at 14:25

## 2024-04-02 RX ADMIN — RIFAXIMIN 550 MG: 550 TABLET ORAL at 20:19

## 2024-04-02 RX ADMIN — VENLAFAXINE 75 MG: 75 TABLET ORAL at 07:56

## 2024-04-02 RX ADMIN — HEPARIN SODIUM 5000 UNITS: 5000 INJECTION INTRAVENOUS; SUBCUTANEOUS at 22:01

## 2024-04-02 RX ADMIN — FOLIC ACID 1 MG: 1 TABLET ORAL at 07:54

## 2024-04-02 RX ADMIN — MEROPENEM 1000 MG: 1 INJECTION, POWDER, FOR SOLUTION INTRAVENOUS at 14:29

## 2024-04-02 RX ADMIN — RIFAXIMIN 550 MG: 550 TABLET ORAL at 08:36

## 2024-04-02 RX ADMIN — VENLAFAXINE 75 MG: 75 TABLET ORAL at 20:19

## 2024-04-02 RX ADMIN — MEROPENEM 1000 MG: 1 INJECTION, POWDER, FOR SOLUTION INTRAVENOUS at 02:22

## 2024-04-02 RX ADMIN — SODIUM CHLORIDE, PRESERVATIVE FREE 5 ML: 5 INJECTION INTRAVENOUS at 07:55

## 2024-04-02 RX ADMIN — ALBUMIN (HUMAN) 25 G: 0.25 INJECTION, SOLUTION INTRAVENOUS at 20:18

## 2024-04-02 RX ADMIN — LACOSAMIDE 100 MG: 10 INJECTION INTRAVENOUS at 22:00

## 2024-04-02 RX ADMIN — SODIUM CHLORIDE, PRESERVATIVE FREE 5 ML: 5 INJECTION INTRAVENOUS at 07:56

## 2024-04-02 RX ADMIN — LEVOTHYROXINE SODIUM 150 MCG: 75 TABLET ORAL at 06:58

## 2024-04-02 ASSESSMENT — PAIN DESCRIPTION - ORIENTATION: ORIENTATION: LEFT

## 2024-04-02 ASSESSMENT — PAIN - FUNCTIONAL ASSESSMENT: PAIN_FUNCTIONAL_ASSESSMENT: PREVENTS OR INTERFERES SOME ACTIVE ACTIVITIES AND ADLS

## 2024-04-02 ASSESSMENT — PAIN DESCRIPTION - FREQUENCY: FREQUENCY: CONTINUOUS

## 2024-04-02 ASSESSMENT — PAIN SCALES - GENERAL
PAINLEVEL_OUTOF10: 0
PAINLEVEL_OUTOF10: 4

## 2024-04-02 ASSESSMENT — PAIN DESCRIPTION - PAIN TYPE: TYPE: ACUTE PAIN

## 2024-04-02 ASSESSMENT — PAIN DESCRIPTION - LOCATION: LOCATION: LEG;CHEST

## 2024-04-02 ASSESSMENT — PAIN DESCRIPTION - DESCRIPTORS: DESCRIPTORS: ACHING;DISCOMFORT

## 2024-04-02 ASSESSMENT — PAIN DESCRIPTION - ONSET: ONSET: ON-GOING

## 2024-04-03 LAB
ABO/RH: NORMAL
ALBUMIN SERPL-MCNC: 3.5 G/DL (ref 3.5–5.2)
ALBUMIN/GLOB SERPL: 1 {RATIO} (ref 1–2.5)
ALP SERPL-CCNC: 138 U/L (ref 35–104)
ALT SERPL-CCNC: <5 U/L (ref 10–35)
ANION GAP SERPL CALCULATED.3IONS-SCNC: 12 MMOL/L (ref 9–16)
ANTIBODY SCREEN: NEGATIVE
ARM BAND NUMBER: NORMAL
AST SERPL-CCNC: 32 U/L (ref 10–35)
BASOPHILS # BLD: 0.06 K/UL (ref 0–0.2)
BASOPHILS NFR BLD: 1 % (ref 0–2)
BILIRUB DIRECT SERPL-MCNC: <0.2 MG/DL (ref 0–0.3)
BILIRUB INDIRECT SERPL-MCNC: 0.2 MG/DL (ref 0–1)
BILIRUB SERPL-MCNC: 0.4 MG/DL (ref 0–1.2)
BLOOD BANK DISPENSE STATUS: NORMAL
BLOOD BANK DISPENSE STATUS: NORMAL
BLOOD BANK SAMPLE EXPIRATION: NORMAL
BODY FLD TYPE: NORMAL
BPU ID: NORMAL
BPU ID: NORMAL
BUN SERPL-MCNC: 60 MG/DL (ref 8–23)
CALCIUM SERPL-MCNC: 8.4 MG/DL (ref 8.6–10.4)
CHLORIDE SERPL-SCNC: 100 MMOL/L (ref 98–107)
CO2 SERPL-SCNC: 24 MMOL/L (ref 20–31)
COMPONENT: NORMAL
COMPONENT: NORMAL
CREAT SERPL-MCNC: 2 MG/DL (ref 0.5–0.9)
CROSSMATCH RESULT: NORMAL
CROSSMATCH RESULT: NORMAL
EKG ATRIAL RATE: 75 BPM
EKG Q-T INTERVAL: 414 MS
EKG QRS DURATION: 92 MS
EKG QTC CALCULATION (BAZETT): 462 MS
EKG R AXIS: 24 DEGREES
EKG T AXIS: -2 DEGREES
EKG VENTRICULAR RATE: 75 BPM
EOSINOPHIL # BLD: 0.37 K/UL (ref 0–0.4)
EOSINOPHILS RELATIVE PERCENT: 6 % (ref 1–4)
ERYTHROCYTE [DISTWIDTH] IN BLOOD BY AUTOMATED COUNT: 16.1 % (ref 11.8–14.4)
GFR SERPL CREATININE-BSD FRML MDRD: 27 ML/MIN/1.73M2
GLOBULIN SER CALC-MCNC: 3.9 G/DL
GLUCOSE SERPL-MCNC: 98 MG/DL (ref 74–99)
HCT VFR BLD AUTO: 21.5 % (ref 36.3–47.1)
HCT VFR BLD AUTO: 22.2 % (ref 36.3–47.1)
HCT VFR BLD AUTO: 23.7 % (ref 36.3–47.1)
HGB BLD-MCNC: 6.8 G/DL (ref 11.9–15.1)
HGB BLD-MCNC: 7 G/DL (ref 11.9–15.1)
HGB BLD-MCNC: 7.5 G/DL (ref 11.9–15.1)
IMM GRANULOCYTES # BLD AUTO: 0.06 K/UL (ref 0–0.3)
IMM GRANULOCYTES NFR BLD: 1 %
LYMPHOCYTES NFR BLD: 0.31 K/UL (ref 1–4.8)
LYMPHOCYTES NFR FLD: 11 %
LYMPHOCYTES RELATIVE PERCENT: 5 % (ref 24–44)
MCH RBC QN AUTO: 31.9 PG (ref 25.2–33.5)
MCHC RBC AUTO-ENTMCNC: 31.6 G/DL (ref 28.4–34.8)
MCV RBC AUTO: 100.9 FL (ref 82.6–102.9)
MONOCYTES NFR BLD: 0.43 K/UL (ref 0.1–0.8)
MONOCYTES NFR BLD: 7 % (ref 1–7)
MORPHOLOGY: ABNORMAL
NEUTROPHILS NFR BLD: 80 % (ref 36–66)
NEUTROPHILS NFR FLD: 61 %
NEUTS SEG NFR BLD: 4.97 K/UL (ref 1.8–7.7)
NRBC BLD-RTO: 0 PER 100 WBC
PLATELET # BLD AUTO: 215 K/UL (ref 138–453)
PMV BLD AUTO: 9.9 FL (ref 8.1–13.5)
POTASSIUM SERPL-SCNC: 4.2 MMOL/L (ref 3.7–5.3)
PROT SERPL-MCNC: 7.4 G/DL (ref 6.6–8.7)
RBC # BLD AUTO: 2.13 M/UL (ref 3.95–5.11)
RBC # FLD: <3000 CELLS/UL
SODIUM SERPL-SCNC: 136 MMOL/L (ref 136–145)
SURGICAL PATHOLOGY REPORT: NORMAL
TRANSFUSION STATUS: NORMAL
TRANSFUSION STATUS: NORMAL
UNIDENT CELLS NFR FLD: NORMAL %
UNIT DIVISION: 0
UNIT DIVISION: 0
WBC # FLD: 2225 CELLS/UL
WBC OTHER # BLD: 6.2 K/UL (ref 3.5–11.3)

## 2024-04-03 PROCEDURE — 6360000002 HC RX W HCPCS: Performed by: INTERNAL MEDICINE

## 2024-04-03 PROCEDURE — 6360000002 HC RX W HCPCS: Performed by: STUDENT IN AN ORGANIZED HEALTH CARE EDUCATION/TRAINING PROGRAM

## 2024-04-03 PROCEDURE — APPSS30 APP SPLIT SHARED TIME 16-30 MINUTES: Performed by: NURSE PRACTITIONER

## 2024-04-03 PROCEDURE — C9254 INJECTION, LACOSAMIDE: HCPCS

## 2024-04-03 PROCEDURE — 99231 SBSQ HOSP IP/OBS SF/LOW 25: CPT | Performed by: PSYCHIATRY & NEUROLOGY

## 2024-04-03 PROCEDURE — 6360000002 HC RX W HCPCS

## 2024-04-03 PROCEDURE — 2580000003 HC RX 258: Performed by: STUDENT IN AN ORGANIZED HEALTH CARE EDUCATION/TRAINING PROGRAM

## 2024-04-03 PROCEDURE — 99291 CRITICAL CARE FIRST HOUR: CPT | Performed by: INTERNAL MEDICINE

## 2024-04-03 PROCEDURE — 85018 HEMOGLOBIN: CPT

## 2024-04-03 PROCEDURE — C9113 INJ PANTOPRAZOLE SODIUM, VIA: HCPCS | Performed by: STUDENT IN AN ORGANIZED HEALTH CARE EDUCATION/TRAINING PROGRAM

## 2024-04-03 PROCEDURE — P9047 ALBUMIN (HUMAN), 25%, 50ML: HCPCS | Performed by: INTERNAL MEDICINE

## 2024-04-03 PROCEDURE — 2000000000 HC ICU R&B

## 2024-04-03 PROCEDURE — 93010 ELECTROCARDIOGRAM REPORT: CPT | Performed by: INTERNAL MEDICINE

## 2024-04-03 PROCEDURE — 6370000000 HC RX 637 (ALT 250 FOR IP): Performed by: INTERNAL MEDICINE

## 2024-04-03 PROCEDURE — A4216 STERILE WATER/SALINE, 10 ML: HCPCS | Performed by: STUDENT IN AN ORGANIZED HEALTH CARE EDUCATION/TRAINING PROGRAM

## 2024-04-03 PROCEDURE — 85025 COMPLETE CBC W/AUTO DIFF WBC: CPT

## 2024-04-03 PROCEDURE — 2580000003 HC RX 258: Performed by: INTERNAL MEDICINE

## 2024-04-03 PROCEDURE — 6370000000 HC RX 637 (ALT 250 FOR IP): Performed by: STUDENT IN AN ORGANIZED HEALTH CARE EDUCATION/TRAINING PROGRAM

## 2024-04-03 PROCEDURE — 80076 HEPATIC FUNCTION PANEL: CPT

## 2024-04-03 PROCEDURE — 36415 COLL VENOUS BLD VENIPUNCTURE: CPT

## 2024-04-03 PROCEDURE — 99232 SBSQ HOSP IP/OBS MODERATE 35: CPT | Performed by: INTERNAL MEDICINE

## 2024-04-03 PROCEDURE — 80048 BASIC METABOLIC PNL TOTAL CA: CPT

## 2024-04-03 PROCEDURE — 85014 HEMATOCRIT: CPT

## 2024-04-03 RX ORDER — LANOLIN ALCOHOL/MO/W.PET/CERES
100 CREAM (GRAM) TOPICAL DAILY
Status: DISCONTINUED | OUTPATIENT
Start: 2024-04-03 | End: 2024-04-09

## 2024-04-03 RX ORDER — LACTULOSE 10 G/15ML
10 SOLUTION ORAL 3 TIMES DAILY
Status: DISCONTINUED | OUTPATIENT
Start: 2024-04-03 | End: 2024-04-09

## 2024-04-03 RX ORDER — LORAZEPAM 2 MG/ML
0.5 INJECTION INTRAMUSCULAR EVERY 6 HOURS PRN
Status: DISCONTINUED | OUTPATIENT
Start: 2024-04-03 | End: 2024-04-04

## 2024-04-03 RX ORDER — HYDROXYZINE HYDROCHLORIDE 10 MG/1
10 TABLET, FILM COATED ORAL ONCE
Status: COMPLETED | OUTPATIENT
Start: 2024-04-03 | End: 2024-04-03

## 2024-04-03 RX ADMIN — LACTULOSE 10 G: 20 SOLUTION ORAL at 19:48

## 2024-04-03 RX ADMIN — DULOXETINE HYDROCHLORIDE 20 MG: 20 CAPSULE, DELAYED RELEASE ORAL at 12:07

## 2024-04-03 RX ADMIN — FOLIC ACID 1 MG: 1 TABLET ORAL at 08:06

## 2024-04-03 RX ADMIN — FUROSEMIDE 25 MG/HR: 10 INJECTION, SOLUTION INTRAMUSCULAR; INTRAVENOUS at 05:45

## 2024-04-03 RX ADMIN — SODIUM CHLORIDE, PRESERVATIVE FREE 10 ML: 5 INJECTION INTRAVENOUS at 19:48

## 2024-04-03 RX ADMIN — MEROPENEM 1000 MG: 1 INJECTION, POWDER, FOR SOLUTION INTRAVENOUS at 14:42

## 2024-04-03 RX ADMIN — SODIUM CHLORIDE, PRESERVATIVE FREE 10 ML: 5 INJECTION INTRAVENOUS at 08:07

## 2024-04-03 RX ADMIN — VENLAFAXINE 75 MG: 75 TABLET ORAL at 08:13

## 2024-04-03 RX ADMIN — HYDROXYZINE HYDROCHLORIDE 10 MG: 10 TABLET ORAL at 08:06

## 2024-04-03 RX ADMIN — LACTULOSE 10 G: 20 SOLUTION ORAL at 16:56

## 2024-04-03 RX ADMIN — VENLAFAXINE 75 MG: 75 TABLET ORAL at 19:49

## 2024-04-03 RX ADMIN — HEPARIN SODIUM 5000 UNITS: 5000 INJECTION INTRAVENOUS; SUBCUTANEOUS at 23:40

## 2024-04-03 RX ADMIN — LACOSAMIDE 100 MG: 10 INJECTION INTRAVENOUS at 21:40

## 2024-04-03 RX ADMIN — LEVOTHYROXINE SODIUM 150 MCG: 75 TABLET ORAL at 08:12

## 2024-04-03 RX ADMIN — LACOSAMIDE 100 MG: 10 INJECTION INTRAVENOUS at 09:00

## 2024-04-03 RX ADMIN — MINERAL SUPPLEMENT IRON 300 MG / 5 ML STRENGTH LIQUID 100 PER BOX UNFLAVORED 300 MG: at 08:06

## 2024-04-03 RX ADMIN — MEROPENEM 1000 MG: 1 INJECTION, POWDER, FOR SOLUTION INTRAVENOUS at 02:03

## 2024-04-03 RX ADMIN — HEPARIN SODIUM 5000 UNITS: 5000 INJECTION INTRAVENOUS; SUBCUTANEOUS at 05:44

## 2024-04-03 RX ADMIN — Medication 100 MG: at 08:06

## 2024-04-03 RX ADMIN — SODIUM CHLORIDE, PRESERVATIVE FREE 40 MG: 5 INJECTION INTRAVENOUS at 08:03

## 2024-04-03 RX ADMIN — RIFAXIMIN 550 MG: 550 TABLET ORAL at 08:12

## 2024-04-03 RX ADMIN — RIFAXIMIN 550 MG: 550 TABLET ORAL at 21:39

## 2024-04-03 RX ADMIN — HEPARIN SODIUM 5000 UNITS: 5000 INJECTION INTRAVENOUS; SUBCUTANEOUS at 14:37

## 2024-04-03 RX ADMIN — MIDODRINE HYDROCHLORIDE 10 MG: 5 TABLET ORAL at 08:06

## 2024-04-03 RX ADMIN — LORAZEPAM 0.5 MG: 2 INJECTION INTRAMUSCULAR; INTRAVENOUS at 23:52

## 2024-04-03 RX ADMIN — ALBUMIN (HUMAN) 25 G: 0.25 INJECTION, SOLUTION INTRAVENOUS at 02:10

## 2024-04-03 RX ADMIN — MIDODRINE HYDROCHLORIDE 10 MG: 5 TABLET ORAL at 14:37

## 2024-04-03 ASSESSMENT — PAIN SCALES - GENERAL
PAINLEVEL_OUTOF10: 0

## 2024-04-03 ASSESSMENT — PAIN SCALES - WONG BAKER
WONGBAKER_NUMERICALRESPONSE: NO HURT

## 2024-04-04 LAB
ALLEN TEST: POSITIVE
AMMONIA PLAS-SCNC: 38 UMOL/L (ref 11–51)
ANION GAP SERPL CALCULATED.3IONS-SCNC: 11 MMOL/L (ref 9–16)
BASOPHILS # BLD: 0.06 K/UL (ref 0–0.2)
BASOPHILS NFR BLD: 1 % (ref 0–2)
BUN SERPL-MCNC: 60 MG/DL (ref 8–23)
CALCIUM SERPL-MCNC: 8.6 MG/DL (ref 8.6–10.4)
CHLORIDE SERPL-SCNC: 101 MMOL/L (ref 98–107)
CO2 SERPL-SCNC: 27 MMOL/L (ref 20–31)
CREAT SERPL-MCNC: 1.9 MG/DL (ref 0.5–0.9)
EOSINOPHIL # BLD: 0.41 K/UL (ref 0–0.44)
EOSINOPHILS RELATIVE PERCENT: 7 % (ref 1–4)
ERYTHROCYTE [DISTWIDTH] IN BLOOD BY AUTOMATED COUNT: 16.3 % (ref 11.8–14.4)
GFR SERPL CREATININE-BSD FRML MDRD: 29 ML/MIN/1.73M2
GLUCOSE BLD-MCNC: 80 MG/DL (ref 65–105)
GLUCOSE BLD-MCNC: 84 MG/DL (ref 65–105)
GLUCOSE SERPL-MCNC: 70 MG/DL (ref 74–99)
HCT VFR BLD AUTO: 22.8 % (ref 36.3–47.1)
HGB BLD-MCNC: 7.1 G/DL (ref 11.9–15.1)
IMM GRANULOCYTES # BLD AUTO: 0.06 K/UL (ref 0–0.3)
IMM GRANULOCYTES NFR BLD: 1 %
LACOSAMIDE: 10.2 UG/ML (ref 1–10)
LYMPHOCYTES NFR BLD: 0.47 K/UL (ref 1.1–3.7)
LYMPHOCYTES RELATIVE PERCENT: 8 % (ref 24–43)
MCH RBC QN AUTO: 31.6 PG (ref 25.2–33.5)
MCHC RBC AUTO-ENTMCNC: 31.1 G/DL (ref 28.4–34.8)
MCV RBC AUTO: 101.3 FL (ref 82.6–102.9)
MICROORGANISM SPEC CULT: NORMAL
MICROORGANISM SPEC CULT: NORMAL
MONOCYTES NFR BLD: 0.71 K/UL (ref 0.1–1.2)
MONOCYTES NFR BLD: 12 % (ref 3–12)
MORPHOLOGY: ABNORMAL
NEUTROPHILS NFR BLD: 71 % (ref 36–65)
NEUTS SEG NFR BLD: 4.19 K/UL (ref 1.5–8.1)
NRBC BLD-RTO: 0 PER 100 WBC
O2 DELIVERY DEVICE: ABNORMAL
PLATELET # BLD AUTO: 199 K/UL (ref 138–453)
PMV BLD AUTO: 9.6 FL (ref 8.1–13.5)
POC HCO3: 28.1 MMOL/L (ref 21–28)
POC O2 SATURATION: 95.7 % (ref 94–98)
POC PCO2: 49.7 MM HG (ref 35–48)
POC PH: 7.36 (ref 7.35–7.45)
POC PO2: 84.1 MM HG (ref 83–108)
POSITIVE BASE EXCESS, ART: 2.3 MMOL/L (ref 0–3)
POTASSIUM SERPL-SCNC: 4.3 MMOL/L (ref 3.7–5.3)
RBC # BLD AUTO: 2.25 M/UL (ref 3.95–5.11)
SAMPLE SITE: ABNORMAL
SERVICE CMNT-IMP: NORMAL
SERVICE CMNT-IMP: NORMAL
SODIUM SERPL-SCNC: 139 MMOL/L (ref 136–145)
SPECIMEN DESCRIPTION: NORMAL
SPECIMEN DESCRIPTION: NORMAL
WBC OTHER # BLD: 5.9 K/UL (ref 3.5–11.3)

## 2024-04-04 PROCEDURE — 99291 CRITICAL CARE FIRST HOUR: CPT | Performed by: INTERNAL MEDICINE

## 2024-04-04 PROCEDURE — 6360000002 HC RX W HCPCS: Performed by: INTERNAL MEDICINE

## 2024-04-04 PROCEDURE — A4216 STERILE WATER/SALINE, 10 ML: HCPCS | Performed by: STUDENT IN AN ORGANIZED HEALTH CARE EDUCATION/TRAINING PROGRAM

## 2024-04-04 PROCEDURE — C9254 INJECTION, LACOSAMIDE: HCPCS

## 2024-04-04 PROCEDURE — C9113 INJ PANTOPRAZOLE SODIUM, VIA: HCPCS | Performed by: STUDENT IN AN ORGANIZED HEALTH CARE EDUCATION/TRAINING PROGRAM

## 2024-04-04 PROCEDURE — 6370000000 HC RX 637 (ALT 250 FOR IP): Performed by: STUDENT IN AN ORGANIZED HEALTH CARE EDUCATION/TRAINING PROGRAM

## 2024-04-04 PROCEDURE — 6370000000 HC RX 637 (ALT 250 FOR IP): Performed by: INTERNAL MEDICINE

## 2024-04-04 PROCEDURE — 82947 ASSAY GLUCOSE BLOOD QUANT: CPT

## 2024-04-04 PROCEDURE — 36415 COLL VENOUS BLD VENIPUNCTURE: CPT

## 2024-04-04 PROCEDURE — 94761 N-INVAS EAR/PLS OXIMETRY MLT: CPT

## 2024-04-04 PROCEDURE — 6360000002 HC RX W HCPCS

## 2024-04-04 PROCEDURE — 85025 COMPLETE CBC W/AUTO DIFF WBC: CPT

## 2024-04-04 PROCEDURE — P9047 ALBUMIN (HUMAN), 25%, 50ML: HCPCS

## 2024-04-04 PROCEDURE — 6360000002 HC RX W HCPCS: Performed by: STUDENT IN AN ORGANIZED HEALTH CARE EDUCATION/TRAINING PROGRAM

## 2024-04-04 PROCEDURE — 82803 BLOOD GASES ANY COMBINATION: CPT

## 2024-04-04 PROCEDURE — 2700000000 HC OXYGEN THERAPY PER DAY

## 2024-04-04 PROCEDURE — 94660 CPAP INITIATION&MGMT: CPT

## 2024-04-04 PROCEDURE — 82140 ASSAY OF AMMONIA: CPT

## 2024-04-04 PROCEDURE — 36600 WITHDRAWAL OF ARTERIAL BLOOD: CPT

## 2024-04-04 PROCEDURE — 2000000000 HC ICU R&B

## 2024-04-04 PROCEDURE — 99231 SBSQ HOSP IP/OBS SF/LOW 25: CPT

## 2024-04-04 PROCEDURE — 99232 SBSQ HOSP IP/OBS MODERATE 35: CPT | Performed by: INTERNAL MEDICINE

## 2024-04-04 PROCEDURE — 80048 BASIC METABOLIC PNL TOTAL CA: CPT

## 2024-04-04 PROCEDURE — 2580000003 HC RX 258: Performed by: INTERNAL MEDICINE

## 2024-04-04 PROCEDURE — 2580000003 HC RX 258: Performed by: STUDENT IN AN ORGANIZED HEALTH CARE EDUCATION/TRAINING PROGRAM

## 2024-04-04 RX ORDER — ALBUMIN (HUMAN) 12.5 G/50ML
25 SOLUTION INTRAVENOUS ONCE
Status: COMPLETED | OUTPATIENT
Start: 2024-04-04 | End: 2024-04-04

## 2024-04-04 RX ADMIN — HEPARIN SODIUM 5000 UNITS: 5000 INJECTION INTRAVENOUS; SUBCUTANEOUS at 13:37

## 2024-04-04 RX ADMIN — SODIUM CHLORIDE, PRESERVATIVE FREE 10 ML: 5 INJECTION INTRAVENOUS at 20:59

## 2024-04-04 RX ADMIN — VENLAFAXINE 75 MG: 75 TABLET ORAL at 20:57

## 2024-04-04 RX ADMIN — LACTULOSE 10 G: 20 SOLUTION ORAL at 20:55

## 2024-04-04 RX ADMIN — ACETAMINOPHEN 325MG 650 MG: 325 TABLET ORAL at 13:40

## 2024-04-04 RX ADMIN — MIDODRINE HYDROCHLORIDE 10 MG: 5 TABLET ORAL at 08:52

## 2024-04-04 RX ADMIN — LEVOTHYROXINE SODIUM 150 MCG: 75 TABLET ORAL at 08:51

## 2024-04-04 RX ADMIN — HEPARIN SODIUM 5000 UNITS: 5000 INJECTION INTRAVENOUS; SUBCUTANEOUS at 22:40

## 2024-04-04 RX ADMIN — Medication 100 MG: at 08:51

## 2024-04-04 RX ADMIN — LACTULOSE 10 G: 20 SOLUTION ORAL at 08:52

## 2024-04-04 RX ADMIN — LACOSAMIDE 100 MG: 10 INJECTION INTRAVENOUS at 09:14

## 2024-04-04 RX ADMIN — RIFAXIMIN 550 MG: 550 TABLET ORAL at 08:51

## 2024-04-04 RX ADMIN — SODIUM CHLORIDE, PRESERVATIVE FREE 10 ML: 5 INJECTION INTRAVENOUS at 08:51

## 2024-04-04 RX ADMIN — MINERAL SUPPLEMENT IRON 300 MG / 5 ML STRENGTH LIQUID 100 PER BOX UNFLAVORED 300 MG: at 08:52

## 2024-04-04 RX ADMIN — MEROPENEM 1000 MG: 1 INJECTION, POWDER, FOR SOLUTION INTRAVENOUS at 14:40

## 2024-04-04 RX ADMIN — MEROPENEM 1000 MG: 1 INJECTION, POWDER, FOR SOLUTION INTRAVENOUS at 01:47

## 2024-04-04 RX ADMIN — RIFAXIMIN 550 MG: 550 TABLET ORAL at 20:57

## 2024-04-04 RX ADMIN — FUROSEMIDE 35 MG/HR: 10 INJECTION, SOLUTION INTRAMUSCULAR; INTRAVENOUS at 01:45

## 2024-04-04 RX ADMIN — LACTULOSE 10 G: 20 SOLUTION ORAL at 13:37

## 2024-04-04 RX ADMIN — SODIUM CHLORIDE: 9 INJECTION, SOLUTION INTRAVENOUS at 01:45

## 2024-04-04 RX ADMIN — MIDODRINE HYDROCHLORIDE 10 MG: 5 TABLET ORAL at 13:37

## 2024-04-04 RX ADMIN — HEPARIN SODIUM 5000 UNITS: 5000 INJECTION INTRAVENOUS; SUBCUTANEOUS at 05:46

## 2024-04-04 RX ADMIN — SODIUM CHLORIDE, PRESERVATIVE FREE 40 MG: 5 INJECTION INTRAVENOUS at 08:51

## 2024-04-04 RX ADMIN — FOLIC ACID 1 MG: 1 TABLET ORAL at 08:52

## 2024-04-04 RX ADMIN — MIDODRINE HYDROCHLORIDE 10 MG: 5 TABLET ORAL at 20:55

## 2024-04-04 RX ADMIN — ALBUMIN (HUMAN) 25 G: 0.25 INJECTION, SOLUTION INTRAVENOUS at 12:34

## 2024-04-04 RX ADMIN — FUROSEMIDE 30 MG/HR: 10 INJECTION, SOLUTION INTRAMUSCULAR; INTRAVENOUS at 11:21

## 2024-04-04 RX ADMIN — LACOSAMIDE 100 MG: 10 INJECTION INTRAVENOUS at 20:55

## 2024-04-04 RX ADMIN — VENLAFAXINE 75 MG: 75 TABLET ORAL at 08:52

## 2024-04-04 RX ADMIN — DULOXETINE HYDROCHLORIDE 20 MG: 20 CAPSULE, DELAYED RELEASE ORAL at 08:52

## 2024-04-04 ASSESSMENT — PAIN SCALES - WONG BAKER

## 2024-04-04 ASSESSMENT — PAIN SCALES - GENERAL
PAINLEVEL_OUTOF10: 0
PAINLEVEL_OUTOF10: 0

## 2024-04-05 LAB
ANION GAP SERPL CALCULATED.3IONS-SCNC: 13 MMOL/L (ref 9–16)
BASOPHILS # BLD: 0 K/UL (ref 0–0.2)
BASOPHILS NFR BLD: 0 % (ref 0–2)
BUN SERPL-MCNC: 63 MG/DL (ref 8–23)
CALCIUM SERPL-MCNC: 8.7 MG/DL (ref 8.6–10.4)
CHLORIDE SERPL-SCNC: 101 MMOL/L (ref 98–107)
CO2 SERPL-SCNC: 26 MMOL/L (ref 20–31)
CREAT SERPL-MCNC: 1.9 MG/DL (ref 0.5–0.9)
EOSINOPHIL # BLD: 0.41 K/UL (ref 0–0.4)
EOSINOPHILS RELATIVE PERCENT: 7 % (ref 1–4)
ERYTHROCYTE [DISTWIDTH] IN BLOOD BY AUTOMATED COUNT: 16.5 % (ref 11.8–14.4)
GFR SERPL CREATININE-BSD FRML MDRD: 29 ML/MIN/1.73M2
GLUCOSE SERPL-MCNC: 118 MG/DL (ref 74–99)
HCT VFR BLD AUTO: 21.8 % (ref 36.3–47.1)
HCT VFR BLD AUTO: 22.7 % (ref 36.3–47.1)
HCT VFR BLD AUTO: 25.7 % (ref 36.3–47.1)
HGB BLD-MCNC: 6.8 G/DL (ref 11.9–15.1)
HGB BLD-MCNC: 6.9 G/DL (ref 11.9–15.1)
HGB BLD-MCNC: 7.9 G/DL (ref 11.9–15.1)
IMM GRANULOCYTES # BLD AUTO: 0.06 K/UL (ref 0–0.3)
IMM GRANULOCYTES NFR BLD: 1 %
LYMPHOCYTES NFR BLD: 0.41 K/UL (ref 1–4.8)
LYMPHOCYTES RELATIVE PERCENT: 7 % (ref 24–44)
MCH RBC QN AUTO: 32.4 PG (ref 25.2–33.5)
MCHC RBC AUTO-ENTMCNC: 31.2 G/DL (ref 28.4–34.8)
MCV RBC AUTO: 103.8 FL (ref 82.6–102.9)
MICROORGANISM SPEC CULT: NORMAL
MICROORGANISM/AGENT SPEC: NORMAL
MONOCYTES NFR BLD: 0.77 K/UL (ref 0.1–0.8)
MONOCYTES NFR BLD: 13 % (ref 1–7)
MORPHOLOGY: ABNORMAL
MORPHOLOGY: ABNORMAL
NEUTROPHILS NFR BLD: 72 % (ref 36–66)
NEUTS SEG NFR BLD: 4.25 K/UL (ref 1.8–7.7)
NRBC BLD-RTO: 0 PER 100 WBC
PLATELET # BLD AUTO: 196 K/UL (ref 138–453)
PMV BLD AUTO: 9.9 FL (ref 8.1–13.5)
POTASSIUM SERPL-SCNC: 4.3 MMOL/L (ref 3.7–5.3)
RBC # BLD AUTO: 2.1 M/UL (ref 3.95–5.11)
SODIUM SERPL-SCNC: 140 MMOL/L (ref 136–145)
SPECIMEN DESCRIPTION: NORMAL
WBC OTHER # BLD: 5.9 K/UL (ref 3.5–11.3)

## 2024-04-05 PROCEDURE — 86900 BLOOD TYPING SEROLOGIC ABO: CPT

## 2024-04-05 PROCEDURE — 6370000000 HC RX 637 (ALT 250 FOR IP): Performed by: INTERNAL MEDICINE

## 2024-04-05 PROCEDURE — 6360000002 HC RX W HCPCS: Performed by: STUDENT IN AN ORGANIZED HEALTH CARE EDUCATION/TRAINING PROGRAM

## 2024-04-05 PROCEDURE — 6370000000 HC RX 637 (ALT 250 FOR IP): Performed by: STUDENT IN AN ORGANIZED HEALTH CARE EDUCATION/TRAINING PROGRAM

## 2024-04-05 PROCEDURE — C9113 INJ PANTOPRAZOLE SODIUM, VIA: HCPCS | Performed by: STUDENT IN AN ORGANIZED HEALTH CARE EDUCATION/TRAINING PROGRAM

## 2024-04-05 PROCEDURE — 36430 TRANSFUSION BLD/BLD COMPNT: CPT

## 2024-04-05 PROCEDURE — 80048 BASIC METABOLIC PNL TOTAL CA: CPT

## 2024-04-05 PROCEDURE — 2580000003 HC RX 258: Performed by: INTERNAL MEDICINE

## 2024-04-05 PROCEDURE — 99232 SBSQ HOSP IP/OBS MODERATE 35: CPT | Performed by: INTERNAL MEDICINE

## 2024-04-05 PROCEDURE — C9254 INJECTION, LACOSAMIDE: HCPCS

## 2024-04-05 PROCEDURE — P9016 RBC LEUKOCYTES REDUCED: HCPCS

## 2024-04-05 PROCEDURE — 85014 HEMATOCRIT: CPT

## 2024-04-05 PROCEDURE — 2700000000 HC OXYGEN THERAPY PER DAY

## 2024-04-05 PROCEDURE — 85025 COMPLETE CBC W/AUTO DIFF WBC: CPT

## 2024-04-05 PROCEDURE — 94761 N-INVAS EAR/PLS OXIMETRY MLT: CPT

## 2024-04-05 PROCEDURE — 36415 COLL VENOUS BLD VENIPUNCTURE: CPT

## 2024-04-05 PROCEDURE — 99291 CRITICAL CARE FIRST HOUR: CPT | Performed by: INTERNAL MEDICINE

## 2024-04-05 PROCEDURE — 6360000002 HC RX W HCPCS

## 2024-04-05 PROCEDURE — A4216 STERILE WATER/SALINE, 10 ML: HCPCS | Performed by: STUDENT IN AN ORGANIZED HEALTH CARE EDUCATION/TRAINING PROGRAM

## 2024-04-05 PROCEDURE — 6360000002 HC RX W HCPCS: Performed by: INTERNAL MEDICINE

## 2024-04-05 PROCEDURE — 85018 HEMOGLOBIN: CPT

## 2024-04-05 PROCEDURE — 2580000003 HC RX 258: Performed by: STUDENT IN AN ORGANIZED HEALTH CARE EDUCATION/TRAINING PROGRAM

## 2024-04-05 PROCEDURE — 86850 RBC ANTIBODY SCREEN: CPT

## 2024-04-05 PROCEDURE — 86920 COMPATIBILITY TEST SPIN: CPT

## 2024-04-05 PROCEDURE — 2000000000 HC ICU R&B

## 2024-04-05 PROCEDURE — 86901 BLOOD TYPING SEROLOGIC RH(D): CPT

## 2024-04-05 RX ORDER — CHOLECALCIFEROL (VITAMIN D3) 125 MCG
5 CAPSULE ORAL NIGHTLY PRN
Status: DISCONTINUED | OUTPATIENT
Start: 2024-04-05 | End: 2024-04-10 | Stop reason: HOSPADM

## 2024-04-05 RX ORDER — SODIUM CHLORIDE 9 MG/ML
INJECTION, SOLUTION INTRAVENOUS PRN
Status: DISCONTINUED | OUTPATIENT
Start: 2024-04-05 | End: 2024-04-08

## 2024-04-05 RX ORDER — TORSEMIDE 20 MG/1
20 TABLET ORAL DAILY
Status: DISCONTINUED | OUTPATIENT
Start: 2024-04-06 | End: 2024-04-10

## 2024-04-05 RX ADMIN — MIDODRINE HYDROCHLORIDE 10 MG: 5 TABLET ORAL at 08:22

## 2024-04-05 RX ADMIN — VENLAFAXINE 75 MG: 75 TABLET ORAL at 21:31

## 2024-04-05 RX ADMIN — HEPARIN SODIUM 5000 UNITS: 5000 INJECTION INTRAVENOUS; SUBCUTANEOUS at 21:32

## 2024-04-05 RX ADMIN — DULOXETINE HYDROCHLORIDE 20 MG: 20 CAPSULE, DELAYED RELEASE ORAL at 08:24

## 2024-04-05 RX ADMIN — MEROPENEM 1000 MG: 1 INJECTION, POWDER, FOR SOLUTION INTRAVENOUS at 14:43

## 2024-04-05 RX ADMIN — FOLIC ACID 1 MG: 1 TABLET ORAL at 08:22

## 2024-04-05 RX ADMIN — VENLAFAXINE 75 MG: 75 TABLET ORAL at 08:22

## 2024-04-05 RX ADMIN — SPIRONOLACTONE 25 MG: 25 TABLET, FILM COATED ORAL at 18:41

## 2024-04-05 RX ADMIN — HEPARIN SODIUM 5000 UNITS: 5000 INJECTION INTRAVENOUS; SUBCUTANEOUS at 14:38

## 2024-04-05 RX ADMIN — LACTULOSE 10 G: 20 SOLUTION ORAL at 14:38

## 2024-04-05 RX ADMIN — RIFAXIMIN 550 MG: 550 TABLET ORAL at 08:24

## 2024-04-05 RX ADMIN — SODIUM CHLORIDE, PRESERVATIVE FREE 40 MG: 5 INJECTION INTRAVENOUS at 08:22

## 2024-04-05 RX ADMIN — LACOSAMIDE 100 MG: 10 INJECTION INTRAVENOUS at 21:32

## 2024-04-05 RX ADMIN — MEROPENEM 1000 MG: 1 INJECTION, POWDER, FOR SOLUTION INTRAVENOUS at 03:14

## 2024-04-05 RX ADMIN — MINERAL SUPPLEMENT IRON 300 MG / 5 ML STRENGTH LIQUID 100 PER BOX UNFLAVORED 300 MG: at 08:23

## 2024-04-05 RX ADMIN — FUROSEMIDE 35 MG/HR: 10 INJECTION, SOLUTION INTRAMUSCULAR; INTRAVENOUS at 03:23

## 2024-04-05 RX ADMIN — MIDODRINE HYDROCHLORIDE 10 MG: 5 TABLET ORAL at 19:52

## 2024-04-05 RX ADMIN — LEVOTHYROXINE SODIUM 150 MCG: 75 TABLET ORAL at 08:22

## 2024-04-05 RX ADMIN — SODIUM CHLORIDE, PRESERVATIVE FREE 10 ML: 5 INJECTION INTRAVENOUS at 19:58

## 2024-04-05 RX ADMIN — LACOSAMIDE 100 MG: 10 INJECTION INTRAVENOUS at 08:23

## 2024-04-05 RX ADMIN — SODIUM CHLORIDE, PRESERVATIVE FREE 10 ML: 5 INJECTION INTRAVENOUS at 19:56

## 2024-04-05 RX ADMIN — RIFAXIMIN 550 MG: 550 TABLET ORAL at 21:31

## 2024-04-05 RX ADMIN — LACTULOSE 10 G: 20 SOLUTION ORAL at 08:23

## 2024-04-05 RX ADMIN — Medication 5 MG: at 19:52

## 2024-04-05 RX ADMIN — HEPARIN SODIUM 5000 UNITS: 5000 INJECTION INTRAVENOUS; SUBCUTANEOUS at 06:08

## 2024-04-05 RX ADMIN — LACTULOSE 10 G: 20 SOLUTION ORAL at 19:52

## 2024-04-05 RX ADMIN — Medication 100 MG: at 08:22

## 2024-04-05 RX ADMIN — MIDODRINE HYDROCHLORIDE 10 MG: 5 TABLET ORAL at 14:38

## 2024-04-05 ASSESSMENT — PAIN SCALES - WONG BAKER

## 2024-04-05 ASSESSMENT — PAIN SCALES - GENERAL
PAINLEVEL_OUTOF10: 0

## 2024-04-06 LAB
ABO/RH: NORMAL
ANION GAP SERPL CALCULATED.3IONS-SCNC: 14 MMOL/L (ref 9–16)
ANTIBODY SCREEN: NEGATIVE
ARM BAND NUMBER: NORMAL
BASOPHILS # BLD: 0 K/UL (ref 0–0.2)
BASOPHILS NFR BLD: 0 % (ref 0–2)
BLOOD BANK BLOOD PRODUCT EXPIRATION DATE: NORMAL
BLOOD BANK DISPENSE STATUS: NORMAL
BLOOD BANK ISBT PRODUCT BLOOD TYPE: 9500
BLOOD BANK PRODUCT CODE: NORMAL
BLOOD BANK SAMPLE EXPIRATION: NORMAL
BLOOD BANK UNIT TYPE AND RH: NORMAL
BPU ID: NORMAL
BUN SERPL-MCNC: 65 MG/DL (ref 8–23)
CALCIUM SERPL-MCNC: 8.7 MG/DL (ref 8.6–10.4)
CHLORIDE SERPL-SCNC: 102 MMOL/L (ref 98–107)
CO2 SERPL-SCNC: 25 MMOL/L (ref 20–31)
COMPONENT: NORMAL
CREAT SERPL-MCNC: 1.9 MG/DL (ref 0.5–0.9)
CROSSMATCH RESULT: NORMAL
EOSINOPHIL # BLD: 0.29 K/UL (ref 0–0.4)
EOSINOPHILS RELATIVE PERCENT: 4 % (ref 1–4)
ERYTHROCYTE [DISTWIDTH] IN BLOOD BY AUTOMATED COUNT: 17.2 % (ref 11.8–14.4)
GFR SERPL CREATININE-BSD FRML MDRD: 30 ML/MIN/1.73M2
GLUCOSE BLD-MCNC: 97 MG/DL (ref 65–105)
GLUCOSE SERPL-MCNC: 109 MG/DL (ref 74–99)
HCT VFR BLD AUTO: 24.9 % (ref 36.3–47.1)
HGB BLD-MCNC: 7.7 G/DL (ref 11.9–15.1)
IMM GRANULOCYTES # BLD AUTO: 0 K/UL (ref 0–0.3)
IMM GRANULOCYTES NFR BLD: 0 %
LYMPHOCYTES NFR BLD: 0.29 K/UL (ref 1–4.8)
LYMPHOCYTES RELATIVE PERCENT: 4 % (ref 24–44)
MCH RBC QN AUTO: 32.2 PG (ref 25.2–33.5)
MCHC RBC AUTO-ENTMCNC: 30.9 G/DL (ref 28.4–34.8)
MCV RBC AUTO: 104.2 FL (ref 82.6–102.9)
MONOCYTES NFR BLD: 0.73 K/UL (ref 0.1–0.8)
MONOCYTES NFR BLD: 10 % (ref 1–7)
MORPHOLOGY: ABNORMAL
MORPHOLOGY: ABNORMAL
NEUTROPHILS NFR BLD: 82 % (ref 36–66)
NEUTS SEG NFR BLD: 5.99 K/UL (ref 1.8–7.7)
NRBC BLD-RTO: 0 PER 100 WBC
PLATELET # BLD AUTO: 200 K/UL (ref 138–453)
PMV BLD AUTO: 9.8 FL (ref 8.1–13.5)
POTASSIUM SERPL-SCNC: 4.5 MMOL/L (ref 3.7–5.3)
RBC # BLD AUTO: 2.39 M/UL (ref 3.95–5.11)
SODIUM SERPL-SCNC: 141 MMOL/L (ref 136–145)
TRANSFUSION STATUS: NORMAL
UNIT DIVISION: 0
UNIT ISSUE DATE/TIME: NORMAL
WBC OTHER # BLD: 7.3 K/UL (ref 3.5–11.3)

## 2024-04-06 PROCEDURE — 6370000000 HC RX 637 (ALT 250 FOR IP): Performed by: STUDENT IN AN ORGANIZED HEALTH CARE EDUCATION/TRAINING PROGRAM

## 2024-04-06 PROCEDURE — 6360000002 HC RX W HCPCS: Performed by: STUDENT IN AN ORGANIZED HEALTH CARE EDUCATION/TRAINING PROGRAM

## 2024-04-06 PROCEDURE — 2580000003 HC RX 258

## 2024-04-06 PROCEDURE — 2000000000 HC ICU R&B

## 2024-04-06 PROCEDURE — 85025 COMPLETE CBC W/AUTO DIFF WBC: CPT

## 2024-04-06 PROCEDURE — 99291 CRITICAL CARE FIRST HOUR: CPT | Performed by: INTERNAL MEDICINE

## 2024-04-06 PROCEDURE — 93005 ELECTROCARDIOGRAM TRACING: CPT | Performed by: INTERNAL MEDICINE

## 2024-04-06 PROCEDURE — 80048 BASIC METABOLIC PNL TOTAL CA: CPT

## 2024-04-06 PROCEDURE — 6370000000 HC RX 637 (ALT 250 FOR IP): Performed by: INTERNAL MEDICINE

## 2024-04-06 PROCEDURE — 2500000003 HC RX 250 WO HCPCS

## 2024-04-06 PROCEDURE — C9254 INJECTION, LACOSAMIDE: HCPCS

## 2024-04-06 PROCEDURE — 36415 COLL VENOUS BLD VENIPUNCTURE: CPT

## 2024-04-06 PROCEDURE — 2580000003 HC RX 258: Performed by: STUDENT IN AN ORGANIZED HEALTH CARE EDUCATION/TRAINING PROGRAM

## 2024-04-06 PROCEDURE — 99232 SBSQ HOSP IP/OBS MODERATE 35: CPT | Performed by: INTERNAL MEDICINE

## 2024-04-06 PROCEDURE — 6360000002 HC RX W HCPCS

## 2024-04-06 PROCEDURE — C9113 INJ PANTOPRAZOLE SODIUM, VIA: HCPCS | Performed by: STUDENT IN AN ORGANIZED HEALTH CARE EDUCATION/TRAINING PROGRAM

## 2024-04-06 PROCEDURE — 93005 ELECTROCARDIOGRAM TRACING: CPT

## 2024-04-06 PROCEDURE — A4216 STERILE WATER/SALINE, 10 ML: HCPCS | Performed by: STUDENT IN AN ORGANIZED HEALTH CARE EDUCATION/TRAINING PROGRAM

## 2024-04-06 PROCEDURE — 82947 ASSAY GLUCOSE BLOOD QUANT: CPT

## 2024-04-06 RX ORDER — DEXMEDETOMIDINE HYDROCHLORIDE 4 UG/ML
.1-1.5 INJECTION, SOLUTION INTRAVENOUS CONTINUOUS
Status: DISCONTINUED | OUTPATIENT
Start: 2024-04-06 | End: 2024-04-09

## 2024-04-06 RX ADMIN — DULOXETINE HYDROCHLORIDE 20 MG: 20 CAPSULE, DELAYED RELEASE ORAL at 07:49

## 2024-04-06 RX ADMIN — SPIRONOLACTONE 25 MG: 25 TABLET, FILM COATED ORAL at 17:56

## 2024-04-06 RX ADMIN — HEPARIN SODIUM 5000 UNITS: 5000 INJECTION INTRAVENOUS; SUBCUTANEOUS at 21:34

## 2024-04-06 RX ADMIN — WATER 5 MG: 1 INJECTION INTRAMUSCULAR; INTRAVENOUS; SUBCUTANEOUS at 10:33

## 2024-04-06 RX ADMIN — DEXMEDETOMIDINE HYDROCHLORIDE 1 MCG/KG/HR: 400 INJECTION, SOLUTION INTRAVENOUS at 18:01

## 2024-04-06 RX ADMIN — SODIUM CHLORIDE, PRESERVATIVE FREE 40 MG: 5 INJECTION INTRAVENOUS at 07:49

## 2024-04-06 RX ADMIN — SODIUM CHLORIDE, PRESERVATIVE FREE 10 ML: 5 INJECTION INTRAVENOUS at 21:34

## 2024-04-06 RX ADMIN — LACOSAMIDE 100 MG: 10 INJECTION INTRAVENOUS at 08:30

## 2024-04-06 RX ADMIN — HEPARIN SODIUM 5000 UNITS: 5000 INJECTION INTRAVENOUS; SUBCUTANEOUS at 05:12

## 2024-04-06 RX ADMIN — Medication 100 MG: at 07:48

## 2024-04-06 RX ADMIN — VENLAFAXINE 75 MG: 75 TABLET ORAL at 07:49

## 2024-04-06 RX ADMIN — MEROPENEM 1000 MG: 1 INJECTION, POWDER, FOR SOLUTION INTRAVENOUS at 15:43

## 2024-04-06 RX ADMIN — LACTULOSE 10 G: 20 SOLUTION ORAL at 21:34

## 2024-04-06 RX ADMIN — RIFAXIMIN 550 MG: 550 TABLET ORAL at 08:30

## 2024-04-06 RX ADMIN — LACTULOSE 10 G: 20 SOLUTION ORAL at 13:20

## 2024-04-06 RX ADMIN — DEXMEDETOMIDINE HYDROCHLORIDE 1.4 MCG/KG/HR: 400 INJECTION, SOLUTION INTRAVENOUS at 22:04

## 2024-04-06 RX ADMIN — MINERAL SUPPLEMENT IRON 300 MG / 5 ML STRENGTH LIQUID 100 PER BOX UNFLAVORED 300 MG: at 07:48

## 2024-04-06 RX ADMIN — SODIUM CHLORIDE, PRESERVATIVE FREE 10 ML: 5 INJECTION INTRAVENOUS at 07:49

## 2024-04-06 RX ADMIN — RIFAXIMIN 550 MG: 550 TABLET ORAL at 21:34

## 2024-04-06 RX ADMIN — SPIRONOLACTONE 25 MG: 25 TABLET, FILM COATED ORAL at 07:48

## 2024-04-06 RX ADMIN — LEVOTHYROXINE SODIUM 150 MCG: 75 TABLET ORAL at 07:48

## 2024-04-06 RX ADMIN — MEROPENEM 1000 MG: 1 INJECTION, POWDER, FOR SOLUTION INTRAVENOUS at 02:06

## 2024-04-06 RX ADMIN — DEXMEDETOMIDINE HYDROCHLORIDE 0.2 MCG/KG/HR: 400 INJECTION, SOLUTION INTRAVENOUS at 10:31

## 2024-04-06 RX ADMIN — LACOSAMIDE 100 MG: 10 INJECTION INTRAVENOUS at 21:41

## 2024-04-06 RX ADMIN — TORSEMIDE 20 MG: 20 TABLET ORAL at 07:49

## 2024-04-06 RX ADMIN — MIDODRINE HYDROCHLORIDE 10 MG: 5 TABLET ORAL at 07:48

## 2024-04-06 RX ADMIN — WATER 5 MG: 1 INJECTION INTRAMUSCULAR; INTRAVENOUS; SUBCUTANEOUS at 20:42

## 2024-04-06 RX ADMIN — HEPARIN SODIUM 5000 UNITS: 5000 INJECTION INTRAVENOUS; SUBCUTANEOUS at 13:30

## 2024-04-06 RX ADMIN — LACTULOSE 10 G: 20 SOLUTION ORAL at 07:48

## 2024-04-06 RX ADMIN — FOLIC ACID 1 MG: 1 TABLET ORAL at 07:48

## 2024-04-06 RX ADMIN — VENLAFAXINE 75 MG: 75 TABLET ORAL at 21:34

## 2024-04-06 ASSESSMENT — PAIN SCALES - WONG BAKER

## 2024-04-06 ASSESSMENT — PAIN SCALES - GENERAL
PAINLEVEL_OUTOF10: 5
PAINLEVEL_OUTOF10: 0
PAINLEVEL_OUTOF10: 0

## 2024-04-07 ENCOUNTER — APPOINTMENT (OUTPATIENT)
Dept: GENERAL RADIOLOGY | Age: 64
DRG: 190 | End: 2024-04-07
Attending: INTERNAL MEDICINE
Payer: MEDICAID

## 2024-04-07 LAB
ANION GAP SERPL CALCULATED.3IONS-SCNC: 10 MMOL/L (ref 9–16)
ANION GAP SERPL CALCULATED.3IONS-SCNC: 11 MMOL/L (ref 9–16)
ANION GAP SERPL CALCULATED.3IONS-SCNC: 8 MMOL/L (ref 9–16)
BASOPHILS # BLD: 0 K/UL (ref 0–0.2)
BASOPHILS NFR BLD: 0 % (ref 0–2)
BUN SERPL-MCNC: 67 MG/DL (ref 8–23)
BUN SERPL-MCNC: 70 MG/DL (ref 8–23)
BUN SERPL-MCNC: 72 MG/DL (ref 8–23)
CALCIUM SERPL-MCNC: 8.2 MG/DL (ref 8.6–10.4)
CALCIUM SERPL-MCNC: 8.2 MG/DL (ref 8.6–10.4)
CALCIUM SERPL-MCNC: 8.6 MG/DL (ref 8.6–10.4)
CHLORIDE SERPL-SCNC: 104 MMOL/L (ref 98–107)
CHLORIDE SERPL-SCNC: 104 MMOL/L (ref 98–107)
CHLORIDE SERPL-SCNC: 106 MMOL/L (ref 98–107)
CO2 SERPL-SCNC: 26 MMOL/L (ref 20–31)
CO2 SERPL-SCNC: 26 MMOL/L (ref 20–31)
CO2 SERPL-SCNC: 27 MMOL/L (ref 20–31)
CREAT SERPL-MCNC: 1.9 MG/DL (ref 0.5–0.9)
CREAT SERPL-MCNC: 2 MG/DL (ref 0.5–0.9)
CREAT SERPL-MCNC: 2.1 MG/DL (ref 0.5–0.9)
EOSINOPHIL # BLD: 0.46 K/UL (ref 0–0.4)
EOSINOPHILS RELATIVE PERCENT: 8 % (ref 1–4)
ERYTHROCYTE [DISTWIDTH] IN BLOOD BY AUTOMATED COUNT: 16.9 % (ref 11.8–14.4)
GFR SERPL CREATININE-BSD FRML MDRD: 27 ML/MIN/1.73M2
GFR SERPL CREATININE-BSD FRML MDRD: 28 ML/MIN/1.73M2
GFR SERPL CREATININE-BSD FRML MDRD: 30 ML/MIN/1.73M2
GLUCOSE SERPL-MCNC: 109 MG/DL (ref 74–99)
GLUCOSE SERPL-MCNC: 141 MG/DL (ref 74–99)
GLUCOSE SERPL-MCNC: 87 MG/DL (ref 74–99)
HCT VFR BLD AUTO: 29.3 % (ref 36.3–47.1)
HGB BLD-MCNC: 8.5 G/DL (ref 11.9–15.1)
IMM GRANULOCYTES # BLD AUTO: 0.06 K/UL (ref 0–0.3)
IMM GRANULOCYTES NFR BLD: 1 %
LYMPHOCYTES NFR BLD: 0.74 K/UL (ref 1–4.8)
LYMPHOCYTES RELATIVE PERCENT: 13 % (ref 24–44)
MCH RBC QN AUTO: 31.3 PG (ref 25.2–33.5)
MCHC RBC AUTO-ENTMCNC: 29 G/DL (ref 28.4–34.8)
MCV RBC AUTO: 107.7 FL (ref 82.6–102.9)
MICROORGANISM SPEC CULT: NORMAL
MICROORGANISM/AGENT SPEC: NORMAL
MONOCYTES NFR BLD: 0.4 K/UL (ref 0.1–0.8)
MONOCYTES NFR BLD: 7 % (ref 1–7)
MORPHOLOGY: ABNORMAL
MORPHOLOGY: ABNORMAL
NEUTROPHILS NFR BLD: 71 % (ref 36–66)
NEUTS SEG NFR BLD: 4.04 K/UL (ref 1.8–7.7)
NRBC BLD-RTO: 0 PER 100 WBC
PLATELET # BLD AUTO: 175 K/UL (ref 138–453)
PMV BLD AUTO: 9.9 FL (ref 8.1–13.5)
POTASSIUM SERPL-SCNC: 5.4 MMOL/L (ref 3.7–5.3)
POTASSIUM SERPL-SCNC: 5.6 MMOL/L (ref 3.7–5.3)
POTASSIUM SERPL-SCNC: 5.7 MMOL/L (ref 3.7–5.3)
RBC # BLD AUTO: 2.72 M/UL (ref 3.95–5.11)
SODIUM SERPL-SCNC: 139 MMOL/L (ref 136–145)
SODIUM SERPL-SCNC: 141 MMOL/L (ref 136–145)
SODIUM SERPL-SCNC: 142 MMOL/L (ref 136–145)
SPECIMEN DESCRIPTION: NORMAL
WBC OTHER # BLD: 5.7 K/UL (ref 3.5–11.3)

## 2024-04-07 PROCEDURE — 6370000000 HC RX 637 (ALT 250 FOR IP): Performed by: STUDENT IN AN ORGANIZED HEALTH CARE EDUCATION/TRAINING PROGRAM

## 2024-04-07 PROCEDURE — 2580000003 HC RX 258

## 2024-04-07 PROCEDURE — 99291 CRITICAL CARE FIRST HOUR: CPT | Performed by: INTERNAL MEDICINE

## 2024-04-07 PROCEDURE — 6370000000 HC RX 637 (ALT 250 FOR IP): Performed by: INTERNAL MEDICINE

## 2024-04-07 PROCEDURE — 6370000000 HC RX 637 (ALT 250 FOR IP)

## 2024-04-07 PROCEDURE — 85025 COMPLETE CBC W/AUTO DIFF WBC: CPT

## 2024-04-07 PROCEDURE — A4216 STERILE WATER/SALINE, 10 ML: HCPCS | Performed by: STUDENT IN AN ORGANIZED HEALTH CARE EDUCATION/TRAINING PROGRAM

## 2024-04-07 PROCEDURE — 6360000002 HC RX W HCPCS

## 2024-04-07 PROCEDURE — 6360000002 HC RX W HCPCS: Performed by: STUDENT IN AN ORGANIZED HEALTH CARE EDUCATION/TRAINING PROGRAM

## 2024-04-07 PROCEDURE — 36415 COLL VENOUS BLD VENIPUNCTURE: CPT

## 2024-04-07 PROCEDURE — 80048 BASIC METABOLIC PNL TOTAL CA: CPT

## 2024-04-07 PROCEDURE — C9113 INJ PANTOPRAZOLE SODIUM, VIA: HCPCS | Performed by: STUDENT IN AN ORGANIZED HEALTH CARE EDUCATION/TRAINING PROGRAM

## 2024-04-07 PROCEDURE — 2700000000 HC OXYGEN THERAPY PER DAY

## 2024-04-07 PROCEDURE — 2500000003 HC RX 250 WO HCPCS

## 2024-04-07 PROCEDURE — 2580000003 HC RX 258: Performed by: STUDENT IN AN ORGANIZED HEALTH CARE EDUCATION/TRAINING PROGRAM

## 2024-04-07 PROCEDURE — 2500000003 HC RX 250 WO HCPCS: Performed by: INTERNAL MEDICINE

## 2024-04-07 PROCEDURE — 71045 X-RAY EXAM CHEST 1 VIEW: CPT

## 2024-04-07 PROCEDURE — 94761 N-INVAS EAR/PLS OXIMETRY MLT: CPT

## 2024-04-07 PROCEDURE — 94640 AIRWAY INHALATION TREATMENT: CPT

## 2024-04-07 PROCEDURE — 94660 CPAP INITIATION&MGMT: CPT

## 2024-04-07 PROCEDURE — 99232 SBSQ HOSP IP/OBS MODERATE 35: CPT | Performed by: INTERNAL MEDICINE

## 2024-04-07 PROCEDURE — C9254 INJECTION, LACOSAMIDE: HCPCS

## 2024-04-07 PROCEDURE — 2000000000 HC ICU R&B

## 2024-04-07 RX ORDER — DEXTROSE MONOHYDRATE 25 G/50ML
25 INJECTION, SOLUTION INTRAVENOUS ONCE
Status: COMPLETED | OUTPATIENT
Start: 2024-04-08 | End: 2024-04-08

## 2024-04-07 RX ORDER — IPRATROPIUM BROMIDE AND ALBUTEROL SULFATE 2.5; .5 MG/3ML; MG/3ML
1 SOLUTION RESPIRATORY (INHALATION) EVERY 4 HOURS PRN
Status: DISCONTINUED | OUTPATIENT
Start: 2024-04-07 | End: 2024-04-10 | Stop reason: HOSPADM

## 2024-04-07 RX ORDER — FUROSEMIDE 10 MG/ML
20 INJECTION INTRAMUSCULAR; INTRAVENOUS ONCE
Status: COMPLETED | OUTPATIENT
Start: 2024-04-07 | End: 2024-04-07

## 2024-04-07 RX ORDER — OXYCODONE HYDROCHLORIDE 5 MG/1
5 TABLET ORAL EVERY 6 HOURS PRN
Status: DISCONTINUED | OUTPATIENT
Start: 2024-04-07 | End: 2024-04-07

## 2024-04-07 RX ORDER — DEXTROSE MONOHYDRATE 25 G/50ML
25 INJECTION, SOLUTION INTRAVENOUS ONCE
Status: COMPLETED | OUTPATIENT
Start: 2024-04-07 | End: 2024-04-07

## 2024-04-07 RX ORDER — FUROSEMIDE 10 MG/ML
20 INJECTION INTRAMUSCULAR; INTRAVENOUS ONCE
Status: COMPLETED | OUTPATIENT
Start: 2024-04-08 | End: 2024-04-08

## 2024-04-07 RX ORDER — OXYCODONE HYDROCHLORIDE 5 MG/1
10 TABLET ORAL EVERY 6 HOURS PRN
Status: DISCONTINUED | OUTPATIENT
Start: 2024-04-07 | End: 2024-04-08

## 2024-04-07 RX ORDER — DEXTROSE MONOHYDRATE 25 G/50ML
25 INJECTION, SOLUTION INTRAVENOUS PRN
Status: DISCONTINUED | OUTPATIENT
Start: 2024-04-07 | End: 2024-04-07

## 2024-04-07 RX ADMIN — HEPARIN SODIUM 5000 UNITS: 5000 INJECTION INTRAVENOUS; SUBCUTANEOUS at 06:00

## 2024-04-07 RX ADMIN — FUROSEMIDE 20 MG: 10 INJECTION, SOLUTION INTRAMUSCULAR; INTRAVENOUS at 02:16

## 2024-04-07 RX ADMIN — SODIUM CHLORIDE, PRESERVATIVE FREE 10 ML: 5 INJECTION INTRAVENOUS at 07:34

## 2024-04-07 RX ADMIN — OXYCODONE 5 MG: 5 TABLET ORAL at 17:34

## 2024-04-07 RX ADMIN — Medication 100 MG: at 07:33

## 2024-04-07 RX ADMIN — MEROPENEM 1000 MG: 1 INJECTION, POWDER, FOR SOLUTION INTRAVENOUS at 13:46

## 2024-04-07 RX ADMIN — LEVOTHYROXINE SODIUM 150 MCG: 75 TABLET ORAL at 06:01

## 2024-04-07 RX ADMIN — RIFAXIMIN 550 MG: 550 TABLET ORAL at 20:43

## 2024-04-07 RX ADMIN — DEXMEDETOMIDINE HYDROCHLORIDE 0.9 MCG/KG/HR: 400 INJECTION, SOLUTION INTRAVENOUS at 17:17

## 2024-04-07 RX ADMIN — FOLIC ACID 1 MG: 1 TABLET ORAL at 07:33

## 2024-04-07 RX ADMIN — WATER 5 MG: 1 INJECTION INTRAMUSCULAR; INTRAVENOUS; SUBCUTANEOUS at 17:37

## 2024-04-07 RX ADMIN — MINERAL SUPPLEMENT IRON 300 MG / 5 ML STRENGTH LIQUID 100 PER BOX UNFLAVORED 300 MG: at 07:33

## 2024-04-07 RX ADMIN — MIDODRINE HYDROCHLORIDE 10 MG: 5 TABLET ORAL at 21:07

## 2024-04-07 RX ADMIN — SODIUM CHLORIDE, PRESERVATIVE FREE 40 MG: 5 INJECTION INTRAVENOUS at 07:33

## 2024-04-07 RX ADMIN — SODIUM ZIRCONIUM CYCLOSILICATE 10 G: 10 POWDER, FOR SUSPENSION ORAL at 10:54

## 2024-04-07 RX ADMIN — ACETAMINOPHEN 325MG 650 MG: 325 TABLET ORAL at 10:21

## 2024-04-07 RX ADMIN — IPRATROPIUM BROMIDE AND ALBUTEROL SULFATE 1 DOSE: .5; 3 SOLUTION RESPIRATORY (INHALATION) at 11:39

## 2024-04-07 RX ADMIN — TORSEMIDE 20 MG: 20 TABLET ORAL at 07:33

## 2024-04-07 RX ADMIN — LACTULOSE 10 G: 20 SOLUTION ORAL at 20:43

## 2024-04-07 RX ADMIN — DEXMEDETOMIDINE HYDROCHLORIDE 1.1 MCG/KG/HR: 400 INJECTION, SOLUTION INTRAVENOUS at 12:10

## 2024-04-07 RX ADMIN — DEXTROSE MONOHYDRATE 25 G: 25 INJECTION, SOLUTION INTRAVENOUS at 20:19

## 2024-04-07 RX ADMIN — SODIUM CHLORIDE: 9 INJECTION, SOLUTION INTRAVENOUS at 06:02

## 2024-04-07 RX ADMIN — RIFAXIMIN 550 MG: 550 TABLET ORAL at 08:30

## 2024-04-07 RX ADMIN — SODIUM CHLORIDE, PRESERVATIVE FREE 10 ML: 5 INJECTION INTRAVENOUS at 20:43

## 2024-04-07 RX ADMIN — LACTULOSE 10 G: 20 SOLUTION ORAL at 07:33

## 2024-04-07 RX ADMIN — HEPARIN SODIUM 5000 UNITS: 5000 INJECTION INTRAVENOUS; SUBCUTANEOUS at 13:44

## 2024-04-07 RX ADMIN — DEXMEDETOMIDINE HYDROCHLORIDE 1.3 MCG/KG/HR: 400 INJECTION, SOLUTION INTRAVENOUS at 20:46

## 2024-04-07 RX ADMIN — DEXMEDETOMIDINE HYDROCHLORIDE 1.3 MCG/KG/HR: 400 INJECTION, SOLUTION INTRAVENOUS at 01:34

## 2024-04-07 RX ADMIN — VENLAFAXINE 75 MG: 75 TABLET ORAL at 20:43

## 2024-04-07 RX ADMIN — DULOXETINE HYDROCHLORIDE 20 MG: 20 CAPSULE, DELAYED RELEASE ORAL at 07:34

## 2024-04-07 RX ADMIN — INSULIN HUMAN 10 UNITS: 100 INJECTION, SOLUTION PARENTERAL at 20:19

## 2024-04-07 RX ADMIN — VENLAFAXINE 75 MG: 75 TABLET ORAL at 07:34

## 2024-04-07 RX ADMIN — SPIRONOLACTONE 25 MG: 25 TABLET, FILM COATED ORAL at 07:33

## 2024-04-07 RX ADMIN — OXYCODONE 10 MG: 5 TABLET ORAL at 18:59

## 2024-04-07 RX ADMIN — MIDODRINE HYDROCHLORIDE 10 MG: 5 TABLET ORAL at 13:44

## 2024-04-07 RX ADMIN — MEROPENEM 1000 MG: 1 INJECTION, POWDER, FOR SOLUTION INTRAVENOUS at 02:07

## 2024-04-07 RX ADMIN — DEXMEDETOMIDINE HYDROCHLORIDE 1 MCG/KG/HR: 400 INJECTION, SOLUTION INTRAVENOUS at 05:21

## 2024-04-07 RX ADMIN — LACTULOSE 10 G: 20 SOLUTION ORAL at 13:44

## 2024-04-07 RX ADMIN — HEPARIN SODIUM 5000 UNITS: 5000 INJECTION INTRAVENOUS; SUBCUTANEOUS at 21:35

## 2024-04-07 RX ADMIN — LACOSAMIDE 100 MG: 10 INJECTION INTRAVENOUS at 09:59

## 2024-04-07 RX ADMIN — LACOSAMIDE 100 MG: 10 INJECTION INTRAVENOUS at 21:41

## 2024-04-07 ASSESSMENT — PAIN SCALES - WONG BAKER

## 2024-04-07 ASSESSMENT — PAIN SCALES - GENERAL: PAINLEVEL_OUTOF10: 0

## 2024-04-08 LAB
ANION GAP SERPL CALCULATED.3IONS-SCNC: 10 MMOL/L (ref 9–16)
ANION GAP SERPL CALCULATED.3IONS-SCNC: 14 MMOL/L (ref 9–16)
BASOPHILS # BLD: 0 K/UL (ref 0–0.2)
BASOPHILS NFR BLD: 0 % (ref 0–2)
BUN SERPL-MCNC: 72 MG/DL (ref 8–23)
BUN SERPL-MCNC: 74 MG/DL (ref 8–23)
CALCIUM SERPL-MCNC: 8.1 MG/DL (ref 8.6–10.4)
CALCIUM SERPL-MCNC: 8.3 MG/DL (ref 8.6–10.4)
CHLORIDE SERPL-SCNC: 103 MMOL/L (ref 98–107)
CHLORIDE SERPL-SCNC: 105 MMOL/L (ref 98–107)
CO2 SERPL-SCNC: 22 MMOL/L (ref 20–31)
CO2 SERPL-SCNC: 27 MMOL/L (ref 20–31)
CREAT SERPL-MCNC: 2.1 MG/DL (ref 0.5–0.9)
CREAT SERPL-MCNC: 2.2 MG/DL (ref 0.5–0.9)
EKG ATRIAL RATE: 83 BPM
EKG ATRIAL RATE: 83 BPM
EKG ATRIAL RATE: 90 BPM
EKG P AXIS: 103 DEGREES
EKG P AXIS: 28 DEGREES
EKG P-R INTERVAL: 156 MS
EKG P-R INTERVAL: 172 MS
EKG P-R INTERVAL: 208 MS
EKG Q-T INTERVAL: 364 MS
EKG Q-T INTERVAL: 378 MS
EKG Q-T INTERVAL: 422 MS
EKG QRS DURATION: 100 MS
EKG QRS DURATION: 82 MS
EKG QRS DURATION: 84 MS
EKG QTC CALCULATION (BAZETT): 427 MS
EKG QTC CALCULATION (BAZETT): 444 MS
EKG QTC CALCULATION (BAZETT): 516 MS
EKG R AXIS: 102 DEGREES
EKG R AXIS: 16 DEGREES
EKG R AXIS: 72 DEGREES
EKG T AXIS: 123 DEGREES
EKG T AXIS: 134 DEGREES
EKG T AXIS: 3 DEGREES
EKG VENTRICULAR RATE: 83 BPM
EKG VENTRICULAR RATE: 83 BPM
EKG VENTRICULAR RATE: 90 BPM
EOSINOPHIL # BLD: 0.28 K/UL (ref 0–0.4)
EOSINOPHILS RELATIVE PERCENT: 4 % (ref 1–4)
ERYTHROCYTE [DISTWIDTH] IN BLOOD BY AUTOMATED COUNT: 16.5 % (ref 11.8–14.4)
GFR SERPL CREATININE-BSD FRML MDRD: 25 ML/MIN/1.73M2
GFR SERPL CREATININE-BSD FRML MDRD: 26 ML/MIN/1.73M2
GLUCOSE BLD-MCNC: 110 MG/DL (ref 65–105)
GLUCOSE SERPL-MCNC: 104 MG/DL (ref 74–99)
GLUCOSE SERPL-MCNC: 49 MG/DL (ref 74–99)
HCT VFR BLD AUTO: 27.9 % (ref 36.3–47.1)
HGB BLD-MCNC: 7.9 G/DL (ref 11.9–15.1)
IMM GRANULOCYTES # BLD AUTO: 0 K/UL (ref 0–0.3)
IMM GRANULOCYTES NFR BLD: 0 %
LYMPHOCYTES NFR BLD: 0.9 K/UL (ref 1–4.8)
LYMPHOCYTES RELATIVE PERCENT: 13 % (ref 24–44)
MCH RBC QN AUTO: 32 PG (ref 25.2–33.5)
MCHC RBC AUTO-ENTMCNC: 28.3 G/DL (ref 28.4–34.8)
MCV RBC AUTO: 113 FL (ref 82.6–102.9)
MONOCYTES NFR BLD: 0.69 K/UL (ref 0.1–0.8)
MONOCYTES NFR BLD: 10 % (ref 1–7)
MORPHOLOGY: ABNORMAL
MORPHOLOGY: ABNORMAL
NEUTROPHILS NFR BLD: 73 % (ref 36–66)
NEUTS SEG NFR BLD: 5.03 K/UL (ref 1.8–7.7)
NRBC BLD-RTO: 0 PER 100 WBC
PLATELET # BLD AUTO: 182 K/UL (ref 138–453)
PMV BLD AUTO: 10 FL (ref 8.1–13.5)
POTASSIUM SERPL-SCNC: 5.5 MMOL/L (ref 3.7–5.3)
POTASSIUM SERPL-SCNC: 5.7 MMOL/L (ref 3.7–5.3)
POTASSIUM SERPL-SCNC: 6 MMOL/L (ref 3.7–5.3)
RBC # BLD AUTO: 2.47 M/UL (ref 3.95–5.11)
SODIUM SERPL-SCNC: 139 MMOL/L (ref 136–145)
SODIUM SERPL-SCNC: 142 MMOL/L (ref 136–145)
WBC OTHER # BLD: 6.9 K/UL (ref 3.5–11.3)

## 2024-04-08 PROCEDURE — 6360000002 HC RX W HCPCS: Performed by: STUDENT IN AN ORGANIZED HEALTH CARE EDUCATION/TRAINING PROGRAM

## 2024-04-08 PROCEDURE — 6360000002 HC RX W HCPCS

## 2024-04-08 PROCEDURE — 93010 ELECTROCARDIOGRAM REPORT: CPT | Performed by: INTERNAL MEDICINE

## 2024-04-08 PROCEDURE — 99233 SBSQ HOSP IP/OBS HIGH 50: CPT | Performed by: INTERNAL MEDICINE

## 2024-04-08 PROCEDURE — 2500000003 HC RX 250 WO HCPCS

## 2024-04-08 PROCEDURE — 6370000000 HC RX 637 (ALT 250 FOR IP)

## 2024-04-08 PROCEDURE — 2580000003 HC RX 258

## 2024-04-08 PROCEDURE — 2700000000 HC OXYGEN THERAPY PER DAY

## 2024-04-08 PROCEDURE — 82947 ASSAY GLUCOSE BLOOD QUANT: CPT

## 2024-04-08 PROCEDURE — 84132 ASSAY OF SERUM POTASSIUM: CPT

## 2024-04-08 PROCEDURE — 94660 CPAP INITIATION&MGMT: CPT

## 2024-04-08 PROCEDURE — 36415 COLL VENOUS BLD VENIPUNCTURE: CPT

## 2024-04-08 PROCEDURE — 85025 COMPLETE CBC W/AUTO DIFF WBC: CPT

## 2024-04-08 PROCEDURE — 99497 ADVNCD CARE PLAN 30 MIN: CPT | Performed by: INTERNAL MEDICINE

## 2024-04-08 PROCEDURE — 6370000000 HC RX 637 (ALT 250 FOR IP): Performed by: INTERNAL MEDICINE

## 2024-04-08 PROCEDURE — 2500000003 HC RX 250 WO HCPCS: Performed by: INTERNAL MEDICINE

## 2024-04-08 PROCEDURE — C9254 INJECTION, LACOSAMIDE: HCPCS

## 2024-04-08 PROCEDURE — 6370000000 HC RX 637 (ALT 250 FOR IP): Performed by: STUDENT IN AN ORGANIZED HEALTH CARE EDUCATION/TRAINING PROGRAM

## 2024-04-08 PROCEDURE — 2000000000 HC ICU R&B

## 2024-04-08 PROCEDURE — 99232 SBSQ HOSP IP/OBS MODERATE 35: CPT | Performed by: INTERNAL MEDICINE

## 2024-04-08 PROCEDURE — 2580000003 HC RX 258: Performed by: STUDENT IN AN ORGANIZED HEALTH CARE EDUCATION/TRAINING PROGRAM

## 2024-04-08 PROCEDURE — 80048 BASIC METABOLIC PNL TOTAL CA: CPT

## 2024-04-08 PROCEDURE — 6360000002 HC RX W HCPCS: Performed by: INTERNAL MEDICINE

## 2024-04-08 PROCEDURE — 94761 N-INVAS EAR/PLS OXIMETRY MLT: CPT

## 2024-04-08 PROCEDURE — C9113 INJ PANTOPRAZOLE SODIUM, VIA: HCPCS | Performed by: STUDENT IN AN ORGANIZED HEALTH CARE EDUCATION/TRAINING PROGRAM

## 2024-04-08 RX ORDER — FUROSEMIDE 10 MG/ML
40 INJECTION INTRAMUSCULAR; INTRAVENOUS ONCE
Status: COMPLETED | OUTPATIENT
Start: 2024-04-08 | End: 2024-04-08

## 2024-04-08 RX ORDER — DEXTROSE MONOHYDRATE 25 G/50ML
25 INJECTION, SOLUTION INTRAVENOUS ONCE
Status: COMPLETED | OUTPATIENT
Start: 2024-04-08 | End: 2024-04-08

## 2024-04-08 RX ADMIN — TORSEMIDE 20 MG: 20 TABLET ORAL at 09:45

## 2024-04-08 RX ADMIN — HYDROMORPHONE HYDROCHLORIDE 1 MG: 1 INJECTION, SOLUTION INTRAMUSCULAR; INTRAVENOUS; SUBCUTANEOUS at 21:39

## 2024-04-08 RX ADMIN — INSULIN HUMAN 10 UNITS: 100 INJECTION, SOLUTION PARENTERAL at 06:14

## 2024-04-08 RX ADMIN — LEVOTHYROXINE SODIUM 150 MCG: 75 TABLET ORAL at 06:14

## 2024-04-08 RX ADMIN — DEXMEDETOMIDINE HYDROCHLORIDE 0.9 MCG/KG/HR: 400 INJECTION, SOLUTION INTRAVENOUS at 05:00

## 2024-04-08 RX ADMIN — INSULIN HUMAN 10 UNITS: 100 INJECTION, SOLUTION PARENTERAL at 00:20

## 2024-04-08 RX ADMIN — MINERAL SUPPLEMENT IRON 300 MG / 5 ML STRENGTH LIQUID 100 PER BOX UNFLAVORED 300 MG: at 09:45

## 2024-04-08 RX ADMIN — LACOSAMIDE 100 MG: 10 INJECTION INTRAVENOUS at 21:30

## 2024-04-08 RX ADMIN — LACOSAMIDE 100 MG: 10 INJECTION INTRAVENOUS at 10:28

## 2024-04-08 RX ADMIN — DULOXETINE HYDROCHLORIDE 20 MG: 20 CAPSULE, DELAYED RELEASE ORAL at 10:28

## 2024-04-08 RX ADMIN — DEXMEDETOMIDINE HYDROCHLORIDE 1.1 MCG/KG/HR: 400 INJECTION, SOLUTION INTRAVENOUS at 09:45

## 2024-04-08 RX ADMIN — HEPARIN SODIUM 5000 UNITS: 5000 INJECTION INTRAVENOUS; SUBCUTANEOUS at 21:40

## 2024-04-08 RX ADMIN — VENLAFAXINE 75 MG: 75 TABLET ORAL at 10:30

## 2024-04-08 RX ADMIN — RIFAXIMIN 550 MG: 550 TABLET ORAL at 20:30

## 2024-04-08 RX ADMIN — MIDODRINE HYDROCHLORIDE 10 MG: 5 TABLET ORAL at 19:09

## 2024-04-08 RX ADMIN — SODIUM CHLORIDE, PRESERVATIVE FREE 10 ML: 5 INJECTION INTRAVENOUS at 20:26

## 2024-04-08 RX ADMIN — OXYCODONE 10 MG: 5 TABLET ORAL at 10:05

## 2024-04-08 RX ADMIN — DEXMEDETOMIDINE HYDROCHLORIDE 1.1 MCG/KG/HR: 400 INJECTION, SOLUTION INTRAVENOUS at 14:30

## 2024-04-08 RX ADMIN — FUROSEMIDE 40 MG: 10 INJECTION, SOLUTION INTRAMUSCULAR; INTRAVENOUS at 06:14

## 2024-04-08 RX ADMIN — LACTULOSE 10 G: 20 SOLUTION ORAL at 20:26

## 2024-04-08 RX ADMIN — MIDODRINE HYDROCHLORIDE 10 MG: 5 TABLET ORAL at 09:45

## 2024-04-08 RX ADMIN — Medication 100 MG: at 09:45

## 2024-04-08 RX ADMIN — HEPARIN SODIUM 5000 UNITS: 5000 INJECTION INTRAVENOUS; SUBCUTANEOUS at 06:14

## 2024-04-08 RX ADMIN — DEXMEDETOMIDINE HYDROCHLORIDE 0.8 MCG/KG/HR: 400 INJECTION, SOLUTION INTRAVENOUS at 20:00

## 2024-04-08 RX ADMIN — LACTULOSE 10 G: 20 SOLUTION ORAL at 09:45

## 2024-04-08 RX ADMIN — HYDROMORPHONE HYDROCHLORIDE 1 MG: 1 INJECTION, SOLUTION INTRAMUSCULAR; INTRAVENOUS; SUBCUTANEOUS at 14:26

## 2024-04-08 RX ADMIN — MEROPENEM 1000 MG: 1 INJECTION, POWDER, FOR SOLUTION INTRAVENOUS at 01:33

## 2024-04-08 RX ADMIN — VENLAFAXINE 75 MG: 75 TABLET ORAL at 20:27

## 2024-04-08 RX ADMIN — RIFAXIMIN 550 MG: 550 TABLET ORAL at 10:29

## 2024-04-08 RX ADMIN — DEXTROSE MONOHYDRATE 25 G: 25 INJECTION, SOLUTION INTRAVENOUS at 00:20

## 2024-04-08 RX ADMIN — DEXMEDETOMIDINE HYDROCHLORIDE 1.1 MCG/KG/HR: 400 INJECTION, SOLUTION INTRAVENOUS at 01:28

## 2024-04-08 RX ADMIN — SODIUM CHLORIDE: 9 INJECTION, SOLUTION INTRAVENOUS at 01:30

## 2024-04-08 RX ADMIN — FUROSEMIDE 20 MG: 10 INJECTION, SOLUTION INTRAMUSCULAR; INTRAVENOUS at 00:20

## 2024-04-08 RX ADMIN — SODIUM CHLORIDE, PRESERVATIVE FREE 40 MG: 5 INJECTION INTRAVENOUS at 09:45

## 2024-04-08 RX ADMIN — SODIUM ZIRCONIUM CYCLOSILICATE 10 G: 10 POWDER, FOR SUSPENSION ORAL at 00:20

## 2024-04-08 RX ADMIN — DEXTROSE MONOHYDRATE 25 G: 25 INJECTION, SOLUTION INTRAVENOUS at 06:14

## 2024-04-08 RX ADMIN — FOLIC ACID 1 MG: 1 TABLET ORAL at 09:45

## 2024-04-08 RX ADMIN — MEROPENEM 1000 MG: 1 INJECTION, POWDER, FOR SOLUTION INTRAVENOUS at 14:29

## 2024-04-08 RX ADMIN — HYDROMORPHONE HYDROCHLORIDE 0.5 MG: 1 INJECTION, SOLUTION INTRAMUSCULAR; INTRAVENOUS; SUBCUTANEOUS at 20:36

## 2024-04-08 NOTE — CARE COORDINATION
Spoke to patient's son, Marek, via telephone. He would like resources on private caregivers for 24 hour care in case patient will go home with hospice. Notified him that I would bring pamphlets for him to review. Meeting with [facility] is scheduled for tomorrow at 11 am

## 2024-04-08 NOTE — ACP (ADVANCE CARE PLANNING)
Advance Care Planning      Palliative Medicine Provider (MD/NP)  Advance Care Planning (ACP) Conversation      Date of Conversation: 04/08/24    The patient and/or authorized decision maker consented to a voluntary Advance Care Planning conversation.   Individuals present for the conversation:  Patient's sons (2), sisters (2), , brother  Other persons present:  RN, med student Ignacio    Legal Healthcare Decision Maker(s):    Primary Decision Maker: Marek Garcia - Child - [phone]    ACP documents available in EMR prior to discussion:  [] Advance Medical Directive  [x] Portable DNR  [] POLST  [] [place] MOST   [] None  [] ACP documents have been previously completed by patient or surrogate, but are not available today for review.        Primary Palliative Diagnosis:   Multi organ failure - cirrhosis, renal failure, copd, resp failure    Conversation Summary:   See progress note.  Patient's family agreeable to hospice consult.    Resuscitation Status:   Code Status: DNR-CC     Outcomes / Completed Documentation:  An explanation of advance directives and their importance was provided and the following forms completed:    [] Advance Medical Directive  [x] Portable DNR   [] POLST  [] No new documents completed  [] Patient or surrogate was asked to provide previously completed documents to the palliative team    If new document completed, original was provided to patient and/or family member.    Copy was placed for scanning into the [facility] EMR.      I spent 16 minutes providing separately identifiable ACP services with the patient and/or surrogate decision maker in a voluntary, in-person conversation discussing the patient's wishes and goals as detailed in the above note.       PANCHO DUNAWAY, DO

## 2024-04-09 PROCEDURE — 6370000000 HC RX 637 (ALT 250 FOR IP): Performed by: STUDENT IN AN ORGANIZED HEALTH CARE EDUCATION/TRAINING PROGRAM

## 2024-04-09 PROCEDURE — 2580000003 HC RX 258

## 2024-04-09 PROCEDURE — 2000000000 HC ICU R&B

## 2024-04-09 PROCEDURE — 94660 CPAP INITIATION&MGMT: CPT

## 2024-04-09 PROCEDURE — 6360000002 HC RX W HCPCS

## 2024-04-09 PROCEDURE — 99232 SBSQ HOSP IP/OBS MODERATE 35: CPT | Performed by: INTERNAL MEDICINE

## 2024-04-09 PROCEDURE — 6360000002 HC RX W HCPCS: Performed by: STUDENT IN AN ORGANIZED HEALTH CARE EDUCATION/TRAINING PROGRAM

## 2024-04-09 PROCEDURE — C9254 INJECTION, LACOSAMIDE: HCPCS

## 2024-04-09 PROCEDURE — 99233 SBSQ HOSP IP/OBS HIGH 50: CPT | Performed by: INTERNAL MEDICINE

## 2024-04-09 PROCEDURE — 6370000000 HC RX 637 (ALT 250 FOR IP): Performed by: INTERNAL MEDICINE

## 2024-04-09 PROCEDURE — 2700000000 HC OXYGEN THERAPY PER DAY

## 2024-04-09 PROCEDURE — 94761 N-INVAS EAR/PLS OXIMETRY MLT: CPT

## 2024-04-09 PROCEDURE — A4216 STERILE WATER/SALINE, 10 ML: HCPCS | Performed by: STUDENT IN AN ORGANIZED HEALTH CARE EDUCATION/TRAINING PROGRAM

## 2024-04-09 PROCEDURE — 6360000002 HC RX W HCPCS: Performed by: INTERNAL MEDICINE

## 2024-04-09 PROCEDURE — 2580000003 HC RX 258: Performed by: STUDENT IN AN ORGANIZED HEALTH CARE EDUCATION/TRAINING PROGRAM

## 2024-04-09 PROCEDURE — 2500000003 HC RX 250 WO HCPCS

## 2024-04-09 PROCEDURE — C9113 INJ PANTOPRAZOLE SODIUM, VIA: HCPCS | Performed by: STUDENT IN AN ORGANIZED HEALTH CARE EDUCATION/TRAINING PROGRAM

## 2024-04-09 RX ORDER — HALOPERIDOL 5 MG/ML
1 INJECTION INTRAMUSCULAR
Status: DISCONTINUED | OUTPATIENT
Start: 2024-04-09 | End: 2024-04-10 | Stop reason: HOSPADM

## 2024-04-09 RX ORDER — LORAZEPAM 2 MG/ML
1 INJECTION INTRAMUSCULAR ONCE
Status: COMPLETED | OUTPATIENT
Start: 2024-04-09 | End: 2024-04-09

## 2024-04-09 RX ADMIN — SODIUM CHLORIDE, PRESERVATIVE FREE 40 MG: 5 INJECTION INTRAVENOUS at 08:23

## 2024-04-09 RX ADMIN — DEXMEDETOMIDINE HYDROCHLORIDE 0.6 MCG/KG/HR: 400 INJECTION, SOLUTION INTRAVENOUS at 07:01

## 2024-04-09 RX ADMIN — SODIUM CHLORIDE 10 ML/HR: 9 INJECTION, SOLUTION INTRAVENOUS at 04:57

## 2024-04-09 RX ADMIN — HYDROMORPHONE HYDROCHLORIDE 1 MG: 1 INJECTION, SOLUTION INTRAMUSCULAR; INTRAVENOUS; SUBCUTANEOUS at 08:31

## 2024-04-09 RX ADMIN — LACOSAMIDE 100 MG: 10 INJECTION INTRAVENOUS at 21:28

## 2024-04-09 RX ADMIN — HYDROMORPHONE HYDROCHLORIDE 1 MG: 1 INJECTION, SOLUTION INTRAMUSCULAR; INTRAVENOUS; SUBCUTANEOUS at 07:50

## 2024-04-09 RX ADMIN — HYDROMORPHONE HYDROCHLORIDE 1.5 MG: 1 INJECTION, SOLUTION INTRAMUSCULAR; INTRAVENOUS; SUBCUTANEOUS at 12:26

## 2024-04-09 RX ADMIN — OLANZAPINE 5 MG: 10 INJECTION, POWDER, LYOPHILIZED, FOR SOLUTION INTRAMUSCULAR at 10:44

## 2024-04-09 RX ADMIN — HYDROMORPHONE HYDROCHLORIDE 1.5 MG: 1 INJECTION, SOLUTION INTRAMUSCULAR; INTRAVENOUS; SUBCUTANEOUS at 13:01

## 2024-04-09 RX ADMIN — HEPARIN SODIUM 5000 UNITS: 5000 INJECTION INTRAVENOUS; SUBCUTANEOUS at 05:46

## 2024-04-09 RX ADMIN — MEROPENEM 1000 MG: 1 INJECTION, POWDER, FOR SOLUTION INTRAVENOUS at 01:33

## 2024-04-09 RX ADMIN — MIDODRINE HYDROCHLORIDE 10 MG: 5 TABLET ORAL at 15:00

## 2024-04-09 RX ADMIN — HYDROMORPHONE HYDROCHLORIDE 1.5 MG: 1 INJECTION, SOLUTION INTRAMUSCULAR; INTRAVENOUS; SUBCUTANEOUS at 16:31

## 2024-04-09 RX ADMIN — VENLAFAXINE 75 MG: 75 TABLET ORAL at 08:25

## 2024-04-09 RX ADMIN — MINERAL SUPPLEMENT IRON 300 MG / 5 ML STRENGTH LIQUID 100 PER BOX UNFLAVORED 300 MG: at 08:22

## 2024-04-09 RX ADMIN — DULOXETINE HYDROCHLORIDE 20 MG: 20 CAPSULE, DELAYED RELEASE ORAL at 08:21

## 2024-04-09 RX ADMIN — HYDROMORPHONE HYDROCHLORIDE 1.5 MG: 1 INJECTION, SOLUTION INTRAMUSCULAR; INTRAVENOUS; SUBCUTANEOUS at 11:41

## 2024-04-09 RX ADMIN — VENLAFAXINE 75 MG: 75 TABLET ORAL at 20:25

## 2024-04-09 RX ADMIN — DEXMEDETOMIDINE HYDROCHLORIDE 0.8 MCG/KG/HR: 400 INJECTION, SOLUTION INTRAVENOUS at 00:46

## 2024-04-09 RX ADMIN — HYDROMORPHONE HYDROCHLORIDE 1.5 MG: 1 INJECTION, SOLUTION INTRAMUSCULAR; INTRAVENOUS; SUBCUTANEOUS at 13:34

## 2024-04-09 RX ADMIN — HYDROMORPHONE HYDROCHLORIDE 1 MG: 1 INJECTION, SOLUTION INTRAMUSCULAR; INTRAVENOUS; SUBCUTANEOUS at 01:31

## 2024-04-09 RX ADMIN — HYDROMORPHONE HYDROCHLORIDE 1.5 MG: 1 INJECTION, SOLUTION INTRAMUSCULAR; INTRAVENOUS; SUBCUTANEOUS at 18:44

## 2024-04-09 RX ADMIN — LACTULOSE 10 G: 20 SOLUTION ORAL at 08:22

## 2024-04-09 RX ADMIN — RIFAXIMIN 550 MG: 550 TABLET ORAL at 08:24

## 2024-04-09 RX ADMIN — Medication 100 MG: at 08:24

## 2024-04-09 RX ADMIN — FOLIC ACID 1 MG: 1 TABLET ORAL at 08:22

## 2024-04-09 RX ADMIN — HALOPERIDOL LACTATE 1 MG: 5 INJECTION, SOLUTION INTRAMUSCULAR at 11:37

## 2024-04-09 RX ADMIN — LEVOTHYROXINE SODIUM 150 MCG: 75 TABLET ORAL at 07:05

## 2024-04-09 RX ADMIN — HYDROMORPHONE HYDROCHLORIDE 1 MG: 1 INJECTION, SOLUTION INTRAMUSCULAR; INTRAVENOUS; SUBCUTANEOUS at 03:21

## 2024-04-09 RX ADMIN — LORAZEPAM 1 MG: 2 INJECTION INTRAMUSCULAR; INTRAVENOUS at 13:33

## 2024-04-09 RX ADMIN — HYDROMORPHONE HYDROCHLORIDE 1 MG: 1 INJECTION, SOLUTION INTRAMUSCULAR; INTRAVENOUS; SUBCUTANEOUS at 05:45

## 2024-04-09 RX ADMIN — HYDROMORPHONE HYDROCHLORIDE 1 MG: 1 INJECTION, SOLUTION INTRAMUSCULAR; INTRAVENOUS; SUBCUTANEOUS at 22:05

## 2024-04-09 RX ADMIN — TORSEMIDE 20 MG: 20 TABLET ORAL at 08:30

## 2024-04-09 RX ADMIN — SODIUM CHLORIDE, PRESERVATIVE FREE 10 ML: 5 INJECTION INTRAVENOUS at 20:26

## 2024-04-09 RX ADMIN — LACOSAMIDE 100 MG: 10 INJECTION INTRAVENOUS at 09:41

## 2024-04-09 RX ADMIN — HYDROMORPHONE HYDROCHLORIDE 1 MG: 1 INJECTION, SOLUTION INTRAMUSCULAR; INTRAVENOUS; SUBCUTANEOUS at 10:10

## 2024-04-09 RX ADMIN — MIDODRINE HYDROCHLORIDE 10 MG: 5 TABLET ORAL at 08:23

## 2024-04-09 NOTE — CARE COORDINATION
Transitional planning. Family meeting w/ [facility] today. Shaina w/ [facility] met w/ family today in pt's room. Palliative Care following. Code status change today. Plans for a Hospice bed conversion when family withdraws care (awaiting arrival of other family members)   BiPAP FiO2 90%, Precedex gtt    1755 pt's sonMarek  called, plans for family to visit tomorrow, plans are to withdraw care around 3:30pm tomorrow once family members are all here. Informed pt's ROSY Alba and Dr. Love- he will place dc/readmit once he hears from internal med.

## 2024-04-09 NOTE — DISCHARGE SUMMARY
DULoxetine (CYMBALTA) 20 MG extended release capsule 1 capsule by Per G Tube route daily  Qty: 30 capsule, Refills: 0      torsemide 40 MG TABS Take 40 mg by mouth daily Per peg tube  Qty: 30 tablet, Refills: 0      lacosamide (VIMPAT) 10 MG/ML SOLN oral solution 10 mLs by Per G Tube route 2 times daily for 30 days. Max Daily Amount: 200 mg  Qty: 600 mL, Refills: 0    Associated Diagnoses: Epileptic automatism (HCC); Seizure-like activity (HCC)      ammonium lactate (LAC-HYDRIN) 12 % lotion Apply 1 Bottle topically as needed for Dry Skin Apply topically for dry skin      aspirin (ASPIRIN 81) 81 MG EC tablet Take 1 tablet by mouth daily Via PEG-tube      LORazepam (ATIVAN) 0.5 MG tablet 1 tablet by PEG Tube route every 6 hours as needed for Anxiety.      vitamin D (CHOLECALCIFEROL) 61180 UNIT CAPS Take 1 capsule by mouth daily Via PEG-tube      ferrous sulfate (IRON 325) 325 (65 Fe) MG tablet 1 tablet by PEG Tube route daily      Vitamin D3 125 MCG (5000 UT) TABS tablet 1 tablet by PEG Tube route daily      pantoprazole (PROTONIX) 40 MG tablet Take 1 tablet by mouth daily Via PEG-tube      gabapentin (NEURONTIN) 100 MG capsule 1 capsule by PEG Tube route every 8 hours as needed (neuropathic pain) for up to 30 days.  Qty: 90 capsule, Refills: 0      thiamine mononitrate (THIAMINE) 100 MG tablet Take 1 tablet by mouth daily  Refills: 0      levothyroxine (SYNTHROID) 150 MCG tablet 1 tablet by PEG Tube route Daily  Qty: 30 tablet, Refills: 3      midodrine (PROAMATINE) 5 MG tablet Take 1 tablet by mouth in the morning, at noon, and at bedtime  Qty: 90 tablet, Refills: 3      rifAXIMin (XIFAXAN) 550 MG tablet 1 tablet by PEG Tube route in the morning and at bedtime  Qty: 42 tablet      lactulose (CHRONULAC) 10 GM/15ML solution 30 mLs by PEG Tube route 3 times daily Hold if more then 3 stools daily  Refills: 1      spironolactone (ALDACTONE) 25 MG tablet Take 1 tablet by mouth in the morning and 1 tablet in the

## 2024-04-10 ENCOUNTER — HOSPITAL ENCOUNTER (INPATIENT)
Age: 64
LOS: 1 days | DRG: 283 | End: 2024-04-10
Attending: INTERNAL MEDICINE | Admitting: INTERNAL MEDICINE
Payer: MEDICAID

## 2024-04-10 VITALS
HEIGHT: 63 IN | BODY MASS INDEX: 35.23 KG/M2 | DIASTOLIC BLOOD PRESSURE: 51 MMHG | HEART RATE: 80 BPM | SYSTOLIC BLOOD PRESSURE: 123 MMHG | OXYGEN SATURATION: 88 % | TEMPERATURE: 97.2 F | RESPIRATION RATE: 21 BRPM | WEIGHT: 198.85 LBS

## 2024-04-10 VITALS — OXYGEN SATURATION: 88 %

## 2024-04-10 PROBLEM — J96.20 ACUTE ON CHRONIC RESPIRATORY FAILURE (HCC): Status: ACTIVE | Noted: 2024-04-10

## 2024-04-10 PROCEDURE — 6360000002 HC RX W HCPCS

## 2024-04-10 PROCEDURE — 6360000002 HC RX W HCPCS: Performed by: INTERNAL MEDICINE

## 2024-04-10 PROCEDURE — 99232 SBSQ HOSP IP/OBS MODERATE 35: CPT | Performed by: INTERNAL MEDICINE

## 2024-04-10 PROCEDURE — 1200000000 HC SEMI PRIVATE

## 2024-04-10 PROCEDURE — 2700000000 HC OXYGEN THERAPY PER DAY

## 2024-04-10 PROCEDURE — 99231 SBSQ HOSP IP/OBS SF/LOW 25: CPT

## 2024-04-10 PROCEDURE — 94660 CPAP INITIATION&MGMT: CPT

## 2024-04-10 PROCEDURE — 99222 1ST HOSP IP/OBS MODERATE 55: CPT | Performed by: NURSE PRACTITIONER

## 2024-04-10 PROCEDURE — 2580000003 HC RX 258: Performed by: STUDENT IN AN ORGANIZED HEALTH CARE EDUCATION/TRAINING PROGRAM

## 2024-04-10 PROCEDURE — 94761 N-INVAS EAR/PLS OXIMETRY MLT: CPT

## 2024-04-10 PROCEDURE — 1250000000 HC SEMI PRIVATE HOSPICE R&B

## 2024-04-10 PROCEDURE — 6360000002 HC RX W HCPCS: Performed by: NURSE PRACTITIONER

## 2024-04-10 RX ORDER — SODIUM CHLORIDE 0.9 % (FLUSH) 0.9 %
5-40 SYRINGE (ML) INJECTION PRN
Status: DISCONTINUED | OUTPATIENT
Start: 2024-04-10 | End: 2024-04-11 | Stop reason: HOSPADM

## 2024-04-10 RX ORDER — IPRATROPIUM BROMIDE AND ALBUTEROL SULFATE 2.5; .5 MG/3ML; MG/3ML
1 SOLUTION RESPIRATORY (INHALATION) EVERY 4 HOURS PRN
Status: CANCELLED | OUTPATIENT
Start: 2024-04-10

## 2024-04-10 RX ORDER — LORAZEPAM 2 MG/ML
1 INJECTION INTRAMUSCULAR EVERY 4 HOURS PRN
Status: DISCONTINUED | OUTPATIENT
Start: 2024-04-10 | End: 2024-04-10

## 2024-04-10 RX ORDER — HALOPERIDOL 5 MG/ML
1 INJECTION INTRAMUSCULAR
Status: DISCONTINUED | OUTPATIENT
Start: 2024-04-10 | End: 2024-04-11 | Stop reason: HOSPADM

## 2024-04-10 RX ORDER — GLUCAGON 1 MG/ML
1 KIT INJECTION PRN
Status: CANCELLED | OUTPATIENT
Start: 2024-04-10

## 2024-04-10 RX ORDER — LORAZEPAM 2 MG/ML
1 INJECTION INTRAMUSCULAR
Status: DISCONTINUED | OUTPATIENT
Start: 2024-04-10 | End: 2024-04-10 | Stop reason: HOSPADM

## 2024-04-10 RX ORDER — LORAZEPAM 2 MG/ML
1 INJECTION INTRAMUSCULAR
Status: DISCONTINUED | OUTPATIENT
Start: 2024-04-10 | End: 2024-04-11 | Stop reason: HOSPADM

## 2024-04-10 RX ORDER — AMMONIUM LACTATE 12 G/100G
1 LOTION TOPICAL PRN
Status: DISCONTINUED | OUTPATIENT
Start: 2024-04-10 | End: 2024-04-11 | Stop reason: HOSPADM

## 2024-04-10 RX ORDER — IPRATROPIUM BROMIDE AND ALBUTEROL SULFATE 2.5; .5 MG/3ML; MG/3ML
1 SOLUTION RESPIRATORY (INHALATION) EVERY 4 HOURS PRN
Status: DISCONTINUED | OUTPATIENT
Start: 2024-04-10 | End: 2024-04-11 | Stop reason: HOSPADM

## 2024-04-10 RX ORDER — HALOPERIDOL 5 MG/ML
1 INJECTION INTRAMUSCULAR
Status: CANCELLED | OUTPATIENT
Start: 2024-04-10

## 2024-04-10 RX ORDER — GLYCOPYRROLATE 0.2 MG/ML
0.1 INJECTION INTRAMUSCULAR; INTRAVENOUS
Status: CANCELLED | OUTPATIENT
Start: 2024-04-10 | End: 2024-04-11

## 2024-04-10 RX ORDER — HALOPERIDOL 5 MG/ML
1 INJECTION INTRAMUSCULAR
Status: DISCONTINUED | OUTPATIENT
Start: 2024-04-10 | End: 2024-04-10

## 2024-04-10 RX ORDER — GLUCAGON 1 MG/ML
1 KIT INJECTION PRN
Status: DISCONTINUED | OUTPATIENT
Start: 2024-04-10 | End: 2024-04-11 | Stop reason: HOSPADM

## 2024-04-10 RX ORDER — LORAZEPAM 2 MG/ML
1 INJECTION INTRAMUSCULAR
Status: CANCELLED | OUTPATIENT
Start: 2024-04-10

## 2024-04-10 RX ORDER — GLYCOPYRROLATE 0.2 MG/ML
0.1 INJECTION INTRAMUSCULAR; INTRAVENOUS
Status: COMPLETED | OUTPATIENT
Start: 2024-04-10 | End: 2024-04-10

## 2024-04-10 RX ORDER — DEXTROSE MONOHYDRATE 100 MG/ML
INJECTION, SOLUTION INTRAVENOUS CONTINUOUS PRN
Status: DISCONTINUED | OUTPATIENT
Start: 2024-04-10 | End: 2024-04-11 | Stop reason: HOSPADM

## 2024-04-10 RX ORDER — DEXTROSE MONOHYDRATE 100 MG/ML
INJECTION, SOLUTION INTRAVENOUS CONTINUOUS PRN
Status: CANCELLED | OUTPATIENT
Start: 2024-04-10

## 2024-04-10 RX ORDER — ONDANSETRON 4 MG/1
4 TABLET, ORALLY DISINTEGRATING ORAL EVERY 8 HOURS PRN
Status: DISCONTINUED | OUTPATIENT
Start: 2024-04-10 | End: 2024-04-11 | Stop reason: HOSPADM

## 2024-04-10 RX ORDER — AMMONIUM LACTATE 12 G/100G
1 LOTION TOPICAL PRN
Status: CANCELLED | OUTPATIENT
Start: 2024-04-10

## 2024-04-10 RX ORDER — ONDANSETRON 2 MG/ML
4 INJECTION INTRAMUSCULAR; INTRAVENOUS EVERY 6 HOURS PRN
Status: DISCONTINUED | OUTPATIENT
Start: 2024-04-10 | End: 2024-04-11 | Stop reason: HOSPADM

## 2024-04-10 RX ORDER — ONDANSETRON 2 MG/ML
4 INJECTION INTRAMUSCULAR; INTRAVENOUS EVERY 6 HOURS PRN
Status: CANCELLED | OUTPATIENT
Start: 2024-04-10

## 2024-04-10 RX ORDER — GLYCOPYRROLATE 0.2 MG/ML
0.1 INJECTION INTRAMUSCULAR; INTRAVENOUS
Status: DISCONTINUED | OUTPATIENT
Start: 2024-04-10 | End: 2024-04-10 | Stop reason: HOSPADM

## 2024-04-10 RX ORDER — SODIUM CHLORIDE 9 MG/ML
INJECTION, SOLUTION INTRAVENOUS PRN
Status: CANCELLED | OUTPATIENT
Start: 2024-04-10

## 2024-04-10 RX ORDER — SODIUM CHLORIDE 9 MG/ML
INJECTION, SOLUTION INTRAVENOUS PRN
Status: DISCONTINUED | OUTPATIENT
Start: 2024-04-10 | End: 2024-04-11 | Stop reason: HOSPADM

## 2024-04-10 RX ORDER — ONDANSETRON 4 MG/1
4 TABLET, ORALLY DISINTEGRATING ORAL EVERY 8 HOURS PRN
Status: CANCELLED | OUTPATIENT
Start: 2024-04-10

## 2024-04-10 RX ORDER — LORAZEPAM 1 MG/1
1 TABLET ORAL EVERY 4 HOURS PRN
Status: DISCONTINUED | OUTPATIENT
Start: 2024-04-10 | End: 2024-04-10

## 2024-04-10 RX ADMIN — LORAZEPAM 1 MG: 2 INJECTION INTRAMUSCULAR; INTRAVENOUS at 00:56

## 2024-04-10 RX ADMIN — LORAZEPAM 1 MG: 2 INJECTION INTRAMUSCULAR; INTRAVENOUS at 13:16

## 2024-04-10 RX ADMIN — SODIUM CHLORIDE, PRESERVATIVE FREE 10 ML: 5 INJECTION INTRAVENOUS at 08:11

## 2024-04-10 RX ADMIN — HYDROMORPHONE HYDROCHLORIDE 1 MG: 1 INJECTION, SOLUTION INTRAMUSCULAR; INTRAVENOUS; SUBCUTANEOUS at 03:42

## 2024-04-10 RX ADMIN — GLYCOPYRROLATE 0.1 MG: 0.2 INJECTION INTRAMUSCULAR; INTRAVENOUS at 12:52

## 2024-04-10 RX ADMIN — HYDROMORPHONE HYDROCHLORIDE 1.5 MG: 1 INJECTION, SOLUTION INTRAMUSCULAR; INTRAVENOUS; SUBCUTANEOUS at 10:53

## 2024-04-10 RX ADMIN — HYDROMORPHONE HYDROCHLORIDE 1 MG: 1 INJECTION, SOLUTION INTRAMUSCULAR; INTRAVENOUS; SUBCUTANEOUS at 13:17

## 2024-04-10 RX ADMIN — LORAZEPAM 1 MG: 2 INJECTION INTRAMUSCULAR; INTRAVENOUS at 11:32

## 2024-04-10 RX ADMIN — LORAZEPAM 1 MG: 2 INJECTION INTRAMUSCULAR; INTRAVENOUS at 17:26

## 2024-04-10 RX ADMIN — HYDROMORPHONE HYDROCHLORIDE 1 MG: 1 INJECTION, SOLUTION INTRAMUSCULAR; INTRAVENOUS; SUBCUTANEOUS at 14:50

## 2024-04-10 RX ADMIN — HYDROMORPHONE HYDROCHLORIDE 1 MG: 1 INJECTION, SOLUTION INTRAMUSCULAR; INTRAVENOUS; SUBCUTANEOUS at 02:00

## 2024-04-10 RX ADMIN — HYDROMORPHONE HYDROCHLORIDE 1 MG: 1 INJECTION, SOLUTION INTRAMUSCULAR; INTRAVENOUS; SUBCUTANEOUS at 00:30

## 2024-04-10 RX ADMIN — HYDROMORPHONE HYDROCHLORIDE 1.5 MG: 1 INJECTION, SOLUTION INTRAMUSCULAR; INTRAVENOUS; SUBCUTANEOUS at 17:26

## 2024-04-10 RX ADMIN — LORAZEPAM 1 MG: 2 INJECTION INTRAMUSCULAR; INTRAVENOUS at 08:07

## 2024-04-10 NOTE — PROGRESS NOTES
Patient's VS ceased at 175, [facility], house sup, IM, RN manager and charge all notified at 175. 's office at 180 ( not a case), [employer] at 181 (pt is potential donor they will call back.)  received call back from [employer] and they would like to reach out to the family about donation and have requested that we hold off on releasing to  home. Spoke with [employer] Ref# [id number]. 1930 [employer] release the body for  home.

## 2024-04-10 NOTE — H&P
spironolactone (ALDACTONE) 25 MG tablet Take 1 tablet by mouth in the morning and 1 tablet in the evening.  Patient taking differently: 1 tablet by PEG Tube route in the morning and 1 tablet in the evening. 24   Chris Jacobs DO   folic acid (FOLVITE) 1 MG tablet 1 tablet by PEG Tube route daily 24   Chris Jacobs DO   bisacodyl (DULCOLAX) 10 MG suppository Place 1 suppository rectally daily as needed for Constipation    ProviderStef MD   fentaNYL (DURAGESIC) 12 MCG/HR Place 1 patch onto the skin every 72 hours.    ProviderStef MD   magnesium hydroxide (MILK OF MAGNESIA) 400 MG/5ML suspension 30 mLs by Per G Tube route daily as needed for Constipation    ProviderStef MD   Multiple Vitamins-Minerals (CENTRUM/CERTA-NIEVES WITH MINERALS ORAL) solution 15 mLs by PEG Tube route daily    ProviderStef MD   nortriptyline (PAMELOR) 10 MG capsule 1 capsule by PEG Tube route nightly    ProviderStef MD   acetaminophen (TYLENOL) 325 MG tablet 2 tablets by PEG Tube route every 6 hours as needed for Pain For pain/fever    ProviderStef MD   venlafaxine (EFFEXOR) 75 MG tablet 1 tablet by PEG Tube route 2 times daily    ProviderStef MD        Allergies:     Patient has no known allergies.    Social History:     Tobacco:    reports that she has never smoked. She does not have any smokeless tobacco history on file.  Alcohol:      reports that she does not currently use alcohol.  Drug Use:  reports no history of drug use.    Family History:     History reviewed. No pertinent family history.    Review of Systems:     Unable to be obtained     Physical Exam:   BP (!) 123/51   Pulse 81   Temp 97.2 °F (36.2 °C) (Axillary)   Resp 23   Ht 1.6 m (5' 2.99\")   Wt 90.2 kg (198 lb 13.7 oz)   SpO2 (!) 80%   BMI 35.23 kg/m²   Temp (24hrs), Av.6 °F (36.4 °C), Min:97.2 °F (36.2 °C), Max:98 °F (36.7 °C)    Recent Labs     24  2307   POCGLU 110* 
   Acute encephalopathy    Wernicke encephalopathy syndrome    Left arm weakness    Cognitive impairment    Acquired hypothyroidism    JASPER (acute kidney injury) (HCC)    Alcoholic cirrhosis (HCC)    Encounter for palliative care    Goals of care, counseling/discussion    Left hemiparesis (HCC)    Urinary retention    Chronic indwelling Loaiza catheter    Moderate tricuspid regurgitation    Acute respiratory failure (HCC)    Acute cystitis with hematuria    Elevated troponin    Cardiorenal syndrome    Ascites due to alcoholic cirrhosis (HCC)    Intertrigo    Pleural effusion, bilateral    Urinary tract infection due to ESBL Klebsiella    Acute renal failure with tubular necrosis (HCC)    Acute hypoxic respiratory failure (HCC)       Neurologic:   History of seizure on vimpat will continue  Given AMS may consider neurology consultation  CT head pending  Fentanyl patch from home held  Gabapentin PRN ordered  Home folic acid ordered  Home cymbalta and effexor ordered    CV:   History of Ef 60-65%  NSTEMI with uptrending troponins  BNP somewhat above baseline at 14,805  Will likely initiate heparin gtt  EKG pending   Continue home midodrine, torsemide and     Respiratory:   Acute on Chronic hypoxic and hypercapnic respiratory failure on 3LPM at home  Currently saturating well on home O2 level, previously required BIPAP  CT PE showing no PE, large left-sided effusion and small right-sided effusion with adjacent compressive atelectasis  Will likely consult with IR for thoracentesis    Repeat ABG pending     GI:   History of PEG and gastric stimulator, liver cirrhosis requiring paracentesis  LFTs unremarkable  Ammonia WNL 18  Continue lactulose  CT abd with ascites and nonspecific distension of the bowel  May consider paracentesis in the future but would prioritize thoracentesis    FEN/:  CKD  CR 1.8 BUN 60, K 5.7 received lokelma and hyperkalemia treatment, 4.9 post, repeat pending      Hematologic:   Chronic

## 2024-04-10 NOTE — PROGRESS NOTES
Physical Therapy Cancel Note      DATE: 2024    NAME: Clemente Garcia  MRN: [id number]   : 1960      Patient not seen this date for Physical Therapy due to:    Other: pt now DNRCC with plan to withdraw care.  Defer PT eval      Electronically signed by Ubaldo Pacheco PT on 2024 at 2:08 PM      
    Palliative Care Progress Note    NAME:  Clemente Garcia  MEDICAL RECORD NUMBER:  [id number]  AGE: 63 y.o.   GENDER: female  : 1960  TODAY'S DATE:  2024    Reason for Consult:  goals of care      Plan        Palliative Interaction: Patient seen and examined on rounds. Patient clinically remains the same. Continues to be confused.     Called and spoke with patients son Marek, he states feeling like he sees little improvements in the patient. He notes plan is for patient to discharge back to facility.     Discussed outpatient palliative services with Marek, these were offered on previous admissions. At that time patient improved and family opted to not proceed at that time. Marek is now agreeable for services upon discharge.     Will send referral to [facility] to follow patient upon discharge. Palliative to follow while she remains inpatient.       Principle Problem/Diagnosis:  Acute hypoxic respiratory failure (HCC)    Additional Assessments:    1- Symptom management/ pain control     Pain Assessment:  The patient is not having any pain.               Anxiety:  none                          Dyspnea:  acute dyspnea and chronic dyspnea                          Fatigue:   generalized         We feel the patient symptoms are being controlled. her current regimen is reviewed by myself and discussed with the staff.       2- Goals of care evaluation   The patient goals of care are improve or maintain function/quality of life   Goals of care discussed with:    [] Patient independently    [] Patient and Family    [x] Family or Healthcare DPOA independently    [] Unable to discuss with patient, family/DPOA not present    3- Code Status  DNR-CCA    4- Other recommendations  - We will continue to provide comfort and support to the patient and the family      History of Present Illness     Referred to Palliative Care by  [x] Physician   [] Nursing  [] Family Request   [] Other:       She was admitted to 
  Critical care team - Resident sign-out to medicine service      Date and time: 4/10/2024 9:56 AM  Patient's name:  Clemente Garcia  Medical Record Number: [id number]  Patient's account/billing number: [id number]  Patient's YOB: 1960  Age: 63 y.o.  Date of Admission: 3/30/2024  4:41 AM  Length of stay during current admission: 11    Primary Care Physician: Milana Whalen MD    Code Status: DNR-CC    Mode of physician to physician communication:        [x] Via telephone   [] In person     Date and time of sign-out: 4/10/2024 9:56 AM    Accepting Internal Medicine NP : Catherine Benito NP    Accepting Medicine team: Intermed    Accepting team's attending: Dr. Karli Lu    Patient's current ICU Bed:  3024     Patient's assigned bed on floor:  2018        [x] Med-Surg Monitored (inpatient hospice) [] Step-down       [] Psychiatry ICU       [] Psych floor     Reason for ICU admission:     Hypoxic respiratory failure, Syncope with AMS    ICU course summary:     Clemente Garcia is a 63 y.o. female with a past medical history of hypothyroidism, chronic hypoxic respiratory failure with home oxygen between 2-5L, and alcoholic liver cirrhosis getting paracentesis, s/p PEG tube DNR CCA without intubation presented to [place] emergency department on 3/29 from her F after an episode of loss of consciousness. Concern for possible syncopal episode and patient was noted to have dyspnea and anasarca and was placed on BiPAP. Her work-up was concerning for hyperkalemia to 5.7, this was treated with insulin, dextrose, lasix and lokelma. Prior to transportation, patient's K was gone down to 4.9.  Patient was transferred here from [place] ED for IR evaluation. On chart review, also concern for aspiration episode by RN at [place] ER while she was wearing BiPAP.     At [place], hgb 7.3, chronically low. K 5.5->4.9. Ammonia 18. Troponin 102->117,  ABG 7.291/69.7/76.8/33.6, BNP significantly elevated.     Patient underwent 
  Meropenem Extended Interval Interchange    The following ordered dose of Meropenem has been changed to optimize its pharmacodynamic profile as approved by the Patient Care Review Committee.    Ordered Dose    __ 500 mg IV every 8 hrs  (30 minute infusion)      _X_ 1 gm  IV every 8  hrs (30 minute infusion)    __ 2 gm IV every 8 hrs (30 minute infusion)    Recent Labs     03/29/24  1458 03/30/24  1148   CREATININE 1.8* 2.0*     Estimated Creatinine Clearance: 30 mL/min (A) (based on SCr of 2 mg/dL (H)).    CrCl Dose   >50 to <130 500mg-2gm q8hrs over 3 hours   >25 to <49.9 1gm-2gm q12 hrs over 3 hours   10 to <25 500mg-1gm q12hrs over 3 hours   <10 500mg-1gm q24hrs over 3 hours   HD 500mg-1gm q24hrs over 3 hours given after HD   CRRT 500mg-1gm q12hrs over 3 hours       New Dose    Meropenem   1 gm  IV q 12 hrs  over 3 hours.        Pharmacists should be contacted for issues concerning drug compatibility with multiple IV medications.  All doses will be prepared using 100ml bag to be infused over 3-hours at a rate of 33.3 ml/hr.    Thank You,  Brad Crews, RPH3/30/20241:34 PM   
  NEUROLOGY INPATIENT PROGRESS NOTE    3/31/2024         Clemente Garcia is a  63 y.o. female admitted on 3/30/2024 with  Acute hypoxic respiratory failure (HCC) [J96.01]    Reason for consult: syncopal episode, encephalopathy. Hx seizures on vimpat, tardive dyskinesia, alcoholic encephalopathy, epileptic automatism, gait instability   History is obtained mostly from the patient and the medical record and from the caregivers. Chart is reviewed and patient is examined.   Briefly, this is a  63 y.o. female with hx of hypothyroidism and chronic hypoxic respiratory failure was admitted from [facility] on 3/30/2024 for evaluation of syncopal episode with mental status changes and patient is dyspneic and anasarca.  Patient is initially brought to [facility] and found to have pleural effusion and thoracentesis could not be done at [place] facility.  Therefore she was transferred to [facility] on 3/30/24  Patient has had history of seizure disorder for which she was on Vimpat.  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  Patient could not get MRI because of Lap-Band with metal and unsure of compatibility.  Patient was also having tardive dyskinesia with several year history of antidepressant therapeutics.  Patient has baseline dementing illness with chronic alcoholism and also patient had chronic neuropathy associated with alcoholism and was on gabapentin.    Patient is known to me from prior hospitalization in February of this year for evaluation of altered mentation and abnormal automatisms raising concern for temporal lobe seizures for which she was initiated on Vimpat 100 bid.  Patient could not get MRI because of Lap-Band with metal and unsure of compatibility.  Patient was also having tardive dyskinesia with several year history of antidepressant therapeutics.  Patient has baseline dementing illness with chronic alcoholism; chronic 
  [facility]  Occupational Therapy Not Seen Note    DATE: 2024    NAME: Clemente Garcia  MRN: [id number]   : 1960      Patient not seen this date for Occupational Therapy due to:    Patient is not appropriate for active participation in OT evaluation/treatment at this time d/t agitated and sedated. OT will continue as able.    Next Scheduled Treatment:       Electronically signed by AMPARO Deal on 2024 at 1:48 PM    
  PALLIATIVE CARE PROGRESS NOTE     NAME:  Clemente Garcia  MEDICAL RECORD NUMBER:  [id number]  AGE: 63 y.o.   GENDER: female  : 1960  TODAY'S DATE:  2024  Room: 3024/3024-01    Reason For Consult:  Goals of care evaluation  Distress management  Symptom Management  Guidance and support  Facilitate communications  Assistance in coordinating care  Recommendations for the above    Plan      Palliative Interaction:  The palliative care service was able to meet with the patient and her family today at bedside.  They are currently meeting with hospice RN, Shaina.    Plans are currently being formulated to get Clemente home so BiPAP can be removed and she can pass peacefully at home.  These plans include making sure that medications are available for palliative sedation as well as subcutaneous administration of opioids.    Family is at bedside.  Appears to be grieving well.    Patient does appear to be somewhat restless.  She continually is moaning and is moving her hands in front of her.  She received a dose of Zyprexa this morning.    RN informs me that he has had to give multiple doses of Dilaudid since shift change as she has been quite restless and somewhat dyspneic.  She remains on 90% FiO2 via BiPAP.    Given all of the above, I have increased her hydromorphone to be 1 mg / 1.5 mg for moderate/severe pain and dyspnea.    I have also added haloperidol to be given intramuscularly 1 mg every 1 hour as needed for restlessness.  Avoid Zyprexa in lieu of this.    Discussed with case management, who is going to provide a list of 24-hour services for the patient's son.    Please reach out if symptoms become uncontrolled and need adjustments prior to discharge.    IMPRESSION/ PLAN  Symptom management/pain control    We feel the patient's symptoms are being controlled. Their current regimen has been reviewed by myself and discussed with the staff. We recommend adjusting their medications as follows: As above    Goals of 
  PALLIATIVE CARE PROGRESS NOTE     NAME:  Clemente Garcia  MEDICAL RECORD NUMBER:  [id number]  AGE: 63 y.o.   GENDER: female  : 1960  TODAY'S DATE:  2024  Room: 3024/3024-01    Reason For Consult:  Goals of care evaluation  Distress management  Symptom Management  Guidance and support  Facilitate communications  Assistance in coordinating care  Recommendations for the above    Plan      Palliative Interaction:  The palliative care service was able to meet with the patient and her family today at bedside.  The patient is encephalopathic during my encounter, trying to rip off her BiPAP.    Multiple of the patient's family members are present at bedside.  This includes her son, Marek, her 2 sisters Frances and Clarita, and her  Joni.  Listening via phone as the patient's daughter-in-law Radha, her other son Anival, and brother Kenton.    We reviewed her medical journey up to this point. This includes COPD, chronic respiratory failure, and cirrhosis. She is also in renal failure and the need for dialysis lingers in the near future.    Family tells me that they want a 100% guarantee that this is the right decision and that she will not return to her baseline from her current situation.  While I explained that that a 100% guarantee is not realistic, I did explain that she has been in the hospital for several weeks.  I explained that our current interventions have not helped.     The patient's son, Marek, acts as the primary spokesperson during this conversation.  He tells me that family has come together and is wanting to withdraw care.  He tells me that first and foremost none of them want to see Clemente suffer any longer.  They understand that she has had multiple medical issues that have led to her current state.    He does want to take her home.  He inquires about 24-hour care.  We discussed that hospice does not provide 24-hour care, but there are agencies that will do so.  He is going to inquire about this. 
  Physician Progress Note      PATIENT:               BILL DAVIDSON  CSN #:                  [id number]  :                       1960  ADMIT DATE:       3/30/2024 4:41 AM  DISCH DATE:  RESPONDING  PROVIDER #:        [facility]          QUERY TEXT:    Pt admitted with Acute on Chronic hypoxic and hypercapnic respiratory failure   with left pleural effusion and has encephalopathy documented. He has PMH of   Wernicke's and Hepatic encephalopathy.  Per IM PN 3/31; She is more   encephalopathic today, ammonia is normal but ABG shows hypercapnia with   respiratory acidosis.  EEG does not show seizure activity and hypercapnia may   be causing her myoclonic jerking and worsening encephalopathy.  If possible,   please document in progress notes and discharge summary further specificity   regarding the type of encephalopathy:    The medical record reflects the following:  Risk Factors: CKD w/ JASPER, Acute on chronic resp failure, Alcoholic cirrhosis,   UTI, Pleural effusion, left  Clinical Indicators: K 5.5>3.4, BUN 62, GFR 25, Glu 61, HCO3  33.6> 28.5, pCO2   69.7> 51.5, pH 7.291>7.351, BNP 42153, Ammonia 18>29, Trop 102>117>169, TSH   14.70, BP 94/49, RR 24, SpO2 86  Treatment: BiPAP 4 L/ FiO2 40, 0.9 % sodium chloride infusion 50 ml/hr,   dextrose 5 % and 0.9 % sodium chloride infusion 100ml/hr x's 1, dextrose 5 %   solution 50ml/hr continuous, dextrose bolus 10% 125 mL, lactulose 20g tid,   meropenem IV, midodrine, rifaximin 550 tab, Thoracentesis    Thank you, Please call if questions  Nancy Garcia RN [facility]  [phone]  Options provided:  -- Acute hepatic encephalopathy without coma  -- Chronic hepatic encephalopathy without coma  -- Metabolic encephalopathy  -- Wernicke?s encephalopathy  -- Other - I will add my own diagnosis  -- Disagree - Not applicable / Not valid  -- Disagree - Clinically unable to determine / Unknown  -- Refer to Clinical Documentation Reviewer    PROVIDER RESPONSE TEXT:    This patient 
 responded to nurse request for assistance with ordering a comfort cart and to provide a prayer blanket.  spoke with dietary to order a cart.     Pt's  was at bedside and another family member was present.  provided a prayer blanket and assured family of continued prayers and support as needed.    Chaplains will remain available to offer spiritual and emotional support as needed.     Electronically signed by Chaplain Suni, on 4/10/2024 at 10:09 AM.  [facility]  [facility]  [phone]   
Comprehensive Nutrition Assessment    Type and Reason for Visit:  Reassess    Nutrition Recommendations/Plan:   Continue NPO.  Advance TF of Standard without Fiber (Osmolite 1.2) to new goal 65 mL/hr  HOLD TF for 1hr before and after Synthroid provision Consider advancing flushes to goal of 80ml every 4 hours   At goal rate provides 1716 kcal, 79 gm protein, 1173mL free water (1653ml total with suggested flushes)     Malnutrition Assessment:  Malnutrition Status:  Insufficient data (03/30/24 1056)    Context:  Chronic Illness       Nutrition Assessment:    Admit for LOC resp failure, remains in ICU A&O x0. NPO about 1 day d/t need for (4/1) paracentesis (2.7L removed), TF reinitiated per home regimen and RD recommendations on 4/1 PM. Per I/O, TF appears at goal x3d, tolerating well -- noted diarrhea likely d/t lactulose.      Nutrition Related Findings:    Labs reviewed: BUN 60mg/dL, Cr 1.9mg/dL, H/H 7.1g/dL/22.8%   Meds: FeSu 300mg, B9 1 mg, lactulose 3x/d, synthroid daily, rifaximin, B1 100mg. +FMS with no recent OP documented (25ml 4/2), +PEG w/ gastric stimulator. +1 bilat UE and +2 piting bilat LE edema. Wound Type: Pressure Injury (Bilat heel PU, pretibial wound)       Current Nutrition Intake & Therapies:    Average Meal Intake: NPO  Average Supplements Intake: NPO  ADULT TUBE FEEDING; PEG; Standard without Fiber; Continuous; 20; Yes; 20; Q 4 hours; 65; 60; Q 4 hours    Anthropometric Measures:  Height: 160 cm (5' 2.99\")  Ideal Body Weight (IBW): 115 lbs (52 kg)    Admission Body Weight: 82.6 kg (182 lb 1.6 oz)  Current Body Weight: 83.8 kg (184 lb 11.9 oz), 160.6 % IBW. Weight Source: Bed Scale  Current BMI (kg/m2): 32.7        Weight Adjustment For:  (EDW 82.6kg (BMI 32.26))                 BMI Categories: Obese Class 1 (BMI 30.0-34.9)    Estimated Daily Nutrient Needs:  Energy Requirements Based On: Formula  Weight Used for Energy Requirements: Other (Comment) (EDW)  Energy (kcal/day): 9625-0492 kcals (MSJ 
Comprehensive Nutrition Assessment    Type and Reason for Visit:  Reassess    Nutrition Recommendations/Plan:   Continue NPO.  Monitor plans for start of nutrition and plan of care.  As able, start TF of Standard without Fiber (Osmolite 1.2) at 20 mL/hr with advancement to goal 60 mL/hr.     Malnutrition Assessment:  Malnutrition Status:  Insufficient data (03/30/24 1056)    Context:  Chronic Illness       Nutrition Assessment:    Pt remains NPO.  Plans for paracentesis noted.  Will monitor plans for restart of Tube Feedings.  +FMS.  Weights reviewed.  Labs reviewed: Na 133 mmol/L, K 3.4 mmol/L, Mg 2.5 mg/dL, BUN 60 mg/dL, CR 2.2 mg/dL.  Meds reviewed.    Nutrition Related Findings:    Meds/Labs reviewed.  +1/+3 edema to all extremities.  +FMS. Wound Type: Pressure Injury (Bilat heel PU, pretibial wound)       Current Nutrition Intake & Therapies:    Average Meal Intake: NPO  Average Supplements Intake: NPO  Diet NPO  Additional Calorie Sources:  D5%0.9%NaCl at 50 mL/hr = 204 kcals/day.    Anthropometric Measures:  Height: 160 cm (5' 2.99\")  Ideal Body Weight (IBW): 115 lbs (52 kg)    Admission Body Weight: 82.6 kg (182 lb 1.6 oz)  Current Body Weight: 84 kg (185 lb 3 oz), 161 % IBW. Weight Source: Bed Scale  Current BMI (kg/m2): 32.8        Weight Adjustment For: No Adjustment                 BMI Categories: Obese Class 1 (BMI 30.0-34.9)    Estimated Daily Nutrient Needs:  Energy Requirements Based On: Formula  Weight Used for Energy Requirements: Current  Energy (kcal/day): 2871-1275 kcals/day  Weight Used for Protein Requirements: Ideal  Protein (g/day): 80-95 gm pro/day  Method Used for Fluid Requirements: ml/Kg (25)  Fluid (ml/day): 4838-7559 mL or per MD    Nutrition Diagnosis:   Inadequate oral intake related to impaired respiratory function as evidenced by NPO or clear liquid status due to medical condition    Nutrition Interventions:   Food and/or Nutrient Delivery: Continue NPO (Monitor plans for start of 
[facility] coming tomorrow at 11am to meet with family. Son Marek was contacted by hospice regarding time.     .[facility] Palliative Care Coordinator  NANO Shaver, RN, [facility]    [phone]  
I have discussed with the spouse the rationale for blood component transfusion; its benefits in treating or preventing fatigue, organ damage, or death; and its risk which includes mild transfusion reactions, rare risk of blood borne infection, or more serious but rare reactions. I have discussed the alternatives to transfusion, including the risk and consequences of not receiving transfusion. The spouse had an opportunity to ask questions and had agreed to proceed with transfusion of blood components.    Rylee Street DO  4/5/2024 12:33PM    
INTENSIVE CARE UNIT  Resident Physician Progress Note    Patient - Clemente Garcia  Date of Admission -  3/30/2024  4:41 AM  Date of Evaluation -  4/10/2024  Room and Bed Number -  3024/3024-01   Hospital Day - 11    HPI:     Clemente Garcia is a 63 y.o. female with a past medical history of hypothyroidism, chronic hypoxic respiratory failure with home oxygen between 2-5L, and  liver cirrhosis getting paracentesis presented to [place] emerg[facility] department on 3/29 from her [facility] after an episode of loss of consciousness. Concern for possible syncopal episode and patient was noted to have dyspnea and anasarca and was placed on BiPAP. Her work-up was concerning for hyperkalemia to 5.7, this was treated with insulin, dextrose, lasix and lokelma. Prior to transportation, patient's K was gone down to 4.9.     Patient was concerned that she will need IR paracentesis today which could not be done at [place] as IR is only available on Thursdays there. She was transferred here for IR evaluation. [place] was unable to discuss this case with IR during transfer. On chart review, also concern for aspiration episode by RN at [place] ER while she was wearing BiPAP.     At [place], hgb 7.3, chronically low. K 5.5->4.9. Ammonia 18. Troponin 102->117,  ABG 7.291/69.7/76.8/33.6, BNP significantly elevated.     3/30: thoracentesis completed with improvement in aeration of the left lung  3/31: Acute worsening of mentation, ammonia normal, mildly hypercapnic, placed on BiPAP  4/1: paracentesis, 2700cc off, palliative discussion for goals of care  4/2: DVT US negative, no change in mentation  4/3: oriented to self only, anxious. Nephro started lasix drip. Continue lactulose and rifaximin  4/4: mentation about the same, family at bedside said this is her baseline   4/5: 1 unit of PRBC received, HB now 7.7. Previously 6.9  4/6: Agitation, Zyprexa and Precedex drip, off lasix drip  4/7 remains agitated intermittently, on Precedex, put 
INTENSIVE CARE UNIT  Resident Physician Progress Note    Patient - Clemente Garcia  Date of Admission -  3/30/2024  4:41 AM  Date of Evaluation -  4/2/2024  Room and Bed Number -  3024/3024-01   Hospital Day - 3  Chief Complaint   Patient presents with    Back Pain    Abdominal Pain     Stomach and back pain after unconscious episode.       HPI:     Clemente Garcia is a 63 y.o. female with a past medical history of hypothyroidism, chronic hypoxic respiratory failure with home oxygen between 2-5L, and liver disease presented to [place] emerg[facility] department from her [facility] after an episode of loss of consciousness. Concern for possible syncopal episode and patient was noted to have dyspnea and anasarca and was placed on BiPAP. Her work-up was concerning for hyperkalemia to 5.7, this was treated with insulin, dextrose, lasix and lokelma. Prior to transportation, patient's K was gone down to 4.9.     Patient was concerned that she will need IR paracentesis today which could not be done at [place] as IR is only available on Thursdays there. She was transferred here for IR evaluation. [place] was unable to discuss this case with IR during transfer. On chart review, also concern for aspiration episode by RN at [place] ER while she was wearing BiPAP.     At [place], hgb 7.3, chronically low. K 5.5->4.9. Ammonia 18. Troponin 102->117,  ABG 7.291/69.7/76.8/33.6, BNP significantly elevated.    3/30: thoracentesis completed with improvement in aeration of the left lung  3/31: Acute worsening of mentation, ammonia normal, mildly hypercapnic, placed on BiPAP  4/1: paracentesis, 2700cc off, palliative discussion for goals of care    SUBJECTIVE:     OVERNIGHT EVENTS:    Improved mentation on BiPAP most of the night, given lasix, albumin added  for s/p paracentesis. EEG showing moderate diffuse encephalopathy    TODAY:  oriented to self only, complaining of left leg pain, does have some swelling of LLE.    AWAKE & FOLLOWING 
INTENSIVE CARE UNIT  Resident Physician Progress Note    Patient - Clemente Garcia  Date of Admission -  3/30/2024  4:41 AM  Date of Evaluation -  4/3/2024  Room and Bed Number -  3024/3024-01   Hospital Day - 4  Chief Complaint   Patient presents with    Back Pain    Abdominal Pain     Stomach and back pain after unconscious episode.       HPI:     Clemente Garcia is a 63 y.o. female with a past medical history of hypothyroidism, chronic hypoxic respiratory failure with home oxygen between 2-5L, and liver disease presented to [place] emerg[facility] department from her [facility] after an episode of loss of consciousness. Concern for possible syncopal episode and patient was noted to have dyspnea and anasarca and was placed on BiPAP. Her work-up was concerning for hyperkalemia to 5.7, this was treated with insulin, dextrose, lasix and lokelma. Prior to transportation, patient's K was gone down to 4.9.     Patient was concerned that she will need IR paracentesis today which could not be done at [place] as IR is only available on Thursdays there. She was transferred here for IR evaluation. [place] was unable to discuss this case with IR during transfer. On chart review, also concern for aspiration episode by RN at [facility] while she was wearing BiPAP.     At [facility], hgb 7.3, chronically low. K 5.5->4.9. Ammonia 18. Troponin 102->117,  ABG 7.291/69.7/76.8/33.6, BNP significantly elevated.    3/30: thoracentesis completed with improvement in aeration of the left lung  3/31: Acute worsening of mentation, ammonia normal, mildly hypercapnic, placed on BiPAP  4/1: paracentesis, 2700cc off, palliative discussion for goals of care  4/2: DVT US negative, no change in mentation    SUBJECTIVE:     OVERNIGHT EVENTS:    wants to get out of bed because there is a \"dead kitten in bed\" DVT US     TODAY:  oriented to self only, very anxious.     AWAKE & FOLLOWING COMMANDS:  [] No   [x] Yes    OBJECTIVE:     VITAL SIGNS:  BP (!) 119/52  
INTENSIVE CARE UNIT  Resident Physician Progress Note    Patient - Clemente Garcia  Date of Admission -  3/30/2024  4:41 AM  Date of Evaluation -  4/5/2024  Room and Bed Number -  3024/3024-01   Hospital Day - 6    HPI:     Clemente Garcia is a 63 y.o. female with a past medical history of hypothyroidism, chronic hypoxic respiratory failure with home oxygen between 2-5L, and liver disease presented to [facility] from her [facility] after an episode of loss of consciousness. Concern for possible syncopal episode and patient was noted to have dyspnea and anasarca and was placed on BiPAP. Her work-up was concerning for hyperkalemia to 5.7, this was treated with insulin, dextrose, lasix and lokelma. Prior to transportation, patient's K was gone down to 4.9.     Patient was concerned that she will need IR paracentesis today which could not be done at [facility] as IR is only available on Thursdays there. She was transferred here for IR evaluation. [facility] was unable to discuss this case with IR during transfer. On chart review, also concern for aspiration episode by RN at [facility] while she was wearing BiPAP.     At [facility], hgb 7.3, chronically low. K 5.5->4.9. Ammonia 18. Troponin 102->117,  ABG 7.291/69.7/76.8/33.6, BNP significantly elevated.     3/30: thoracentesis completed with improvement in aeration of the left lung  3/31: Acute worsening of mentation, ammonia normal, mildly hypercapnic, placed on BiPAP  4/1: paracentesis, 2700cc off, palliative discussion for goals of care  4/2: DVT US negative, no change in mentation  4/3: oriented to self only, anxious. Nephro started lasix drip. Continue lactulose and rifaximin  4/4: mentation about the same, family at bedside said this is her baseline     SUBJECTIVE:     OVERNIGHT EVENTS:    No overnight events    TODAY:  Pt examined, resting comfortably. She is sleepy but awakens and follows commands.     AWAKE & FOLLOWING COMMANDS:  [] No   [x] Yes 
INTENSIVE CARE UNIT  Resident Physician Progress Note    Patient - Clemente Garcia  Date of Admission -  3/30/2024  4:41 AM  Date of Evaluation -  4/8/2024  Room and Bed Number -  3024/3024-01   Hospital Day - 9    HPI:     Clemente Garcia is a 63 y.o. female with a past medical history of hypothyroidism, chronic hypoxic respiratory failure with home oxygen between 2-5L, and  liver cirrhosis getting paracentesis presented to [facility] on 3/29 from her [facility] after an episode of loss of consciousness. Concern for possible syncopal episode and patient was noted to have dyspnea and anasarca and was placed on BiPAP. Her work-up was concerning for hyperkalemia to 5.7, this was treated with insulin, dextrose, lasix and lokelma. Prior to transportation, patient's K was gone down to 4.9.     Patient was concerned that she will need IR paracentesis today which could not be done at [facility] as IR is only available on Thursdays there. She was transferred here for IR evaluation. [facility] was unable to discuss this case with IR during transfer. On chart review, also concern for aspiration episode by RN at [facility] ER while she was wearing BiPAP.     At [facility], hgb 7.3, chronically low. K 5.5->4.9. Ammonia 18. Troponin 102->117,  ABG 7.291/69.7/76.8/33.6, BNP significantly elevated.     3/30: thoracentesis completed with improvement in aeration of the left lung  3/31: Acute worsening of mentation, ammonia normal, mildly hypercapnic, placed on BiPAP  4/1: paracentesis, 2700cc off, palliative discussion for goals of care  4/2: DVT US negative, no change in mentation  4/3: oriented to self only, anxious. Nephro started lasix drip. Continue lactulose and rifaximin  4/4: mentation about the same, family at bedside said this is her baseline   4/5: 1 unit of PRBC received, HB now 7.7. Previously 6.9  4/6: Agitation, Zyprexa and Precedex drip, off lasix drip  4/7 remains agitated intermittently, on Precedex, put back 
MAGDIEL started patient hooked with MRI electrodes  
NEPHROLOGY PROGRESS NOTE      ASSESSMENT     Acute kidney injury nonoliguric secondary to prerenal injury from hypoperfusion related to decompensated cirrhosis and complicated further by contrast exposure -plateauing.  Possibility of HRS being entertained 2.  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  Investigations also disclosed acute kidney injury which prompted nephrology consultation.    Status post thoracocentesis and scheduled for abdominocentesis.  Neurology workup underway for encephalopathy.  Ammonia levels are normal.  On IV fluids.  Urine output decent.  Blood pressure stable on midodrine.  Renal function seems to be plateauing.    OBJECTIVE     Vitals:    04/01/24 1030 04/01/24 1100 04/01/24 1130 04/01/24 1200   BP: 107/60 107/81 111/61 110/81   Pulse: 70 68 69 72   Resp: 16 17 20 13   Temp: 97.5 °F (36.4 °C) 97.5 °F (36.4 °C) 97.5 °F (36.4 °C) 97.5 °F (36.4 °C)   TempSrc:    Bladder   SpO2: 92% 93% 93% 94%   Weight:       Height:         24HR INTAKE/OUTPUT:    Intake/Output Summary (Last 24 hours) at 4/1/2024 1245  Last data filed at 4/1/2024 1156  Gross per 24 hour   Intake 3040.9 ml   Output 1735 ml   Net 1305.9 ml       General appearance:in no acute distress  HEENT: PERRLA  Respiratory::vesicular breath sounds,no wheeze/crackles  Cardiovascular:S1 S2 normal,no gallop or organic murmur.  Abdomen: Distended  Extremities: No Cyanosis or Clubbing, present lower extremity edema  Neurological: Opens eyes on commands      MEDICATIONS 
NON INVASIVE VENTILATION  PROVIDE OPTIMAL VENTILATION/ACCEPTABLE SP02  IMPLEMENT NON INVASIVE VENTILATION PROTOCOL  ASSESSMENT SKIN INTEGRITY  PATIENT EDUCATION AS NEEDED  BIPAP AS NEEDED  
NON INVASIVE VENTILATION  PROVIDE OPTIMAL VENTILATION/ACCEPTABLE SP02  IMPLEMENT NON INVASIVE VENTILATION PROTOCOL  ASSESSMENT SKIN INTEGRITY  PATIENT EDUCATION AS NEEDED  BIPAP AS NEEDED  
Nephrology Progress Note      SUBJECTIVE        No acute events overnight     Sodium 141, K4.5, chloride 102, bicarb 25, BUN 65, creatinine 1.9    Received 1 unit PRBC overnight    Urine output documented at 1.1 L over the past 24 hours, she is +3 L since admission.    On examination patient is maintaining saturations on 5 L nasal cannula, hemodynamically stable, tolerating tube feeds via PEG tube.  She is alert oriented x 1, restless and agitated      Brief HPI:     63-year-old female with past medical history of hypothyroidism, chronic hypoxic respiratory failure, cirrhosis from a nursing facility after she had a syncopal episode with mental status changes.  She was also found to have bilateral pleural effusions L>R, neurology was following for altered mental state, encephalopathy due to metabolic reasons.       OBJECTIVE      CURRENT TEMPERATURE:  Temp: 97.9 °F (36.6 °C)  MAXIMUM TEMPERATURE OVER 24HRS:  Temp (24hrs), Av.2 °F (36.8 °C), Min:97.7 °F (36.5 °C), Max:99 °F (37.2 °C)    CURRENT RESPIRATORY RATE:  Respirations: 21  CURRENT PULSE:  Pulse: 81  CURRENT BLOOD PRESSURE:  BP: 136/63  24HR BLOOD PRESSURE RANGE:  Systolic (24hrs), Av , Min:122 , Max:156   ; Diastolic (24hrs), Av, Min:53, Max:124    24HR INTAKE/OUTPUT:    Intake/Output Summary (Last 24 hours) at 2024 0955  Last data filed at 2024 0900  Gross per 24 hour   Intake 2225.9 ml   Output 1435 ml   Net 790.9 ml       PHYSICAL EXAM      GENERAL APPEARANCE: Alert and oriented x 1, restless  SKIN: warm and dry, no rash or erythema  PULMONARY: clear to auscultation bilaterally- no wheezes, rales or rhonchi, normal air movement, no respiratory distress  CADRDIOVASCULAR: normal rate, regular rhythm, normal S1 and S2, no murmurs, rubs, clicks or gallops, distal pulses intact, no carotid bruits   ABDOMEN: soft, non-tender, non-distended, normal bowel sounds, no masses or organomegaly and soft nontender, bowel sounds present, no 
Nephrology Progress Note      SUBJECTIVE      Patient was seen and examined.  No acute events overnight     Sodium 142, potassium 5.7, chloride 105, bicarb 27, BUN 74, creatinine 2.2    Urine output documented at 290 mL over the past 24 hours, she is +5.8 liters since admission.  She is hyperkalemic this morning did receive 2 doses of insulin, and and 1 dose of Lokelma 10 this morning.  She also received Lasix 40 this morning    On examination patient is on BiPAP, she is lethargic, not following commands.  Tolerating tube feeds through her PEG tube    Patient was evaluated by palliative care as well and family decided to change the CODE STATUS to DNRCC with comfort measures to be initiated.    Brief HPI:     63-year-old female with past medical history of hypothyroidism, chronic hypoxic respiratory failure, cirrhosis from a nursing facility after she had a syncopal episode with mental status changes.  She was also found to have bilateral pleural effusions L>R, neurology was following for altered mental state, encephalopathy due to metabolic reasons.       OBJECTIVE      CURRENT TEMPERATURE:  Temp: 99.1 °F (37.3 °C)  MAXIMUM TEMPERATURE OVER 24HRS:  Temp (24hrs), Av.8 °F (37.1 °C), Min:97.5 °F (36.4 °C), Max:99.9 °F (37.7 °C)    CURRENT RESPIRATORY RATE:  Respirations: 15  CURRENT PULSE:  Pulse: 66  CURRENT BLOOD PRESSURE:  BP: (!) 90/48  24HR BLOOD PRESSURE RANGE:  Systolic (24hrs), Av , Min:87 , Max:142   ; Diastolic (24hrs), Av, Min:48, Max:83    24HR INTAKE/OUTPUT:    Intake/Output Summary (Last 24 hours) at 2024 1034  Last data filed at 2024 0700  Gross per 24 hour   Intake 2372.6 ml   Output 730 ml   Net 1642.6 ml         PHYSICAL EXAM      GENERAL APPEARANCE: Alert and oriented x 1, lethargic  SKIN: warm and dry, no rash or erythema  PULMONARY: clear to auscultation bilaterally- no wheezes, rales or rhonchi, normal air movement, no respiratory distress  CADRDIOVASCULAR: normal rate, 
Nephrology Progress Note      SUBJECTIVE    Following for JASPER Pre Renal with baseline 1-1.1  Brief HPI:     63-year-old female with past medical history of hypothyroidism, chronic hypoxic respiratory failure, cirrhosis from a nursing facility after she had a syncopal episode with mental status changes.  She was also found to have bilateral pleural effusions L>R, neurology was following for altered mental state, encephalopathy due to metabolic reasons.   She is s/p thoracentesis 3/30, paracentesis .    Patient on Torsemide 20mg and aldactone 25mg  Labs today: Na 139, K 5.4, Cl 104, CO2 27, BUN 67, Cr 1.9.  She received Lokelma 10grams X1  Urine output 705, 900 stool.  + 4 liters since admission  Remains on Oxygen mask.  She is confused hollering out, restless and agitated.  Going up on precedex.  Has tube feed at 65/hr.  Family at bedside      OBJECTIVE      CURRENT TEMPERATURE:  Temp: 99.5 °F (37.5 °C)  MAXIMUM TEMPERATURE OVER 24HRS:  Temp (24hrs), Av.4 °F (36.9 °C), Min:96.8 °F (36 °C), Max:99.5 °F (37.5 °C)    CURRENT RESPIRATORY RATE:  Respirations: 22  CURRENT PULSE:  Pulse: 91  CURRENT BLOOD PRESSURE:  BP: 109/68  24HR BLOOD PRESSURE RANGE:  Systolic (24hrs), Av , Min:94 , Max:148   ; Diastolic (24hrs), Av, Min:56, Max:101    24HR INTAKE/OUTPUT:    Intake/Output Summary (Last 24 hours) at 2024 1054  Last data filed at 2024 1036  Gross per 24 hour   Intake 2494.87 ml   Output 1430 ml   Net 1064.87 ml         PHYSICAL EXAM      GENERAL APPEARANCE: Confused agitated  SKIN: redness noted  PULMONARY: clear anteriorly, coarse in bases  CADRDIOVASCULAR: normal rate, regular rhythm, normal S1 and S2, no murmurs, rubs, clicks or gallops, distal pulses intact, no carotid bruits   ABDOMEN: soft, non-tender, non-distended, normal bowel sounds, no masses or organomegaly and soft nontender, bowel sounds present, no organomegaly,  no ascites  EXTREMITIES: no cyanosis, clubbing or edema    CURRENT 
Physical Therapy        Physical Therapy Cancel Note      DATE: 2024    NAME: Clemente Garcia  MRN: [id number]   : 1960      Patient not seen this date for Physical Therapy due to:    Blood Transfusion:  Will await conclusion of blood transfusion prior to PT evaluation.      Electronically signed by Fausto Echavarria PT on 2024 at 2:43 PM     
Pt arrives to room for para  JR PISANO and RS RDMS to bedside  Site prepped and draped  Access obtained and draining dark yellow     Specimen obtained  2.7L removed  Access removed and site covered with dsd  Return to floor with RN  
Renal Progress Note    Patient :  Clemente Garcia; 63 y.o. MRN# [id number]  Location:  3024/3024-01  Attending:  Jose Sharma MD  Admit Date:  3/30/2024   Hospital Day: 4    Subjective:     Patient was seen and examined.  No new issues reported overnight.    Patient did get paracentesis done by IR on 4/1/2024 got about 2.7 L of dark yellow fluid removed.    BMP results from today reviewed sodium 136, potassium 4.2, chloride 100, bicarb 24, calcium 8.4, BUN 60, creatinine 2.0 mg/dl.  Urine output documented as about 942 in the last 24 hours.    Continue Lasix drip and did receive albumin.  Outpatient Medications:     Medications Prior to Admission: DULoxetine (CYMBALTA) 20 MG extended release capsule, 1 capsule by Per G Tube route daily  torsemide 40 MG TABS, Take 40 mg by mouth daily Per peg tube  lacosamide (VIMPAT) 10 MG/ML SOLN oral solution, 10 mLs by Per G Tube route 2 times daily for 30 days. Max Daily Amount: 200 mg  fentaNYL (DURAGESIC) 12 MCG/HR, Place 1 patch onto the skin every 3 days for 9 days. Intended supply: 30 days Max Daily Amount: 1 patch  ammonium lactate (LAC-HYDRIN) 12 % lotion, Apply 1 Bottle topically as needed for Dry Skin Apply topically for dry skin  aspirin (ASPIRIN 81) 81 MG EC tablet, Take 1 tablet by mouth daily Via PEG-tube  LORazepam (ATIVAN) 0.5 MG tablet, 1 tablet by PEG Tube route every 6 hours as needed for Anxiety.  vitamin D (CHOLECALCIFEROL) 98998 UNIT CAPS, Take 1 capsule by mouth daily Via PEG-tube  ferrous sulfate (IRON 325) 325 (65 Fe) MG tablet, 1 tablet by PEG Tube route daily  Vitamin D3 125 MCG (5000 UT) TABS tablet, 1 tablet by PEG Tube route daily  pantoprazole (PROTONIX) 40 MG tablet, Take 1 tablet by mouth daily Via PEG-tube  gabapentin (NEURONTIN) 100 MG capsule, 1 capsule by PEG Tube route every 8 hours as needed (neuropathic pain) for up to 30 days.  thiamine mononitrate (THIAMINE) 100 MG tablet, Take 1 tablet by mouth daily (Patient taking differently: 1 
Renal Progress Note    Patient :  Clemente Garcia; 63 y.o. MRN# [id number]  Location:  3024/3024-01  Attending:  Jose Sharma MD  Admit Date:  3/30/2024   Hospital Day: 1    Subjective:     Patient was seen and examined.  Some hypoglycemia overnight and also drop in hemoglobin to 6.8 requiring blood transfusion.  Currently on D5W at 50 cc an hour.    BMP results from today reviewed sodium 134, potassium 3.9, chloride 98, bicarb 27, calcium 8.5, BUN 62, creatinine 2.1 mg/dl.  Urine output documented as about 825 cc in the last 24 hours.  Diuretics currently on hold.  Has been having hypotension with systolics in low 90s.    Status post left-sided thoracentesis 3/30/2023 with drainage of about 1200 cc of serosanguineous fluid.  Outpatient Medications:     Medications Prior to Admission: DULoxetine (CYMBALTA) 20 MG extended release capsule, 1 capsule by Per G Tube route daily  torsemide 40 MG TABS, Take 40 mg by mouth daily Per peg tube  lacosamide (VIMPAT) 10 MG/ML SOLN oral solution, 10 mLs by Per G Tube route 2 times daily for 30 days. Max Daily Amount: 200 mg  fentaNYL (DURAGESIC) 12 MCG/HR, Place 1 patch onto the skin every 3 days for 9 days. Intended supply: 30 days Max Daily Amount: 1 patch  ammonium lactate (LAC-HYDRIN) 12 % lotion, Apply 1 Bottle topically as needed for Dry Skin Apply topically for dry skin  aspirin (ASPIRIN 81) 81 MG EC tablet, Take 1 tablet by mouth daily Via PEG-tube  LORazepam (ATIVAN) 0.5 MG tablet, 1 tablet by PEG Tube route every 6 hours as needed for Anxiety.  vitamin D (CHOLECALCIFEROL) 29279 UNIT CAPS, Take 1 capsule by mouth daily Via PEG-tube  ferrous sulfate (IRON 325) 325 (65 Fe) MG tablet, 1 tablet by PEG Tube route daily  Vitamin D3 125 MCG (5000 UT) TABS tablet, 1 tablet by PEG Tube route daily  pantoprazole (PROTONIX) 40 MG tablet, Take 1 tablet by mouth daily Via PEG-tube  gabapentin (NEURONTIN) 100 MG capsule, 1 capsule by PEG Tube route every 8 hours as needed 
SPIRITUAL HEALTH - [facility]  PROGRESS NOTE    Shift date: 4/8/2024   Shift day: Monday   Shift # 1    Room # 3024/3024-01   Name: Clemente Garcia                Denominational: Yarsani   Place of Mu-ism:     Referral:  Request from palliative care to family after code status change    Admit Date & Time: 3/30/2024  4:41 AM    Assessment:  Clemente Garcia is a 63 y.o. female in the hospital because acute hypoxic respiratory failure. Upon entering the room writer observes family present and appearing calm and to be coping.      Intervention:  Writer introduced self and title as  Writer offered space for family  to express feelings, needs, and concerns and provided a ministry presence. They expressed that they planned to have hospice care for her in one of their homes as they did not want her to die in a facility. Their mendez appeared to be an important source of support for them.  provided support through active listening and words of affirmation, and assured them of prayers.     Outcome:  Family was receptive to spiritual health visit and expressed gratitude for support.     Plan:  Chaplains will remain available to offer spiritual and emotional support as needed.      Electronically signed by Chaplain Suni, on 4/8/2024 at 1:45 PM.  Spiritual Health  [facility]  [phone]     
SURGERY      Gastric bypass with stimulator    HYSTERECTOMY (CERVIX STATUS UNKNOWN)      UPPER GASTROINTESTINAL ENDOSCOPY N/A 2024    EGD BIOPSY performed by Taqueria Mckoy MD at [facility] OR        Medications during admission:      rifAXIMin  550 mg Oral BID    fentaNYL  1 patch TransDERmal Q72H    lacosamide (VIMPAT) 100 mg in sodium chloride 0.9 % 60 mL IVPB  100 mg IntraVENous BID    midodrine  10 mg PEG Tube TID    sodium chloride flush  5-40 mL IntraVENous 2 times per day    [Held by provider] aspirin  81 mg Oral Daily    DULoxetine  20 mg Oral Daily    ferrous Sulfate  300 mg Per G Tube Daily    folic acid  1 mg PEG Tube Daily    [Held by provider] lactulose  20 g PEG Tube TID    levothyroxine  150 mcg PEG Tube Daily    [Held by provider] spironolactone  25 mg PEG Tube BID    [Held by provider] torsemide  40 mg Oral Daily    venlafaxine  75 mg PEG Tube BID    thiamine  100 mg Oral Daily    meropenem  1,000 mg IntraVENous Q12H    pantoprazole (PROTONIX) 40 mg in sodium chloride (PF) 0.9 % 10 mL injection  40 mg IntraVENous Daily         Physical Exam:   /61   Pulse 69   Temp 97.5 °F (36.4 °C)   Resp 20   Ht 1.6 m (5' 2.99\")   Wt 84 kg (185 lb 3 oz)   SpO2 93%   BMI 32.81 kg/m²   Temp (24hrs), Av.6 °F (36.4 °C), Min:97 °F (36.1 °C), Max:98.1 °F (36.7 °C)          Neurological examination:    Mental status   Alert and oriented to self, location; following basic commands; speech is hesitant.  No obvious dysarthria noted.     Cranial nerves   II - visual fields intact to confrontation; pupils reactive  III, IV, VI - extraocular muscles intact; no LOUIS; no nystagmus; no ptosis   V - normal facial sensation                                                               VII - normal facial symmetry                                                             VIII - intact hearing                                                                             IX, X - symmetrical palate elevation  
TUBE FEEDING; PEG; Standard without Fiber; Continuous; 20; Yes; 20; Q 4 hours; 65; 60; Q 4 hours  Current Tube Feeding (TF) Orders:  Feeding Route: PEG  Formula: Standard without Fiber  Schedule: Continuous  Feeding Regimen: Standard without Fiber (Osmolite 1.2) goal 65 mL/hr x22hrs = 1716 kcal, 79 gm protein, 1173mL free water  Additives/Modulars:    Water Flushes: 60ml water q6hrs= 360ml/d  Current TF & Flush Orders Provides: 1716 kcal, 79 gm protein, 1533mL free water  Goal TF & Flush Orders Provides: Nepro at 45 mL/hr x22hrs, 80ml flush q4hrs = 1752 kcal, 80 gm protein, 720mL free water (1200ml total with suggested flushes)    Anthropometric Measures:  Height: 160 cm (5' 2.99\")  Ideal Body Weight (IBW): 115 lbs (52 kg)    Admission Body Weight: 82.6 kg (182 lb 1.6 oz)  Current Body Weight: 83.8 kg (184 lb 11.9 oz), 160.6 % IBW. Weight Source: Bed Scale  Current BMI (kg/m2): 32.7        Weight Adjustment For:  (EDW 82.6kg (BMI 32.26))                 BMI Categories: Obese Class 1 (BMI 30.0-34.9)    Estimated Daily Nutrient Needs:  Energy Requirements Based On: Formula  Weight Used for Energy Requirements: Other (Comment) (EDW)  Energy (kcal/day): 6919-0858 kcals (MSJ x1.1AF, x1.1-1.2SF)  Weight Used for Protein Requirements: Ideal (EDW)  Protein (g/day): 78-94 (1.5-1.8g/kg IBW, obese and cirrhosis)  Method Used for Fluid Requirements: ml/Kg  Fluid (ml/day): 1652-2065ml (20-25ml/kg EDW)    Nutrition Diagnosis:   Inadequate oral intake related to cognitive or neurological impairment, swallowing difficulty as evidenced by nutrition support - enteral nutrition, NPO or clear liquid status due to medical condition    Nutrition Interventions:   Food and/or Nutrient Delivery: Modify Tube Feeding  Nutrition Education/Counseling: No recommendation at this time  Coordination of Nutrition Care: Continue to monitor while inpatient  Plan of Care discussed with: RN- Adriana    Goals:  Previous Goal Met: Goal(s) Achieved  Goals: 
commands      MEDICATIONS     Scheduled Meds:    rifAXIMin  550 mg Oral BID    fentaNYL  1 patch TransDERmal Q72H    lacosamide (VIMPAT) 100 mg in sodium chloride 0.9 % 60 mL IVPB  100 mg IntraVENous BID    midodrine  10 mg PEG Tube TID    sodium chloride flush  5-40 mL IntraVENous 2 times per day    [Held by provider] aspirin  81 mg Oral Daily    DULoxetine  20 mg Oral Daily    ferrous Sulfate  300 mg Per G Tube Daily    folic acid  1 mg PEG Tube Daily    [Held by provider] lactulose  20 g PEG Tube TID    levothyroxine  150 mcg PEG Tube Daily    [Held by provider] spironolactone  25 mg PEG Tube BID    [Held by provider] torsemide  40 mg Oral Daily    venlafaxine  75 mg PEG Tube BID    thiamine  100 mg Oral Daily    meropenem  1,000 mg IntraVENous Q12H    pantoprazole (PROTONIX) 40 mg in sodium chloride (PF) 0.9 % 10 mL injection  40 mg IntraVENous Daily     Continuous Infusions:    dextrose 5 % and 0.9 % NaCl 50 mL/hr at 04/02/24 0829    sodium chloride 10 mL/hr at 04/02/24 0829    dextrose       PRN Meds:  LORazepam, sodium chloride flush, sodium chloride, potassium chloride **OR** potassium chloride, magnesium sulfate, ondansetron **OR** ondansetron, polyethylene glycol, acetaminophen **OR** acetaminophen, ammonium lactate, [Held by provider] gabapentin, glucose, dextrose bolus **OR** dextrose bolus, glucagon (rDNA), dextrose  Home Meds:                Medications Prior to Admission: DULoxetine (CYMBALTA) 20 MG extended release capsule, 1 capsule by Per G Tube route daily  torsemide 40 MG TABS, Take 40 mg by mouth daily Per peg tube  lacosamide (VIMPAT) 10 MG/ML SOLN oral solution, 10 mLs by Per G Tube route 2 times daily for 30 days. Max Daily Amount: 200 mg  fentaNYL (DURAGESIC) 12 MCG/HR, Place 1 patch onto the skin every 3 days for 9 days. Intended supply: 30 days Max Daily Amount: 1 patch  ammonium lactate (LAC-HYDRIN) 12 % lotion, Apply 1 Bottle topically as needed for Dry Skin Apply topically for dry 
fentaNYL (DURAGESIC) 12 MCG/HR Place 1 patch onto the skin every 3 days for 9 days. Intended supply: 30 days Max Daily Amount: 1 patch 3 patch 0    ammonium lactate (LAC-HYDRIN) 12 % lotion Apply 1 Bottle topically as needed for Dry Skin Apply topically for dry skin      aspirin (ASPIRIN 81) 81 MG EC tablet Take 1 tablet by mouth daily Via PEG-tube      LORazepam (ATIVAN) 0.5 MG tablet 1 tablet by PEG Tube route every 6 hours as needed for Anxiety.      vitamin D (CHOLECALCIFEROL) 16611 UNIT CAPS Take 1 capsule by mouth daily Via PEG-tube      ferrous sulfate (IRON 325) 325 (65 Fe) MG tablet 1 tablet by PEG Tube route daily      Vitamin D3 125 MCG (5000 UT) TABS tablet 1 tablet by PEG Tube route daily      pantoprazole (PROTONIX) 40 MG tablet Take 1 tablet by mouth daily Via PEG-tube      gabapentin (NEURONTIN) 100 MG capsule 1 capsule by PEG Tube route every 8 hours as needed (neuropathic pain) for up to 30 days. 90 capsule 0    thiamine mononitrate (THIAMINE) 100 MG tablet Take 1 tablet by mouth daily (Patient taking differently: 1 tablet by PEG Tube route daily)  0    levothyroxine (SYNTHROID) 150 MCG tablet 1 tablet by PEG Tube route Daily 30 tablet 3    midodrine (PROAMATINE) 5 MG tablet Take 1 tablet by mouth in the morning, at noon, and at bedtime (Patient taking differently: 1 tablet by PEG Tube route in the morning, at noon, and at bedtime) 90 tablet 3    rifAXIMin (XIFAXAN) 550 MG tablet 1 tablet by PEG Tube route in the morning and at bedtime 42 tablet     lactulose (CHRONULAC) 10 GM/15ML solution 30 mLs by PEG Tube route 3 times daily Hold if more then 3 stools daily  1    spironolactone (ALDACTONE) 25 MG tablet Take 1 tablet by mouth in the morning and 1 tablet in the evening. (Patient taking differently: 1 tablet by PEG Tube route in the morning and 1 tablet in the evening.) 30 tablet 3    folic acid (FOLVITE) 1 MG tablet 1 tablet by PEG Tube route daily 30 tablet 3    bisacodyl (DULCOLAX) 10 MG 
cirrhosis  Peripheral neuropathy  S/p Peg tube placement    Plan:     Continue Vimpat 100 mg BID  Seizure precautions   Treatment of metabolic derangements as per critical care team  Palliative care on board; prognosis guarded   Will follow       Electronically signed by Narayan Mendoza DO on 4/2/2024 at 3:45 PM      Narayan Mendoza DO  [facility] [place]  Neurology    
MG tablet, 2 tablets by PEG Tube route every 6 hours as needed for Pain For pain/fever  venlafaxine (EFFEXOR) 75 MG tablet, 1 tablet by PEG Tube route 2 times daily    INVESTIGATIONS     Last 3 CMP:    Recent Labs     04/03/24 0155 04/04/24 0424 04/05/24  1048    139 140   K 4.2 4.3 4.3    101 101   CO2 24 27 26   BUN 60* 60* 63*   CREATININE 2.0* 1.9* 1.9*   CALCIUM 8.4* 8.6 8.7   PROT 7.4  --   --    LABALBU 3.5  --   --    BILITOT 0.4  --   --    ALKPHOS 138*  --   --    AST 32  --   --    ALT <5*  --   --        Last 3 CBC:  Recent Labs     04/03/24 0155 04/03/24 0416 04/03/24  1009 04/04/24 0424 04/05/24  1048   WBC 6.2  --   --  5.9 5.9   RBC 2.13*  --   --  2.25* 2.10*   HGB 6.8*   < > 7.5* 7.1* 6.8*   HCT 21.5*   < > 23.7* 22.8* 21.8*   .9  --   --  101.3 103.8*   MCH 31.9  --   --  31.6 32.4   MCHC 31.6  --   --  31.1 31.2   RDW 16.1*  --   --  16.3* 16.5*     --   --  199 196   MPV 9.9  --   --  9.6 9.9    < > = values in this interval not displayed.       Please do not hesitate to call with questions    This note is created with the assistance of a speech-recognition program. While intending to generate a document that actually reflects the content of the visit, no guarantees can be provided that every mistake has been identified and corrected by editing    Vincent Carter MD MD, [facility] (), FACP   4/5/2024 12:59 PM  [facility]  
01:42 PM    BILIRUBINUR NEGATIVE 03/30/2024 01:42 PM    GLUCOSEU NEGATIVE 03/30/2024 01:42 PM    KETUA NEGATIVE 03/30/2024 01:42 PM       HgBA1c:    Lab Results   Component Value Date/Time    LABA1C 4.9 02/20/2024 05:44 AM     TSH:    Lab Results   Component Value Date/Time    TSH 14.70 03/30/2024 09:29 AM     Lactic Acid:   Lab Results   Component Value Date/Time    LACTA 0.7 03/29/2024 05:25 PM    LACTA 0.7 03/21/2024 08:48 PM      Troponin: No results for input(s): \"TROPONINI\" in the last 72 hours.    XR CHEST (SINGLE VIEW FRONTAL) [[id number]]    Collected: 03/30/24 1759    Updated: 03/30/24 1803    Narrative:     EXAMINATION:  ONE XRAY VIEW OF THE CHEST    3/30/2024 5:26 pm    COMPARISON:  None.    HISTORY:  Thoracentesis left    FINDINGS:  *Greatly decreased volume left pleural effusion compared to prior study with  improved pneumatization mid and lower lung.  *Persistent left pleural effusion with left basilar opacity.  * Congestive heart failure is most likely given the radiographic findings;  pneumonia is also a consideration in areas of consolidation with pleural  effusion.  *Cardiomegaly.  *No acute osseous abnormality.   Impression:     1. Greatly decreased volume left pleural effusion compared to prior study  with improved pneumatization mid and lower lung.  2. Persistent left pleural effusion with left basilar opacity.  3.  Congestive heart failure is most likely given the radiographic findings;  pneumonia is also a consideration in areas of consolidation with pleural  effusion.  4. Cardiomegaly.         ASSESSMENT:     Patient Active Problem List    Diagnosis Date Noted    Syncope 03/31/2024    Abnormal involuntary movements 03/31/2024    History of ETOH abuse 03/31/2024    Acute hypoxic respiratory failure (HCC) 03/30/2024    Arterial hypotension 03/30/2024    Acute renal failure with tubular necrosis (HCC) 03/25/2024    Pleural effusion, left 03/24/2024    Urinary tract infection due to ESBL 
chronic cirrhosis, currently does not follow with nephrology.  Baseline creatinine now seems to be around 1.7-1.9 mg/dl.  Serology 3/22/2024 MANUEL, ANCA, SPEP, immunofixation all negative.  UPC 2.95.  3.  Acute metabolic encephalopathy  4.  Bilateral pleural effusion left greater than right.  5.  UTI with history of ESBL Klebsiella.  6.  Cirrhosis of the liver portal hypertension- secondary to etoh abuse sober since 1/8/24.   8. Hypotension - had been on midodine at home   9. Hyperkalemia secondary to #1, decreased distal sodium delivery, aldactone usage at home  10.  Ascites - paracentesis prn  11. Pleural effusions - s/p thoracentesis  12. Hx seizure on vimpat   13. NSTEMI EF 60-65%  15. PEG and gastric stimulator  16. Anemia  17. Chronic hypoxic respiratory failure on home oxygen.    PLAN      Continue Lasix gtt., keep urine output 100-150 mL/hr  Monitor intake and output  Continue midodrine 10 mg 3 times daily  BMP in a.m.  Will continue to follow      Please do not hesitate to call with questions.    Electronically signed by Ayaan Wise MD on 4/4/2024 at 10:34 AM     Attending Physician Statement  I have discussed the care of Clemente Garcia, including pertinent history and exam findings,  with the Fellow/Residentt. I have reviewed the key elements of all parts of the encounter with the Fellow/ Resident.  I agree with the assessment, plan and orders as documented by the resident.    Vincent Carter MD MD, MRCP ([place]), FACP   4/4/2024 5:09 PM    [facility]  
Heparin Subcut [] EPC Cuffs    NUTRITION:  [x] NPO [x] Tube Feeding [] TPN  [] PO    HOME MEDS RECONCILED: [] No  [x] Yes    CONSULTATION NEEDED:  [] No  [x] Yes    FAMILY UPDATED:    [] No  [x] Yes    TRANSFER OUT OF ICU:   [x] No  [] Yes        Plan:  Neurologic:   Hx of seizures  Acute metabolic encephalopathy  CT head 3/30 normal  Vimpat 100mg BID  Seizure precautions     Neurology signed off   Home folic acid, Cymbalta, Effexor, Fentanyl patch continued   Gabapentin prn    Cardiovascular:  EF 60-65%  Midodrine 10mg TID  BLE duplex negative  MAP goal > 65  Pressors : none     Pulmonary:  Acute on chronic hypoxic and hypercapneic respiratory failure    CT PE 3/30 : no PE, large left-sided pleural effusion, small right-sided effusion, compressive atelectasis  S/p thoracentesis 3/30, exudative process  Pleural cytology pending  3L NC at home, BiPAP at night      Renal/Fluid/Electrolyte:  CKD    Hyperkalemia, resolved  Nephrology following   Off Lasix gtt, on torsemide 20, Aldactone 25 twice daily    Lab Results   Component Value Date/Time     04/07/2024 03:52 AM    K 5.4 (H) 04/07/2024 03:52 AM     04/07/2024 03:52 AM    CO2 27 04/07/2024 03:52 AM    BUN 67 (H) 04/07/2024 03:52 AM    CREATININE 1.9 (H) 04/07/2024 03:52 AM       Intake/Output Summary (Last 24 hours) at 4/7/2024 0622  Last data filed at 4/7/2024 0444  Gross per 24 hour   Intake 2587.44 ml   Output 1205 ml   Net 1382.44 ml       GI/Nutrition:  Hx PEG w/ gastric stimulator  Liver cirrhosis requiring paracentesis    ADULT TUBE FEEDING; PEG; Standard without Fiber; Continuous; 20; Yes; 20; Q 4 hours; 65; 60; Q 4 hours  Ammonia WNL   Continue home lactulose, rifaximin    CT 3/30 w/ ascites, nonspecific distention of bowel   S/p paracentesis 4/1   FMS in place, moderate diarrhea     ID:   Ascitic fluid consistent w/ SBP, cultures with no growth   Abx : meropenem, home rifaximin   Urine cx NGTD   Blood cx NGTD   Contact precautions for hx of 
slightly worse than yesterday, still obeys commands, verbalization poor     CV:   History of Ef 60-65%  Troponins roughly at baseline  BNP somewhat above baseline at 14,805  Continue home midodrine     Respiratory:   Acute on Chronic hypoxic and hypercapnic respiratory failure on 3LPM at home  Currently saturating well on home O2 level, previously required BIPAP  CT PE showing no PE, large left-sided effusion and small right-sided effusion with adjacent compressive atelectasis  SP thoracentesis 3/30  Fluid analysis pending     GI:   History of PEG and gastric stimulator, liver cirrhosis requiring paracentesis  LFTs unremarkable  Ammonia WNL 18  Continue lactulose  CT abd with ascites and nonspecific distension of the bowel  May consider paracentesis in the future      FEN/:  CKD  CR 2.1 BUN 62, K 3.9   Received lokelma and hyperkalemia treatment, 4.9 post, repeat pending    Nephrology following  D5 infusion at 50m/L  I/O 450/825  Holding home aldactone     Hematologic:   Chronic normocytic anemia  HBG 6.8 Platelet 224  Recheck at noon     Endocrine:   History of hypothyroidism  Continue home synthroid  Hypoglycemia overnight, improved with D5, tube feeds intiatated     ID:   Afebrile with normal WBC  Urine likely contaminated from long term indwelling montejo  Will exchange and repeat UA  Previous UA with bacteria and yeast  Just finished treatment with levaquin, was recently discharged from hospital for ESBL UTI  Lactic acid negative  Monitor on meropenum until cultures return  UTI may be contributing to AMS     Other: Remain in ICU    WEAN PER PROTOCOL:  [] No   [] Yes  [x] N/A    ICU PROPHYLAXIS:  Stress ulcer:  [x] PPI Agent  [] [id number] [] Sucralfate  [] Other:  VTE:   [] Enoxaparin  [] Unfract. Heparin Subcut  [x] EPC Cuffs    NUTRITION:  [] NPO [x] Tube Feeding (Specify: ) [] TPN  [] PO    HOME MEDS RECONCILED: [] No  [x] Yes    CONSULTATION NEEDED:  [] No  [x] Yes    FAMILY UPDATED:    [] No  [x] 
   Normal sinus rhythm  Normal ECG  When compared with ECG of 29-MAR-2024 14:55,  No significant change was found          Assessment        REVIEW OF SYSTEMS    [x]   UNABLE TO OBTAIN:     Constitutional:  []   Chills   []  Fatigue   []  Fevers   []  Malaise   []  Weight loss   [] Other:     Respiratory:   []  Cough    []  Shortness of breath    []  Chest pain    [] Other:     Cardiovascular:   []  Chest pain  []  Dyspnea    []  Exertional chest pressure/discomfort     [] Fatigue      []  Palpitations    []  Syncope   [] Other:     Gastrointestinal:   []  Abdominal pain   []  Constipation    []  Diarrhea    []   Dysphagia   []  Reflux             []  Vomiting   [] Other:     Genitourinary:  []  Dysuria     []  Frequency   []  Hematuria   [] Nocturia   []  Urinary incontinence   [] Other:     Musculoskeletal:   [] Back pain    []  Muscle weakness   []  Myalgias    []  Neck pain   []  Stiff joints   []  Other:     Behavioral/Psych:   [] Anxiety    []  Depression     []  Mood swings   [] Other:     PHYSICAL ASSESSMENT:     General: []  Oriented x3      [] well appearing      [] Intubated      [] ill appearing      [] Other:    Mental Status: [] normal mental status exam      [] drowsy      [x] Confused      [] Other:     Cardiovascular: [x]  Regular rate/rhythm      [] Arrhythmia      [] Other:     Chest: [x] Effort normal      [] lungs clear      [] respiratory distress      [] Tachypnea      []  Other:    Abdomen: [x] Soft/non-tender      []  Normal appearance      [] Distended      [] Ascites      [] Other:    Neurological: [x] Normal Speech      [] Normal Sensation      []  Deficits present:      Extremity:  [x] normal skin color/temp      [] clubbing/cyanosis      []  No edema      [] Other:     Palliative Performance Scale:  ___60%  Ambulation reduced; Significant disease; Can't do hobbies/housework; intake normal or reduced; occasional assist; LOC full/confusion  _x__50%  Mainly sit/lie; Extensive disease; 
care of Clemente Garcia, including pertinent history and exam findings with the resident. I have reviewed the key elements of all parts of the encounter with the resident. I have seen and examined the patient with the resident.  I agree with the assessment and plan and status of the problem list as documented.    I saw the patient during the rounds today, chart reviewed overnight events noted.  Patient received 1 dose of Ativan apparently she was restless night currently she is lethargic she does not follow commands intermittently eyes opening restless.  She is on lactulose and she is on rifaximin and will continue as she is having bowel movement.  She is on Lasix drip and her urine output is 2.4 L last 24 hours  Her hemoglobin is 7.1 BUN is 60 bicarbonate is 27.  Will check ammonia level.  Continue lactulose and rifaximin.  She is on bipap last night  Will follow-up neurological status and mentation.  No benzodiazepine.  With use Seroquel at night in case needed.    Discussed with nursing staff, treatment and plan discussed.  Discussed with respiratory therapist.    Total critical care time caring for this patient with life threatening, unstable organ failure, including direct patient contact, management of life support systems, review of data including imaging and labs, discussions with other team members and physicians at least 30  Min so far today, excluding procedures.       Please note that this chart was generated using voice recognition Dragon dictation software. Although every effort was made to ensure the accuracy of this automated transcription, some errors in transcription may have occurred.     Amilcar Busch MD  4/4/2024 1:50 PM   
noted chart reviewed.  Patient is hemodynamically stable she is very restless and agitated this morning received Zyprexa and started on Precedex doing better while she was started on Zyprexa and Precedex drip.  Her BUN is 63 creatinine is 1.9 she is off Lasix drip she is on torsemide.  Urine output was 1.15 L in last 24 hours.  She had a drop in hemoglobin 6.9 she had received 1 unit packed RBC her hemoglobin is 7.7 now.  She is currently on tube feeding her blood sugar  Will monitor her mentation neurological status.  Will continue with meropenem.  Will continue with lactulose and rifaximin.    Discussed with nursing staff, treatment and plan discussed.  Discussed with respiratory therapist.    Total critical care time caring for this patient with life threatening, unstable organ failure, including direct patient contact, management of life support systems, review of data including imaging and labs, discussions with other team members and physicians at least 30  Min so far today, excluding procedures.       Please note that this chart was generated using voice recognition Dragon dictation software. Although every effort was made to ensure the accuracy of this automated transcription, some errors in transcription may have occurred.     Amilcar Busch MD  4/6/2024 11:06 AM   
directives, code status and other major issues was more than 25 minutes     Electronically signed by   NAZIA Victor - CNP  Palliative Care Team  on 4/10/2024 at 10:55 AM    Palliative care office: [phone]    
every effort was made to ensure the accuracy of this automated transcription, some errors in transcription may have occurred.

## 2024-04-10 NOTE — CARE COORDINATION
Patient converted to hospice bed with [facility]. PS sent to Catherine NP requesting admission order

## 2024-04-10 NOTE — PLAN OF CARE
Problem: Discharge Planning  Goal: Discharge to home or other facility with appropriate resources  Outcome: Progressing     Problem: Safety - Adult  Goal: Free from fall injury  Outcome: Progressing     Problem: Pain  Goal: Verbalizes/displays adequate comfort level or baseline comfort level  Outcome: Progressing     Problem: Confusion  Goal: Confusion, delirium, dementia, or psychosis is improved or at baseline  Description: INTERVENTIONS:  1. Assess for possible contributors to thought disturbance, including medications, impaired vision or hearing, underlying metabolic abnormalities, dehydration, psychiatric diagnoses, and notify attending LIP  2. [place] high risk fall precautions, as indicated  3. Provide frequent short contacts to provide reality reorientation, refocusing and direction  4. Decrease environmental stimuli, including noise as appropriate  5. Monitor and intervene to maintain adequate nutrition, hydration, elimination, sleep and activity  6. If unable to ensure safety without constant attention obtain sitter and review sitter guidelines with assigned personnel  7. Initiate Psychosocial CNS and Spiritual Care consult, as indicated  Outcome: Progressing     
  Problem: Respiratory - Adult  Goal: Achieves optimal ventilation and oxygenation  4/8/2024 2153 by Radha Zamorano RCP  Outcome: Progressing   PROVIDE ADEQUATE OXYGENATION WITH ACCEPTABLE SP02/ABG'S    [x]  IDENTIFY APPROPRIATE OXYGEN THERAPY  [x]   MONITOR SP02/ABG'S AS NEEDED   [x]   PATIENT EDUCATION AS NEEDED  NONINVASIVE VENTILATION    PROVIDE OPTIMAL VENTILATION/ACCEPTABLE SPO2   IMPLEMENT NONINVASIVE VENTILATION PROTOCOL   MAINTAIN ACCEPTABLE SPO2   ASSESS SKIN INTEGRITY/BREAKDOWN SCORE   PATIENT EDUCATION AS NEEDED   BIPAP AS NEEDED        
  Problem: Respiratory - Adult  Goal: Achieves optimal ventilation and oxygenation  Outcome: Progressing     
  Problem: Safety - Adult  Goal: Free from fall injury  3/31/2024 1135 by Patricia Rodrigues RN  Outcome: Progressing     Problem: Discharge Planning  Goal: Discharge to home or other facility with appropriate resources  3/31/2024 1135 by Patricia Rodrigues RN  Outcome: Progressing     Problem: Pain  Goal: Verbalizes/displays adequate comfort level or baseline comfort level  3/31/2024 1135 by Patricia Rodrigues RN  Outcome: Progressing     Problem: Confusion  Goal: Confusion, delirium, dementia, or psychosis is improved or at baseline  Description: INTERVENTIONS:  1. Assess for possible contributors to thought disturbance, including medications, impaired vision or hearing, underlying metabolic abnormalities, dehydration, psychiatric diagnoses, and notify attending LIP  2. [place] high risk fall precautions, as indicated  3. Provide frequent short contacts to provide reality reorientation, refocusing and direction  4. Decrease environmental stimuli, including noise as appropriate  5. Monitor and intervene to maintain adequate nutrition, hydration, elimination, sleep and activity  6. If unable to ensure safety without constant attention obtain sitter and review sitter guidelines with assigned personnel  7. Initiate Psychosocial CNS and Spiritual Care consult, as indicated  Outcome: Progressing     
  Problem: Safety - Adult  Goal: Free from fall injury  4/2/2024 0638 by Natalya Anne RN  Outcome: Progressing  4/1/2024 1950 by Patricia Rodrigues RN  Outcome: Progressing     Problem: Discharge Planning  Goal: Discharge to home or other facility with appropriate resources  4/2/2024 0638 by Natalya Anne RN  Outcome: Progressing  4/1/2024 1950 by Patricia Rodrigues RN  Outcome: Progressing     Problem: Pain  Goal: Verbalizes/displays adequate comfort level or baseline comfort level  4/2/2024 0638 by Natalya Anne RN  Outcome: Progressing  4/1/2024 1950 by Patricia Rodrigues RN  Outcome: Progressing     Problem: Confusion  Goal: Confusion, delirium, dementia, or psychosis is improved or at baseline  Description: INTERVENTIONS:  1. Assess for possible contributors to thought disturbance, including medications, impaired vision or hearing, underlying metabolic abnormalities, dehydration, psychiatric diagnoses, and notify attending LIP  2. [place] high risk fall precautions, as indicated  3. Provide frequent short contacts to provide reality reorientation, refocusing and direction  4. Decrease environmental stimuli, including noise as appropriate  5. Monitor and intervene to maintain adequate nutrition, hydration, elimination, sleep and activity  6. If unable to ensure safety without constant attention obtain sitter and review sitter guidelines with assigned personnel  7. Initiate Psychosocial CNS and Spiritual Care consult, as indicated  4/2/2024 0638 by Natalya Anne RN  Outcome: Progressing  4/1/2024 1950 by Patricia Rodrigues RN  Outcome: Progressing     Problem: Respiratory - Adult  Goal: Achieves optimal ventilation and oxygenation  4/2/2024 0638 by Natalya Anne RN  Outcome: Progressing  4/1/2024 1950 by Patricia Rodrigues RN  Outcome: Progressing     Problem: Skin/Tissue Integrity  Goal: Absence of new skin breakdown  Description: 1.  Monitor for areas of redness and/or skin breakdown  2.  Assess 
  Problem: Safety - Adult  Goal: Free from fall injury  4/2/2024 0934 by Jihan Boyle RN  Outcome: Progressing     Problem: Discharge Planning  Goal: Discharge to home or other facility with appropriate resources  4/2/2024 0934 by Jihan Boyle RN  Outcome: Progressing     Problem: Pain  Goal: Verbalizes/displays adequate comfort level or baseline comfort level  4/2/2024 0934 by Jihan Boyle RN  Outcome: Progressing  Flowsheets (Taken 4/2/2024 0800)  Verbalizes/displays adequate comfort level or baseline comfort level:   Encourage patient to monitor pain and request assistance   Assess pain using appropriate pain scale     Problem: Confusion  Goal: Confusion, delirium, dementia, or psychosis is improved or at baseline  Description: INTERVENTIONS:  1. Assess for possible contributors to thought disturbance, including medications, impaired vision or hearing, underlying metabolic abnormalities, dehydration, psychiatric diagnoses, and notify attending LIP  2. [place] high risk fall precautions, as indicated  3. Provide frequent short contacts to provide reality reorientation, refocusing and direction  4. Decrease environmental stimuli, including noise as appropriate  5. Monitor and intervene to maintain adequate nutrition, hydration, elimination, sleep and activity  6. If unable to ensure safety without constant attention obtain sitter and review sitter guidelines with assigned personnel  7. Initiate Psychosocial CNS and Spiritual Care consult, as indicated  4/2/2024 0934 by Jihan Boyle RN  Outcome: Progressing     Problem: Respiratory - Adult  Goal: Achieves optimal ventilation and oxygenation  4/2/2024 0934 by Jihan Boyle RN  Outcome: Progressing     
  Problem: Safety - Adult  Goal: Free from fall injury  4/3/2024 1524 by Jihan Boyle RN  Outcome: Progressing     Problem: Discharge Planning  Goal: Discharge to home or other facility with appropriate resources  4/3/2024 1524 by Jihan Boyle RN  Outcome: Progressing     Problem: Pain  Goal: Verbalizes/displays adequate comfort level or baseline comfort level  4/3/2024 1524 by Jihan Boyle RN  Outcome: Progressing  Flowsheets (Taken 4/3/2024 0800)  Verbalizes/displays adequate comfort level or baseline comfort level:   Encourage patient to monitor pain and request assistance   Assess pain using appropriate pain scale     Problem: Confusion  Goal: Confusion, delirium, dementia, or psychosis is improved or at baseline  Description: INTERVENTIONS:  1. Assess for possible contributors to thought disturbance, including medications, impaired vision or hearing, underlying metabolic abnormalities, dehydration, psychiatric diagnoses, and notify attending LIP  2. [place] high risk fall precautions, as indicated  3. Provide frequent short contacts to provide reality reorientation, refocusing and direction  4. Decrease environmental stimuli, including noise as appropriate  5. Monitor and intervene to maintain adequate nutrition, hydration, elimination, sleep and activity  6. If unable to ensure safety without constant attention obtain sitter and review sitter guidelines with assigned personnel  7. Initiate Psychosocial CNS and Spiritual Care consult, as indicated  4/3/2024 1524 by Jihan Boyle RN  Outcome: Progressing     
  Problem: Safety - Adult  Goal: Free from fall injury  4/3/2024 1937 by Serg Stearns RN  Outcome: Progressing  4/3/2024 1524 by Jihan Boyle RN  Outcome: Progressing  4/3/2024 0742 by Jihan Boyle RN  Outcome: Progressing     Problem: Discharge Planning  Goal: Discharge to home or other facility with appropriate resources  4/3/2024 1937 by Serg Stearns RN  Outcome: Progressing  4/3/2024 1524 by Jihan Boyle RN  Outcome: Progressing  4/3/2024 0742 by Jihan Boyle RN  Outcome: Progressing     Problem: Pain  Goal: Verbalizes/displays adequate comfort level or baseline comfort level  4/3/2024 1937 by Serg Stearns RN  Outcome: Progressing  4/3/2024 1524 by Jihan Boyle RN  Outcome: Progressing  Flowsheets (Taken 4/3/2024 0800)  Verbalizes/displays adequate comfort level or baseline comfort level:   Encourage patient to monitor pain and request assistance   Assess pain using appropriate pain scale  4/3/2024 0742 by Jihan Boyle RN  Outcome: Progressing     Problem: Confusion  Goal: Confusion, delirium, dementia, or psychosis is improved or at baseline  Description: INTERVENTIONS:  1. Assess for possible contributors to thought disturbance, including medications, impaired vision or hearing, underlying metabolic abnormalities, dehydration, psychiatric diagnoses, and notify attending LIP  2. [place] high risk fall precautions, as indicated  3. Provide frequent short contacts to provide reality reorientation, refocusing and direction  4. Decrease environmental stimuli, including noise as appropriate  5. Monitor and intervene to maintain adequate nutrition, hydration, elimination, sleep and activity  6. If unable to ensure safety without constant attention obtain sitter and review sitter guidelines with assigned personnel  7. Initiate Psychosocial CNS and Spiritual Care consult, as indicated  4/3/2024 1937 by Serg Stearns RN  Outcome: Progressing  4/3/2024 1524 by [place] 
  Problem: Safety - Adult  Goal: Free from fall injury  4/4/2024 1943 by Serg Stearns RN  Outcome: Progressing  4/4/2024 1032 by Radha Gaston RN  Outcome: Progressing     Problem: Discharge Planning  Goal: Discharge to home or other facility with appropriate resources  4/4/2024 1943 by Serg Stearns RN  Outcome: Progressing  4/4/2024 1032 by Radha Gaston RN  Outcome: Progressing     Problem: Pain  Goal: Verbalizes/displays adequate comfort level or baseline comfort level  4/4/2024 1943 by Serg Stearns RN  Outcome: Progressing  4/4/2024 1032 by Radha Gaston RN  Outcome: Progressing  Flowsheets (Taken 4/4/2024 0800)  Verbalizes/displays adequate comfort level or baseline comfort level: Assess pain using appropriate pain scale     Problem: Confusion  Goal: Confusion, delirium, dementia, or psychosis is improved or at baseline  Description: INTERVENTIONS:  1. Assess for possible contributors to thought disturbance, including medications, impaired vision or hearing, underlying metabolic abnormalities, dehydration, psychiatric diagnoses, and notify attending LIP  2. [place] high risk fall precautions, as indicated  3. Provide frequent short contacts to provide reality reorientation, refocusing and direction  4. Decrease environmental stimuli, including noise as appropriate  5. Monitor and intervene to maintain adequate nutrition, hydration, elimination, sleep and activity  6. If unable to ensure safety without constant attention obtain sitter and review sitter guidelines with assigned personnel  7. Initiate Psychosocial CNS and Spiritual Care consult, as indicated  4/4/2024 1943 by Serg Stearns RN  Outcome: Progressing  4/4/2024 1032 by Radha Gaston RN  Outcome: Progressing     Problem: Respiratory - Adult  Goal: Achieves optimal ventilation and oxygenation  4/4/2024 1943 by Serg Stearns RN  Outcome: Progressing  4/4/2024 1032 by Radha Gaston 
  Problem: Safety - Adult  Goal: Free from fall injury  4/5/2024 1937 by Serg Stearns RN  Outcome: Progressing  4/5/2024 1402 by Yvonne Mc RN  Outcome: Progressing     Problem: Discharge Planning  Goal: Discharge to home or other facility with appropriate resources  4/5/2024 1937 by Serg Stearns RN  Outcome: Progressing  4/5/2024 1402 by Yvonne Mc RN  Outcome: Progressing     Problem: Pain  Goal: Verbalizes/displays adequate comfort level or baseline comfort level  4/5/2024 1937 by Serg Stearns RN  Outcome: Progressing  4/5/2024 1402 by Yvonne Mc RN  Outcome: Progressing     Problem: Confusion  Goal: Confusion, delirium, dementia, or psychosis is improved or at baseline  Description: INTERVENTIONS:  1. Assess for possible contributors to thought disturbance, including medications, impaired vision or hearing, underlying metabolic abnormalities, dehydration, psychiatric diagnoses, and notify attending LIP  2. [place] high risk fall precautions, as indicated  3. Provide frequent short contacts to provide reality reorientation, refocusing and direction  4. Decrease environmental stimuli, including noise as appropriate  5. Monitor and intervene to maintain adequate nutrition, hydration, elimination, sleep and activity  6. If unable to ensure safety without constant attention obtain sitter and review sitter guidelines with assigned personnel  7. Initiate Psychosocial CNS and Spiritual Care consult, as indicated  4/5/2024 1937 by Serg Stearns RN  Outcome: Progressing  4/5/2024 1402 by Yvonne Mc RN  Outcome: Progressing     Problem: Respiratory - Adult  Goal: Achieves optimal ventilation and oxygenation  4/5/2024 1937 by Serg Stearns RN  Outcome: Progressing  4/5/2024 1402 by Yvonne Mc RN  Outcome: Progressing     Problem: Skin/Tissue Integrity  Goal: Absence of new skin breakdown  Description: 1.  Monitor for areas of redness and/or skin breakdown  2.  Assess vascular access 
  Problem: Safety - Adult  Goal: Free from fall injury  4/7/2024 0850 by Betty Gomez RN  Outcome: Progressing     Problem: Discharge Planning  Goal: Discharge to home or other facility with appropriate resources  4/7/2024 0850 by Betty Gomez RN  Outcome: Progressing     Problem: Pain  Goal: Verbalizes/displays adequate comfort level or baseline comfort level  4/7/2024 0850 by Betty Gomez RN  Outcome: Progressing     Problem: Confusion  Goal: Confusion, delirium, dementia, or psychosis is improved or at baseline  Description: INTERVENTIONS:  1. Assess for possible contributors to thought disturbance, including medications, impaired vision or hearing, underlying metabolic abnormalities, dehydration, psychiatric diagnoses, and notify attending LIP  2. [place] high risk fall precautions, as indicated  3. Provide frequent short contacts to provide reality reorientation, refocusing and direction  4. Decrease environmental stimuli, including noise as appropriate  5. Monitor and intervene to maintain adequate nutrition, hydration, elimination, sleep and activity  6. If unable to ensure safety without constant attention obtain sitter and review sitter guidelines with assigned personnel  7. Initiate Psychosocial CNS and Spiritual Care consult, as indicated  4/7/2024 0850 by Betty Gomez RN  Outcome: Progressing     Problem: Respiratory - Adult  Goal: Achieves optimal ventilation and oxygenation  4/7/2024 0850 by Betty Gomez RN  Outcome: Progressing     Problem: Skin/Tissue Integrity  Goal: Absence of new skin breakdown  Description: 1.  Monitor for areas of redness and/or skin breakdown  2.  Assess vascular access sites hourly  3.  Every 4-6 hours minimum:  Change oxygen saturation probe site  4.  Every 4-6 hours:  If on nasal continuous positive airway pressure, respiratory therapy assess nares and determine need for appliance change or resting period.  4/7/2024 0850 by Betty Gomez 
  Problem: Safety - Adult  Goal: Free from fall injury  4/8/2024 1548 by Rodger Hilton RN  Outcome: Progressing     Problem: ABCDS Injury Assessment  Goal: Absence of physical injury  4/8/2024 1548 by Rodger Hilton RN  Outcome: Progressing     Problem: Skin/Tissue Integrity  Goal: Absence of new skin breakdown  Description: 1.  Monitor for areas of redness and/or skin breakdown  2.  Assess vascular access sites hourly  3.  Every 4-6 hours minimum:  Change oxygen saturation probe site  4.  Every 4-6 hours:  If on nasal continuous positive airway pressure, respiratory therapy assess nares and determine need for appliance change or resting period.  4/8/2024 1548 by Rodger Hilton, RN  Outcome: Progressing     Problem: Respiratory - Adult  Goal: Achieves optimal ventilation and oxygenation  4/8/2024 1548 by Rodger Hilton, RN  Outcome: Progressing     
  Problem: Safety - Adult  Goal: Free from fall injury  4/8/2024 2122 by Quinn Knox RN  Outcome: Progressing  4/8/2024 1548 by Rodger Hilton RN  Outcome: Progressing     Problem: Discharge Planning  Goal: Discharge to home or other facility with appropriate resources  4/8/2024 2122 by Quinn Knox RN  Outcome: Progressing  4/8/2024 1548 by Rodger Hilton RN  Outcome: Progressing     Problem: Pain  Goal: Verbalizes/displays adequate comfort level or baseline comfort level  4/8/2024 2122 by Quinn Knox RN  Outcome: Progressing  4/8/2024 1548 by Rodger Hilton RN  Outcome: Progressing     Problem: Confusion  Goal: Confusion, delirium, dementia, or psychosis is improved or at baseline  Description: INTERVENTIONS:  1. Assess for possible contributors to thought disturbance, including medications, impaired vision or hearing, underlying metabolic abnormalities, dehydration, psychiatric diagnoses, and notify attending LIP  2. [place] high risk fall precautions, as indicated  3. Provide frequent short contacts to provide reality reorientation, refocusing and direction  4. Decrease environmental stimuli, including noise as appropriate  5. Monitor and intervene to maintain adequate nutrition, hydration, elimination, sleep and activity  6. If unable to ensure safety without constant attention obtain sitter and review sitter guidelines with assigned personnel  7. Initiate Psychosocial CNS and Spiritual Care consult, as indicated  4/8/2024 2122 by Quinn Knox RN  Outcome: Progressing  4/8/2024 1548 by Rodger Hilton RN  Outcome: Progressing     Problem: Respiratory - Adult  Goal: Achieves optimal ventilation and oxygenation  4/8/2024 2122 by Quinn Knox RN  Outcome: Progressing  4/8/2024 1548 by Rodger Hilton RN  Outcome: Progressing     Problem: Skin/Tissue Integrity  Goal: Absence of new skin breakdown  Description: 1.  Monitor for areas of redness and/or skin 
  Problem: Safety - Adult  Goal: Free from fall injury  4/9/2024 0759 by Eliud Javier RN  Outcome: Progressing  4/8/2024 2122 by Quinn Knox RN  Outcome: Progressing     Problem: Discharge Planning  Goal: Discharge to home or other facility with appropriate resources  4/9/2024 0759 by Eliud Javier RN  Outcome: Progressing  4/8/2024 2122 by Quinn Knox RN  Outcome: Progressing     Problem: Pain  Goal: Verbalizes/displays adequate comfort level or baseline comfort level  4/8/2024 2122 by Quinn Knox RN  Outcome: Progressing     Problem: Confusion  Goal: Confusion, delirium, dementia, or psychosis is improved or at baseline  Description: INTERVENTIONS:  1. Assess for possible contributors to thought disturbance, including medications, impaired vision or hearing, underlying metabolic abnormalities, dehydration, psychiatric diagnoses, and notify attending LIP  2. [place] high risk fall precautions, as indicated  3. Provide frequent short contacts to provide reality reorientation, refocusing and direction  4. Decrease environmental stimuli, including noise as appropriate  5. Monitor and intervene to maintain adequate nutrition, hydration, elimination, sleep and activity  6. If unable to ensure safety without constant attention obtain sitter and review sitter guidelines with assigned personnel  7. Initiate Psychosocial CNS and Spiritual Care consult, as indicated  4/8/2024 2122 by Quinn Knox RN  Outcome: Progressing     
  Problem: Safety - Adult  Goal: Free from fall injury  4/9/2024 2114 by Ani Christensen, RN  Outcome: Progressing     Problem: Discharge Planning  Goal: Discharge to home or other facility with appropriate resources  4/9/2024 2114 by Ani Christensen, RN  Outcome: Progressing     Problem: Pain  Goal: Verbalizes/displays adequate comfort level or baseline comfort level  Outcome: Progressing     Problem: Confusion  Goal: Confusion, delirium, dementia, or psychosis is improved or at baseline  Description: INTERVENTIONS:  1. Assess for possible contributors to thought disturbance, including medications, impaired vision or hearing, underlying metabolic abnormalities, dehydration, psychiatric diagnoses, and notify attending LIP  2. [place] high risk fall precautions, as indicated  3. Provide frequent short contacts to provide reality reorientation, refocusing and direction  4. Decrease environmental stimuli, including noise as appropriate  5. Monitor and intervene to maintain adequate nutrition, hydration, elimination, sleep and activity  6. If unable to ensure safety without constant attention obtain sitter and review sitter guidelines with assigned personnel  7. Initiate Psychosocial CNS and Spiritual Care consult, as indicated  Outcome: Progressing     Problem: Respiratory - Adult  Goal: Achieves optimal ventilation and oxygenation  Outcome: Progressing     Problem: Skin/Tissue Integrity  Goal: Absence of new skin breakdown  Description: 1.  Monitor for areas of redness and/or skin breakdown  2.  Assess vascular access sites hourly  3.  Every 4-6 hours minimum:  Change oxygen saturation probe site  4.  Every 4-6 hours:  If on nasal continuous positive airway pressure, respiratory therapy assess nares and determine need for appliance change or resting period.  Outcome: Progressing     Problem: ABCDS Injury Assessment  Goal: Absence of physical injury  Outcome: Progressing     
  Problem: Safety - Adult  Goal: Free from fall injury  Outcome: Progressing     Problem: Discharge Planning  Goal: Discharge to home or other facility with appropriate resources  Outcome: Progressing     Problem: Pain  Goal: Verbalizes/displays adequate comfort level or baseline comfort level  Outcome: Progressing     
  Problem: Safety - Adult  Goal: Free from fall injury  Outcome: Progressing     Problem: Discharge Planning  Goal: Discharge to home or other facility with appropriate resources  Outcome: Progressing     Problem: Pain  Goal: Verbalizes/displays adequate comfort level or baseline comfort level  Outcome: Progressing     Problem: Confusion  Goal: Confusion, delirium, dementia, or psychosis is improved or at baseline  Description: INTERVENTIONS:  1. Assess for possible contributors to thought disturbance, including medications, impaired vision or hearing, underlying metabolic abnormalities, dehydration, psychiatric diagnoses, and notify attending LIP  2. [place] high risk fall precautions, as indicated  3. Provide frequent short contacts to provide reality reorientation, refocusing and direction  4. Decrease environmental stimuli, including noise as appropriate  5. Monitor and intervene to maintain adequate nutrition, hydration, elimination, sleep and activity  6. If unable to ensure safety without constant attention obtain sitter and review sitter guidelines with assigned personnel  7. Initiate Psychosocial CNS and Spiritual Care consult, as indicated  Outcome: Progressing     Problem: Respiratory - Adult  Goal: Achieves optimal ventilation and oxygenation  Outcome: Progressing     Problem: Skin/Tissue Integrity  Goal: Absence of new skin breakdown  Description: 1.  Monitor for areas of redness and/or skin breakdown  2.  Assess vascular access sites hourly  3.  Every 4-6 hours minimum:  Change oxygen saturation probe site  4.  Every 4-6 hours:  If on nasal continuous positive airway pressure, respiratory therapy assess nares and determine need for appliance change or resting period.  Outcome: Progressing     Problem: ABCDS Injury Assessment  Goal: Absence of physical injury  Outcome: Progressing     
  Problem: Safety - Adult  Goal: Free from fall injury  Outcome: Progressing     Problem: Discharge Planning  Goal: Discharge to home or other facility with appropriate resources  Outcome: Progressing     Problem: Pain  Goal: Verbalizes/displays adequate comfort level or baseline comfort level  Outcome: Progressing     Problem: Confusion  Goal: Confusion, delirium, dementia, or psychosis is improved or at baseline  Description: INTERVENTIONS:  1. Assess for possible contributors to thought disturbance, including medications, impaired vision or hearing, underlying metabolic abnormalities, dehydration, psychiatric diagnoses, and notify attending LIP  2. [place] high risk fall precautions, as indicated  3. Provide frequent short contacts to provide reality reorientation, refocusing and direction  4. Decrease environmental stimuli, including noise as appropriate  5. Monitor and intervene to maintain adequate nutrition, hydration, elimination, sleep and activity  6. If unable to ensure safety without constant attention obtain sitter and review sitter guidelines with assigned personnel  7. Initiate Psychosocial CNS and Spiritual Care consult, as indicated  Outcome: Progressing     Problem: Respiratory - Adult  Goal: Achieves optimal ventilation and oxygenation  4/1/2024 1950 by Patricia Rodrigues RN  Outcome: Progressing     
  Problem: Safety - Adult  Goal: Free from fall injury  Outcome: Progressing     Problem: Discharge Planning  Goal: Discharge to home or other facility with appropriate resources  Outcome: Progressing     Problem: Pain  Goal: Verbalizes/displays adequate comfort level or baseline comfort level  Outcome: Progressing     Problem: Confusion  Goal: Confusion, delirium, dementia, or psychosis is improved or at baseline  Description: INTERVENTIONS:  1. Assess for possible contributors to thought disturbance, including medications, impaired vision or hearing, underlying metabolic abnormalities, dehydration, psychiatric diagnoses, and notify attending LIP  2. [place] high risk fall precautions, as indicated  3. Provide frequent short contacts to provide reality reorientation, refocusing and direction  4. Decrease environmental stimuli, including noise as appropriate  5. Monitor and intervene to maintain adequate nutrition, hydration, elimination, sleep and activity  6. If unable to ensure safety without constant attention obtain sitter and review sitter guidelines with assigned personnel  7. Initiate Psychosocial CNS and Spiritual Care consult, as indicated  Outcome: Progressing     Problem: Respiratory - Adult  Goal: Achieves optimal ventilation and oxygenation  Outcome: Progressing     Problem: Skin/Tissue Integrity  Goal: Absence of new skin breakdown  Description: 1.  Monitor for areas of redness and/or skin breakdown  2.  Assess vascular access sites hourly  3.  Every 4-6 hours minimum:  Change oxygen saturation probe site  4.  Every 4-6 hours:  If on nasal continuous positive airway pressure, respiratory therapy assess nares and determine need for appliance change or resting period.  Outcome: Progressing     Problem: ABCDS Injury Assessment  Goal: Absence of physical injury  Outcome: Progressing     Electronically signed by Betty Gomez RN on 4/6/2024 at 9:54 AM    
  Problem: Safety - Adult  Goal: Free from fall injury  Outcome: Progressing     Problem: Discharge Planning  Goal: Discharge to home or other facility with appropriate resources  Outcome: Progressing     Problem: Pain  Goal: Verbalizes/displays adequate comfort level or baseline comfort level  Outcome: Progressing     Problem: Confusion  Goal: Confusion, delirium, dementia, or psychosis is improved or at baseline  Description: INTERVENTIONS:  1. Assess for possible contributors to thought disturbance, including medications, impaired vision or hearing, underlying metabolic abnormalities, dehydration, psychiatric diagnoses, and notify attending LIP  2. [place] high risk fall precautions, as indicated  3. Provide frequent short contacts to provide reality reorientation, refocusing and direction  4. Decrease environmental stimuli, including noise as appropriate  5. Monitor and intervene to maintain adequate nutrition, hydration, elimination, sleep and activity  6. If unable to ensure safety without constant attention obtain sitter and review sitter guidelines with assigned personnel  7. Initiate Psychosocial CNS and Spiritual Care consult, as indicated  Outcome: Progressing     
  Problem: Safety - Adult  Goal: Free from fall injury  Outcome: Progressing     Problem: Discharge Planning  Goal: Discharge to home or other facility with appropriate resources  Outcome: Progressing     Problem: Pain  Goal: Verbalizes/displays adequate comfort level or baseline comfort level  Outcome: Progressing     Problem: Confusion  Goal: Confusion, delirium, dementia, or psychosis is improved or at baseline  Description: INTERVENTIONS:  1. Assess for possible contributors to thought disturbance, including medications, impaired vision or hearing, underlying metabolic abnormalities, dehydration, psychiatric diagnoses, and notify attending LIP  2. [place] high risk fall precautions, as indicated  3. Provide frequent short contacts to provide reality reorientation, refocusing and direction  4. Decrease environmental stimuli, including noise as appropriate  5. Monitor and intervene to maintain adequate nutrition, hydration, elimination, sleep and activity  6. If unable to ensure safety without constant attention obtain sitter and review sitter guidelines with assigned personnel  7. Initiate Psychosocial CNS and Spiritual Care consult, as indicated  Outcome: Progressing     Problem: Respiratory - Adult  Goal: Achieves optimal ventilation and oxygenation  Outcome: Progressing     Problem: Skin/Tissue Integrity  Goal: Absence of new skin breakdown  Description: 1.  Monitor for areas of redness and/or skin breakdown  2.  Assess vascular access sites hourly  3.  Every 4-6 hours minimum:  Change oxygen saturation probe site  4.  Every 4-6 hours:  If on nasal continuous positive airway pressure, respiratory therapy assess nares and determine need for appliance change or resting period.  Outcome: Progressing     Problem: ABCDS Injury Assessment  Goal: Absence of physical injury  Outcome: Progressing     
  Problem: Safety - Adult  Goal: Free from fall injury  Outcome: Progressing     Problem: Discharge Planning  Goal: Discharge to home or other facility with appropriate resources  Outcome: Progressing     Problem: Pain  Goal: Verbalizes/displays adequate comfort level or baseline comfort level  Outcome: Progressing  Flowsheets (Taken 4/2/2024 1600 by Jihan Boyle RN)  Verbalizes/displays adequate comfort level or baseline comfort level:   Encourage patient to monitor pain and request assistance   Assess pain using appropriate pain scale     Problem: Confusion  Goal: Confusion, delirium, dementia, or psychosis is improved or at baseline  Description: INTERVENTIONS:  1. Assess for possible contributors to thought disturbance, including medications, impaired vision or hearing, underlying metabolic abnormalities, dehydration, psychiatric diagnoses, and notify attending LIP  2. [place] high risk fall precautions, as indicated  3. Provide frequent short contacts to provide reality reorientation, refocusing and direction  4. Decrease environmental stimuli, including noise as appropriate  5. Monitor and intervene to maintain adequate nutrition, hydration, elimination, sleep and activity  6. If unable to ensure safety without constant attention obtain sitter and review sitter guidelines with assigned personnel  7. Initiate Psychosocial CNS and Spiritual Care consult, as indicated  Outcome: Progressing     Problem: Respiratory - Adult  Goal: Achieves optimal ventilation and oxygenation  Outcome: Progressing     Problem: Skin/Tissue Integrity  Goal: Absence of new skin breakdown  Description: 1.  Monitor for areas of redness and/or skin breakdown  2.  Assess vascular access sites hourly  3.  Every 4-6 hours minimum:  Change oxygen saturation probe site  4.  Every 4-6 hours:  If on nasal continuous positive airway pressure, respiratory therapy assess nares and determine need for appliance change or resting 
Dr. Durham and I had a long discussion with the patient's (David PINEDA) family in person, in presence of the RN taking care of the patient. Patient's current clinical condition, laboratory and radiographic findings as well as recommendations of physicians consulted on the case were discussed with the patient's family in detail in simple English. All questions and concerns of the family were addressed, and appropriate emotional support was provided. After understanding patient's current medical condition, family decided that they wanted to continue the ongoing treatment but in case patient's heart stops (cardiac arrest) or the patient stops breathing (respiratory arrest), they do not want any chest compressions, insertion of a breathing tube down patient's throat for respiratory support or other similar  resuscitative measures to be performed on the patient. They requested that if such a condition arises, the patient should be made comfortable and nature should be allowed to take its course. They asked that patient's code status should be changed to DNRCCA (no intubation), and signed the DNRCCA order form in my presence, which was witnessed by RN, Osiris.     Will honor family's wishes, and will change patient's code status to DNRCCA (no intubation).      Adalberto Sales MD  Pager: [phone]  Department of Internal Medicine,  [facility] [place] ([place])             3/30/2024, 11:39 AM    
N, RN  Outcome: Not Progressing  4/9/2024 2114 by Ani Christensen RN  Outcome: Progressing     
injury  Outcome: Progressing  Flowsheets (Taken 4/4/2024 1029)  Absence of Physical Injury: Implement safety measures based on patient assessment

## 2024-04-10 NOTE — PROGRESS NOTES
Pt received from MICU as Hospice inpatient. Pt is non responsive to pain. Noted patient with increase work of breathing and somewhat anxious, medicated for comfort. Pt family at bedside grieving appropriate. Educated son Marek on s/s of death.  Hospice Nurse Miriam in to see family. Will continue to monitor.

## 2024-04-10 NOTE — H&P
[facility]  Office: [phone]  Fer Villegas DO, Francis Braswell DO, Prabhakar Gillette DO, Jose Veliz DO, Jessica Petty MD, Karli Lu MD, Loida Velazquez MD, Nicole Resendiz MD,  Norberto Mondragon MD, Anat Farias MD, Charles Nieves MD,  Enrike Beth DO, Regis Shell MD, Kenny Munson MD, Misael Villegas DO, Cari Liang MD,  Eliud Jara DO, Ayana Watson MD, Mikayla Durham MD, Jenna Lucero MD, Austin Kelly MD,  Joe Brannon MD, Karthik Hernández MD, Jeffrey Pineda MD, Gisella Dickerson MD, Dany Guaman MD, Ruddy Martins MD, Warner Luke DO, Raymundo Penn DO, Donna Guajardo MD,  Zaheer Gray MD, Shirley Waterhouse, CNP,  Lina Morrow CNP, Maged Baptiste, CNP,  Zainab Blackwood, DNP, Merna Martinez, CNP, Radha Johnson, CNP, Taty Antonio, CNP, Darlene Weiner, CNP, Gaby Garcia, PA-C, Ebony Murray PA-C, Catherine Benito, CNP, Liberty Tineo, CNP, Tremayne Middleton, CNP, Chaparrita Amaya, CNP, Ginger Obando, CNP, Sandhya Cerna, CNS, Maranda Ambrosio, CNP, Tram Almonte CNP, Tracy Schwab, CNP         [facility]   IN-PATIENT SERVICE   [facility]    HISTORY AND PHYSICAL EXAMINATION            Date:   4/10/2024  Patient name:  Clemente Garcia  Date of admission:  4/10/2024 12:05 PM  MRN:   [id number]  Account:  [id number]  YOB: 1960  PCP:    Milana Whalen MD  Room:   2018/2018-01  Code Status:    DNR-CC    Chief Complaint:     Downgraded from ICU from hospice care    History Obtained From:     Medical records/ icu team sign out    History of Present Illness:     Clemente Garcia is a 63 y.o. Non- / non  female who is a ICU transfer to inpatient hospice bed today.     She has a  past medical history of hypothyroidism, chronic hypoxic respiratory failure with home oxygen between 2-5L, and  liver cirrhosis getting paracentesis who presented to [place] emergency department on 3/29 from her ECF after an episode of loss of consciousness.   Bottle topically as needed for Dry Skin Apply topically for dry skin    Stef Antunez MD   aspirin (ASPIRIN 81) 81 MG EC tablet Take 1 tablet by mouth daily Via PEG-tube    Stef Antunez MD   LORazepam (ATIVAN) 0.5 MG tablet 1 tablet by PEG Tube route every 6 hours as needed for Anxiety.    Stef nAtunez MD   vitamin D (CHOLECALCIFEROL) 12162 UNIT CAPS Take 1 capsule by mouth daily Via PEG-tube    Stef Antunez MD   ferrous sulfate (IRON 325) 325 (65 Fe) MG tablet 1 tablet by PEG Tube route daily    Stef Antunez MD   Vitamin D3 125 MCG (5000 UT) TABS tablet 1 tablet by PEG Tube route daily    Stef Antunez MD   pantoprazole (PROTONIX) 40 MG tablet Take 1 tablet by mouth daily Via PEG-tube    Stef Antunez MD   gabapentin (NEURONTIN) 100 MG capsule 1 capsule by PEG Tube route every 8 hours as needed (neuropathic pain) for up to 30 days. 2/23/24 3/24/24  Chris Jacobs DO   thiamine mononitrate (THIAMINE) 100 MG tablet Take 1 tablet by mouth daily  Patient taking differently: 1 tablet by PEG Tube route daily 2/24/24   Chris Jacobs DO   levothyroxine (SYNTHROID) 150 MCG tablet 1 tablet by PEG Tube route Daily 2/24/24   Chris Jacobs DO   midodrine (PROAMATINE) 5 MG tablet Take 1 tablet by mouth in the morning, at noon, and at bedtime  Patient taking differently: 1 tablet by PEG Tube route in the morning, at noon, and at bedtime 2/23/24   Chris Jacobs DO   rifAXIMin (XIFAXAN) 550 MG tablet 1 tablet by PEG Tube route in the morning and at bedtime 2/23/24   Chris Jacobs DO   lactulose (CHRONULAC) 10 GM/15ML solution 30 mLs by PEG Tube route 3 times daily Hold if more then 3 stools daily 2/23/24   Chris Jacobs DO   spironolactone (ALDACTONE) 25 MG tablet Take 1 tablet by mouth in the morning and 1 tablet in the evening.  Patient taking differently: 1 tablet by PEG Tube route in the morning and 1 tablet in the evening. 2/23/24    Chris Jacobs DO   folic acid (FOLVITE) 1 MG tablet 1 tablet by PEG Tube route daily 24   Chris Jacobs DO   bisacodyl (DULCOLAX) 10 MG suppository Place 1 suppository rectally daily as needed for Constipation    Stef Antunez MD   fentaNYL (DURAGESIC) 12 MCG/HR Place 1 patch onto the skin every 72 hours.    ProviderStef MD   magnesium hydroxide (MILK OF MAGNESIA) 400 MG/5ML suspension 30 mLs by Per G Tube route daily as needed for Constipation    ProviderStef MD   Multiple Vitamins-Minerals (CENTRUM/CERTA-NIEVES WITH MINERALS ORAL) solution 15 mLs by PEG Tube route daily    Stef Antunez MD   nortriptyline (PAMELOR) 10 MG capsule 1 capsule by PEG Tube route nightly    Stef Antunez MD   acetaminophen (TYLENOL) 325 MG tablet 2 tablets by PEG Tube route every 6 hours as needed for Pain For pain/fever    Stef Antunez MD   venlafaxine (EFFEXOR) 75 MG tablet 1 tablet by PEG Tube route 2 times daily    ProviderStef MD        Allergies:     Patient has no known allergies.    Social History:     Tobacco:    reports that she has never smoked. She does not have any smokeless tobacco history on file.  Alcohol:      reports that she does not currently use alcohol.  Drug Use:  reports no history of drug use.    Family History:     No family history on file.    Review of Systems:     Unable to obtain given patient is unresponsive    Physical Exam:   There were no vitals taken for this visit.  Temp (24hrs), Av.6 °F (36.4 °C), Min:97.2 °F (36.2 °C), Max:98 °F (36.7 °C)    Recent Labs     24  2307   POCGLU 110*     No intake or output data in the 24 hours ending 04/10/24 1327    General Appearance: Obese ill-appearing BiPAP dependent  Mental status: Unresponsive  Head: normocephalic, atraumatic  Ear: normal external ear, no discharge, hearing intact  Nose: no drainage noted  Mouth: mucous membranes moist  Neck: supple, no carotid bruits, thyroid

## 2024-04-11 NOTE — PROGRESS NOTES
was stopped by charge nurse of stepdown unit to contact  stating \"family wanted to be present until patient was picked up\". Writer contacted , gave information, and was informed  cannot  patient until released to [facility]. As  explained procedure,  contacted family in room for further explanation. Family was thankful for  reaching out to .     Electronically signed by Lalit Adair on 4/10/2024 at 11:45 PM

## 2024-04-11 NOTE — PROGRESS NOTES
[facility]   Patient Death Note  DEATH   Shift date: 4.10.2024    Shift day: Wednesday  Shift #: 2                 Room #    Name: Clemente Garcia            Age: 63 y.o.  Gender: female          Sabianist: Amish      Place of Mandaeism: unknown  Admit Date & Time: 4/10/2024 12:05 PM     Referral: Nurse   Actual date of death: 4.10.2024   TOD: 1755       SITUATION AT DEATH:  Cardiac arrest - palliative care    IS THIS A 'S CASE?  No    SPIRITUAL/EMOTIONAL INTERVENTION:  Family sad and tearful.  Overall, family is coping well and determined support available.   provided space for feelings, thoughts, and concerns.  Discussed end of life issues.  Assisted with release of body form.  Addressed questions as needed.  Maintained presence.        Family Received Grief Packet?  No       NAME AND PHONE NUMBER OF DOCTOR SIGNING DEATH CERTIFICATE:  Dr. Karli Lu [phone]    Copy of COMPLETED Release of Body Form Received?  Yes    Patient's belongings: None     HOME:  Name: [place]  [place]  City: [place]  Phone Number: [phone]    NEXT OF KIN:  Name: Marek Garcia  Relationship: Son  Street Address: [address]  City: [place]  State: [place]  Zip code: unknown   Phone Number: [phone]    ANY FOLLOW-UP NEEDED?  No    IF SO, WHAT?  None    Electronically signed by Chaplain Anahi, on 4/10/2024 at 10:00 PM.  [facility]  [phone]     04/10/24 1810   Encounter Summary   Encounter Overview/Reason  Grief, Loss, and Adjustments   Service Provided For: Family;Patient   Referral/Consult From: Nurse   Support System Children;Family members   Last Encounter  04/10/24   Complexity of Encounter High   Begin Time 1810   End Time  1830   Total Time Calculated 20 min   Grief, Loss, and Adjustments   Type Death   Assessment/Intervention/Outcome   Assessment Coping;Sad;Tearful   Intervention Active listening;Discussed illness injury and it’s

## 2024-04-16 NOTE — DISCHARGE SUMMARY
[facility]  Office: [phone]  Fer Villegas DO, Francis Braswell DO, Prabhakar Gillette DO, Jose Veliz DO, Jessica Petty MD, Karli Lu MD, Loida Velazquez MD, Nicole Resendiz MD,  Norberto Mondragon MD, Anat Farias MD, Charles Nieves MD,  Enrike Beth DO, Regis Shell MD, Kenny Munson MD, Misael Villegas DO, Cari Liang MD,  Eliud Jara DO, Ayana Watson MD, Mikayla Durham MD, Jenna Lucero MD, Austin Kelly MD,  Joe Brannon MD, Karthik Hernández MD, Jeffrey Pineda MD, Gisella Dickerson MD, Dany Guaman MD, Ruddy Martins MD, Warner Luke DO, Raymundo Penn DO, Donna Guajardo MD,  Zaheer Gray MD, Shirley Waterhouse, CNP,  Lina Morrow CNP, Maged Baptiste, CNP,  Zainab Blackwood, DNP, Merna Martinez, CNP, Radha Johnson, CNP, Taty Antonio CNP, Darlene Weiner, CNP, Gaby Garcia, PA-C, Ebony Murray PA-C, Catherine Benito, CNP, Liberty Tineo, CNP, Tremayne Middleton, CNP, Chaparrita Amaya, CNP, Ginger Obando, CNP, Sandhya Cerna, CNS, Maranda Ambrosio, CNP, Tram Almonte CNP, Tracy Schwab, CNP         [facility]   IN-PATIENT SERVICE   [facility]    Discharge Summary     Patient ID: Clemente Garcia  :  1960   MRN: [id number]     ACCOUNT:  [id number]   Patient's PCP: Milana Whalen MD  Admit Date: 4/10/2024   Discharge Date:  4/10/2024  Length of Stay: 1  Code Status:  Prior  Admitting Physician: Francis Braswell DO  Discharge Physician: NAZIA Kerr NP     Active Discharge Diagnoses:     Hospital Problem Lists:  Principal Problem:    Acute on chronic respiratory failure (HCC)  Resolved Problems:    * No resolved hospital problems. *      Admission Condition:  critical     Discharged Condition:     Hospital Stay:     Hospital Course:  Clemetne Garcia is a 63 y.o. female who was admitted for the management of   Acute on chronic respiratory failure (HCC) and volume overloaded Acute renal failure 2/2 decompensated liver cirrhosis and

## (undated) DEVICE — PERRYSBURG ENDO PACK: Brand: MEDLINE INDUSTRIES, INC.

## (undated) DEVICE — FORCEPS BX L240CM JAW DIA2.8MM L CAP W/ NDL MIC MESH TOOTH

## (undated) DEVICE — BITEBLOCK 54FR W/ DENT RIM BLOX

## (undated) DEVICE — GLOVE ORANGE PI 7 1/2   MSG9075

## (undated) DEVICE — Device: Brand: DEFENDO VALVE AND CONNECTOR KIT

## (undated) DEVICE — ADAPTER CLEANING PORPOISE CLEANING